# Patient Record
Sex: MALE | Race: WHITE | Employment: FULL TIME | URBAN - METROPOLITAN AREA
[De-identification: names, ages, dates, MRNs, and addresses within clinical notes are randomized per-mention and may not be internally consistent; named-entity substitution may affect disease eponyms.]

---

## 2017-01-27 RX ORDER — TRAMADOL HYDROCHLORIDE 50 MG/1
50 TABLET ORAL EVERY 6 HOURS PRN
Qty: 90 TABLET | Refills: 0 | Status: SHIPPED | OUTPATIENT
Start: 2017-01-27 | End: 2017-02-16 | Stop reason: SDUPTHER

## 2017-02-16 RX ORDER — TRAMADOL HYDROCHLORIDE 50 MG/1
50 TABLET ORAL EVERY 6 HOURS PRN
Qty: 90 TABLET | Refills: 0 | Status: SHIPPED | OUTPATIENT
Start: 2017-02-16 | End: 2017-03-16 | Stop reason: SDUPTHER

## 2017-03-17 RX ORDER — TRAMADOL HYDROCHLORIDE 50 MG/1
50 TABLET ORAL EVERY 6 HOURS PRN
Qty: 90 TABLET | Refills: 0 | Status: SHIPPED | OUTPATIENT
Start: 2017-03-17 | End: 2017-04-11 | Stop reason: SDUPTHER

## 2017-04-11 ENCOUNTER — OFFICE VISIT (OUTPATIENT)
Dept: PRIMARY CARE CLINIC | Age: 52
End: 2017-04-11
Payer: COMMERCIAL

## 2017-04-11 VITALS
WEIGHT: 185.4 LBS | RESPIRATION RATE: 18 BRPM | DIASTOLIC BLOOD PRESSURE: 72 MMHG | SYSTOLIC BLOOD PRESSURE: 118 MMHG | BODY MASS INDEX: 24.57 KG/M2 | HEIGHT: 73 IN | HEART RATE: 60 BPM

## 2017-04-11 DIAGNOSIS — G89.29 CHRONIC MIDLINE LOW BACK PAIN WITHOUT SCIATICA: Primary | ICD-10-CM

## 2017-04-11 DIAGNOSIS — G47.33 OBSTRUCTIVE SLEEP APNEA SYNDROME: ICD-10-CM

## 2017-04-11 DIAGNOSIS — M51.37 DISC DISEASE, DEGENERATIVE, LUMBAR OR LUMBOSACRAL: ICD-10-CM

## 2017-04-11 DIAGNOSIS — M54.50 CHRONIC MIDLINE LOW BACK PAIN WITHOUT SCIATICA: Primary | ICD-10-CM

## 2017-04-11 DIAGNOSIS — J30.89 PERENNIAL ALLERGIC RHINITIS, UNSPECIFIED ALLERGIC RHINITIS TRIGGER: ICD-10-CM

## 2017-04-11 PROCEDURE — 99214 OFFICE O/P EST MOD 30 MIN: CPT | Performed by: NURSE PRACTITIONER

## 2017-04-11 RX ORDER — TRAMADOL HYDROCHLORIDE 50 MG/1
50 TABLET ORAL EVERY 6 HOURS PRN
Qty: 90 TABLET | Refills: 0 | Status: SHIPPED | OUTPATIENT
Start: 2017-04-11 | End: 2017-05-09 | Stop reason: SDUPTHER

## 2017-04-11 RX ORDER — MELOXICAM 15 MG/1
TABLET ORAL
Qty: 30 TABLET | Refills: 2 | Status: SHIPPED | OUTPATIENT
Start: 2017-04-11 | End: 2017-08-13 | Stop reason: SDUPTHER

## 2017-04-11 RX ORDER — ECHINACEA PURPUREA EXTRACT 125 MG
1 TABLET ORAL PRN
Qty: 1 BOTTLE | Refills: 3 | Status: SHIPPED | OUTPATIENT
Start: 2017-04-11 | End: 2018-01-11 | Stop reason: ALTCHOICE

## 2017-04-11 RX ORDER — FLUTICASONE PROPIONATE 50 MCG
1 SPRAY, SUSPENSION (ML) NASAL DAILY
Qty: 1 BOTTLE | Refills: 3 | Status: SHIPPED | OUTPATIENT
Start: 2017-04-11 | End: 2021-09-07 | Stop reason: ALTCHOICE

## 2017-04-11 ASSESSMENT — ENCOUNTER SYMPTOMS
WHEEZING: 0
SHORTNESS OF BREATH: 0
ABDOMINAL PAIN: 0
SINUS PRESSURE: 0
TROUBLE SWALLOWING: 0
SORE THROAT: 0
COUGH: 0
BLOOD IN STOOL: 0
BACK PAIN: 1
DIARRHEA: 0
CONSTIPATION: 0
VOMITING: 0
RHINORRHEA: 1
NAUSEA: 0

## 2017-05-09 DIAGNOSIS — M51.37 DISC DISEASE, DEGENERATIVE, LUMBAR OR LUMBOSACRAL: ICD-10-CM

## 2017-05-09 DIAGNOSIS — G89.29 CHRONIC MIDLINE LOW BACK PAIN WITHOUT SCIATICA: ICD-10-CM

## 2017-05-09 DIAGNOSIS — M54.50 CHRONIC MIDLINE LOW BACK PAIN WITHOUT SCIATICA: ICD-10-CM

## 2017-05-09 RX ORDER — TRAMADOL HYDROCHLORIDE 50 MG/1
50 TABLET ORAL EVERY 6 HOURS PRN
Qty: 90 TABLET | Refills: 0 | Status: SHIPPED | OUTPATIENT
Start: 2017-05-09 | End: 2017-06-08 | Stop reason: SDUPTHER

## 2017-05-15 RX ORDER — LISINOPRIL 10 MG/1
TABLET ORAL
Qty: 30 TABLET | Refills: 5 | Status: SHIPPED | OUTPATIENT
Start: 2017-05-15 | End: 2017-08-23 | Stop reason: SDUPTHER

## 2017-06-07 DIAGNOSIS — M54.50 CHRONIC MIDLINE LOW BACK PAIN WITHOUT SCIATICA: ICD-10-CM

## 2017-06-07 DIAGNOSIS — M51.37 DISC DISEASE, DEGENERATIVE, LUMBAR OR LUMBOSACRAL: ICD-10-CM

## 2017-06-07 DIAGNOSIS — G89.29 CHRONIC MIDLINE LOW BACK PAIN WITHOUT SCIATICA: ICD-10-CM

## 2017-06-08 RX ORDER — TRAMADOL HYDROCHLORIDE 50 MG/1
TABLET ORAL
Qty: 90 TABLET | Refills: 0 | Status: SHIPPED | OUTPATIENT
Start: 2017-06-08 | End: 2017-06-29 | Stop reason: SDUPTHER

## 2017-06-08 RX ORDER — TRAMADOL HYDROCHLORIDE 50 MG/1
50 TABLET ORAL EVERY 6 HOURS PRN
Qty: 90 TABLET | Refills: 0 | Status: SHIPPED | OUTPATIENT
Start: 2017-06-08 | End: 2017-06-08 | Stop reason: SDUPTHER

## 2017-06-29 DIAGNOSIS — M54.50 CHRONIC MIDLINE LOW BACK PAIN WITHOUT SCIATICA: ICD-10-CM

## 2017-06-29 DIAGNOSIS — M51.37 DISC DISEASE, DEGENERATIVE, LUMBAR OR LUMBOSACRAL: ICD-10-CM

## 2017-06-29 DIAGNOSIS — G89.29 CHRONIC MIDLINE LOW BACK PAIN WITHOUT SCIATICA: ICD-10-CM

## 2017-06-29 RX ORDER — TRAMADOL HYDROCHLORIDE 50 MG/1
TABLET ORAL
Qty: 90 TABLET | Refills: 0 | Status: SHIPPED | OUTPATIENT
Start: 2017-06-29 | End: 2017-07-26 | Stop reason: SDUPTHER

## 2017-07-26 DIAGNOSIS — G89.29 CHRONIC MIDLINE LOW BACK PAIN WITHOUT SCIATICA: ICD-10-CM

## 2017-07-26 DIAGNOSIS — M54.50 CHRONIC MIDLINE LOW BACK PAIN WITHOUT SCIATICA: ICD-10-CM

## 2017-07-26 DIAGNOSIS — M51.37 DISC DISEASE, DEGENERATIVE, LUMBAR OR LUMBOSACRAL: ICD-10-CM

## 2017-07-27 RX ORDER — TRAMADOL HYDROCHLORIDE 50 MG/1
TABLET ORAL
Qty: 90 TABLET | Refills: 0 | Status: SHIPPED | OUTPATIENT
Start: 2017-07-27 | End: 2017-10-12 | Stop reason: SDUPTHER

## 2017-08-13 DIAGNOSIS — G89.29 CHRONIC MIDLINE LOW BACK PAIN WITHOUT SCIATICA: ICD-10-CM

## 2017-08-13 DIAGNOSIS — M51.37 DISC DISEASE, DEGENERATIVE, LUMBAR OR LUMBOSACRAL: ICD-10-CM

## 2017-08-13 DIAGNOSIS — M54.50 CHRONIC MIDLINE LOW BACK PAIN WITHOUT SCIATICA: ICD-10-CM

## 2017-08-14 RX ORDER — MELOXICAM 15 MG/1
TABLET ORAL
Qty: 30 TABLET | Refills: 1 | Status: SHIPPED | OUTPATIENT
Start: 2017-08-14 | End: 2017-10-20 | Stop reason: SDUPTHER

## 2017-08-21 DIAGNOSIS — M51.37 DISC DISEASE, DEGENERATIVE, LUMBAR OR LUMBOSACRAL: ICD-10-CM

## 2017-08-21 DIAGNOSIS — G89.29 CHRONIC MIDLINE LOW BACK PAIN WITHOUT SCIATICA: ICD-10-CM

## 2017-08-21 DIAGNOSIS — M54.50 CHRONIC MIDLINE LOW BACK PAIN WITHOUT SCIATICA: ICD-10-CM

## 2017-08-21 RX ORDER — TRAMADOL HYDROCHLORIDE 50 MG/1
TABLET ORAL
Qty: 90 TABLET | Refills: 0 | OUTPATIENT
Start: 2017-08-21

## 2017-08-23 ENCOUNTER — OFFICE VISIT (OUTPATIENT)
Dept: PRIMARY CARE CLINIC | Age: 52
End: 2017-08-23
Payer: COMMERCIAL

## 2017-08-23 VITALS
SYSTOLIC BLOOD PRESSURE: 122 MMHG | RESPIRATION RATE: 14 BRPM | HEART RATE: 70 BPM | BODY MASS INDEX: 24.39 KG/M2 | HEIGHT: 73 IN | WEIGHT: 184 LBS | DIASTOLIC BLOOD PRESSURE: 68 MMHG

## 2017-08-23 DIAGNOSIS — G89.29 CHRONIC MIDLINE LOW BACK PAIN WITHOUT SCIATICA: ICD-10-CM

## 2017-08-23 DIAGNOSIS — M54.50 CHRONIC MIDLINE LOW BACK PAIN WITHOUT SCIATICA: ICD-10-CM

## 2017-08-23 DIAGNOSIS — I10 ESSENTIAL HYPERTENSION: Primary | Chronic | ICD-10-CM

## 2017-08-23 DIAGNOSIS — M51.37 DISC DISEASE, DEGENERATIVE, LUMBAR OR LUMBOSACRAL: ICD-10-CM

## 2017-08-23 DIAGNOSIS — M54.42 ACUTE LEFT-SIDED LOW BACK PAIN WITH LEFT-SIDED SCIATICA: ICD-10-CM

## 2017-08-23 PROCEDURE — 99214 OFFICE O/P EST MOD 30 MIN: CPT | Performed by: NURSE PRACTITIONER

## 2017-08-23 RX ORDER — TRAMADOL HYDROCHLORIDE 200 MG/1
200 TABLET, FILM COATED, EXTENDED RELEASE ORAL DAILY
Qty: 30 TABLET | Refills: 0 | Status: SHIPPED | OUTPATIENT
Start: 2017-08-23 | End: 2017-11-10 | Stop reason: SDUPTHER

## 2017-08-23 RX ORDER — TIZANIDINE 4 MG/1
4 TABLET ORAL 3 TIMES DAILY
Qty: 90 TABLET | Refills: 2 | Status: SHIPPED | OUTPATIENT
Start: 2017-08-23 | End: 2018-09-21 | Stop reason: SDUPTHER

## 2017-08-23 RX ORDER — LIDOCAINE 50 MG/G
1 PATCH TOPICAL DAILY
Qty: 30 PATCH | Refills: 0 | Status: SHIPPED | OUTPATIENT
Start: 2017-08-23 | End: 2018-04-23

## 2017-08-23 RX ORDER — TRAMADOL HYDROCHLORIDE 50 MG/1
TABLET ORAL
Qty: 90 TABLET | Refills: 0 | Status: CANCELLED | OUTPATIENT
Start: 2017-08-23

## 2017-08-23 RX ORDER — LISINOPRIL 10 MG/1
TABLET ORAL
Qty: 30 TABLET | Refills: 5 | Status: SHIPPED | OUTPATIENT
Start: 2017-08-23 | End: 2018-04-23 | Stop reason: SDUPTHER

## 2017-08-23 ASSESSMENT — ENCOUNTER SYMPTOMS
BOWEL INCONTINENCE: 0
BACK PAIN: 1
ABDOMINAL PAIN: 0
SHORTNESS OF BREATH: 0
COUGH: 0

## 2017-08-23 ASSESSMENT — PATIENT HEALTH QUESTIONNAIRE - PHQ9
2. FEELING DOWN, DEPRESSED OR HOPELESS: 0
SUM OF ALL RESPONSES TO PHQ QUESTIONS 1-9: 0
1. LITTLE INTEREST OR PLEASURE IN DOING THINGS: 0
SUM OF ALL RESPONSES TO PHQ9 QUESTIONS 1 & 2: 0

## 2017-08-24 ENCOUNTER — TELEPHONE (OUTPATIENT)
Dept: PRIMARY CARE CLINIC | Age: 52
End: 2017-08-24

## 2017-09-12 ENCOUNTER — TELEPHONE (OUTPATIENT)
Dept: PRIMARY CARE CLINIC | Age: 52
End: 2017-09-12

## 2017-09-13 RX ORDER — TRAMADOL HYDROCHLORIDE 50 MG/1
50 TABLET ORAL EVERY 8 HOURS PRN
Qty: 90 TABLET | Refills: 0 | Status: SHIPPED | OUTPATIENT
Start: 2017-09-13 | End: 2017-10-11 | Stop reason: SDUPTHER

## 2017-10-11 DIAGNOSIS — M54.50 CHRONIC MIDLINE LOW BACK PAIN WITHOUT SCIATICA: ICD-10-CM

## 2017-10-11 DIAGNOSIS — M51.37 DISC DISEASE, DEGENERATIVE, LUMBAR OR LUMBOSACRAL: ICD-10-CM

## 2017-10-11 DIAGNOSIS — G89.29 CHRONIC MIDLINE LOW BACK PAIN WITHOUT SCIATICA: ICD-10-CM

## 2017-10-12 RX ORDER — TRAMADOL HYDROCHLORIDE 50 MG/1
50 TABLET ORAL EVERY 8 HOURS PRN
Qty: 90 TABLET | Refills: 0 | Status: SHIPPED | OUTPATIENT
Start: 2017-10-12 | End: 2017-11-10 | Stop reason: SDUPTHER

## 2017-10-20 DIAGNOSIS — M51.37 DISC DISEASE, DEGENERATIVE, LUMBAR OR LUMBOSACRAL: ICD-10-CM

## 2017-10-20 DIAGNOSIS — M54.50 CHRONIC MIDLINE LOW BACK PAIN WITHOUT SCIATICA: ICD-10-CM

## 2017-10-20 DIAGNOSIS — G89.29 CHRONIC MIDLINE LOW BACK PAIN WITHOUT SCIATICA: ICD-10-CM

## 2017-10-20 RX ORDER — MELOXICAM 15 MG/1
TABLET ORAL
Qty: 30 TABLET | Refills: 0 | Status: SHIPPED | OUTPATIENT
Start: 2017-10-20 | End: 2017-11-21 | Stop reason: SDUPTHER

## 2017-11-10 RX ORDER — TRAMADOL HYDROCHLORIDE 50 MG/1
TABLET ORAL
Qty: 90 TABLET | Refills: 0 | Status: SHIPPED | OUTPATIENT
Start: 2017-11-10 | End: 2017-12-08 | Stop reason: SDUPTHER

## 2017-11-10 NOTE — TELEPHONE ENCOUNTER
Last OV 8/23/17    Health Maintenance   Topic Date Due    Lipid screen  06/12/2003    Flu vaccine (1) 11/10/2017 (Originally 9/1/2017)    Hepatitis C screen  11/10/2017 (Originally 1965)    HIV screen  11/10/2017 (Originally 6/12/1978)    Colon cancer screen colonoscopy  11/10/2017 (Originally 6/12/2013)    DTaP/Tdap/Td vaccine (2 - Td) 01/01/2024             (applicable per patient's age: Cancer Screenings, Depression Screening, Fall Risk Screening, Immunizations)    BUN (mg/dL)   Date Value   09/10/2012 14      (goal A1C is < 7)   (goal LDL is <100) need 30-50% reduction from baseline     BP Readings from Last 3 Encounters:   08/23/17 122/68   04/11/17 118/72   12/12/16 128/78    (goal /80)      All Future Testing planned in [facility]:  Lab Frequency Next Occurrence   Lipid Panel Once 11/10/2017   CBC Auto Differential Once 05/97/2780   Comp Metabolic w Bili Profile Once 11/10/2017       Next Visit Date:  Future Appointments  Date Time Provider Yoli Griggs   11/28/2017 3:20 PM Hortencia Thompson CNP [facility]            Patient Active Problem List:     Restless leg syndrome     Hyperlipidemia     Disc disease, degenerative, lumbar or lumbosacral     Somatic dysfunction of cervical region     Chronic midline low back pain without sciatica     Essential hypertension     Obstructive sleep apnea syndrome

## 2017-11-21 DIAGNOSIS — M54.50 CHRONIC MIDLINE LOW BACK PAIN WITHOUT SCIATICA: ICD-10-CM

## 2017-11-21 DIAGNOSIS — M51.37 DISC DISEASE, DEGENERATIVE, LUMBAR OR LUMBOSACRAL: ICD-10-CM

## 2017-11-21 DIAGNOSIS — G89.29 CHRONIC MIDLINE LOW BACK PAIN WITHOUT SCIATICA: ICD-10-CM

## 2017-11-21 RX ORDER — MELOXICAM 15 MG/1
TABLET ORAL
Qty: 30 TABLET | Refills: 5 | Status: SHIPPED | OUTPATIENT
Start: 2017-11-21 | End: 2018-12-14

## 2017-12-08 RX ORDER — TRAMADOL HYDROCHLORIDE 50 MG/1
TABLET ORAL
Qty: 90 TABLET | Refills: 0 | Status: SHIPPED | OUTPATIENT
Start: 2017-12-08 | End: 2018-01-08 | Stop reason: SDUPTHER

## 2018-01-08 DIAGNOSIS — M51.37 DISC DISEASE, DEGENERATIVE, LUMBAR OR LUMBOSACRAL: Primary | ICD-10-CM

## 2018-01-08 DIAGNOSIS — M99.01 SOMATIC DYSFUNCTION OF CERVICAL REGION: ICD-10-CM

## 2018-01-08 DIAGNOSIS — M54.50 CHRONIC MIDLINE LOW BACK PAIN WITHOUT SCIATICA: ICD-10-CM

## 2018-01-08 DIAGNOSIS — G89.29 CHRONIC MIDLINE LOW BACK PAIN WITHOUT SCIATICA: ICD-10-CM

## 2018-01-09 RX ORDER — TRAMADOL HYDROCHLORIDE 50 MG/1
TABLET ORAL
Qty: 90 TABLET | Refills: 0 | Status: SHIPPED | OUTPATIENT
Start: 2018-01-09 | End: 2018-01-29 | Stop reason: SDUPTHER

## 2018-01-11 ENCOUNTER — OFFICE VISIT (OUTPATIENT)
Dept: PRIMARY CARE CLINIC | Age: 53
End: 2018-01-11
Payer: COMMERCIAL

## 2018-01-11 VITALS
OXYGEN SATURATION: 97 % | HEART RATE: 68 BPM | RESPIRATION RATE: 18 BRPM | SYSTOLIC BLOOD PRESSURE: 102 MMHG | BODY MASS INDEX: 23.67 KG/M2 | DIASTOLIC BLOOD PRESSURE: 62 MMHG | WEIGHT: 178.6 LBS | HEIGHT: 73 IN

## 2018-01-11 DIAGNOSIS — Z12.11 SCREENING FOR COLON CANCER: ICD-10-CM

## 2018-01-11 DIAGNOSIS — I10 ESSENTIAL HYPERTENSION: Primary | Chronic | ICD-10-CM

## 2018-01-11 DIAGNOSIS — M51.37 DISC DISEASE, DEGENERATIVE, LUMBAR OR LUMBOSACRAL: ICD-10-CM

## 2018-01-11 DIAGNOSIS — Z13.0 SCREENING FOR DEFICIENCY ANEMIA: ICD-10-CM

## 2018-01-11 DIAGNOSIS — G89.29 CHRONIC MIDLINE LOW BACK PAIN WITHOUT SCIATICA: ICD-10-CM

## 2018-01-11 DIAGNOSIS — M54.50 CHRONIC MIDLINE LOW BACK PAIN WITHOUT SCIATICA: ICD-10-CM

## 2018-01-11 DIAGNOSIS — R06.02 SHORT OF BREATH ON EXERTION: ICD-10-CM

## 2018-01-11 DIAGNOSIS — Z13.29 SCREENING FOR THYROID DISORDER: ICD-10-CM

## 2018-01-11 DIAGNOSIS — E78.2 MIXED HYPERLIPIDEMIA: ICD-10-CM

## 2018-01-11 DIAGNOSIS — G47.33 OBSTRUCTIVE SLEEP APNEA SYNDROME: ICD-10-CM

## 2018-01-11 PROCEDURE — 99214 OFFICE O/P EST MOD 30 MIN: CPT | Performed by: NURSE PRACTITIONER

## 2018-01-11 PROCEDURE — G8420 CALC BMI NORM PARAMETERS: HCPCS | Performed by: NURSE PRACTITIONER

## 2018-01-11 PROCEDURE — G8427 DOCREV CUR MEDS BY ELIG CLIN: HCPCS | Performed by: NURSE PRACTITIONER

## 2018-01-11 PROCEDURE — 1036F TOBACCO NON-USER: CPT | Performed by: NURSE PRACTITIONER

## 2018-01-11 PROCEDURE — 3017F COLORECTAL CA SCREEN DOC REV: CPT | Performed by: NURSE PRACTITIONER

## 2018-01-11 PROCEDURE — G8484 FLU IMMUNIZE NO ADMIN: HCPCS | Performed by: NURSE PRACTITIONER

## 2018-01-11 ASSESSMENT — ENCOUNTER SYMPTOMS
SINUS PRESSURE: 0
CONSTIPATION: 0
BLOOD IN STOOL: 0
ABDOMINAL PAIN: 0
VOMITING: 0
TROUBLE SWALLOWING: 0
NAUSEA: 0
SHORTNESS OF BREATH: 0
WHEEZING: 0
BACK PAIN: 1
SORE THROAT: 0
COUGH: 0
DIARRHEA: 0

## 2018-01-11 NOTE — PROGRESS NOTES
Chronic Disease Visit Information    BP Readings from Last 3 Encounters:   08/23/17 122/68   04/11/17 118/72   12/12/16 128/78          BUN (mg/dL)   Date Value   09/10/2012 14     CREATININE (mg/dL)   Date Value   09/10/2012 0.78     Glucose (mg/dL)   Date Value   09/10/2012 119 (H)            Have you changed or started any medications since your last visit including any over-the-counter medicines, vitamins, or herbal medicines? no   Are you having any side effects from any of your medications? -  no  Have you stopped taking any of your medications? Is so, why? -  no    Have you seen any other physician or provider since your last visit? No  Have you had any other diagnostic tests since your last visit? No  Have you been seen in the emergency room and/or had an admission to a hospital since we last saw you? No  Have you had your annual diabetic retinal (eye) exam? No, na  Have you had your routine dental cleaning in the past 6 months? no    Have you activated your [employer] account? If not, what are your barriers?  No: not interested     Patient Care Team:  Melvi Jenkins CNP as PCP - General (Family Nurse Practitioner)         Medical History Review  Past Medical, Family, and Social History reviewed and does contribute to the patient presenting condition    Health Maintenance   Topic Date Due    Potassium monitoring  1965    Hepatitis C screen  1965    HIV screen  06/12/1978    Lipid screen  06/12/2003    Colon cancer screen colonoscopy  06/12/2013    Creatinine monitoring  09/10/2013    Flu vaccine (1) 09/01/2017    DTaP/Tdap/Td vaccine (2 - Td) 01/01/2024

## 2018-01-11 NOTE — PROGRESS NOTES
[facility]  [facility]  [address]  [address] [place] [address]  Dept: [phone]  Dept Fax: [phone]    Shashank Sanchez is a 45 y.o. male who presents today for his medical conditions/complaints as noted below. Shashank Sanchez is c/o of   Chief Complaint   Patient presents with    Back Pain     chronic, 3 month visit    Hypertension     3 month visit    Shortness of Breath     has been getting worse, was using nasal spray for sinus         HPI:     Here today for follow up  Has been noticing some SOB with exertion/stair climbing the past few months    Has a lot of nasal congestion, using flonase nightly, reports did take antihistamine for a few days and it helped a lot    Reports his lower back pain is a bit worse, has noticed \"clicking\" with movement  Has felt a bulge in right lower back  Has been using 2 tramadol in AM and 1 in PM, wondering if he can take 2 both AM and PM      Has not been using his CPAP, would like to try again  Plans to call equipment provider to assist with adjusting        Shortness of Breath   This is a new problem. The current episode started more than 1 month ago. The problem occurs daily. The problem has been unchanged. Pertinent negatives include no abdominal pain, chest pain, ear pain, fever, headaches, leg swelling, rash, sore throat, vomiting or wheezing. The symptoms are aggravated by occupational exposure and exercise. Treatments tried: Flonase. The treatment provided no relief. There is no history of allergies, asthma or chronic lung disease. Back Pain   This is a chronic problem. The current episode started more than 1 year ago. The problem occurs 2 to 4 times per day. The problem has been gradually worsening since onset. The pain is present in the lumbar spine. The quality of the pain is described as aching. The pain does not radiate. The pain is at a severity of 7/10. The pain is moderate.  The symptoms are aggravated by distension. Musculoskeletal:        Lumbar back: He exhibits tenderness. He exhibits normal range of motion. No palpable \"bulge\" in the area that patient reportedly has felt it in, mild swelling on the right side   Neurological: He is alert and oriented to person, place, and time. Skin: Skin is warm and dry. Psychiatric: He has a normal mood and affect. His behavior is normal. Judgment and thought content normal.     /62 (Site: Left Arm, Position: Sitting, Cuff Size: Large Adult)   Pulse 68   Resp 18   Ht 6' 1\" (1.854 m)   Wt 178 lb 9.6 oz (81 kg)   SpO2 97%   BMI 23.56 kg/m²     Assessment:      1. Essential hypertension  Comprehensive Metabolic Panel    Lipid Panel   2. Disc disease, degenerative, lumbar or lumbosacral     3. Chronic midline low back pain without sciatica     4. Obstructive sleep apnea syndrome     5. Short of breath on exertion     6. Mixed hyperlipidemia  Comprehensive Metabolic Panel    Lipid Panel   7. Screening for colon cancer  Sada Pena MD, [facility]*   8. Screening for deficiency anemia  CBC Auto Differential   9. Screening for thyroid disorder  TSH with Reflex             Plan:      Return in about 3 months (around 4/11/2018) for back pain.     Orders Placed This Encounter   Procedures    Comprehensive Metabolic Panel     Standing Status:   Future     Standing Expiration Date:   1/11/2019    CBC Auto Differential     Standing Status:   Future     Standing Expiration Date:   1/11/2019    Lipid Panel     Standing Status:   Future     Standing Expiration Date:   1/11/2019     Order Specific Question:   Is Patient Fasting?/# of Hours     Answer:   8    TSH with Reflex     Standing Status:   Future     Standing Expiration Date:   1/11/2019   Sada Pena MD, [facility]*     Referral Priority:   Routine     Referral Type:   Consult for Advice and Opinion     Referral Reason:   Specialty Services Required

## 2018-01-17 ENCOUNTER — TELEPHONE (OUTPATIENT)
Dept: PRIMARY CARE CLINIC | Age: 53
End: 2018-01-17

## 2018-01-25 DIAGNOSIS — Z12.11 COLON CANCER SCREENING: Primary | ICD-10-CM

## 2018-01-25 RX ORDER — POLYETHYLENE GLYCOL 3350 17 G/17G
POWDER, FOR SOLUTION ORAL
Qty: 255 G | Refills: 0 | Status: SHIPPED | OUTPATIENT
Start: 2018-01-25 | End: 2018-02-24

## 2018-01-29 DIAGNOSIS — M99.01 SOMATIC DYSFUNCTION OF CERVICAL REGION: ICD-10-CM

## 2018-01-29 DIAGNOSIS — M54.50 CHRONIC MIDLINE LOW BACK PAIN WITHOUT SCIATICA: ICD-10-CM

## 2018-01-29 DIAGNOSIS — M51.37 DISC DISEASE, DEGENERATIVE, LUMBAR OR LUMBOSACRAL: ICD-10-CM

## 2018-01-29 DIAGNOSIS — G89.29 CHRONIC MIDLINE LOW BACK PAIN WITHOUT SCIATICA: ICD-10-CM

## 2018-01-29 NOTE — TELEPHONE ENCOUNTER
1/11/18 LV pt states at last appointment he was told he was able to take 4 tramadol per day. Please advise. Thank you.

## 2018-01-30 RX ORDER — TRAMADOL HYDROCHLORIDE 50 MG/1
TABLET ORAL
Qty: 120 TABLET | Refills: 0 | Status: SHIPPED | OUTPATIENT
Start: 2018-01-30 | End: 2018-02-01 | Stop reason: SDUPTHER

## 2018-02-01 ENCOUNTER — TELEPHONE (OUTPATIENT)
Dept: PRIMARY CARE CLINIC | Age: 53
End: 2018-02-01

## 2018-02-01 DIAGNOSIS — M51.37 DISC DISEASE, DEGENERATIVE, LUMBAR OR LUMBOSACRAL: ICD-10-CM

## 2018-02-01 DIAGNOSIS — M54.50 CHRONIC MIDLINE LOW BACK PAIN WITHOUT SCIATICA: ICD-10-CM

## 2018-02-01 DIAGNOSIS — G89.29 CHRONIC MIDLINE LOW BACK PAIN WITHOUT SCIATICA: ICD-10-CM

## 2018-02-01 DIAGNOSIS — M99.01 SOMATIC DYSFUNCTION OF CERVICAL REGION: ICD-10-CM

## 2018-02-01 RX ORDER — TRAMADOL HYDROCHLORIDE 50 MG/1
TABLET ORAL
Qty: 120 TABLET | Refills: 0 | Status: SHIPPED | OUTPATIENT
Start: 2018-02-01 | End: 2018-02-27 | Stop reason: SDUPTHER

## 2018-02-16 ENCOUNTER — HOSPITAL ENCOUNTER (EMERGENCY)
Age: 53
Discharge: HOME | End: 2018-02-16
Attending: EMERGENCY MEDICINE
Payer: COMMERCIAL

## 2018-02-16 VITALS
RESPIRATION RATE: 15 BRPM | TEMPERATURE: 98 F | OXYGEN SATURATION: 96 % | SYSTOLIC BLOOD PRESSURE: 101 MMHG | BODY MASS INDEX: 22.35 KG/M2 | WEIGHT: 165 LBS | HEIGHT: 72 IN | DIASTOLIC BLOOD PRESSURE: 58 MMHG | HEART RATE: 67 BPM

## 2018-02-16 DIAGNOSIS — R11.2 NAUSEA AND VOMITING, INTRACTABILITY OF VOMITING NOT SPECIFIED, UNSPECIFIED VOMITING TYPE: ICD-10-CM

## 2018-02-16 DIAGNOSIS — R10.13 ABDOMINAL PAIN, EPIGASTRIC: Primary | ICD-10-CM

## 2018-02-16 LAB
ABSOLUTE EOS #: 0.1 K/UL (ref 0–0.4)
ABSOLUTE IMMATURE GRANULOCYTE: ABNORMAL K/UL (ref 0–0.3)
ABSOLUTE LYMPH #: 1.3 K/UL (ref 1–4.8)
ABSOLUTE MONO #: 0.5 K/UL (ref 0.1–1.3)
ALBUMIN SERPL-MCNC: 4.6 G/DL (ref 3.5–5.2)
ALBUMIN/GLOBULIN RATIO: ABNORMAL (ref 1–2.5)
ALP BLD-CCNC: 73 U/L (ref 40–129)
ALT SERPL-CCNC: 18 U/L (ref 5–41)
ANION GAP SERPL CALCULATED.3IONS-SCNC: 13 MMOL/L (ref 9–17)
AST SERPL-CCNC: 19 U/L
BASOPHILS # BLD: 1 % (ref 0–2)
BASOPHILS ABSOLUTE: 0 K/UL (ref 0–0.2)
BILIRUB SERPL-MCNC: 0.56 MG/DL (ref 0.3–1.2)
BUN BLDV-MCNC: 18 MG/DL (ref 6–20)
BUN/CREAT BLD: ABNORMAL (ref 9–20)
CALCIUM SERPL-MCNC: 9.2 MG/DL (ref 8.6–10.4)
CHLORIDE BLD-SCNC: 100 MMOL/L (ref 98–107)
CO2: 25 MMOL/L (ref 20–31)
CREAT SERPL-MCNC: 0.78 MG/DL (ref 0.7–1.2)
DIFFERENTIAL TYPE: ABNORMAL
EKG ATRIAL RATE: 63 BPM
EKG P AXIS: 52 DEGREES
EKG P-R INTERVAL: 140 MS
EKG Q-T INTERVAL: 388 MS
EKG QRS DURATION: 94 MS
EKG QTC CALCULATION (BAZETT): 397 MS
EKG R AXIS: 83 DEGREES
EKG T AXIS: 74 DEGREES
EKG VENTRICULAR RATE: 63 BPM
EOSINOPHILS RELATIVE PERCENT: 1 % (ref 0–4)
GFR AFRICAN AMERICAN: >60 ML/MIN
GFR NON-AFRICAN AMERICAN: >60 ML/MIN
GFR SERPL CREATININE-BSD FRML MDRD: ABNORMAL ML/MIN/{1.73_M2}
GFR SERPL CREATININE-BSD FRML MDRD: ABNORMAL ML/MIN/{1.73_M2}
GLUCOSE BLD-MCNC: 117 MG/DL (ref 70–99)
HCT VFR BLD CALC: 49.7 % (ref 41–53)
HEMOGLOBIN: 17.2 G/DL (ref 13.5–17.5)
IMMATURE GRANULOCYTES: ABNORMAL %
LIPASE: 27 U/L (ref 13–60)
LYMPHOCYTES # BLD: 15 % (ref 24–44)
MCH RBC QN AUTO: 31.1 PG (ref 26–34)
MCHC RBC AUTO-ENTMCNC: 34.6 G/DL (ref 31–37)
MCV RBC AUTO: 89.9 FL (ref 80–100)
MONOCYTES # BLD: 6 % (ref 1–7)
NRBC AUTOMATED: ABNORMAL PER 100 WBC
PDW BLD-RTO: 12.2 % (ref 11.5–14.9)
PLATELET # BLD: 295 K/UL (ref 150–450)
PLATELET ESTIMATE: ABNORMAL
PMV BLD AUTO: 8.1 FL (ref 6–12)
POTASSIUM SERPL-SCNC: 4.6 MMOL/L (ref 3.7–5.3)
RBC # BLD: 5.53 M/UL (ref 4.5–5.9)
RBC # BLD: ABNORMAL 10*6/UL
SEG NEUTROPHILS: 77 % (ref 36–66)
SEGMENTED NEUTROPHILS ABSOLUTE COUNT: 6.5 K/UL (ref 1.3–9.1)
SODIUM BLD-SCNC: 138 MMOL/L (ref 135–144)
TOTAL PROTEIN: 7.5 G/DL (ref 6.4–8.3)
WBC # BLD: 8.4 K/UL (ref 3.5–11)
WBC # BLD: ABNORMAL 10*3/UL

## 2018-02-16 PROCEDURE — 6370000000 HC RX 637 (ALT 250 FOR IP): Performed by: EMERGENCY MEDICINE

## 2018-02-16 PROCEDURE — 99284 EMERGENCY DEPT VISIT MOD MDM: CPT

## 2018-02-16 PROCEDURE — 85025 COMPLETE CBC W/AUTO DIFF WBC: CPT

## 2018-02-16 PROCEDURE — 2580000003 HC RX 258: Performed by: EMERGENCY MEDICINE

## 2018-02-16 PROCEDURE — 83690 ASSAY OF LIPASE: CPT

## 2018-02-16 PROCEDURE — 93005 ELECTROCARDIOGRAM TRACING: CPT

## 2018-02-16 PROCEDURE — 6360000002 HC RX W HCPCS: Performed by: EMERGENCY MEDICINE

## 2018-02-16 PROCEDURE — 96374 THER/PROPH/DIAG INJ IV PUSH: CPT

## 2018-02-16 PROCEDURE — 36415 COLL VENOUS BLD VENIPUNCTURE: CPT

## 2018-02-16 PROCEDURE — 80053 COMPREHEN METABOLIC PANEL: CPT

## 2018-02-16 RX ORDER — ONDANSETRON 4 MG/1
4 TABLET, ORALLY DISINTEGRATING ORAL EVERY 8 HOURS PRN
Qty: 10 TABLET | Refills: 0 | Status: SHIPPED | OUTPATIENT
Start: 2018-02-16 | End: 2018-04-23 | Stop reason: ALTCHOICE

## 2018-02-16 RX ORDER — 0.9 % SODIUM CHLORIDE 0.9 %
1000 INTRAVENOUS SOLUTION INTRAVENOUS ONCE
Status: COMPLETED | OUTPATIENT
Start: 2018-02-16 | End: 2018-02-16

## 2018-02-16 RX ORDER — DICYCLOMINE HYDROCHLORIDE 10 MG/1
10 CAPSULE ORAL ONCE
Status: COMPLETED | OUTPATIENT
Start: 2018-02-16 | End: 2018-02-16

## 2018-02-16 RX ORDER — FAMOTIDINE 20 MG/1
20 TABLET, FILM COATED ORAL 2 TIMES DAILY
Qty: 30 TABLET | Refills: 0 | Status: SHIPPED | OUTPATIENT
Start: 2018-02-16 | End: 2018-04-23

## 2018-02-16 RX ORDER — ONDANSETRON 2 MG/ML
4 INJECTION INTRAMUSCULAR; INTRAVENOUS ONCE
Status: COMPLETED | OUTPATIENT
Start: 2018-02-16 | End: 2018-02-16

## 2018-02-16 RX ADMIN — DICYCLOMINE HYDROCHLORIDE 10 MG: 10 CAPSULE ORAL at 12:41

## 2018-02-16 RX ADMIN — SODIUM CHLORIDE 1000 ML: 9 INJECTION, SOLUTION INTRAVENOUS at 11:32

## 2018-02-16 RX ADMIN — ONDANSETRON 4 MG: 2 INJECTION INTRAMUSCULAR; INTRAVENOUS at 11:33

## 2018-02-16 ASSESSMENT — PAIN DESCRIPTION - PAIN TYPE: TYPE: ACUTE PAIN

## 2018-02-16 ASSESSMENT — ENCOUNTER SYMPTOMS
BACK PAIN: 0
EYE PAIN: 0
COUGH: 0
ABDOMINAL PAIN: 1
RHINORRHEA: 0
NAUSEA: 1
EYE REDNESS: 0
VOMITING: 1
SHORTNESS OF BREATH: 0
EYE DISCHARGE: 0
DIARRHEA: 0

## 2018-02-16 ASSESSMENT — PAIN DESCRIPTION - LOCATION: LOCATION: CHEST;ABDOMEN

## 2018-02-16 ASSESSMENT — PAIN - FUNCTIONAL ASSESSMENT: PAIN_FUNCTIONAL_ASSESSMENT: 0-10

## 2018-02-16 ASSESSMENT — PAIN SCALES - GENERAL
PAINLEVEL_OUTOF10: 8
PAINLEVEL_OUTOF10: 9

## 2018-02-16 ASSESSMENT — PAIN DESCRIPTION - DESCRIPTORS: DESCRIPTORS: ACHING

## 2018-02-16 NOTE — ED PROVIDER NOTES
[address]  eMERGENCY dEPARTMENT eNCOUnter      Pt Name: Tisha Borges  MRN: [id number]  [place] 1965  Date of evaluation: 2/16/2018  PCP:    Jovon Spears CNP      CHIEF COMPLAINT       Nausea vomiting and abdominal discomfort    HISTORY OF PRESENT ILLNESS   HPI   Tisha Bogres is a 45 y.o. male who presents For evaluation for not feeling well. Patient states the last couple days he felt some nausea. States he's had a few episodes of vomiting. He says lightheaded at times. His his blood pressure is elevated however at this time is back normal.  No bowel movement changes. He has abdominal Comes and goes. No abdominal pain right now. He says he doesn't feel well. Otherwise no other complaints       REVIEW OF SYSTEMS         Review of Systems   Constitutional: Positive for fatigue. Negative for chills and fever. HENT: Negative for congestion and rhinorrhea. Eyes: Negative for pain, discharge and redness. Respiratory: Negative for cough and shortness of breath. Cardiovascular: Negative for chest pain. Gastrointestinal: Positive for abdominal pain, nausea and vomiting. Negative for diarrhea. Genitourinary: Negative for dysuria and hematuria. Musculoskeletal: Negative for arthralgias, back pain, myalgias, neck pain and neck stiffness. Skin: Negative for rash. Neurological: Negative for light-headedness and headaches. Hematological: Does not bruise/bleed easily.           PAST MEDICAL HISTORY     Past Medical History:   Diagnosis Date    Back pain     Disc disease, degenerative, lumbar or lumbosacral     Hyperlipidemia     Restless leg syndrome        SURGICAL HISTORY       Past Surgical History:   Procedure Laterality Date    HERNIA REPAIR      TONSILLECTOMY AND ADENOIDECTOMY      TYMPANOSTOMY TUBE PLACEMENT Bilateral        CURRENT MEDICATIONS       Previous Medications    FLUTICASONE (FLONASE) 50 MCG/ACT NASAL SPRAY    1 spray by Nasal route moist.   Eyes: Conjunctivae and EOM are normal. Pupils are equal, round, and reactive to light. Right eye exhibits no discharge. Left eye exhibits no discharge. Neck: Normal range of motion. Neck supple. Cardiovascular: Normal rate, regular rhythm, normal heart sounds and intact distal pulses. Pulmonary/Chest: Effort normal and breath sounds normal. No respiratory distress. He has no wheezes. Abdominal: Soft. Bowel sounds are normal. He exhibits no distension. There is no tenderness. Musculoskeletal: Normal range of motion. Neurological: He is alert and oriented to person, place, and time. Skin: Skin is warm and dry. Nursing note and vitals reviewed. DIFFERENTIAL DIAGNOSIS/ MDM:     Patient here with more abdominal complaints including nausea and vomiting. Vitals are unremarkable. EKG stable. Workup including symptom medications are given at this time    1245: She continues in stable condition. Workup thus far is unremarkable.   At this time patient with likely more of a gastritis type symptoms and at this time patient placed on Pepcid and Zofran for symptom control    DIAGNOSTIC RESULTS     EKG: All EKG's are interpreted by the Emergency Department Physician who either signs or Co-signs this chart in the absence of a cardiologist.  EKG Interpretation    Interpreted by emergency department physician    Rhythm: normal sinus   Rate: normal  Axis: normal  Ectopy: none  Conduction: normal  ST Segments: no acute change  T Waves: no acute change  Q Waves: none    EKG  Impression: no acute changes        RADIOLOGY:   I directly visualized the following  images and reviewed the radiologist interpretations:  No orders to display          LABS:  Labs Reviewed   CBC WITH AUTO DIFFERENTIAL - Abnormal; Notable for the following:        Result Value    Seg Neutrophils 77 (*)     Lymphocytes 15 (*)     All other components within normal limits   COMPREHENSIVE METABOLIC PANEL - Abnormal; Notable for the

## 2018-02-16 NOTE — ED NOTES
Pt reported that nausea has decreased, but ABD cramping persists; Dr. Shabnam Arreola notified. Verbal orders for PO Bentyl 10mg.      Neli Patterson RN  02/16/18 1169

## 2018-02-27 DIAGNOSIS — G89.29 CHRONIC MIDLINE LOW BACK PAIN WITHOUT SCIATICA: ICD-10-CM

## 2018-02-27 DIAGNOSIS — M99.01 SOMATIC DYSFUNCTION OF CERVICAL REGION: ICD-10-CM

## 2018-02-27 DIAGNOSIS — M54.50 CHRONIC MIDLINE LOW BACK PAIN WITHOUT SCIATICA: ICD-10-CM

## 2018-02-27 DIAGNOSIS — M51.37 DISC DISEASE, DEGENERATIVE, LUMBAR OR LUMBOSACRAL: ICD-10-CM

## 2018-02-27 RX ORDER — TRAMADOL HYDROCHLORIDE 50 MG/1
TABLET ORAL
Qty: 120 TABLET | Refills: 0 | Status: SHIPPED | OUTPATIENT
Start: 2018-02-27 | End: 2018-04-03 | Stop reason: SDUPTHER

## 2018-04-03 DIAGNOSIS — G89.29 CHRONIC MIDLINE LOW BACK PAIN WITHOUT SCIATICA: ICD-10-CM

## 2018-04-03 DIAGNOSIS — M51.37 DISC DISEASE, DEGENERATIVE, LUMBAR OR LUMBOSACRAL: ICD-10-CM

## 2018-04-03 DIAGNOSIS — M54.50 CHRONIC MIDLINE LOW BACK PAIN WITHOUT SCIATICA: ICD-10-CM

## 2018-04-03 DIAGNOSIS — M99.01 SOMATIC DYSFUNCTION OF CERVICAL REGION: ICD-10-CM

## 2018-04-03 RX ORDER — TRAMADOL HYDROCHLORIDE 50 MG/1
TABLET ORAL
Qty: 120 TABLET | Refills: 0 | Status: SHIPPED | OUTPATIENT
Start: 2018-04-03 | End: 2018-05-03 | Stop reason: SDUPTHER

## 2018-04-23 ENCOUNTER — OFFICE VISIT (OUTPATIENT)
Dept: PRIMARY CARE CLINIC | Age: 53
End: 2018-04-23
Payer: COMMERCIAL

## 2018-04-23 VITALS
HEART RATE: 64 BPM | HEIGHT: 72 IN | BODY MASS INDEX: 24.57 KG/M2 | SYSTOLIC BLOOD PRESSURE: 112 MMHG | DIASTOLIC BLOOD PRESSURE: 64 MMHG | WEIGHT: 181.4 LBS | RESPIRATION RATE: 18 BRPM

## 2018-04-23 DIAGNOSIS — Z98.890 H/O LEFT INGUINAL HERNIA REPAIR: ICD-10-CM

## 2018-04-23 DIAGNOSIS — E78.2 MIXED HYPERLIPIDEMIA: ICD-10-CM

## 2018-04-23 DIAGNOSIS — M54.50 CHRONIC MIDLINE LOW BACK PAIN WITHOUT SCIATICA: Primary | ICD-10-CM

## 2018-04-23 DIAGNOSIS — Z87.19 H/O LEFT INGUINAL HERNIA REPAIR: ICD-10-CM

## 2018-04-23 DIAGNOSIS — I10 ESSENTIAL HYPERTENSION: Chronic | ICD-10-CM

## 2018-04-23 DIAGNOSIS — G89.29 CHRONIC MIDLINE LOW BACK PAIN WITHOUT SCIATICA: Primary | ICD-10-CM

## 2018-04-23 DIAGNOSIS — M51.37 DISC DISEASE, DEGENERATIVE, LUMBAR OR LUMBOSACRAL: ICD-10-CM

## 2018-04-23 DIAGNOSIS — R10.32 LLQ PAIN: ICD-10-CM

## 2018-04-23 PROCEDURE — 3017F COLORECTAL CA SCREEN DOC REV: CPT | Performed by: NURSE PRACTITIONER

## 2018-04-23 PROCEDURE — 1036F TOBACCO NON-USER: CPT | Performed by: NURSE PRACTITIONER

## 2018-04-23 PROCEDURE — 99214 OFFICE O/P EST MOD 30 MIN: CPT | Performed by: NURSE PRACTITIONER

## 2018-04-23 PROCEDURE — G8427 DOCREV CUR MEDS BY ELIG CLIN: HCPCS | Performed by: NURSE PRACTITIONER

## 2018-04-23 PROCEDURE — G8420 CALC BMI NORM PARAMETERS: HCPCS | Performed by: NURSE PRACTITIONER

## 2018-04-23 RX ORDER — LIDOCAINE 50 MG/G
1 PATCH TOPICAL DAILY
Qty: 30 PATCH | Refills: 1 | Status: SHIPPED | OUTPATIENT
Start: 2018-04-23 | End: 2018-08-24

## 2018-04-23 RX ORDER — LIDOCAINE 50 MG/G
1 PATCH TOPICAL DAILY
COMMUNITY
End: 2018-04-23 | Stop reason: SDUPTHER

## 2018-04-23 RX ORDER — LISINOPRIL 10 MG/1
TABLET ORAL
Qty: 30 TABLET | Refills: 5 | Status: SHIPPED | OUTPATIENT
Start: 2018-04-23 | End: 2018-05-31 | Stop reason: SDUPTHER

## 2018-04-23 ASSESSMENT — ENCOUNTER SYMPTOMS
COUGH: 0
ABDOMINAL PAIN: 1
SORE THROAT: 0
CONSTIPATION: 0
WHEEZING: 0
TROUBLE SWALLOWING: 0
VOMITING: 0
BACK PAIN: 1
DIARRHEA: 0
SHORTNESS OF BREATH: 0
BLOOD IN STOOL: 0
NAUSEA: 0
SINUS PRESSURE: 0

## 2018-05-03 DIAGNOSIS — G89.29 CHRONIC MIDLINE LOW BACK PAIN WITHOUT SCIATICA: ICD-10-CM

## 2018-05-03 DIAGNOSIS — M99.01 SOMATIC DYSFUNCTION OF CERVICAL REGION: ICD-10-CM

## 2018-05-03 DIAGNOSIS — M51.37 DISC DISEASE, DEGENERATIVE, LUMBAR OR LUMBOSACRAL: ICD-10-CM

## 2018-05-03 DIAGNOSIS — M54.50 CHRONIC MIDLINE LOW BACK PAIN WITHOUT SCIATICA: ICD-10-CM

## 2018-05-03 RX ORDER — TRAMADOL HYDROCHLORIDE 50 MG/1
TABLET ORAL
Qty: 120 TABLET | Refills: 0 | Status: SHIPPED | OUTPATIENT
Start: 2018-05-03 | End: 2018-05-31 | Stop reason: SDUPTHER

## 2018-05-31 DIAGNOSIS — M54.50 CHRONIC MIDLINE LOW BACK PAIN WITHOUT SCIATICA: ICD-10-CM

## 2018-05-31 DIAGNOSIS — M99.01 SOMATIC DYSFUNCTION OF CERVICAL REGION: ICD-10-CM

## 2018-05-31 DIAGNOSIS — I10 ESSENTIAL HYPERTENSION: Chronic | ICD-10-CM

## 2018-05-31 DIAGNOSIS — M51.37 DISC DISEASE, DEGENERATIVE, LUMBAR OR LUMBOSACRAL: ICD-10-CM

## 2018-05-31 DIAGNOSIS — G89.29 CHRONIC MIDLINE LOW BACK PAIN WITHOUT SCIATICA: ICD-10-CM

## 2018-05-31 RX ORDER — TRAMADOL HYDROCHLORIDE 50 MG/1
TABLET ORAL
Qty: 120 TABLET | Refills: 0 | Status: SHIPPED | OUTPATIENT
Start: 2018-05-31 | End: 2018-06-26 | Stop reason: SDUPTHER

## 2018-05-31 RX ORDER — LISINOPRIL 10 MG/1
TABLET ORAL
Qty: 30 TABLET | Refills: 5 | Status: SHIPPED | OUTPATIENT
Start: 2018-05-31 | End: 2018-06-26 | Stop reason: SDUPTHER

## 2018-06-20 ENCOUNTER — TELEPHONE (OUTPATIENT)
Dept: PRIMARY CARE CLINIC | Age: 53
End: 2018-06-20

## 2018-06-20 DIAGNOSIS — F41.9 ANXIETY: Primary | ICD-10-CM

## 2018-06-20 RX ORDER — ALPRAZOLAM 0.25 MG/1
0.25 TABLET ORAL 3 TIMES DAILY PRN
Qty: 45 TABLET | Refills: 0 | Status: SHIPPED | OUTPATIENT
Start: 2018-06-20 | End: 2018-07-05

## 2018-06-26 DIAGNOSIS — G89.29 CHRONIC MIDLINE LOW BACK PAIN WITHOUT SCIATICA: ICD-10-CM

## 2018-06-26 DIAGNOSIS — M54.50 CHRONIC MIDLINE LOW BACK PAIN WITHOUT SCIATICA: ICD-10-CM

## 2018-06-26 DIAGNOSIS — M51.37 DISC DISEASE, DEGENERATIVE, LUMBAR OR LUMBOSACRAL: ICD-10-CM

## 2018-06-26 DIAGNOSIS — I10 ESSENTIAL HYPERTENSION: Chronic | ICD-10-CM

## 2018-06-26 DIAGNOSIS — M99.01 SOMATIC DYSFUNCTION OF CERVICAL REGION: ICD-10-CM

## 2018-06-26 RX ORDER — LISINOPRIL 10 MG/1
TABLET ORAL
Qty: 30 TABLET | Refills: 5 | Status: SHIPPED | OUTPATIENT
Start: 2018-06-26 | End: 2018-08-24 | Stop reason: SDUPTHER

## 2018-06-26 RX ORDER — TRAMADOL HYDROCHLORIDE 50 MG/1
TABLET ORAL
Qty: 120 TABLET | Refills: 0 | Status: SHIPPED | OUTPATIENT
Start: 2018-06-26 | End: 2018-07-23 | Stop reason: SDUPTHER

## 2018-06-27 ENCOUNTER — TELEPHONE (OUTPATIENT)
Dept: PRIMARY CARE CLINIC | Age: 53
End: 2018-06-27

## 2018-07-23 DIAGNOSIS — M99.01 SOMATIC DYSFUNCTION OF CERVICAL REGION: ICD-10-CM

## 2018-07-23 DIAGNOSIS — G89.29 CHRONIC MIDLINE LOW BACK PAIN WITHOUT SCIATICA: ICD-10-CM

## 2018-07-23 DIAGNOSIS — M51.37 DISC DISEASE, DEGENERATIVE, LUMBAR OR LUMBOSACRAL: ICD-10-CM

## 2018-07-23 DIAGNOSIS — M54.50 CHRONIC MIDLINE LOW BACK PAIN WITHOUT SCIATICA: ICD-10-CM

## 2018-07-23 RX ORDER — TRAMADOL HYDROCHLORIDE 50 MG/1
TABLET ORAL
Qty: 120 TABLET | Refills: 0 | Status: SHIPPED | OUTPATIENT
Start: 2018-07-23 | End: 2018-08-22

## 2018-08-24 ENCOUNTER — OFFICE VISIT (OUTPATIENT)
Dept: PRIMARY CARE CLINIC | Age: 53
End: 2018-08-24
Payer: COMMERCIAL

## 2018-08-24 VITALS
SYSTOLIC BLOOD PRESSURE: 120 MMHG | BODY MASS INDEX: 23.73 KG/M2 | RESPIRATION RATE: 18 BRPM | HEART RATE: 60 BPM | HEIGHT: 72 IN | DIASTOLIC BLOOD PRESSURE: 72 MMHG | WEIGHT: 175.2 LBS

## 2018-08-24 DIAGNOSIS — G89.29 CHRONIC MIDLINE LOW BACK PAIN WITHOUT SCIATICA: ICD-10-CM

## 2018-08-24 DIAGNOSIS — M51.37 DISC DISEASE, DEGENERATIVE, LUMBAR OR LUMBOSACRAL: ICD-10-CM

## 2018-08-24 DIAGNOSIS — I10 ESSENTIAL HYPERTENSION: Primary | Chronic | ICD-10-CM

## 2018-08-24 DIAGNOSIS — M54.50 CHRONIC MIDLINE LOW BACK PAIN WITHOUT SCIATICA: ICD-10-CM

## 2018-08-24 PROCEDURE — G8427 DOCREV CUR MEDS BY ELIG CLIN: HCPCS | Performed by: NURSE PRACTITIONER

## 2018-08-24 PROCEDURE — 3017F COLORECTAL CA SCREEN DOC REV: CPT | Performed by: NURSE PRACTITIONER

## 2018-08-24 PROCEDURE — G8420 CALC BMI NORM PARAMETERS: HCPCS | Performed by: NURSE PRACTITIONER

## 2018-08-24 PROCEDURE — 99214 OFFICE O/P EST MOD 30 MIN: CPT | Performed by: NURSE PRACTITIONER

## 2018-08-24 PROCEDURE — 1036F TOBACCO NON-USER: CPT | Performed by: NURSE PRACTITIONER

## 2018-08-24 RX ORDER — TRAMADOL HYDROCHLORIDE 50 MG/1
TABLET ORAL
Qty: 120 TABLET | Refills: 0 | Status: SHIPPED | OUTPATIENT
Start: 2018-08-24 | End: 2018-09-21 | Stop reason: SDUPTHER

## 2018-08-24 RX ORDER — LISINOPRIL 10 MG/1
TABLET ORAL
Qty: 30 TABLET | Refills: 5 | Status: SHIPPED | OUTPATIENT
Start: 2018-08-24 | End: 2018-09-21 | Stop reason: SDUPTHER

## 2018-08-24 RX ORDER — TRAMADOL HYDROCHLORIDE 50 MG/1
50 TABLET ORAL
COMMUNITY
Start: 2016-12-02 | End: 2018-08-24 | Stop reason: SDUPTHER

## 2018-08-24 ASSESSMENT — ENCOUNTER SYMPTOMS
SINUS PRESSURE: 0
ABDOMINAL PAIN: 0
SHORTNESS OF BREATH: 0
BACK PAIN: 1
BLOOD IN STOOL: 0
COUGH: 0
VOMITING: 0
CONSTIPATION: 0
DIARRHEA: 0
NAUSEA: 0
TROUBLE SWALLOWING: 0
SORE THROAT: 0
WHEEZING: 0

## 2018-08-24 ASSESSMENT — PATIENT HEALTH QUESTIONNAIRE - PHQ9
2. FEELING DOWN, DEPRESSED OR HOPELESS: 1
SUM OF ALL RESPONSES TO PHQ QUESTIONS 1-9: 1
1. LITTLE INTEREST OR PLEASURE IN DOING THINGS: 0
SUM OF ALL RESPONSES TO PHQ9 QUESTIONS 1 & 2: 1
SUM OF ALL RESPONSES TO PHQ QUESTIONS 1-9: 1

## 2018-08-24 NOTE — PROGRESS NOTES
Chronic Disease Visit Information    BP Readings from Last 3 Encounters:   04/23/18 112/64   02/16/18 (!) 101/58   01/11/18 102/62          BUN (mg/dL)   Date Value   02/16/2018 18     CREATININE (mg/dL)   Date Value   02/16/2018 0.78     Glucose (mg/dL)   Date Value   02/16/2018 117 (H)            Have you changed or started any medications since your last visit including any over-the-counter medicines, vitamins, or herbal medicines? no   Are you having any side effects from any of your medications? -  no  Have you stopped taking any of your medications? Is so, why? -  no    Have you seen any other physician or provider since your last visit? No  Have you had any other diagnostic tests since your last visit? No  Have you been seen in the emergency room and/or had an admission to a hospital since we last saw you? No  Have you had your annual diabetic retinal (eye) exam? No, na  Have you had your routine dental cleaning in the past 6 months? no    Have you activated your [employer] account? If not, what are your barriers?  No: declined     Patient Care Team:  GITA Montelongo CNP as PCP - General (Family Nurse Practitioner)         Medical History Review  Past Medical, Family, and Social History reviewed and does contribute to the patient presenting condition    Health Maintenance   Topic Date Due    Hepatitis C screen  1965    HIV screen  06/12/1978    Lipid screen  06/12/2003    Shingles Vaccine (1 of 2 - 2 Dose Series) 06/12/2013    Colon cancer screen colonoscopy  06/12/2013    Flu vaccine (1) 01/11/2019 (Originally 9/1/2018)    Potassium monitoring  02/16/2019    Creatinine monitoring  02/16/2019    DTaP/Tdap/Td vaccine (2 - Td) 01/01/2024
Dispense:  30 tablet     Refill:  5      Hypertension-well-controlled, continue current medication, refill provided. Reminded to complete labs prior to next visit  Chronic back pain-stable, refill on tramadol  Controlled Substances Monitoring:   RX Monitoring 8/24/2018   Attestation The Prescription Monitoring Report for this patient was reviewed today. Documentation Obtaining appropriate analgesic effect of treatment. ;No signs of potential drug abuse or diversion identified. Medication Contracts Existing medication contract. Refer for colnoscopy next visit as he does not want to complete until next year     Patient given educational materials - see patient instructions. Discussed use, benefit, and side effects of prescribed medications. All patient questions answered. Pt voiced understanding. Reviewed health maintenance. Instructed to continue current medications, diet and exercise. Patient agreed with treatment plan. Follow up as directed.      Electronically signed by GITA Butler CNP on 8/24/2018 at 5:05 PM

## 2018-09-21 DIAGNOSIS — M51.37 DISC DISEASE, DEGENERATIVE, LUMBAR OR LUMBOSACRAL: ICD-10-CM

## 2018-09-21 DIAGNOSIS — G89.29 CHRONIC MIDLINE LOW BACK PAIN WITHOUT SCIATICA: ICD-10-CM

## 2018-09-21 DIAGNOSIS — I10 ESSENTIAL HYPERTENSION: Chronic | ICD-10-CM

## 2018-09-21 DIAGNOSIS — M54.50 CHRONIC MIDLINE LOW BACK PAIN WITHOUT SCIATICA: ICD-10-CM

## 2018-09-21 RX ORDER — TRAMADOL HYDROCHLORIDE 50 MG/1
100 TABLET ORAL EVERY 12 HOURS
Qty: 120 TABLET | Refills: 0 | Status: SHIPPED | OUTPATIENT
Start: 2018-09-21 | End: 2018-10-19 | Stop reason: SDUPTHER

## 2018-09-21 RX ORDER — LISINOPRIL 10 MG/1
TABLET ORAL
Qty: 30 TABLET | Refills: 5 | Status: SHIPPED | OUTPATIENT
Start: 2018-09-21 | End: 2018-12-14 | Stop reason: SDUPTHER

## 2018-09-21 RX ORDER — TIZANIDINE 4 MG/1
4 TABLET ORAL 3 TIMES DAILY
Qty: 90 TABLET | Refills: 5 | Status: SHIPPED | OUTPATIENT
Start: 2018-09-21 | End: 2019-08-16 | Stop reason: SDUPTHER

## 2018-09-21 NOTE — TELEPHONE ENCOUNTER
Last OV 8/24/2018    Health Maintenance   Topic Date Due    Hepatitis C screen  1965    HIV screen  06/12/1978    Lipid screen  06/12/2003    Shingles Vaccine (1 of 2 - 2 Dose Series) 06/12/2013    Colon cancer screen colonoscopy  06/12/2013    Flu vaccine (1) 01/11/2019 (Originally 9/1/2018)    Potassium monitoring  02/16/2019    Creatinine monitoring  02/16/2019    DTaP/Tdap/Td vaccine (2 - Td) 01/01/2024             (applicable per patient's age: Cancer Screenings, Depression Screening, Fall Risk Screening, Immunizations)    AST (U/L)   Date Value   02/16/2018 19     ALT (U/L)   Date Value   02/16/2018 18     BUN (mg/dL)   Date Value   02/16/2018 18      (goal A1C is < 7)   (goal LDL is <100) need 30-50% reduction from baseline     BP Readings from Last 3 Encounters:   08/24/18 120/72   04/23/18 112/64   02/16/18 (!) 101/58    (goal /80)      All Future Testing planned in [facility]:  Lab Frequency Next Occurrence   Comprehensive Metabolic Panel Once 53/47/2223   CBC Auto Differential Once 02/11/2018   Lipid Panel Once 01/11/2019   TSH with Reflex Once 01/11/2019   XR Lumbar Spine Ap Lateral Flexion and Extension Once 05/08/2018       Next Visit Date:  Future Appointments  Date Time Provider Yoli Griggs   12/14/2018 3:00 PM Gaby Child, APRN - CNP [facility]            Patient Active Problem List:     Restless leg syndrome     Hyperlipidemia     Disc disease, degenerative, lumbar or lumbosacral     Somatic dysfunction of cervical region     Chronic midline low back pain without sciatica     Essential hypertension     Obstructive sleep apnea syndrome

## 2018-10-19 DIAGNOSIS — G89.29 CHRONIC MIDLINE LOW BACK PAIN WITHOUT SCIATICA: ICD-10-CM

## 2018-10-19 DIAGNOSIS — M51.37 DISC DISEASE, DEGENERATIVE, LUMBAR OR LUMBOSACRAL: ICD-10-CM

## 2018-10-19 DIAGNOSIS — M54.50 CHRONIC MIDLINE LOW BACK PAIN WITHOUT SCIATICA: ICD-10-CM

## 2018-10-20 NOTE — TELEPHONE ENCOUNTER
Last OV 08/24/18    Health Maintenance   Topic Date Due    Hepatitis C screen  1965    HIV screen  06/12/1978    Lipid screen  06/12/2003    Shingles Vaccine (1 of 2 - 2 Dose Series) 06/12/2013    Colon cancer screen colonoscopy  06/12/2013    Flu vaccine (1) 01/11/2019 (Originally 9/1/2018)    Potassium monitoring  02/16/2019    Creatinine monitoring  02/16/2019    DTaP/Tdap/Td vaccine (2 - Td) 01/01/2024             (applicable per patient's age: Cancer Screenings, Depression Screening, Fall Risk Screening, Immunizations)    AST (U/L)   Date Value   02/16/2018 19     ALT (U/L)   Date Value   02/16/2018 18     BUN (mg/dL)   Date Value   02/16/2018 18      (goal A1C is < 7)   (goal LDL is <100) need 30-50% reduction from baseline     BP Readings from Last 3 Encounters:   08/24/18 120/72   04/23/18 112/64   02/16/18 (!) 101/58    (goal /80)      All Future Testing planned in [facility]:  Lab Frequency Next Occurrence   Comprehensive Metabolic Panel Once 88/06/7875   CBC Auto Differential Once 02/11/2018   Lipid Panel Once 01/11/2019   TSH with Reflex Once 01/11/2019   XR Lumbar Spine Ap Lateral Flexion and Extension Once 05/08/2018       Next Visit Date:  Future Appointments  Date Time Provider Yoli Griggs   12/14/2018 3:00 PM Gaby York, APRN - CNP [facility]            Patient Active Problem List:     Restless leg syndrome     Hyperlipidemia     Disc disease, degenerative, lumbar or lumbosacral     Somatic dysfunction of cervical region     Chronic midline low back pain without sciatica     Essential hypertension     Obstructive sleep apnea syndrome

## 2018-10-21 RX ORDER — TRAMADOL HYDROCHLORIDE 50 MG/1
TABLET ORAL
Qty: 120 TABLET | Refills: 0 | Status: SHIPPED | OUTPATIENT
Start: 2018-10-21 | End: 2018-11-19 | Stop reason: SDUPTHER

## 2018-11-10 ENCOUNTER — HOSPITAL ENCOUNTER (EMERGENCY)
Age: 53
Discharge: HOME | End: 2018-11-10
Attending: EMERGENCY MEDICINE
Payer: COMMERCIAL

## 2018-11-10 VITALS
WEIGHT: 185 LBS | TEMPERATURE: 98.5 F | HEART RATE: 90 BPM | DIASTOLIC BLOOD PRESSURE: 91 MMHG | HEIGHT: 72 IN | OXYGEN SATURATION: 98 % | BODY MASS INDEX: 25.06 KG/M2 | RESPIRATION RATE: 16 BRPM | SYSTOLIC BLOOD PRESSURE: 154 MMHG

## 2018-11-10 DIAGNOSIS — H10.32 ACUTE CONJUNCTIVITIS OF LEFT EYE, UNSPECIFIED ACUTE CONJUNCTIVITIS TYPE: Primary | ICD-10-CM

## 2018-11-10 PROCEDURE — 99283 EMERGENCY DEPT VISIT LOW MDM: CPT

## 2018-11-10 RX ORDER — TRAMADOL HYDROCHLORIDE 50 MG/1
50 TABLET ORAL EVERY 6 HOURS PRN
Qty: 10 TABLET | Refills: 0 | Status: SHIPPED | OUTPATIENT
Start: 2018-11-10 | End: 2018-11-19

## 2018-11-10 RX ORDER — TETRACAINE HYDROCHLORIDE 5 MG/ML
2 SOLUTION OPHTHALMIC ONCE
Status: DISCONTINUED | OUTPATIENT
Start: 2018-11-10 | End: 2018-11-10 | Stop reason: HOSPADM

## 2018-11-10 RX ORDER — SULFACETAMIDE SODIUM 100 MG/ML
2 SOLUTION/ DROPS OPHTHALMIC EVERY 4 HOURS
Qty: 1 BOTTLE | Refills: 0 | Status: SHIPPED | OUTPATIENT
Start: 2018-11-10 | End: 2018-11-17

## 2018-11-10 ASSESSMENT — PAIN SCALES - GENERAL
PAINLEVEL_OUTOF10: 6
PAINLEVEL_OUTOF10: 4

## 2018-11-10 ASSESSMENT — ENCOUNTER SYMPTOMS
RHINORRHEA: 0
NAUSEA: 0
EYE ITCHING: 1
SHORTNESS OF BREATH: 0
ABDOMINAL PAIN: 0
EYE PAIN: 1
COUGH: 0
EYE DISCHARGE: 0
SORE THROAT: 0
VOMITING: 0
PHOTOPHOBIA: 0
CONSTIPATION: 0
FACIAL SWELLING: 0
DIARRHEA: 0
EYE REDNESS: 1

## 2018-11-10 ASSESSMENT — VISUAL ACUITY
OU: 20/25
OS: 20/40
OD: 20/40

## 2018-11-10 ASSESSMENT — PAIN DESCRIPTION - ORIENTATION: ORIENTATION: LEFT

## 2018-11-10 ASSESSMENT — PAIN DESCRIPTION - LOCATION: LOCATION: EYE

## 2018-11-10 NOTE — ED PROVIDER NOTES
with a voice recognition program.  Efforts were made to edit thedictations but occasionally words are mis-transcribed.)    Anusha Whyte MD  Attending Emergency Physician                        Anusha Whyte MD  11/10/18 1223

## 2018-11-16 ENCOUNTER — OFFICE VISIT (OUTPATIENT)
Dept: PRIMARY CARE CLINIC | Age: 53
End: 2018-11-16
Payer: COMMERCIAL

## 2018-11-16 VITALS
HEART RATE: 66 BPM | DIASTOLIC BLOOD PRESSURE: 74 MMHG | BODY MASS INDEX: 23.84 KG/M2 | HEIGHT: 72 IN | SYSTOLIC BLOOD PRESSURE: 126 MMHG | WEIGHT: 176 LBS | OXYGEN SATURATION: 96 %

## 2018-11-16 DIAGNOSIS — J06.9 UPPER RESPIRATORY TRACT INFECTION, UNSPECIFIED TYPE: Primary | ICD-10-CM

## 2018-11-16 PROCEDURE — G8427 DOCREV CUR MEDS BY ELIG CLIN: HCPCS | Performed by: FAMILY MEDICINE

## 2018-11-16 PROCEDURE — G8484 FLU IMMUNIZE NO ADMIN: HCPCS | Performed by: FAMILY MEDICINE

## 2018-11-16 PROCEDURE — 99213 OFFICE O/P EST LOW 20 MIN: CPT | Performed by: FAMILY MEDICINE

## 2018-11-16 PROCEDURE — 1036F TOBACCO NON-USER: CPT | Performed by: FAMILY MEDICINE

## 2018-11-16 PROCEDURE — 3017F COLORECTAL CA SCREEN DOC REV: CPT | Performed by: FAMILY MEDICINE

## 2018-11-16 PROCEDURE — G8420 CALC BMI NORM PARAMETERS: HCPCS | Performed by: FAMILY MEDICINE

## 2018-11-16 RX ORDER — AZITHROMYCIN 250 MG/1
TABLET, FILM COATED ORAL
Qty: 6 TABLET | Refills: 0 | Status: SHIPPED | OUTPATIENT
Start: 2018-11-16 | End: 2018-12-14 | Stop reason: ALTCHOICE

## 2018-11-16 ASSESSMENT — ENCOUNTER SYMPTOMS
ABDOMINAL PAIN: 0
COUGH: 1
SHORTNESS OF BREATH: 0
SINUS PRESSURE: 0
WHEEZING: 0
SORE THROAT: 1
VOMITING: 0
NAUSEA: 0

## 2018-11-16 ASSESSMENT — PATIENT HEALTH QUESTIONNAIRE - PHQ9
SUM OF ALL RESPONSES TO PHQ QUESTIONS 1-9: 2
SUM OF ALL RESPONSES TO PHQ QUESTIONS 1-9: 2
1. LITTLE INTEREST OR PLEASURE IN DOING THINGS: 0
SUM OF ALL RESPONSES TO PHQ9 QUESTIONS 1 & 2: 2
2. FEELING DOWN, DEPRESSED OR HOPELESS: 2

## 2018-11-16 NOTE — PROGRESS NOTES
Ear: External ear normal.   Left Ear: External ear normal.   Mouth/Throat: Oropharynx is clear and moist. No oropharyngeal exudate. Left tm normal, R tm some scarring from previous surgery, no fluid noted. Eyes: Pupils are equal, round, and reactive to light. Neck: Neck supple. Cardiovascular: Normal rate, regular rhythm and normal heart sounds. Pulmonary/Chest: Effort normal and breath sounds normal. No respiratory distress. He has no wheezes. Neurological: He is alert and oriented to person, place, and time. Skin: Skin is warm and dry. He is not diaphoretic. Psychiatric: He has a normal mood and affect. His behavior is normal.     /74   Pulse 66   Ht 6' (1.829 m)   Wt 176 lb (79.8 kg)   SpO2 96%   BMI 23.87 kg/m²     Assessment:      1. Upper respiratory tract infection, unspecified type  - recommend mucinex for cough. Continue allergy medications. If pt not improving in next few days he can take abx.   - follow up if worsens. Plan:      Return if symptoms worsen or fail to improve. No orders of the defined types were placed in this encounter.     Orders Placed This Encounter   Medications    azithromycin (ZITHROMAX) 250 MG tablet     Sig: TAKE TWO TABLETS  on day 1 followed by ONE TAB on days 2 - 5     Dispense:  6 tablet     Refill:  0           Electronically signed by Rk Peres DO on 11/16/2018 at 9:00 AM

## 2018-11-19 DIAGNOSIS — G89.29 CHRONIC MIDLINE LOW BACK PAIN WITHOUT SCIATICA: ICD-10-CM

## 2018-11-19 DIAGNOSIS — M51.37 DISC DISEASE, DEGENERATIVE, LUMBAR OR LUMBOSACRAL: ICD-10-CM

## 2018-11-19 DIAGNOSIS — M54.50 CHRONIC MIDLINE LOW BACK PAIN WITHOUT SCIATICA: ICD-10-CM

## 2018-11-19 RX ORDER — TRAMADOL HYDROCHLORIDE 50 MG/1
100 TABLET ORAL EVERY 12 HOURS PRN
Qty: 120 TABLET | Refills: 0 | Status: SHIPPED | OUTPATIENT
Start: 2018-11-19 | End: 2018-12-14 | Stop reason: SDUPTHER

## 2018-12-14 ENCOUNTER — OFFICE VISIT (OUTPATIENT)
Dept: PRIMARY CARE CLINIC | Age: 53
End: 2018-12-14
Payer: COMMERCIAL

## 2018-12-14 VITALS
DIASTOLIC BLOOD PRESSURE: 54 MMHG | SYSTOLIC BLOOD PRESSURE: 86 MMHG | HEART RATE: 68 BPM | HEIGHT: 72 IN | RESPIRATION RATE: 18 BRPM | BODY MASS INDEX: 25.03 KG/M2 | WEIGHT: 184.8 LBS

## 2018-12-14 DIAGNOSIS — G89.29 CHRONIC MIDLINE LOW BACK PAIN WITHOUT SCIATICA: ICD-10-CM

## 2018-12-14 DIAGNOSIS — M54.50 CHRONIC MIDLINE LOW BACK PAIN WITHOUT SCIATICA: ICD-10-CM

## 2018-12-14 DIAGNOSIS — I10 ESSENTIAL HYPERTENSION: Primary | Chronic | ICD-10-CM

## 2018-12-14 DIAGNOSIS — M51.37 DISC DISEASE, DEGENERATIVE, LUMBAR OR LUMBOSACRAL: ICD-10-CM

## 2018-12-14 PROCEDURE — G8484 FLU IMMUNIZE NO ADMIN: HCPCS | Performed by: NURSE PRACTITIONER

## 2018-12-14 PROCEDURE — 1036F TOBACCO NON-USER: CPT | Performed by: NURSE PRACTITIONER

## 2018-12-14 PROCEDURE — G8427 DOCREV CUR MEDS BY ELIG CLIN: HCPCS | Performed by: NURSE PRACTITIONER

## 2018-12-14 PROCEDURE — 99214 OFFICE O/P EST MOD 30 MIN: CPT | Performed by: NURSE PRACTITIONER

## 2018-12-14 PROCEDURE — G8419 CALC BMI OUT NRM PARAM NOF/U: HCPCS | Performed by: NURSE PRACTITIONER

## 2018-12-14 PROCEDURE — 3017F COLORECTAL CA SCREEN DOC REV: CPT | Performed by: NURSE PRACTITIONER

## 2018-12-14 RX ORDER — TIZANIDINE 4 MG/1
4 TABLET ORAL 3 TIMES DAILY
Qty: 90 TABLET | Refills: 5 | Status: SHIPPED | OUTPATIENT
Start: 2018-12-14 | End: 2019-06-14

## 2018-12-14 RX ORDER — MELOXICAM 15 MG/1
TABLET ORAL
Qty: 30 TABLET | Refills: 5 | Status: CANCELLED | OUTPATIENT
Start: 2018-12-14

## 2018-12-14 RX ORDER — LISINOPRIL 10 MG/1
TABLET ORAL
Qty: 30 TABLET | Refills: 5 | Status: SHIPPED | OUTPATIENT
Start: 2018-12-14 | End: 2019-06-28 | Stop reason: SDUPTHER

## 2018-12-14 RX ORDER — TRAMADOL HYDROCHLORIDE 50 MG/1
100 TABLET ORAL EVERY 12 HOURS PRN
Qty: 120 TABLET | Refills: 0 | Status: SHIPPED | OUTPATIENT
Start: 2018-12-14 | End: 2019-05-13 | Stop reason: SDUPTHER

## 2018-12-14 ASSESSMENT — ENCOUNTER SYMPTOMS
DIARRHEA: 0
VOMITING: 0
CONSTIPATION: 0
WHEEZING: 0
ABDOMINAL PAIN: 0
TROUBLE SWALLOWING: 0
COUGH: 0
SINUS PRESSURE: 0
BACK PAIN: 1
SHORTNESS OF BREATH: 0
NAUSEA: 0
BLOOD IN STOOL: 0
SORE THROAT: 0

## 2018-12-14 NOTE — PROGRESS NOTES
date: 7/23/2010    Smokeless tobacco: Never Used    Alcohol use No      Comment: rare      Current Outpatient Prescriptions   Medication Sig Dispense Refill    traMADol (ULTRAM) 50 MG tablet Take 2 tablets by mouth every 12 hours as needed for Pain for up to 30 days. . 120 tablet 0    lisinopril (PRINIVIL;ZESTRIL) 10 MG tablet TAKE ONE TABLET BY MOUTH DAILY 30 tablet 5    tiZANidine (ZANAFLEX) 4 MG tablet Take 1 tablet by mouth 3 times daily 90 tablet 5    Multiple Vitamins-Minerals (MULTI FOR HIM 50+ PO) Take by mouth      fluticasone (FLONASE) 50 MCG/ACT nasal spray 1 spray by Nasal route daily 1 Bottle 3     No current facility-administered medications for this visit. No Known Allergies    Health Maintenance   Topic Date Due    Hepatitis C screen  1965    HIV screen  06/12/1978    Lipid screen  06/12/2003    Diabetes screen  06/12/2003    Shingles Vaccine (1 of 2 - 2 Dose Series) 06/12/2013    Colon cancer screen colonoscopy  06/12/2013    Flu vaccine (1) 01/11/2019 (Originally 9/1/2018)    Potassium monitoring  02/16/2019    Creatinine monitoring  02/16/2019    DTaP/Tdap/Td vaccine (2 - Td) 01/01/2024       Subjective:      Review of Systems   Constitutional: Negative for activity change, appetite change, chills, fatigue, fever and unexpected weight change. HENT: Negative for congestion, ear pain, hearing loss, sinus pressure, sore throat and trouble swallowing. Eyes: Negative for visual disturbance. Respiratory: Negative for cough, shortness of breath and wheezing. Cardiovascular: Negative for chest pain, palpitations and leg swelling. Gastrointestinal: Negative for abdominal pain, blood in stool, constipation, diarrhea, nausea and vomiting. Endocrine: Negative for cold intolerance, heat intolerance, polydipsia, polyphagia and polyuria. Genitourinary: Negative for difficulty urinating, frequency, hematuria and urgency.    Musculoskeletal: Positive for arthralgias and

## 2019-01-16 DIAGNOSIS — G89.29 CHRONIC MIDLINE LOW BACK PAIN WITHOUT SCIATICA: ICD-10-CM

## 2019-01-16 DIAGNOSIS — M54.50 CHRONIC MIDLINE LOW BACK PAIN WITHOUT SCIATICA: ICD-10-CM

## 2019-01-16 DIAGNOSIS — M51.37 DISC DISEASE, DEGENERATIVE, LUMBAR OR LUMBOSACRAL: ICD-10-CM

## 2019-01-16 RX ORDER — TRAMADOL HYDROCHLORIDE 50 MG/1
100 TABLET ORAL EVERY 12 HOURS PRN
Qty: 120 TABLET | Refills: 0 | Status: SHIPPED | OUTPATIENT
Start: 2019-01-16 | End: 2019-02-15 | Stop reason: SDUPTHER

## 2019-02-15 DIAGNOSIS — M54.50 CHRONIC MIDLINE LOW BACK PAIN WITHOUT SCIATICA: ICD-10-CM

## 2019-02-15 DIAGNOSIS — M51.37 DISC DISEASE, DEGENERATIVE, LUMBAR OR LUMBOSACRAL: ICD-10-CM

## 2019-02-15 DIAGNOSIS — G89.29 CHRONIC MIDLINE LOW BACK PAIN WITHOUT SCIATICA: ICD-10-CM

## 2019-02-15 RX ORDER — TRAMADOL HYDROCHLORIDE 50 MG/1
100 TABLET ORAL EVERY 12 HOURS PRN
Qty: 120 TABLET | Refills: 0 | Status: SHIPPED | OUTPATIENT
Start: 2019-02-15 | End: 2019-03-13 | Stop reason: SDUPTHER

## 2019-03-13 DIAGNOSIS — M54.50 CHRONIC MIDLINE LOW BACK PAIN WITHOUT SCIATICA: ICD-10-CM

## 2019-03-13 DIAGNOSIS — M51.37 DISC DISEASE, DEGENERATIVE, LUMBAR OR LUMBOSACRAL: ICD-10-CM

## 2019-03-13 DIAGNOSIS — G89.29 CHRONIC MIDLINE LOW BACK PAIN WITHOUT SCIATICA: ICD-10-CM

## 2019-03-13 RX ORDER — TRAMADOL HYDROCHLORIDE 50 MG/1
100 TABLET ORAL EVERY 12 HOURS PRN
Qty: 120 TABLET | Refills: 0 | Status: SHIPPED | OUTPATIENT
Start: 2019-03-13 | End: 2019-04-12 | Stop reason: SDUPTHER

## 2019-03-27 ENCOUNTER — TELEPHONE (OUTPATIENT)
Dept: PRIMARY CARE CLINIC | Age: 54
End: 2019-03-27

## 2019-04-12 DIAGNOSIS — M51.37 DISC DISEASE, DEGENERATIVE, LUMBAR OR LUMBOSACRAL: ICD-10-CM

## 2019-04-12 DIAGNOSIS — G89.29 CHRONIC MIDLINE LOW BACK PAIN WITHOUT SCIATICA: ICD-10-CM

## 2019-04-12 DIAGNOSIS — M54.50 CHRONIC MIDLINE LOW BACK PAIN WITHOUT SCIATICA: ICD-10-CM

## 2019-04-12 RX ORDER — TRAMADOL HYDROCHLORIDE 50 MG/1
100 TABLET ORAL EVERY 12 HOURS PRN
Qty: 120 TABLET | Refills: 0 | Status: SHIPPED | OUTPATIENT
Start: 2019-04-12 | End: 2019-05-12

## 2019-04-16 ENCOUNTER — OFFICE VISIT (OUTPATIENT)
Dept: PRIMARY CARE CLINIC | Age: 54
End: 2019-04-16
Payer: COMMERCIAL

## 2019-04-16 VITALS
HEIGHT: 72 IN | SYSTOLIC BLOOD PRESSURE: 106 MMHG | BODY MASS INDEX: 24.06 KG/M2 | DIASTOLIC BLOOD PRESSURE: 68 MMHG | RESPIRATION RATE: 18 BRPM | WEIGHT: 177.6 LBS | HEART RATE: 60 BPM

## 2019-04-16 DIAGNOSIS — G89.29 CHRONIC MIDLINE LOW BACK PAIN WITHOUT SCIATICA: Primary | ICD-10-CM

## 2019-04-16 DIAGNOSIS — M54.50 CHRONIC MIDLINE LOW BACK PAIN WITHOUT SCIATICA: Primary | ICD-10-CM

## 2019-04-16 DIAGNOSIS — Z87.891 FORMER SMOKER: ICD-10-CM

## 2019-04-16 DIAGNOSIS — R06.02 SHORTNESS OF BREATH: ICD-10-CM

## 2019-04-16 DIAGNOSIS — M51.37 DISC DISEASE, DEGENERATIVE, LUMBAR OR LUMBOSACRAL: ICD-10-CM

## 2019-04-16 PROCEDURE — G8420 CALC BMI NORM PARAMETERS: HCPCS | Performed by: NURSE PRACTITIONER

## 2019-04-16 PROCEDURE — G8427 DOCREV CUR MEDS BY ELIG CLIN: HCPCS | Performed by: NURSE PRACTITIONER

## 2019-04-16 PROCEDURE — 99214 OFFICE O/P EST MOD 30 MIN: CPT | Performed by: NURSE PRACTITIONER

## 2019-04-16 PROCEDURE — 1036F TOBACCO NON-USER: CPT | Performed by: NURSE PRACTITIONER

## 2019-04-16 PROCEDURE — 3017F COLORECTAL CA SCREEN DOC REV: CPT | Performed by: NURSE PRACTITIONER

## 2019-04-16 RX ORDER — IBUPROFEN 800 MG/1
800 TABLET ORAL EVERY 8 HOURS PRN
Qty: 90 TABLET | Refills: 5 | Status: SHIPPED | OUTPATIENT
Start: 2019-04-16 | End: 2019-06-04 | Stop reason: ALTCHOICE

## 2019-04-16 RX ORDER — ALBUTEROL SULFATE 90 UG/1
2 AEROSOL, METERED RESPIRATORY (INHALATION) EVERY 6 HOURS PRN
Qty: 1 INHALER | Refills: 3 | Status: SHIPPED | OUTPATIENT
Start: 2019-04-16 | End: 2019-06-28 | Stop reason: SDUPTHER

## 2019-04-16 ASSESSMENT — ENCOUNTER SYMPTOMS
BLOOD IN STOOL: 0
TROUBLE SWALLOWING: 0
CONSTIPATION: 0
WHEEZING: 0
COUGH: 1
SORE THROAT: 0
BACK PAIN: 1
VOMITING: 0
SHORTNESS OF BREATH: 1
ABDOMINAL PAIN: 0
NAUSEA: 0
DIARRHEA: 0
SINUS PRESSURE: 0

## 2019-04-16 ASSESSMENT — PATIENT HEALTH QUESTIONNAIRE - PHQ9
SUM OF ALL RESPONSES TO PHQ QUESTIONS 1-9: 0
2. FEELING DOWN, DEPRESSED OR HOPELESS: 0
1. LITTLE INTEREST OR PLEASURE IN DOING THINGS: 0
SUM OF ALL RESPONSES TO PHQ9 QUESTIONS 1 & 2: 0
SUM OF ALL RESPONSES TO PHQ QUESTIONS 1-9: 0

## 2019-04-16 NOTE — PROGRESS NOTES
[facility] PRIMARY CARE  [address] [place] [address]   [address] [address],[address] [facility] [address]  Dept: [phone]  Dept Fax: [phone]    Annamaria Forrest is a 52 y.o. male who presentstoday for his medical conditions/complaints as noted below. Annamaria Forrest is c/o of  Chief Complaint   Patient presents with    Hypertension     4 month visit    Lower Back Pain     chronic, routine visit       HPI:     Here today for follow up  Voicing concern about some increased SOB, feels its hard to take a deep breath at times  Bothers him daily, has noticed with stair climbing  Has morning cough with some production of whitish mucous, does not cough during day much unless he exerts himself  He is a fomer smoker of 35 years, quit 8 years ago    His back pain is worse on some days, better on others  Never resumed his meloxicam after stopping for colonoscopy    Back Pain   This is a chronic problem. The current episode started more than 1 year ago. The problem occurs daily. The problem has been waxing and waning since onset. The pain is present in the lumbar spine. The quality of the pain is described as aching. The pain is at a severity of 5/10. The pain is moderate. Pertinent negatives include no abdominal pain, chest pain, fever, headaches or weakness. He has tried NSAIDs, analgesics and heat for the symptoms. The treatment provided significant relief.        No results found for: LABA1C          ( goal A1C is < 7)   No results found for: LABMICR  No results found for: LDLCHOLESTEROL, LDLCALC    (goal LDL is <100)   AST (U/L)   Date Value   02/16/2018 19     ALT (U/L)   Date Value   02/16/2018 18     BUN (mg/dL)   Date Value   02/16/2018 18     BP Readings from Last 3 Encounters:   04/16/19 106/68   12/14/18 (!) 86/54   11/16/18 126/74          ([id number])    Past Medical History:   Diagnosis Date    Back pain     Disc disease, degenerative, lumbar or lumbosacral     Hyperlipidemia     Restless leg syndrome       Past Surgical History:   Procedure Laterality Date    HERNIA REPAIR      TONSILLECTOMY AND ADENOIDECTOMY      TYMPANOSTOMY TUBE PLACEMENT Bilateral        Family History   Problem Relation Age of Onset    Arthritis Mother           Social History     Tobacco Use    Smoking status: Former Smoker     Packs/day: 0.05     Years: 35.00     Pack years: 1.75     Start date:      Last attempt to quit: 2010     Years since quittin.7    Smokeless tobacco: Never Used   Substance Use Topics    Alcohol use: No     Comment: rare      Current Outpatient Medications   Medication Sig Dispense Refill    albuterol sulfate HFA (PROAIR HFA) 108 (90 Base) MCG/ACT inhaler Inhale 2 puffs into the lungs every 6 hours as needed for Wheezing 1 Inhaler 3    ibuprofen (ADVIL;MOTRIN) 800 MG tablet Take 1 tablet by mouth every 8 hours as needed for Pain 90 tablet 5    traMADol (ULTRAM) 50 MG tablet Take 2 tablets by mouth every 12 hours as needed for Pain for up to 30 days. 120 tablet 0    lisinopril (PRINIVIL;ZESTRIL) 10 MG tablet TAKE ONE TABLET BY MOUTH DAILY 30 tablet 5    tiZANidine (ZANAFLEX) 4 MG tablet Take 1 tablet by mouth 3 times daily 90 tablet 5    Multiple Vitamins-Minerals (MULTI FOR HIM 50+ PO) Take by mouth      fluticasone (FLONASE) 50 MCG/ACT nasal spray 1 spray by Nasal route daily 1 Bottle 3     No current facility-administered medications for this visit.       No Known Allergies    Health Maintenance   Topic Date Due    Hepatitis C screen  1965    HIV screen  1978    Lipid screen  2003    Shingles Vaccine (1 of 2) 2013    Colon cancer screen colonoscopy  2013    Potassium monitoring  2019    Creatinine monitoring  2019    Flu vaccine (Season Ended) 2019    DTaP/Tdap/Td vaccine (2 - Td) 2024    Pneumococcal 0-64 years Vaccine  Aged Out       Subjective:      Review of Systems   Constitutional: Negative for activity change, appetite change, chills, fatigue, fever and unexpected weight change. HENT: Negative for congestion, ear pain, hearing loss, sinus pressure, sore throat and trouble swallowing. Eyes: Negative for visual disturbance. Respiratory: Positive for cough and shortness of breath. Negative for wheezing. Cardiovascular: Negative for chest pain, palpitations and leg swelling. Gastrointestinal: Negative for abdominal pain, blood in stool, constipation, diarrhea, nausea and vomiting. Endocrine: Negative for cold intolerance, heat intolerance, polydipsia, polyphagia and polyuria. Genitourinary: Negative for difficulty urinating, frequency, hematuria and urgency. Musculoskeletal: Positive for back pain. Negative for arthralgias and myalgias. Skin: Negative for rash. Allergic/Immunologic: Negative for environmental allergies. Neurological: Negative for dizziness, weakness, light-headedness and headaches. Psychiatric/Behavioral: Negative for confusion. The patient is not nervous/anxious. Objective:     Physical Exam   Constitutional: He is oriented to person, place, and time. He appears well-developed and well-nourished. HENT:   Head: Normocephalic. Eyes: Pupils are equal, round, and reactive to light. Conjunctivae and EOM are normal.   Neck: Normal range of motion. Cardiovascular: Normal rate, regular rhythm, normal heart sounds and intact distal pulses. No murmur heard. Pulmonary/Chest: Effort normal and breath sounds normal. He has no wheezes. Abdominal: Soft. Bowel sounds are normal. He exhibits no distension. Musculoskeletal: Normal range of motion. Neurological: He is alert and oriented to person, place, and time. Skin: Skin is warm and dry. Psychiatric: He has a normal mood and affect.  His behavior is normal. Judgment and thought content normal.     /68 (Site: Left Upper Arm, Position: Sitting, Cuff Size: Medium Adult)   Pulse 60   Resp 18   Ht 6' (1.829 m)   Wt 177 lb 9.6 oz (80.6 kg)   BMI 24.09 kg/m²     Assessment:       Diagnosis Orders   1. Chronic midline low back pain without sciatica  ibuprofen (ADVIL;MOTRIN) 800 MG tablet    XR LUMBAR SPINE (2-3 VIEWS)   2. Shortness of breath  XR CHEST STANDARD (2 VW)    albuterol sulfate HFA (PROAIR HFA) 108 (90 Base) MCG/ACT inhaler   3. Former smoker  XR CHEST STANDARD (2 VW)   4. Disc disease, degenerative, lumbar or lumbosacral  ibuprofen (ADVIL;MOTRIN) 800 MG tablet    XR LUMBAR SPINE (2-3 VIEWS)             Plan:      Return in about 4 months (around 8/16/2019) for back pain. Orders Placed This Encounter   Procedures    XR CHEST STANDARD (2 VW)     Standing Status:   Future     Standing Expiration Date:   4/16/2020     Order Specific Question:   Reason for exam:     Answer:   shortness of breath    XR LUMBAR SPINE (2-3 VIEWS)     Standing Status:   Future     Standing Expiration Date:   4/16/2020     Order Specific Question:   Reason for exam:     Answer:   worsening chronic pain        Orders Placed This Encounter   Medications    albuterol sulfate HFA (PROAIR HFA) 108 (90 Base) MCG/ACT inhaler     Sig: Inhale 2 puffs into the lungs every 6 hours as needed for Wheezing     Dispense:  1 Inhaler     Refill:  3    ibuprofen (ADVIL;MOTRIN) 800 MG tablet     Sig: Take 1 tablet by mouth every 8 hours as needed for Pain     Dispense:  90 tablet     Refill:  5      Back pain, DDD lumbar-xray as no recent imaging and symptoms are changing, motrin TID, tramadol as needed  SOB, former smoker-lengthy discussion, concern for asthma/COPD, check CXR, start albuterol. Pending results may consider further testing or referral to pulmonary   Patient given educational materials - see patient instructions. Discussed use, benefit, and side effects of prescribed medications. All patientquestions answered. Pt voiced understanding. Reviewed health maintenance. Instructedto continue current medications, diet and exercise.

## 2019-04-22 ENCOUNTER — HOSPITAL ENCOUNTER (OUTPATIENT)
Age: 54
Discharge: HOME | End: 2019-04-24
Payer: COMMERCIAL

## 2019-04-22 ENCOUNTER — HOSPITAL ENCOUNTER (OUTPATIENT)
Dept: GENERAL RADIOLOGY | Age: 54
Discharge: HOME | End: 2019-04-24
Payer: COMMERCIAL

## 2019-04-22 DIAGNOSIS — G89.29 CHRONIC MIDLINE LOW BACK PAIN WITHOUT SCIATICA: ICD-10-CM

## 2019-04-22 DIAGNOSIS — M51.37 DISC DISEASE, DEGENERATIVE, LUMBAR OR LUMBOSACRAL: ICD-10-CM

## 2019-04-22 DIAGNOSIS — Z87.891 FORMER SMOKER: ICD-10-CM

## 2019-04-22 DIAGNOSIS — M54.50 CHRONIC MIDLINE LOW BACK PAIN WITHOUT SCIATICA: ICD-10-CM

## 2019-04-22 DIAGNOSIS — R06.02 SHORTNESS OF BREATH: ICD-10-CM

## 2019-04-22 PROCEDURE — 72100 X-RAY EXAM L-S SPINE 2/3 VWS: CPT

## 2019-04-22 PROCEDURE — 71046 X-RAY EXAM CHEST 2 VIEWS: CPT

## 2019-05-13 DIAGNOSIS — M54.50 CHRONIC MIDLINE LOW BACK PAIN WITHOUT SCIATICA: ICD-10-CM

## 2019-05-13 DIAGNOSIS — G89.29 CHRONIC MIDLINE LOW BACK PAIN WITHOUT SCIATICA: ICD-10-CM

## 2019-05-13 DIAGNOSIS — M51.37 DISC DISEASE, DEGENERATIVE, LUMBAR OR LUMBOSACRAL: ICD-10-CM

## 2019-05-13 RX ORDER — TRAMADOL HYDROCHLORIDE 50 MG/1
100 TABLET ORAL EVERY 12 HOURS PRN
Qty: 120 TABLET | Refills: 0 | Status: SHIPPED | OUTPATIENT
Start: 2019-05-13 | End: 2019-06-12 | Stop reason: SDUPTHER

## 2019-05-13 NOTE — TELEPHONE ENCOUNTER
Last ov 4/16/19    Health Maintenance   Topic Date Due    Hepatitis C screen  1965    HIV screen  06/12/1978    Lipid screen  06/12/2003    Shingles Vaccine (1 of 2) 06/12/2013    Colon cancer screen colonoscopy  06/12/2013    Potassium monitoring  02/16/2019    Creatinine monitoring  02/16/2019    Flu vaccine (Season Ended) 09/01/2019    DTaP/Tdap/Td vaccine (2 - Td) 01/01/2024    Pneumococcal 0-64 years Vaccine  Aged Out             (applicable per patient's age: Cancer Screenings, Depression Screening, Fall Risk Screening, Immunizations)    AST (U/L)   Date Value   02/16/2018 19     ALT (U/L)   Date Value   02/16/2018 18     BUN (mg/dL)   Date Value   02/16/2018 18      (goal A1C is < 7)   (goal LDL is <100) need 30-50% reduction from baseline     BP Readings from Last 3 Encounters:   04/16/19 106/68   12/14/18 (!) 86/54   11/16/18 126/74    (goal /80)      All Future Testing planned in [facility]:  Lab Frequency Next Occurrence       Next Visit Date:  Future Appointments   Date Time Provider Yoli Griggs   8/16/2019  3:20 PM GITA Lentz - CNP [id number]            Patient Active Problem List:     Restless leg syndrome     Hyperlipidemia     Disc disease, degenerative, lumbar or lumbosacral     Somatic dysfunction of cervical region     Chronic midline low back pain without sciatica     Essential hypertension     Obstructive sleep apnea syndrome

## 2019-06-04 ENCOUNTER — APPOINTMENT (OUTPATIENT)
Dept: CT IMAGING | Age: 54
End: 2019-06-04
Payer: COMMERCIAL

## 2019-06-04 ENCOUNTER — HOSPITAL ENCOUNTER (OUTPATIENT)
Age: 54
Setting detail: OBSERVATION
Discharge: HOME | End: 2019-06-06
Attending: EMERGENCY MEDICINE | Admitting: EMERGENCY MEDICINE
Payer: COMMERCIAL

## 2019-06-04 ENCOUNTER — APPOINTMENT (OUTPATIENT)
Dept: GENERAL RADIOLOGY | Age: 54
End: 2019-06-04
Payer: COMMERCIAL

## 2019-06-04 DIAGNOSIS — R07.9 CHEST PAIN, UNSPECIFIED TYPE: Primary | ICD-10-CM

## 2019-06-04 DIAGNOSIS — G45.0 VERTEBROBASILAR INSUFFICIENCY: ICD-10-CM

## 2019-06-04 LAB
ABSOLUTE EOS #: 0.11 K/UL (ref 0–0.44)
ABSOLUTE IMMATURE GRANULOCYTE: 0.03 K/UL (ref 0–0.3)
ABSOLUTE LYMPH #: 1.18 K/UL (ref 1.1–3.7)
ABSOLUTE MONO #: 0.64 K/UL (ref 0.1–1.2)
ALBUMIN SERPL-MCNC: 4.2 G/DL (ref 3.5–5.2)
ALBUMIN/GLOBULIN RATIO: 1.8 (ref 1–2.5)
ALP BLD-CCNC: 63 U/L (ref 40–129)
ALT SERPL-CCNC: 25 U/L (ref 5–41)
ANION GAP SERPL CALCULATED.3IONS-SCNC: 12 MMOL/L (ref 9–17)
AST SERPL-CCNC: 32 U/L
BASOPHILS # BLD: 1 % (ref 0–2)
BASOPHILS ABSOLUTE: 0.04 K/UL (ref 0–0.2)
BILIRUB SERPL-MCNC: 0.6 MG/DL (ref 0.3–1.2)
BNP INTERPRETATION: NORMAL
BUN BLDV-MCNC: 17 MG/DL (ref 6–20)
BUN/CREAT BLD: ABNORMAL (ref 9–20)
CALCIUM SERPL-MCNC: 8.7 MG/DL (ref 8.6–10.4)
CHLORIDE BLD-SCNC: 98 MMOL/L (ref 98–107)
CO2: 24 MMOL/L (ref 20–31)
CREAT SERPL-MCNC: 0.85 MG/DL (ref 0.7–1.2)
DIFFERENTIAL TYPE: ABNORMAL
EOSINOPHILS RELATIVE PERCENT: 1 % (ref 1–4)
GFR AFRICAN AMERICAN: >60 ML/MIN
GFR NON-AFRICAN AMERICAN: >60 ML/MIN
GFR SERPL CREATININE-BSD FRML MDRD: ABNORMAL ML/MIN/{1.73_M2}
GFR SERPL CREATININE-BSD FRML MDRD: ABNORMAL ML/MIN/{1.73_M2}
GLUCOSE BLD-MCNC: 120 MG/DL (ref 70–99)
HCT VFR BLD CALC: 42.6 % (ref 40.7–50.3)
HEMOGLOBIN: 14.8 G/DL (ref 13–17)
IMMATURE GRANULOCYTES: 0 %
LYMPHOCYTES # BLD: 14 % (ref 24–43)
MCH RBC QN AUTO: 30.9 PG (ref 25.2–33.5)
MCHC RBC AUTO-ENTMCNC: 34.7 G/DL (ref 28.4–34.8)
MCV RBC AUTO: 88.9 FL (ref 82.6–102.9)
MONOCYTES # BLD: 8 % (ref 3–12)
NRBC AUTOMATED: 0 PER 100 WBC
PDW BLD-RTO: 11.9 % (ref 11.8–14.4)
PLATELET # BLD: 260 K/UL (ref 138–453)
PLATELET ESTIMATE: ABNORMAL
PMV BLD AUTO: 10.5 FL (ref 8.1–13.5)
POTASSIUM SERPL-SCNC: 4 MMOL/L (ref 3.7–5.3)
PRO-BNP: 44 PG/ML
RBC # BLD: 4.79 M/UL (ref 4.21–5.77)
RBC # BLD: ABNORMAL 10*6/UL
SEG NEUTROPHILS: 76 % (ref 36–65)
SEGMENTED NEUTROPHILS ABSOLUTE COUNT: 6.25 K/UL (ref 1.5–8.1)
SODIUM BLD-SCNC: 134 MMOL/L (ref 135–144)
TOTAL PROTEIN: 6.6 G/DL (ref 6.4–8.3)
TROPONIN INTERP: NORMAL
TROPONIN INTERP: NORMAL
TROPONIN T: NORMAL NG/ML
TROPONIN T: NORMAL NG/ML
TROPONIN, HIGH SENSITIVITY: 7 NG/L (ref 0–22)
TROPONIN, HIGH SENSITIVITY: <6 NG/L (ref 0–22)
WBC # BLD: 8.3 K/UL (ref 3.5–11.3)
WBC # BLD: ABNORMAL 10*3/UL

## 2019-06-04 PROCEDURE — 85025 COMPLETE CBC W/AUTO DIFF WBC: CPT

## 2019-06-04 PROCEDURE — 99285 EMERGENCY DEPT VISIT HI MDM: CPT

## 2019-06-04 PROCEDURE — 96375 TX/PRO/DX INJ NEW DRUG ADDON: CPT

## 2019-06-04 PROCEDURE — 96374 THER/PROPH/DIAG INJ IV PUSH: CPT

## 2019-06-04 PROCEDURE — 2580000003 HC RX 258: Performed by: EMERGENCY MEDICINE

## 2019-06-04 PROCEDURE — 83880 ASSAY OF NATRIURETIC PEPTIDE: CPT

## 2019-06-04 PROCEDURE — 84484 ASSAY OF TROPONIN QUANT: CPT

## 2019-06-04 PROCEDURE — 70498 CT ANGIOGRAPHY NECK: CPT

## 2019-06-04 PROCEDURE — 93005 ELECTROCARDIOGRAM TRACING: CPT

## 2019-06-04 PROCEDURE — G0378 HOSPITAL OBSERVATION PER HR: HCPCS

## 2019-06-04 PROCEDURE — 6360000002 HC RX W HCPCS: Performed by: EMERGENCY MEDICINE

## 2019-06-04 PROCEDURE — 6360000004 HC RX CONTRAST MEDICATION: Performed by: EMERGENCY MEDICINE

## 2019-06-04 PROCEDURE — 70496 CT ANGIOGRAPHY HEAD: CPT

## 2019-06-04 PROCEDURE — 71046 X-RAY EXAM CHEST 2 VIEWS: CPT

## 2019-06-04 PROCEDURE — 6370000000 HC RX 637 (ALT 250 FOR IP): Performed by: EMERGENCY MEDICINE

## 2019-06-04 PROCEDURE — 80053 COMPREHEN METABOLIC PANEL: CPT

## 2019-06-04 PROCEDURE — 93005 ELECTROCARDIOGRAM TRACING: CPT | Performed by: EMERGENCY MEDICINE

## 2019-06-04 PROCEDURE — 70450 CT HEAD/BRAIN W/O DYE: CPT

## 2019-06-04 RX ORDER — SODIUM CHLORIDE 0.9 % (FLUSH) 0.9 %
10 SYRINGE (ML) INJECTION PRN
Status: DISCONTINUED | OUTPATIENT
Start: 2019-06-04 | End: 2019-06-06 | Stop reason: HOSPADM

## 2019-06-04 RX ORDER — SODIUM CHLORIDE 9 MG/ML
INJECTION, SOLUTION INTRAVENOUS CONTINUOUS
Status: DISCONTINUED | OUTPATIENT
Start: 2019-06-04 | End: 2019-06-06 | Stop reason: HOSPADM

## 2019-06-04 RX ORDER — ACETAMINOPHEN 325 MG/1
650 TABLET ORAL EVERY 4 HOURS PRN
Status: DISCONTINUED | OUTPATIENT
Start: 2019-06-04 | End: 2019-06-06 | Stop reason: HOSPADM

## 2019-06-04 RX ORDER — SODIUM CHLORIDE 0.9 % (FLUSH) 0.9 %
10 SYRINGE (ML) INJECTION EVERY 12 HOURS SCHEDULED
Status: DISCONTINUED | OUTPATIENT
Start: 2019-06-04 | End: 2019-06-06 | Stop reason: HOSPADM

## 2019-06-04 RX ORDER — ACETAMINOPHEN 325 MG/1
650 TABLET ORAL ONCE
Status: COMPLETED | OUTPATIENT
Start: 2019-06-04 | End: 2019-06-04

## 2019-06-04 RX ORDER — DIPHENHYDRAMINE HYDROCHLORIDE 50 MG/ML
25 INJECTION INTRAMUSCULAR; INTRAVENOUS ONCE
Status: COMPLETED | OUTPATIENT
Start: 2019-06-04 | End: 2019-06-04

## 2019-06-04 RX ORDER — PROCHLORPERAZINE EDISYLATE 5 MG/ML
10 INJECTION INTRAMUSCULAR; INTRAVENOUS ONCE
Status: COMPLETED | OUTPATIENT
Start: 2019-06-04 | End: 2019-06-04

## 2019-06-04 RX ORDER — 0.9 % SODIUM CHLORIDE 0.9 %
1000 INTRAVENOUS SOLUTION INTRAVENOUS ONCE
Status: COMPLETED | OUTPATIENT
Start: 2019-06-04 | End: 2019-06-04

## 2019-06-04 RX ORDER — TIZANIDINE 4 MG/1
4 TABLET ORAL 3 TIMES DAILY
Status: DISCONTINUED | OUTPATIENT
Start: 2019-06-04 | End: 2019-06-06 | Stop reason: HOSPADM

## 2019-06-04 RX ORDER — ASPIRIN 81 MG/1
162 TABLET, CHEWABLE ORAL ONCE
Status: COMPLETED | OUTPATIENT
Start: 2019-06-04 | End: 2019-06-04

## 2019-06-04 RX ORDER — LISINOPRIL 10 MG/1
10 TABLET ORAL DAILY
Status: DISCONTINUED | OUTPATIENT
Start: 2019-06-05 | End: 2019-06-06 | Stop reason: HOSPADM

## 2019-06-04 RX ADMIN — SODIUM CHLORIDE 1000 ML: 9 INJECTION, SOLUTION INTRAVENOUS at 19:14

## 2019-06-04 RX ADMIN — Medication 10 ML: at 23:25

## 2019-06-04 RX ADMIN — IOHEXOL 90 ML: 350 INJECTION, SOLUTION INTRAVENOUS at 20:50

## 2019-06-04 RX ADMIN — DIPHENHYDRAMINE HYDROCHLORIDE 25 MG: 50 INJECTION INTRAMUSCULAR; INTRAVENOUS at 22:12

## 2019-06-04 RX ADMIN — ASPIRIN 81 MG 162 MG: 81 TABLET ORAL at 19:14

## 2019-06-04 RX ADMIN — ACETAMINOPHEN 650 MG: 325 TABLET ORAL at 21:18

## 2019-06-04 RX ADMIN — SODIUM CHLORIDE: 9 INJECTION, SOLUTION INTRAVENOUS at 23:25

## 2019-06-04 RX ADMIN — PROCHLORPERAZINE EDISYLATE 10 MG: 5 INJECTION INTRAMUSCULAR; INTRAVENOUS at 22:11

## 2019-06-04 ASSESSMENT — ENCOUNTER SYMPTOMS
COUGH: 0
SORE THROAT: 0
VOMITING: 0
EYE REDNESS: 0
SHORTNESS OF BREATH: 1
ABDOMINAL PAIN: 0
BLOOD IN STOOL: 0
NAUSEA: 0
FACIAL SWELLING: 0
DIARRHEA: 0
PHOTOPHOBIA: 0
CHEST TIGHTNESS: 1
WHEEZING: 0

## 2019-06-04 ASSESSMENT — PAIN DESCRIPTION - ONSET
ONSET: PROGRESSIVE
ONSET: PROGRESSIVE

## 2019-06-04 ASSESSMENT — PAIN DESCRIPTION - PROGRESSION
CLINICAL_PROGRESSION: GRADUALLY WORSENING
CLINICAL_PROGRESSION: GRADUALLY WORSENING

## 2019-06-04 ASSESSMENT — PAIN DESCRIPTION - PAIN TYPE
TYPE: ACUTE PAIN
TYPE: ACUTE PAIN

## 2019-06-04 ASSESSMENT — PAIN DESCRIPTION - LOCATION
LOCATION: HEAD
LOCATION: CHEST

## 2019-06-04 ASSESSMENT — PAIN DESCRIPTION - FREQUENCY
FREQUENCY: CONTINUOUS
FREQUENCY: INTERMITTENT

## 2019-06-04 ASSESSMENT — PAIN SCALES - GENERAL
PAINLEVEL_OUTOF10: 2
PAINLEVEL_OUTOF10: 8
PAINLEVEL_OUTOF10: 0
PAINLEVEL_OUTOF10: 5

## 2019-06-04 ASSESSMENT — PAIN - FUNCTIONAL ASSESSMENT
PAIN_FUNCTIONAL_ASSESSMENT: ACTIVITIES ARE NOT PREVENTED
PAIN_FUNCTIONAL_ASSESSMENT: ACTIVITIES ARE NOT PREVENTED

## 2019-06-04 ASSESSMENT — PAIN DESCRIPTION - DESCRIPTORS
DESCRIPTORS: HEAVINESS
DESCRIPTORS: ACHING

## 2019-06-04 ASSESSMENT — HEART SCORE: ECG: 1

## 2019-06-04 ASSESSMENT — PAIN DESCRIPTION - ORIENTATION: ORIENTATION: MID

## 2019-06-04 NOTE — ED NOTES
Dr Polo Lopes and Dr Lana Herrera at bedside     Rita Jaden, [place]  06/04/19 9949

## 2019-06-04 NOTE — ED NOTES
Bed: 11  Expected date:   Expected time:   Means of arrival:   Comments:  95[address] Vikash Holguin RN  06/04/19 9310

## 2019-06-04 NOTE — ED PROVIDER NOTES
[facility]  Emergency Department  Faculty Attestation     I performed a history and physical examination of the patient and discussed management with the resident. I reviewed the residents note and agree with the documented findings and plan of care. Any areas of disagreement are noted on the chart. I was personally present for the key portions of any procedures. I have documented in the chart those procedures where I was not present during the key portions. I have reviewed the emergency nurses triage note. I agree with the chief complaint, past medical history, past surgical history, allergies, medications, social and family history as documented unless otherwise noted below. For Physician Assistant/ Nurse Practitioner cases/documentation I have personally evaluated this patient and have completed at least one if not all key elements of the E/M (history, physical exam, and MDM). Additional findings are as noted. Primary Care Physician:  GITA Mares CNP    Screenings:  [unfilled]    CHIEF COMPLAINT       Chief Complaint   Patient presents with    Chest Pain     Started having dry mouth, ams, numbness, chest pain and feeling like he was going to pass out.  Went to urgent care and was sent to ed for eval/treat       RECENT VITALS:   Temp: 97.5 °F (36.4 °C),  Pulse: 71, Resp: 18, BP: (!) 145/81    LABS:  Labs Reviewed   CBC WITH AUTO DIFFERENTIAL - Abnormal; Notable for the following components:       Result Value    Seg Neutrophils 76 (*)     Lymphocytes 14 (*)     All other components within normal limits   COMPREHENSIVE METABOLIC PANEL   TROPONIN   BRAIN NATRIURETIC PEPTIDE       Radiology  XR CHEST STANDARD (2 VW)    (Results Pending)   CT Head WO Contrast    (Results Pending)   CTA HEAD W CONTRAST    (Results Pending)   CTA NECK W CONTRAST    (Results Pending)       CRITICAL CARE: There was a high probability of clinically significant/life threatening deterioration in this patient's condition which required my urgent intervention. Total critical care time was 10 minutes. This excludes any time for separately reportable procedures. EKG:  EKG Interpretation    Interpreted by me    Rhythm: normal sinus   Rate: normal  Axis: normal  Ectopy: none  Conduction: Incomplete right bundle-branch block  ST Segments: no acute change  T Waves: Inversion aVL, V2  Q Waves: none    Clinical Impression: Nonspecific T wave changes    Attending Physician Additional  Notes    Patient been under significant amount of stress recently. He developed substernal heaviness pressure sensation without radiation. He has shortness of breath sweats and nausea. He has dry mouth. He felt anxious and had tingling in his fingers. He had difficulty with his speech and some ataxia which has since resolved. There's mild headache. He's been having irritability and change in personality for the past week. He has risk factors for coronary disease but no prior workup. He had an EKG at urgent care center which was nonspecific. He received one baby aspirin. On exam he is minimally hypertensive uncomfortable vital signs normal.  Normal speech mentation memory orientation. GCS 15. Normal pupils. Normal extraocular movements. Normal finger nose movements. Negative drift. Tongue is midline. Pulses are symmetrical.  No edema cords Homans Tenderness. Chest nontender. Lungs are clear. Abdomen is benign. Impression is anginal type pain, new onset, rule out ACS, TIA/vertebral artery insufficiency. Plan is cardiac workup, troponin, EKG, fluids, CT brain, CT angiogram, neurology consultation, admission. Melanie Briggs.  Yael Mehta MD, [facility]  Attending Emergency  Physician                Lemuel Montiel MD  06/04/19 6621

## 2019-06-04 NOTE — ED PROVIDER NOTES
[facility]  ED  Emergency Department Encounter  EmergencyMedicine Resident     Pt Barbara Aquino  MRN: [id number]  [place] 1965  Date of evaluation: 6/4/19  PCP:  GITA Orellana CNP    CHIEF COMPLAINT       Chief Complaint   Patient presents with    Chest Pain     Started having dry mouth, ams, numbness, chest pain and feeling like he was going to pass out. Went to urgent care and was sent to ed for eval/treat       HISTORY OF PRESENT ILLNESS  (Location/Symptom, Timing/Onset, Context/Setting, Quality, Duration, Modifying Factors, Severity.)      Mick Pena is a 52 y.o. male who presents with Chest pain, dizziness, near syncope and speech difficulty. Patient states symptoms started earlier today and went to urgent care. EKG was done and patient was given aspirin and referred to the emergency department. He states that he had an acute onset of what sounds like vertigo associated with chest pain shortness of breath and speech difficulty. Patient states he is having word finding problems. He has a history of high blood pressure but has been controlled. He states his chest pain is midsternal pressure-like sensation and nonradiating and was associated with some shortness of breath. He denies any nausea or vomiting abdominal pain. Denies any prior history of aortic pathology. PAST MEDICAL / SURGICAL / SOCIAL / FAMILY HISTORY      has a past medical history of Back pain, Disc disease, degenerative, lumbar or lumbosacral, Hyperlipidemia, and Restless leg syndrome. has a past surgical history that includes Tonsillectomy and adenoidectomy; hernia repair; and Tympanostomy tube placement (Bilateral).     Social History     Socioeconomic History    Marital status:      Spouse name: Not on file    Number of children: Not on file    Years of education: Not on file    Highest education level: Not on file   Occupational History    Not on file   Social Needs  Financial resource strain: Not on file   Shashank-Sonu insecurity:     Worry: Not on file     Inability: Not on file    Transportation needs:     Medical: Not on file     Non-medical: Not on file   Tobacco Use    Smoking status: Former Smoker     Packs/day: 0.05     Years: 35.00     Pack years: 1.75     Start date:      Last attempt to quit: 2010     Years since quittin.8    Smokeless tobacco: Never Used   Substance and Sexual Activity    Alcohol use: No     Comment: rare    Drug use: Yes     Comment: occ    Sexual activity: Yes     Partners: Female   Lifestyle    Physical activity:     Days per week: Not on file     Minutes per session: Not on file    Stress: Not on file   Relationships    Social connections:     Talks on phone: Not on file     Gets together: Not on file     Attends Religion service: Not on file     Active member of club or organization: Not on file     Attends meetings of clubs or organizations: Not on file     Relationship status: Not on file    Intimate partner violence:     Fear of current or ex partner: Not on file     Emotionally abused: Not on file     Physically abused: Not on file     Forced sexual activity: Not on file   Other Topics Concern    Not on file   Social History Narrative    Not on file       Family History   Problem Relation Age of Onset    Arthritis Mother        Allergies:  Patient has no known allergies. Home Medications:  Prior to Admission medications    Medication Sig Start Date End Date Taking? Authorizing Provider   traMADol (ULTRAM) 50 MG tablet Take 2 tablets by mouth every 12 hours as needed for Pain for up to 30 days.  19 Yes GITA Razo CNP   lisinopril (PRINIVIL;ZESTRIL) 10 MG tablet TAKE ONE TABLET BY MOUTH DAILY 18  Yes GITA Razo CNP   tiZANidine (ZANAFLEX) 4 MG tablet Take 1 tablet by mouth 3 times daily 18  Yes GITA Cruz CNP   Multiple Vitamins-Minerals (MULTI FOR are equal, round, and reactive to light. Conjunctivae are normal. No scleral icterus. Neck: Normal range of motion. Neck supple. No JVD present. No tracheal deviation present. Cardiovascular: Normal rate, regular rhythm, normal heart sounds and intact distal pulses. Exam reveals no gallop and no friction rub. No murmur heard. Pulmonary/Chest: Effort normal and breath sounds normal. No stridor. No respiratory distress. He has no wheezes. He has no rales. He exhibits no tenderness. Abdominal: Soft. Bowel sounds are normal. He exhibits no mass. There is no tenderness. There is no rebound and no guarding. Musculoskeletal: Normal range of motion. He exhibits no edema or tenderness. Lymphadenopathy:     He has no cervical adenopathy. Neurological: He is alert and oriented to person, place, and time. No cranial nerve deficit. He exhibits normal muscle tone. Coordination normal.   Skin: Skin is warm and dry. No rash noted. He is not diaphoretic. No erythema. Psychiatric: He has a normal mood and affect. His behavior is normal. Judgment normal.   Nursing note and vitals reviewed.       DIFFERENTIAL  DIAGNOSIS     PLAN (LABS / IMAGING / EKG):  Orders Placed This Encounter   Procedures    XR CHEST STANDARD (2 VW)    CT Head WO Contrast    CTA HEAD W CONTRAST    CTA NECK W CONTRAST    CBC WITH AUTO DIFFERENTIAL    Comprehensive Metabolic Panel    Troponin    Brain Natriuretic Peptide    Troponin    EKG 12 Lead    PATIENT STATUS (FROM ED OR OR/PROCEDURAL) Observation       MEDICATIONS ORDERED:  Orders Placed This Encounter   Medications    0.9 % sodium chloride bolus    aspirin chewable tablet 162 mg    iohexol (OMNIPAQUE 350) solution 90 mL    acetaminophen (TYLENOL) tablet 650 mg    diphenhydrAMINE (BENADRYL) injection 25 mg    prochlorperazine (COMPAZINE) injection 10 mg       DDX: ACS, TIA, Vasovagal syncope, dehydration    DIAGNOSTIC RESULTS / EMERGENCY DEPARTMENT COURSE / MDM LABS:  Results for orders placed or performed during the hospital encounter of 06/04/19   CBC WITH AUTO DIFFERENTIAL   Result Value Ref Range    WBC 8.3 3.5 - 11.3 k/uL    RBC 4.79 4.21 - 5.77 m/uL    Hemoglobin 14.8 13.0 - 17.0 g/dL    Hematocrit 42.6 40.7 - 50.3 %    MCV 88.9 82.6 - 102.9 fL    MCH 30.9 25.2 - 33.5 pg    MCHC 34.7 28.4 - 34.8 g/dL    RDW 11.9 11.8 - 14.4 %    Platelets 101 387 - 899 k/uL    MPV 10.5 8.1 - 13.5 fL    NRBC Automated 0.0 0.0 per 100 WBC    Differential Type NOT REPORTED     Seg Neutrophils 76 (H) 36 - 65 %    Lymphocytes 14 (L) 24 - 43 %    Monocytes 8 3 - 12 %    Eosinophils % 1 1 - 4 %    Basophils 1 0 - 2 %    Immature Granulocytes 0 0 %    Segs Absolute 6.25 1.50 - 8.10 k/uL    Absolute Lymph # 1.18 1.10 - 3.70 k/uL    Absolute Mono # 0.64 0.10 - 1.20 k/uL    Absolute Eos # 0.11 0.00 - 0.44 k/uL    Basophils # 0.04 0.00 - 0.20 k/uL    Absolute Immature Granulocyte 0.03 0.00 - 0.30 k/uL    WBC Morphology NOT REPORTED     RBC Morphology NOT REPORTED     Platelet Estimate NOT REPORTED    Comprehensive Metabolic Panel   Result Value Ref Range    Glucose 120 (H) 70 - 99 mg/dL    BUN 17 6 - 20 mg/dL    CREATININE 0.85 0.70 - 1.20 mg/dL    Bun/Cre Ratio NOT REPORTED 9 - 20    Calcium 8.7 8.6 - 10.4 mg/dL    Sodium 134 (L) 135 - 144 mmol/L    Potassium 4.0 3.7 - 5.3 mmol/L    Chloride 98 98 - 107 mmol/L    CO2 24 20 - 31 mmol/L    Anion Gap 12 9 - 17 mmol/L    Alkaline Phosphatase 63 40 - 129 U/L    ALT 25 5 - 41 U/L    AST 32 <40 U/L    Total Bilirubin 0.60 0.3 - 1.2 mg/dL    Total Protein 6.6 6.4 - 8.3 g/dL    Alb 4.2 3.5 - 5.2 g/dL    Albumin/Globulin Ratio 1.8 1.0 - 2.5    GFR Non-African American >60 >60 mL/min    GFR African American >60 >60 mL/min    GFR Comment          GFR Staging NOT REPORTED    Troponin   Result Value Ref Range    Troponin, High Sensitivity <6 0 - 22 ng/L    Troponin T NOT REPORTED <0.03 ng/mL    Troponin Interp NOT REPORTED    Brain Natriuretic Peptide Result Value Ref Range    Pro-BNP 44 <300 pg/mL    BNP Interpretation Pro-BNP Reference Range:    Troponin   Result Value Ref Range    Troponin, High Sensitivity 7 0 - 22 ng/L    Troponin T NOT REPORTED <0.03 ng/mL    Troponin Interp NOT REPORTED        IMPRESSION: 52-year-old male presents with chest pain, lightheadedness versus vertigo. Was hypertensive and slightly diaphoretic as well. There had symptoms like this in the past.  Does have history of hypertension which has been well-controlled. No prior cardiac workup. Vital signs within normal limits. Neurologic exam is normal as well. Plan workup for TIA, possible vertebrobasilar insufficiency as well as ACS. Anticipate admission. RADIOLOGY:  Xr Chest Standard (2 Vw)    Result Date: 6/4/2019  EXAMINATION: TWO XRAY VIEWS OF THE CHEST 6/4/2019 7:30 pm COMPARISON: Chest radiograph dated 04/22/2019 HISTORY: ORDERING SYSTEM PROVIDED HISTORY: chest pain TECHNOLOGIST PROVIDED HISTORY: chest pain Ordering Physician Provided Reason for Exam: chest pain Acuity: Unknown Type of Exam: Unknown FINDINGS: Cardiac and mediastinal shadows are normal.  No infiltrates. No effusions. No pneumothorax. No free air below the diaphragm. No acute findings in the chest.     Ct Head Wo Contrast    Result Date: 6/4/2019  EXAMINATION: CT OF THE HEAD WITHOUT CONTRAST  6/4/2019 8:36 pm TECHNIQUE: CT of the head was performed without the administration of intravenous contrast. Dose modulation, iterative reconstruction, and/or weight based adjustment of the mA/kV was utilized to reduce the radiation dose to as low as reasonably achievable. COMPARISON: September 2012 HISTORY: ORDERING SYSTEM PROVIDED HISTORY: speech difficulty, vertigo TECHNOLOGIST PROVIDED HISTORY: Ordering Physician Provided Reason for Exam: Patient presents with Acuity: Unknown Type of Exam: Unknown FINDINGS: BRAIN/VENTRICLES: Ventricles and sulci are stable.   Posterior fossa detail is limited due to artifact. 4th ventricle is midline. There is no definite focal area of abnormal density in the parenchyma. There is no hemorrhage or extra-axial collection ORBITS: No acute orbital abnormality is noted SINUSES: No air-fluid levels are noted SOFT TISSUES/SKULL:  No acute abnormality of the visualized skull or soft tissues. No acute intracranial abnormality. Cta Neck W Contrast    Result Date: 6/4/2019  EXAMINATION: CTA OF THE HEAD WITH CONTRAST; CTA OF THE NECK 6/4/2019 8:15 pm; 6/4/2019 8:36 pm: TECHNIQUE: CTA of the head/brain was performed with the administration of intravenous contrast. Multiplanar reformatted images are provided for review. MIP images are provided for review. Dose modulation, iterative reconstruction, and/or weight based adjustment of the mA/kV was utilized to reduce the radiation dose to as low as reasonably achievable.; CTA of the neck was performed with the administration of intravenous contrast. Multiplanar reformatted images are provided for review. MIP images are provided for review. Stenosis of the internal carotid arteries measured using NASCET criteria. Dose modulation, iterative reconstruction, and/or weight based adjustment of the mA/kV was utilized to reduce the radiation dose to as low as reasonably achievable. COMPARISON: None. HISTORY: ORDERING SYSTEM PROVIDED HISTORY: speech difficulty, vertigo TECHNOLOGIST PROVIDED HISTORY: Ordering Physician Provided Reason for Exam: Patient presents with Acuity: Unknown Type of Exam: Unknown; ORDERING SYSTEM PROVIDED HISTORY: speech difficulty, vertigo TECHNOLOGIST PROVIDED HISTORY: Ordering Physician Provided Reason for Exam: Patient presents with Type of Exam: Unknown FINDINGS: CTA NECK: AORTIC ARCH/ARCH VESSELS: Vascular calcifications are noted. Otherwise, the aorta is grossly normal.  Great vessel origins are patent. CAROTID ARTERIES: Left: Common carotid artery is patent.   Low-density plaque and calcifications are noted at the bifurcation and proximal left internal carotid artery. There is minimal narrowing at the bifurcation. There is no significant left internal carotid artery narrowing. Right: Common carotid artery and bifurcation are patent. Calcifications are noted along the lateral aspect of the bifurcation. There is no focal significant narrowing of the common carotid artery, bifurcation or internal carotid artery on the right VERTEBRAL ARTERIES: Both vertebral arteries are patent. There is no focal significant narrowing SOFT TISSUES: Several small cervical nodes are present. High density right parotid nodule is noted measuring approximately 14 mm. Small thoracic inlet level lymph nodes are noted. Multiple mediastinal nodes are present. AP window level lymph node measures approximately 2.3 x 2 cm. Peripheral interstitial prominence is noted in the upper lung zones bilaterally raising the question of fibrotic change. Emphysematous changes are noted as well. BONES: Degenerative changes throughout the spine are noted. No destructive lesions are noted. Spinal canal detail is limited. Multilevel disc bulging is noted CTA HEAD: ANTERIOR CIRCULATION: Distal internal carotid arteries are widely patent without focal narrowing. Multifocal irregular contour of the distal anterior cerebral branches is noted. Artifact is favored. There is no focal significant narrowing noted otherwise. No focal significant narrowing of the middle cerebral arteries is noted. Remona Mellow POSTERIOR CIRCULATION: Distal vertebral arteries and the basilar artery are widely patent. Posterior cerebral arteries are widely patent bilaterally. BRAIN: Limited evaluation of the dural sinuses demonstrates no focal abnormality. Limited evaluation of the brain parenchyma demonstrates no midline shift. No focal significant arterial narrowing in the head or the neck. Lymph nodes in the AP window region are noted as described. Dedicated CT chest recommended. Cta Head W Contrast    Result Date: 6/4/2019  EXAMINATION: CTA OF THE HEAD WITH CONTRAST; CTA OF THE NECK 6/4/2019 8:15 pm; 6/4/2019 8:36 pm: TECHNIQUE: CTA of the head/brain was performed with the administration of intravenous contrast. Multiplanar reformatted images are provided for review. MIP images are provided for review. Dose modulation, iterative reconstruction, and/or weight based adjustment of the mA/kV was utilized to reduce the radiation dose to as low as reasonably achievable.; CTA of the neck was performed with the administration of intravenous contrast. Multiplanar reformatted images are provided for review. MIP images are provided for review. Stenosis of the internal carotid arteries measured using NASCET criteria. Dose modulation, iterative reconstruction, and/or weight based adjustment of the mA/kV was utilized to reduce the radiation dose to as low as reasonably achievable. COMPARISON: None. HISTORY: ORDERING SYSTEM PROVIDED HISTORY: speech difficulty, vertigo TECHNOLOGIST PROVIDED HISTORY: Ordering Physician Provided Reason for Exam: Patient presents with Acuity: Unknown Type of Exam: Unknown; ORDERING SYSTEM PROVIDED HISTORY: speech difficulty, vertigo TECHNOLOGIST PROVIDED HISTORY: Ordering Physician Provided Reason for Exam: Patient presents with Type of Exam: Unknown FINDINGS: CTA NECK: AORTIC ARCH/ARCH VESSELS: Vascular calcifications are noted. Otherwise, the aorta is grossly normal.  Great vessel origins are patent. CAROTID ARTERIES: Left: Common carotid artery is patent. Low-density plaque and calcifications are noted at the bifurcation and proximal left internal carotid artery. There is minimal narrowing at the bifurcation. There is no significant left internal carotid artery narrowing. Right: Common carotid artery and bifurcation are patent. Calcifications are noted along the lateral aspect of the bifurcation.   There is no focal significant narrowing of the common carotid artery, bifurcation or internal carotid artery on the right VERTEBRAL ARTERIES: Both vertebral arteries are patent. There is no focal significant narrowing SOFT TISSUES: Several small cervical nodes are present. High density right parotid nodule is noted measuring approximately 14 mm. Small thoracic inlet level lymph nodes are noted. Multiple mediastinal nodes are present. AP window level lymph node measures approximately 2.3 x 2 cm. Peripheral interstitial prominence is noted in the upper lung zones bilaterally raising the question of fibrotic change. Emphysematous changes are noted as well. BONES: Degenerative changes throughout the spine are noted. No destructive lesions are noted. Spinal canal detail is limited. Multilevel disc bulging is noted CTA HEAD: ANTERIOR CIRCULATION: Distal internal carotid arteries are widely patent without focal narrowing. Multifocal irregular contour of the distal anterior cerebral branches is noted. Artifact is favored. There is no focal significant narrowing noted otherwise. No focal significant narrowing of the middle cerebral arteries is noted. Lord Donte POSTERIOR CIRCULATION: Distal vertebral arteries and the basilar artery are widely patent. Posterior cerebral arteries are widely patent bilaterally. BRAIN: Limited evaluation of the dural sinuses demonstrates no focal abnormality. Limited evaluation of the brain parenchyma demonstrates no midline shift. No focal significant arterial narrowing in the head or the neck. Lymph nodes in the AP window region are noted as described. Dedicated CT chest recommended. EKG  EKG Interpretation    Interpreted by me    Rhythm: normal sinus   Rate: normal  Axis: normal  Ectopy: none  Conduction: incomplete right bundle-branch block  ST Segments:T-wave inversion 1, aVL  T Waves: no acute change  Q Waves: none    Clinical Impression: normal sinus rhythm, normal intervals, normal axis. Incomplete right bundle branch block.   T-wave inversion noted in leads 1, aVL. All EKG's are interpreted by the Emergency Department Physician who either signs or Co-signs this chart in the absence of a cardiologist.    EMERGENCY DEPARTMENT COURSE:  Heart Score:   Heart Score for chest pain patients  History: Moderately Suspicious  ECG: Non-Specifc repolarization disturbance/LBTB/PM  Patient Age: > 39 and < 65 years  *Risk factors for Atherosclerotic disease: Hypertension  Risk Factors: 1 or 2 risk factors  Troponin: < 1X normal limit  Heart Score Total: 4    Score 0-3: 2.5% MACE over next 6 weeks = Discharge Home  Score 4-6: 20.3% MACE over next 6 weeks = Admit for Clinical Observation  Score 7-10: 72.7% MACE over next 6 weeks = Early Invasive Strategies   Chest Pain in the Emergency Room: A Multicenter Validation of the [address] [place] BE, Miguel AJ, Cathy SHELLEY, Marisa TP, [employer], Mast EG, Lucius SH, [facility]. Crit Pathw Cardiol. 2010 Sep; 9(3): 164-169. \"A prospective validation of the HEART score for chest pain patients at the emergency department. \" Int J Cardiol. 2013 Oct 3;168(3):2153-8. doi: 10.1016/j.ijcard. 2013.01.255. Epub 2013 Mar 7. Patient is elevated heart score N believe requires admission for cardiology consultation given risk factors and abnormal EKG. Has not had evaluation in the past.  Additionally E ABCD 2 score is a 2 however given possible posterior stroke, will have neurology evaluate as well. CT reviewed and discussed with patient. CT shows mediastinal lymphadenopathy which could be concerning for malignancy and recommended follow-up CT scan. We'll defer to primary team to order CT while in hospital and this was discussed with patient who is in agreement. PROCEDURES:  None    CONSULTS:  IP CONSULT TO NEUROLOGY  IP CONSULT TO CARDIOLOGY    CRITICAL CARE:  None    FINAL IMPRESSION      1. Chest pain, unspecified type    2.  Vertebrobasilar insufficiency          DISPOSITION / PLAN DISPOSITION  Admission      PATIENT REFERRED TO:  Dave Charles, APRN - CNP  [address] [facility]  [address]  [facility] [address] [address] [facility]  [phone]            DISCHARGE MEDICATIONS:  New Prescriptions    No medications on file       Tati Chavarria DO  Emergency Medicine Resident    (Please note that portions of thisnote were completed with a voice recognition program.  Efforts were made to edit the dictations but occasionally words are mis-transcribed. )        Tati Chavarria DO  Resident  06/04/19 [id number]

## 2019-06-05 ENCOUNTER — APPOINTMENT (OUTPATIENT)
Dept: NUCLEAR MEDICINE | Age: 54
End: 2019-06-05
Payer: COMMERCIAL

## 2019-06-05 LAB
AMPHETAMINE SCREEN URINE: NEGATIVE
BARBITURATE SCREEN URINE: NEGATIVE
BENZODIAZEPINE SCREEN, URINE: NEGATIVE
BILIRUBIN URINE: NEGATIVE
BUPRENORPHINE URINE: ABNORMAL
CANNABINOID SCREEN URINE: POSITIVE
CHOLESTEROL/HDL RATIO: 3.1
CHOLESTEROL: 135 MG/DL
COCAINE METABOLITE, URINE: NEGATIVE
COLOR: YELLOW
COMMENT UA: NORMAL
EKG ATRIAL RATE: 51 BPM
EKG ATRIAL RATE: 62 BPM
EKG P AXIS: 63 DEGREES
EKG P AXIS: 74 DEGREES
EKG P-R INTERVAL: 136 MS
EKG P-R INTERVAL: 136 MS
EKG Q-T INTERVAL: 406 MS
EKG Q-T INTERVAL: 452 MS
EKG QRS DURATION: 94 MS
EKG QRS DURATION: 96 MS
EKG QTC CALCULATION (BAZETT): 412 MS
EKG QTC CALCULATION (BAZETT): 416 MS
EKG R AXIS: 84 DEGREES
EKG R AXIS: 84 DEGREES
EKG T AXIS: 70 DEGREES
EKG T AXIS: 74 DEGREES
EKG VENTRICULAR RATE: 51 BPM
EKG VENTRICULAR RATE: 62 BPM
FOLATE: >20 NG/ML
GLUCOSE URINE: NEGATIVE
HDLC SERPL-MCNC: 43 MG/DL
KETONES, URINE: NEGATIVE
LDL CHOLESTEROL: 66 MG/DL (ref 0–130)
LEUKOCYTE ESTERASE, URINE: NEGATIVE
LV EF: 62 %
LVEF MODALITY: NORMAL
MDMA URINE: ABNORMAL
METHADONE SCREEN, URINE: NEGATIVE
METHAMPHETAMINE, URINE: ABNORMAL
NITRITE, URINE: NEGATIVE
OPIATES, URINE: NEGATIVE
OXYCODONE SCREEN URINE: NEGATIVE
PH UA: 7 (ref 5–8)
PHENCYCLIDINE, URINE: NEGATIVE
PROPOXYPHENE, URINE: ABNORMAL
PROTEIN UA: NEGATIVE
SEDIMENTATION RATE, ERYTHROCYTE: 2 MM (ref 0–10)
SPECIFIC GRAVITY UA: 1.02 (ref 1–1.03)
TEST INFORMATION: ABNORMAL
TRICYCLIC ANTIDEPRESSANTS, UR: ABNORMAL
TRIGL SERPL-MCNC: 131 MG/DL
TURBIDITY: CLEAR
URINE HGB: NEGATIVE
UROBILINOGEN, URINE: NORMAL
VITAMIN B-12: 584 PG/ML (ref 232–1245)
VLDLC SERPL CALC-MCNC: NORMAL MG/DL (ref 1–30)

## 2019-06-05 PROCEDURE — 93005 ELECTROCARDIOGRAM TRACING: CPT | Performed by: STUDENT IN AN ORGANIZED HEALTH CARE EDUCATION/TRAINING PROGRAM

## 2019-06-05 PROCEDURE — 93010 ELECTROCARDIOGRAM REPORT: CPT | Performed by: INTERNAL MEDICINE

## 2019-06-05 PROCEDURE — A9500 TC99M SESTAMIBI: HCPCS | Performed by: EMERGENCY MEDICINE

## 2019-06-05 PROCEDURE — 85651 RBC SED RATE NONAUTOMATED: CPT

## 2019-06-05 PROCEDURE — 2580000003 HC RX 258: Performed by: EMERGENCY MEDICINE

## 2019-06-05 PROCEDURE — 95819 EEG AWAKE AND ASLEEP: CPT | Performed by: PSYCHIATRY & NEUROLOGY

## 2019-06-05 PROCEDURE — 81003 URINALYSIS AUTO W/O SCOPE: CPT

## 2019-06-05 PROCEDURE — 80061 LIPID PANEL: CPT

## 2019-06-05 PROCEDURE — 6370000000 HC RX 637 (ALT 250 FOR IP): Performed by: EMERGENCY MEDICINE

## 2019-06-05 PROCEDURE — 6360000002 HC RX W HCPCS: Performed by: EMERGENCY MEDICINE

## 2019-06-05 PROCEDURE — 82746 ASSAY OF FOLIC ACID SERUM: CPT

## 2019-06-05 PROCEDURE — 93017 CV STRESS TEST TRACING ONLY: CPT

## 2019-06-05 PROCEDURE — G0378 HOSPITAL OBSERVATION PER HR: HCPCS

## 2019-06-05 PROCEDURE — 99220 PR INITIAL OBSERVATION CARE/DAY 70 MINUTES: CPT | Performed by: PSYCHIATRY & NEUROLOGY

## 2019-06-05 PROCEDURE — 3430000000 HC RX DIAGNOSTIC RADIOPHARMACEUTICAL: Performed by: EMERGENCY MEDICINE

## 2019-06-05 PROCEDURE — 80307 DRUG TEST PRSMV CHEM ANLYZR: CPT

## 2019-06-05 PROCEDURE — 95816 EEG AWAKE AND DROWSY: CPT

## 2019-06-05 PROCEDURE — 78452 HT MUSCLE IMAGE SPECT MULT: CPT

## 2019-06-05 PROCEDURE — 96372 THER/PROPH/DIAG INJ SC/IM: CPT

## 2019-06-05 PROCEDURE — 36415 COLL VENOUS BLD VENIPUNCTURE: CPT

## 2019-06-05 PROCEDURE — 82607 VITAMIN B-12: CPT

## 2019-06-05 RX ORDER — ALBUTEROL SULFATE 90 UG/1
2 AEROSOL, METERED RESPIRATORY (INHALATION) PRN
Status: DISCONTINUED | OUTPATIENT
Start: 2019-06-05 | End: 2019-06-05 | Stop reason: ALTCHOICE

## 2019-06-05 RX ORDER — SODIUM CHLORIDE 0.9 % (FLUSH) 0.9 %
10 SYRINGE (ML) INJECTION PRN
Status: DISCONTINUED | OUTPATIENT
Start: 2019-06-05 | End: 2019-06-06 | Stop reason: HOSPADM

## 2019-06-05 RX ORDER — SODIUM CHLORIDE 0.9 % (FLUSH) 0.9 %
10 SYRINGE (ML) INJECTION PRN
Status: DISCONTINUED | OUTPATIENT
Start: 2019-06-05 | End: 2019-06-05 | Stop reason: ALTCHOICE

## 2019-06-05 RX ORDER — NITROGLYCERIN 0.4 MG/1
0.4 TABLET SUBLINGUAL EVERY 5 MIN PRN
Status: DISCONTINUED | OUTPATIENT
Start: 2019-06-05 | End: 2019-06-05 | Stop reason: ALTCHOICE

## 2019-06-05 RX ORDER — AMINOPHYLLINE DIHYDRATE 25 MG/ML
50 INJECTION, SOLUTION INTRAVENOUS PRN
Status: DISCONTINUED | OUTPATIENT
Start: 2019-06-05 | End: 2019-06-05 | Stop reason: ALTCHOICE

## 2019-06-05 RX ORDER — SODIUM CHLORIDE 9 MG/ML
500 INJECTION, SOLUTION INTRAVENOUS CONTINUOUS PRN
Status: DISCONTINUED | OUTPATIENT
Start: 2019-06-05 | End: 2019-06-05 | Stop reason: ALTCHOICE

## 2019-06-05 RX ORDER — METOPROLOL TARTRATE 5 MG/5ML
5 INJECTION INTRAVENOUS EVERY 5 MIN PRN
Status: DISCONTINUED | OUTPATIENT
Start: 2019-06-05 | End: 2019-06-05 | Stop reason: ALTCHOICE

## 2019-06-05 RX ORDER — ATROPINE SULFATE 0.1 MG/ML
0.5 INJECTION INTRAVENOUS EVERY 5 MIN PRN
Status: DISCONTINUED | OUTPATIENT
Start: 2019-06-05 | End: 2019-06-05 | Stop reason: ALTCHOICE

## 2019-06-05 RX ORDER — TRAMADOL HYDROCHLORIDE 50 MG/1
50 TABLET ORAL 3 TIMES DAILY PRN
Status: DISCONTINUED | OUTPATIENT
Start: 2019-06-05 | End: 2019-06-06 | Stop reason: HOSPADM

## 2019-06-05 RX ADMIN — TIZANIDINE 4 MG: 4 TABLET ORAL at 08:18

## 2019-06-05 RX ADMIN — TRAMADOL HYDROCHLORIDE 50 MG: 50 TABLET, FILM COATED ORAL at 15:25

## 2019-06-05 RX ADMIN — TETRAKIS(2-METHOXYISOBUTYLISOCYANIDE)COPPER(I) TETRAFLUOROBORATE 14.1 MILLICURIE: 1 INJECTION, POWDER, LYOPHILIZED, FOR SOLUTION INTRAVENOUS at 09:00

## 2019-06-05 RX ADMIN — TIZANIDINE 4 MG: 4 TABLET ORAL at 13:56

## 2019-06-05 RX ADMIN — LISINOPRIL 10 MG: 10 TABLET ORAL at 08:18

## 2019-06-05 RX ADMIN — TETRAKIS(2-METHOXYISOBUTYLISOCYANIDE)COPPER(I) TETRAFLUOROBORATE 38 MILLICURIE: 1 INJECTION, POWDER, LYOPHILIZED, FOR SOLUTION INTRAVENOUS at 10:25

## 2019-06-05 RX ADMIN — SODIUM CHLORIDE, PRESERVATIVE FREE 10 ML: 5 INJECTION INTRAVENOUS at 10:25

## 2019-06-05 RX ADMIN — ACETAMINOPHEN 650 MG: 325 TABLET ORAL at 20:04

## 2019-06-05 RX ADMIN — Medication 10 ML: at 10:14

## 2019-06-05 RX ADMIN — Medication 10 ML: at 19:53

## 2019-06-05 RX ADMIN — TRAMADOL HYDROCHLORIDE 50 MG: 50 TABLET, FILM COATED ORAL at 08:49

## 2019-06-05 RX ADMIN — Medication 10 ML: at 10:33

## 2019-06-05 RX ADMIN — REGADENOSON 0.4 MG: 0.08 INJECTION, SOLUTION INTRAVENOUS at 10:24

## 2019-06-05 RX ADMIN — ENOXAPARIN SODIUM 40 MG: 40 INJECTION SUBCUTANEOUS at 08:18

## 2019-06-05 RX ADMIN — Medication 10 ML: at 08:18

## 2019-06-05 RX ADMIN — SODIUM CHLORIDE, PRESERVATIVE FREE 10 ML: 5 INJECTION INTRAVENOUS at 09:00

## 2019-06-05 ASSESSMENT — PAIN SCALES - GENERAL
PAINLEVEL_OUTOF10: 0
PAINLEVEL_OUTOF10: 2
PAINLEVEL_OUTOF10: 0
PAINLEVEL_OUTOF10: 3
PAINLEVEL_OUTOF10: 6
PAINLEVEL_OUTOF10: 2

## 2019-06-05 ASSESSMENT — PAIN DESCRIPTION - PAIN TYPE
TYPE: ACUTE PAIN
TYPE: ACUTE PAIN

## 2019-06-05 ASSESSMENT — PAIN DESCRIPTION - LOCATION
LOCATION: HEAD
LOCATION: HEAD

## 2019-06-05 NOTE — CONSULTS
Assessment/Plan:  Pap every 3 year if normal-due, STI testing as indicated, exercise most days of week, appropriate diet and hydration, Calcium 1000mg + 600 vit D daily, birth control as directed (ACHES reviewed)  Extended cycling  Benefits, risks and alternatives discussed/reviewed  Annual mammogram starting at age 34, monthly BSE  Kegels 20 times twice daily  RTO for pap and cultures  Pelvic US  Right breast US  Complete TSH, FT4       Diagnoses and all orders for this visit:    Encntr for gyn exam (general) (routine) w abnormal findings    Dysmenorrhea  -     US pelvis complete non OB; Future    Irregular menses  -     TSH, 3rd generation; Future  -     T4, free; Future  -     US pelvis complete non OB; Future    Breast density  -     US breast right limited (diagnostic); Future    HSV infection  -     valACYclovir (VALTREX) 500 mg tablet; Take 1 tablet (500 mg total) by mouth daily    Other orders  -     varenicline (CHANTIX) 1 mg tablet; Take 1 mg by mouth 2 (two) times a day  -     Multiple Vitamins-Minerals (MULTIVITAMIN ADULT PO); Take by mouth          Subjective:      Patient ID: Kim Zheng is a 32 y o  female  Patient here for annual exam  Monthly spotting x 5 days with mod to light flow while extended cycling  This past month she did not bleed when expected during placebo pill and started bleeding 2 weeks later x 2 weeks  Missed one pill because she did not have a refill on her [facility]  No other missed pills  Compliant with taking daily on time  C/o menstrual pain that rates 10/10 on day one of menses  No  medications taken because "they don't work " Last sexually active 11 months ago  HSV 2 outbreaks-4 this year  Takes valacyclovir daily and increased to 2  Tablets per day x 3 days during outbreaks, which resolves symptoms within 5 days  Intermittent abnormal clear to white colored,normal odor vaginal discharge  Currently mod flow vaginal bleeding           The following portions of the patient's NEUROLOGY INPATIENT CONSULT NOTE    6/5/2019         Kacie Pearl is a  52 y.o. male admitted on 6/4/2019 with  Chest pain [R07.9]      History is obtained mostly from the patient and the medical record and from the caregivers. Chart is reviewed and patient is examined. Briefly, this is a  52 y.o. male admitted on 6/4/2019 with past medical history of restless leg syndrome, hypertension, hyperlipidemia, degenerative disc disease and back pain   Came to the ED due to acute onset of lightheadedness, tingling in bilateral hands and feet, chest pain with shortness of breath. Patient went to urgent care where  they did an EKG and given aspirin and told him to go to the hospital.  In the hospital patient had a CT head done which was unremarkable CTA head and neck showed no large vessel occlusion and neurology was consulted for the symptom. As per the patient he was driving back from home at 3:30 PM when the all of the sudden he developed numbness and tingling in his upper and lower extremity and feeling anxious he rushed home her symptoms did not improve and he decided to go to the urgent care where he noticed slurred speech and gait imbalance which lasted for one hour and he got an EKG done and received a baby aspirin was told to go to the hospital.  At . patient was evaluated with blood pressure was 145/81 and he complained of chest heaviness in the Center office chest with shortness of breath. Patient denied nausea, palpitations, diaphoresis, headache, seizures, blurry vision or any other neurological complaint. No current facility-administered medications on file prior to encounter. Current Outpatient Medications on File Prior to Encounter   Medication Sig Dispense Refill    traMADol (ULTRAM) 50 MG tablet Take 2 tablets by mouth every 12 hours as needed for Pain for up to 30 days.  (Patient taking differently: Take 50 mg by mouth three times daily. ) 120 tablet 0    lisinopril (PRINIVIL;ZESTRIL) 10 MG tablet TAKE ONE TABLET BY MOUTH DAILY 30 tablet 5    Multiple Vitamins-Minerals (MULTI FOR HIM 50+ PO) Take by mouth      albuterol sulfate HFA (PROAIR HFA) 108 (90 Base) MCG/ACT inhaler Inhale 2 puffs into the lungs every 6 hours as needed for Wheezing 1 Inhaler 3    tiZANidine (ZANAFLEX) 4 MG tablet Take 1 tablet by mouth 3 times daily 90 tablet 5    fluticasone (FLONASE) 50 MCG/ACT nasal spray 1 spray by Nasal route daily 1 Bottle 3     Allergies: Jing Deleon has No Known Allergies. Past Medical History:   Diagnosis Date    Back pain     Disc disease, degenerative, lumbar or lumbosacral     Hyperlipidemia     Hypertension     Restless leg syndrome        Past Surgical History:   Procedure Laterality Date    HERNIA REPAIR      TONSILLECTOMY AND ADENOIDECTOMY      TYMPANOSTOMY TUBE PLACEMENT Bilateral      Social History: Jing Deleon  reports that he quit smoking about 8 years ago. He started smoking about 9 years ago. He has a 1.75 pack-year smoking history. He has never used smokeless tobacco. He reports that he has current or past drug history. He reports that he does not drink alcohol.     Family History   Problem Relation Age of Onset    Arthritis Mother        Current Medications:     lisinopril  10 mg Oral Daily    tiZANidine  4 mg Oral TID    sodium chloride flush  10 mL Intravenous 2 times per day    enoxaparin  40 mg Subcutaneous Daily     PRN Meds include: traMADol, sodium chloride flush, sodium chloride flush, sodium chloride flush, acetaminophen    ROS:   Constitutional Negative for fever and chills   HEENT Negative for ear discharge, ear pain, nosebleed   Eyes Negative for photophobia, pain and discharge   Respiratory Negative for hemoptysis and sputum   Cardiovascular Negative for orthopnea, claudication and PND   Gastrointestinal Negative for abdominal pain, diarrhea, blood in stool   Musculoskeletal Negative for joint pain, negative for history were reviewed and updated as appropriate: allergies, current medications, past family history, past medical history, past social history, past surgical history and problem list     Review of Systems   Constitutional: Negative  Negative for activity change, appetite change, chills, diaphoresis, fatigue, fever and unexpected weight change  HENT: Negative for congestion, dental problem, sneezing, sore throat and trouble swallowing  Eyes: Negative for visual disturbance  Respiratory: Negative for chest tightness and shortness of breath  Cardiovascular: Negative for chest pain and leg swelling  Gastrointestinal: Negative for abdominal pain, constipation, diarrhea, nausea and vomiting  Genitourinary: Positive for menstrual problem, pelvic pain (with menses), vaginal bleeding and vaginal discharge  Negative for difficulty urinating, dyspareunia, dysuria, frequency, hematuria, urgency and vaginal pain  Musculoskeletal: Negative for back pain and neck pain  Skin: Negative  Allergic/Immunologic: Negative  Neurological: Negative for weakness and headaches  Hematological: Negative for adenopathy  Psychiatric/Behavioral: Negative  Objective:      /60 (BP Location: Right arm, Patient Position: Sitting)   Pulse (!) 48   Ht 5' 3" (1 6 m)   Wt 77 7 kg (171 lb 6 4 oz)   LMP 05/10/2018   BMI 30 36 kg/m²          Physical Exam   Constitutional: She is oriented to person, place, and time  She appears well-developed and well-nourished  HENT:   Head: Normocephalic and atraumatic  Eyes: Right eye exhibits no discharge  Left eye exhibits no discharge  Neck: Normal range of motion  Neck supple  Cardiovascular: Normal rate, regular rhythm, normal heart sounds and intact distal pulses  Pulmonary/Chest: Effort normal and breath sounds normal    Abdominal: Soft  Genitourinary: Uterus normal  Rectal exam shows no external hemorrhoid   There is breast tenderness (right breat myalgia   Skin Negative for rash or itching   Endo/heme/allergies Negative for polydipsia, environmental allergy   Psychiatric Negative for suicidal ideation. Patient is not anxious           Objective:   /65   Pulse 53   Temp 97.2 °F (36.2 °C) (Axillary)   Resp 14   Ht 6' (1.829 m)   Wt 177 lb 1.6 oz (80.3 kg)   SpO2 95%   BMI 24.02 kg/m²     Blood pressure range: Systolic (40NXE), DYY:762 , Min:110 , UBB:077   ; Diastolic (46ZMW), CRI:02, Min:65, Max:96      Continuous infusions:    sodium chloride 75 mL/hr at 06/04/19 9385                    EXAMINATION:  GENERAL    SKIN  Appears comfortable and in no distress    No gross lesions, rashes, bruising or bleeding on exposed skin area   HEENT NC/ AT  Eyes:no icterus, redness, pupils equal and reactive, extraocular eye movements intact, conjunctiva clear  Ear: normal external ear, no discharge, hearing intact  Nose:  no drainage noted  Mouth: mucous membranes moist   NECK  LUNGS  Supple and no bruits heard  Clear to auscultation,b/l   Cardiovascular  Abdomen  S1, S2 heard; radial pulse intact,RRR  Soft,non-tender,no organomegaly,BS+   MENTAL STATUS:  Alert, oriented, intact memory, no confusion, normal speech, normal language, no hallucination or delusion   CRANIAL NERVES: II     -       Pupils reactive b/l visual acuity: 20/30 OU; Fundus exam: intact venous pulsations;  Visual fields intact to confrontation  III,IV,VI -  EOMs full, no afferent defect, no                      RADHA, no ptosis  V     -     Normal facial sensation  VII    -     Normal facial symmetry  VIII   -     Intact hearing  IX,X -     Symmetrical palate  XI    -     Symmetrical shoulder shrug  XII   -     Midline tongue, no atrophy    MOTOR FUNCTION:  Bulk R side:Normal            L side:Normal  Tone  R side:Normal            L side:Normal   Power 5/5 in upper and lower extremties;      no involuntary movements, no tremor     SENSORY FUNCTION:                      Extremities: Touch tenderness and area of density at 10:00  5 cm FN)  No breast swelling, discharge or bleeding  No labial fusion  There is no rash, tenderness, lesion or injury on the right labia  There is no rash, tenderness, lesion or injury on the left labia  Cervix exhibits no motion tenderness, no discharge and no friability  Right adnexum displays no mass, no tenderness and no fullness  Left adnexum displays no mass, no tenderness and no fullness  There is bleeding in the vagina  No erythema or tenderness in the vagina  No foreign body in the vagina  No signs of injury around the vagina  No vaginal discharge found  Genitourinary Comments: Mod to heavy menses at time of exam   Musculoskeletal: Normal range of motion  Lymphadenopathy:     She has no cervical adenopathy  Right: No inguinal adenopathy present  Left: No inguinal adenopathy present  Neurological: She is alert and oriented to person, place, and time  Skin: Skin is warm and dry  Psychiatric: She has a normal mood and affect  Nursing note and vitals reviewed  R side:Normal                 L side:Normal     Pain        R side:Normal                  L side:Normal  Vibration  R side:Normal                  L side:Normal    Proprioception    R side:Normal                             L side:Normal    Peripheral pulses palpable, no pedal edema or calf pain with palpation   CEREBELLAR FUNCTION:  Heel to Shin:     Right side:  normal                             Left side:  normal    Finger to Nose:   Right:  normal                              Left:  normal            REFLEX FUNCTION:  Symmetric, no perverted reflex,      Right Bicep:  2+  Left Bicep:  2+  Right Knee:  2+  Left Knee:  2+    Babinski sign   Right side:Downgoing                          Left side   :Downgoing   STATION and GAIT  Not tested           Data:    Lab Results:     Lab Results   Component Value Date    ALT 25 06/04/2019    AST 32 06/04/2019     Hematology:  Recent Labs     06/04/19  1905   WBC 8.3   HGB 14.8   HCT 42.6        Chemistry:  Recent Labs     06/04/19  1905   *   K 4.0   CL 98   CO2 24   GLUCOSE 120*   BUN 17   CREATININE 0.85   CALCIUM 8.7     Recent Labs     06/04/19  1905   PROT 6.6   LABALBU 4.2   AST 32   ALT 25       No results found for: PHENYTOIN, PHENYTOIN, VALPROATE, CBMZ              Impression and Plan:     Mr. Gabby Arellano is a 52 y.o. male with ast medical history of restless leg syndrome, hypertension, hyperlipidemia, degenerative disc disease and back pain   Came to the ED due to acute onset of lightheadedness, tingling in bilateral hands and feet, chest pain with shortness of breath. Symptoms can be related to stroke/TIA, arrhythmias versus anxiety attack  CTAs head and neck unremarkable, would order MRI brain with and without contrast  Obtain  orthostatic and consider tilt test/ cardiology workup  Adequate hydration  Will follow along    Discussed with   Will follow with you. Thank you for consultation.          Mireille Forte MD Pager: [phone]  Electronically signed 6/5/2019 at 11:51 AM

## 2019-06-05 NOTE — CARE COORDINATION
Case Management Initial Discharge Plan  John Winston,             Met with:patient to discuss discharge plans. Information verified: address, contacts, phone number, , insurance Yes  PCP: GITA Viera CNP  Date of last visit: month     Insurance Provider: [facility]     Discharge Planning    Living Arrangements:  Family Members   Support Systems:  Family Members    Home has 2 stories  none stairs to climb to get into front door, flight stairs to climb to reach second floor  Location of bedroom/bathroom in home up     Patient able to perform ADL's:Independent    Current Services (outpatient & in home) none  DME equipment: none  DME provider: none    [facility]: [facility]    Potential Assistance Purchasing Medications:  Yes  Does patient want to participate in local refill/ meds to beds program?  No    Potential Assistance Needed:  N/A    Patient agreeable to home care: No  [place] of choice provided:  n/a    Prior SNF/Rehab Placement and Facility: none   Agreeable to SNF/Rehab: No  [place] of choice provided: no   Evaluation: n/a    Expected Discharge date:     Patient expects to be discharged to:  home  Follow Up Appointment: Best Day/ Time:      Transportation provider: family   Transportation arrangements needed for discharge: No    Readmission Risk              Risk of Unplanned Readmission:        8             Does patient have a readmission risk score greater than 14?: No  If yes, follow-up appointment must be made within 7 days of discharge.      Discharge Plan: Plan to discharge to home , has established pcp,           Electronically signed by Freddy Wall RN on 19 at 12:41 PM

## 2019-06-05 NOTE — H&P
[facility]  CDU / OBSERVATION eNCOUnter  Resident Note     Pt Name: John Winston  MRN: [id number]  [place] 1965  Date of evaluation: 6/5/19  Patient's PCP is :  GITA Viera - CNP    CHIEF COMPLAINT       Chief Complaint   Patient presents with    Chest Pain     Started having dry mouth, ams, numbness, chest pain and feeling like he was going to pass out. Went to urgent care and was sent to ed for eval/treat         [address] [place] [address] Maninder Eid is a 52 y.o. male who presents acute anxiety, acute lightheadedness, acute tingling in bilateral hands and feet, acute chest pain with shortness of breath. Patient states that he's been feeling \"due\" lately. He states it has been a lot going on in his social life. He was driving home from work yesterday as he began to feel word finding difficulty in addition to the symptoms as described above in addition to nausea and diaphoresis. He states he has never felt this before. He went to an urgent care department him to seek further evaluation at the emergency department. Upon evaluation this morning, he states he still feels slight chest heaviness in the center of his chest.  He describes as a hand is currently sitting on his chest.  This is associated with some shortness of breath. Patient is a former smoker, history of hypertension and hyperlipidemia. No previous cardiac evaluation.     Location/Symptom: substernal chest, BL hands and feet  Timing/Onset: yesterday driving home from work  Provocation: environmental factors  Quality: lightheaded, extremity paresthesias, chest heaviness  Radiation: none  Severity: moderate  Timing/Duration: lasted approximately 1 hour  Modifying Factors: unclear    REVIEW OF SYSTEMS       General ROS - anxious, No fevers, No malaise   Ophthalmic ROS - No discharge, No changes in vision  ENT ROS -  No sore throat, No rhinorrhea,   Respiratory ROS -  shortness of breath, no cough, no  wheezing  Cardiovascular ROS -  chest pain, no dyspnea on exertion  Gastrointestinal ROS - brief nausea, No abdominal pain, no current nausea or vomiting, no change in bowel habits, no black or bloody stools  Genito-Urinary ROS - No dysuria, trouble voiding, or hematuria  Musculoskeletal ROS - No myalgias, No arthalgias  Neurological ROS - lightheadedness, BL hand and feet tingling, No headache, No focal weakness  Dermatological ROS - No lesions, No rash     (PQRS) Advance directives on face sheet per hospital policy. No change unless specifically mentioned in chart    PAST MEDICAL HISTORY    has a past medical history of Back pain, Disc disease, degenerative, lumbar or lumbosacral, Hyperlipidemia, Hypertension, and Restless leg syndrome. I have reviewed the past medical history with the patient and it is pertinent to this complaint. SURGICAL HISTORY      has a past surgical history that includes Tonsillectomy and adenoidectomy; hernia repair; and Tympanostomy tube placement (Bilateral). I have reviewed and agree with Surgical History entered and it is pertinent to this complaint. CURRENT MEDICATIONS       lisinopril (PRINIVIL;ZESTRIL) tablet 10 mg Daily   tiZANidine (ZANAFLEX) tablet 4 mg TID   sodium chloride flush 0.9 % injection 10 mL 2 times per day   sodium chloride flush 0.9 % injection 10 mL PRN   acetaminophen (TYLENOL) tablet 650 mg Q4H PRN   enoxaparin (LOVENOX) injection 40 mg Daily   0.9 % sodium chloride infusion Continuous       All medication charted and reviewed. ALLERGIES     has No Known Allergies. FAMILY HISTORY     indicated that his mother is alive. He indicated that his father is . He indicated that both of his sisters are alive. He indicated that all of his three brothers are alive. family history includes Arthritis in his mother. The patient denies any pertinent family history. I have reviewed and agree with the family history entered.   I have reviewed the Family History and it is not significant to the case    SOCIAL HISTORY      reports that he quit smoking about 8 years ago. He started smoking about 9 years ago. He has a 1.75 pack-year smoking history. He has never used smokeless tobacco. He reports that he has current or past drug history. He reports that he does not drink alcohol. I have reviewed and agree with all Social.  There are no concerns for substance abuse/use. PHYSICAL EXAM     INITIAL VITALS:  height is 6' (1.829 m) and weight is 177 lb 1.6 oz (80.3 kg). His oral temperature is 98 °F (36.7 °C). His blood pressure is 122/73 and his pulse is 53. His respiration is 14 and oxygen saturation is 93%.       CONSTITUTIONAL: AOx4, no apparent distress, appears stated age    HEAD: normocephalic, atraumatic   EYES: PERRLA, EOMI    ENT: moist mucous membranes, uvula midline   NECK: supple, symmetric   BACK: symmetric   LUNGS: clear to auscultation bilaterally   CARDIOVASCULAR: regular rate and rhythm, no murmurs, rubs or gallops   ABDOMEN: soft, non-tender, non-distended with normal active bowel sounds   NEUROLOGIC:  MAEx4, no focal sensory or motor deficits, no pronator drift, finger nose testing without difficulty, cranial nerves intact    MUSCULOSKELETAL: no clubbing, cyanosis or edema   SKIN: no rash or wounds       DIFFERENTIAL DIAGNOSIS/MDM:     Stroke/ TIA/ Facial Droop:  DDX: Stroke/ TIA, Vascular, Infectious/inflammatory, Neoplastic/neurological, Drug induced, Iatrogenic, Cardiopulmonary, Autoimmune, Endocrine, Degenerative, Psychogenic/psychiatric, MSK  Immediate: BHCG, INR, fingerstick    Chest Pain:  DDX: Emergent: ACS/NSTEMI/STEMI/angina, arrhythmia, trauma, aortic dissection,  PE, PNA, pneumothroax, esophageal rupture, tamponade, Cocaine use  Nonemergent: pneumonia, pericarditis, GERD, MSK, Endocarditis, anxiety  Evaluated for: diaphoresis, present chest pain, tachypnea, BP both arms, heart sounds, JVD, tender chest wall, wheezing, anxiety    Lightheaded/ Dizzy:  DDX:   Vertigo: Peripheral (BPPV, labyrinthitis, Meniere's) Central: (MS, Acoustic neuroma, carotid artery dissection). Lightheaded: Serious (cardiac valve/ arrhytmia, anemia, low glucose, infection), Common (orthostatic, nonspecific)      DIAGNOSTIC RESULTS     EKG: All EKG's are interpreted by the Observation Physician who either signs or Co-signs this chart in the absence of a cardiologist.    EKG Interpretation    Interpreted by observation physician    EKG Interpretation    Interpreted by me    Rhythm: normal sinus   Rate: normal  Axis: normal  Ectopy: none  Conduction: normal  ST Segments: no acute change  T Waves: no acute change  Q Waves: none    Clinical Impression: no acute changes and normal EKG    Ant Ray DO      RADIOLOGY:   I directly visualized the following  images and reviewed the radiologist interpretations:    Xr Chest Standard (2 Vw)    Result Date: 6/4/2019  EXAMINATION: TWO XRAY VIEWS OF THE CHEST 6/4/2019 7:30 pm COMPARISON: Chest radiograph dated 04/22/2019 HISTORY: ORDERING SYSTEM PROVIDED HISTORY: chest pain TECHNOLOGIST PROVIDED HISTORY: chest pain Ordering Physician Provided Reason for Exam: chest pain Acuity: Unknown Type of Exam: Unknown FINDINGS: Cardiac and mediastinal shadows are normal.  No infiltrates. No effusions. No pneumothorax. No free air below the diaphragm. No acute findings in the chest.     Ct Head Wo Contrast    Result Date: 6/4/2019  EXAMINATION: CT OF THE HEAD WITHOUT CONTRAST  6/4/2019 8:36 pm TECHNIQUE: CT of the head was performed without the administration of intravenous contrast. Dose modulation, iterative reconstruction, and/or weight based adjustment of the mA/kV was utilized to reduce the radiation dose to as low as reasonably achievable.  COMPARISON: September 2012 HISTORY: ORDERING SYSTEM PROVIDED HISTORY: speech difficulty, vertigo TECHNOLOGIST PROVIDED HISTORY: Ordering Physician Provided Reason for Exam: is grossly normal.  Great vessel origins are patent. CAROTID ARTERIES: Left: Common carotid artery is patent. Low-density plaque and calcifications are noted at the bifurcation and proximal left internal carotid artery. There is minimal narrowing at the bifurcation. There is no significant left internal carotid artery narrowing. Right: Common carotid artery and bifurcation are patent. Calcifications are noted along the lateral aspect of the bifurcation. There is no focal significant narrowing of the common carotid artery, bifurcation or internal carotid artery on the right VERTEBRAL ARTERIES: Both vertebral arteries are patent. There is no focal significant narrowing SOFT TISSUES: Several small cervical nodes are present. High density right parotid nodule is noted measuring approximately 14 mm. Small thoracic inlet level lymph nodes are noted. Multiple mediastinal nodes are present. AP window level lymph node measures approximately 2.3 x 2 cm. Peripheral interstitial prominence is noted in the upper lung zones bilaterally raising the question of fibrotic change. Emphysematous changes are noted as well. BONES: Degenerative changes throughout the spine are noted. No destructive lesions are noted. Spinal canal detail is limited. Multilevel disc bulging is noted CTA HEAD: ANTERIOR CIRCULATION: Distal internal carotid arteries are widely patent without focal narrowing. Multifocal irregular contour of the distal anterior cerebral branches is noted. Artifact is favored. There is no focal significant narrowing noted otherwise. No focal significant narrowing of the middle cerebral arteries is noted. [place] POSTERIOR CIRCULATION: Distal vertebral arteries and the basilar artery are widely patent. Posterior cerebral arteries are widely patent bilaterally. BRAIN: Limited evaluation of the dural sinuses demonstrates no focal abnormality. Limited evaluation of the brain parenchyma demonstrates no midline shift. bifurcation are patent. Calcifications are noted along the lateral aspect of the bifurcation. There is no focal significant narrowing of the common carotid artery, bifurcation or internal carotid artery on the right VERTEBRAL ARTERIES: Both vertebral arteries are patent. There is no focal significant narrowing SOFT TISSUES: Several small cervical nodes are present. High density right parotid nodule is noted measuring approximately 14 mm. Small thoracic inlet level lymph nodes are noted. Multiple mediastinal nodes are present. AP window level lymph node measures approximately 2.3 x 2 cm. Peripheral interstitial prominence is noted in the upper lung zones bilaterally raising the question of fibrotic change. Emphysematous changes are noted as well. BONES: Degenerative changes throughout the spine are noted. No destructive lesions are noted. Spinal canal detail is limited. Multilevel disc bulging is noted CTA HEAD: ANTERIOR CIRCULATION: Distal internal carotid arteries are widely patent without focal narrowing. Multifocal irregular contour of the distal anterior cerebral branches is noted. Artifact is favored. There is no focal significant narrowing noted otherwise. No focal significant narrowing of the middle cerebral arteries is noted. Ariana Higinio POSTERIOR CIRCULATION: Distal vertebral arteries and the basilar artery are widely patent. Posterior cerebral arteries are widely patent bilaterally. BRAIN: Limited evaluation of the dural sinuses demonstrates no focal abnormality. Limited evaluation of the brain parenchyma demonstrates no midline shift. No focal significant arterial narrowing in the head or the neck. Lymph nodes in the AP window region are noted as described. Dedicated CT chest recommended. LABS:  I have reviewed and interpreted all available lab results.   Labs Reviewed   CBC WITH AUTO DIFFERENTIAL - Abnormal; Notable for the following components:       Result Value    Seg Neutrophils 76 (*) test  · Patient had negative CT head, CTA head and neck. However, given constellation of symptoms, will have evaluation by neurology. Consider etiology related to TIA vs seizure  · Will eventually need CT chest to further evaluate for mediastinal lymphadenopathy seen on CTA head/neck  · He denies suicidal or homicidal thoughts  · Continue home medications and pain control  · Monitor vitals, labs, and imaging  · DISPO: pending consults and clinical improvement    CONSULTS:    IP CONSULT TO NEUROLOGY  IP CONSULT TO CARDIOLOGY    PROCEDURES:  Not indicated       PATIENT REFERRED TO:    Aurelia Finnegan, APRN - CNP  [address] [facility]  [address] [facility] [address]  [facility] [address] [facility]  [phone]            --  Hakeem Cerda DO   Emergency Medicine Resident     This dictation was generated by voice recognition computer software. Although all attempts are made to edit the dictation for accuracy, there may be errors in the transcription that are not intended.

## 2019-06-05 NOTE — ED PROVIDER NOTES
Dr Yael Mehta sign out, neuro symptoms, cardiology neuro consulted,   Pt admitted,       Malina Whitley,   06/04/19 5981

## 2019-06-05 NOTE — PLAN OF CARE
Problem: Safety:  Goal: Free from accidental physical injury  Description  Free from accidental physical injury  6/5/2019 1228 by Shira Tillman RN  Outcome: Ongoing  6/5/2019 0012 by Halle Rasmussen RN  Outcome: Ongoing     Problem: Pain:  Goal: Patient's pain/discomfort is manageable  Description  Patient's pain/discomfort is manageable  6/5/2019 1228 by Shira Tillman RN  Outcome: Ongoing  6/5/2019 0012 by Halle Rasmussen RN  Outcome: Ongoing     Problem: Discharge Planning:  Goal: Patients continuum of care needs are met  Description  Patients continuum of care needs are met  6/5/2019 1228 by Shira Tillman RN  Outcome: Ongoing  6/5/2019 0012 by Halle Rasmussen RN  Outcome: Ongoing

## 2019-06-05 NOTE — PROGRESS NOTES
[facility]  CDU / OBSERVATION eNCOUnter  Attending NOte       I performed a history and physical examination of the patient and discussed management with the resident. I reviewed the residents note and agree with the documented findings and plan of care. Any areas of disagreement are noted on the chart. I was personally present for the key portions of any procedures. I have documented in the chart those procedures where I was not present during the key portions. I have reviewed the nurses notes. I agree with the chief complaint, past medical history, past surgical history, allergies, medications, social and family history as documented unless otherwise noted below. The Family history, social history, and ROS are effectively unchanged since admission unless noted elsewhere in the chart. She presented with anginal-like chest pain. Patient also stated he had \"word finding issues\". On closer history it seems the patient may have had a hyperventilation type episode. He states he felt anxiety and started feeling numbness in his feet and hands. Patient also felt that his mouth was not working properly. This resolved with resolution of some of the anxiety. The anxiety is not something the patient experiences frequently. Patient had associated chest pain and discomfort with that described as an anginal type symptom. Patient's workup in the ED has been negative. Neurology has been consulted. No neurologic anatomic issues have been found. I suspect patient may have had enough perioral paresthesias that he was having difficulty forming words patient has no residual neurologic deficit at this time. Stress testing has been performed.     Dale Rosa MD  Attending Emergency  Physician

## 2019-06-05 NOTE — CONSULTS
I reviewed and discussed with the resident of his/her note, the documented findings and plan of care. I have personally seen and evaluated the patient and test results. I agree with the documentation except following modifications/additions: (see separate resident note)    52year old RH  was admitted after one episode. Yesterday after around 3PM, he completed work, drove to home, started to feel tingling/numbness in both hands and feet, also a feeling of panic attack, he managed to get home, had difficulty speech at home, wife noticed some confusion, pt also recalls slight tightness in chest, sob, slightly difficulty in walking but no falls/dizziness/visual changes, he had mild headache, no seizure like activities, no local or lateralized weakness, he went to ED, BP was slightly high 145/81. CTA head, neck no significant stenosis, reported possible artifacts for multifocal irregularity in distal anterior cerebral branches. Posterior cerebral arteries were widely patent bilaterally. CTH unremarkable. CBC, CMP grossly WNL, neuro exam benign except mild length dependent sensory reduction to temp/pp below knees. Pt denies similar experience in the past, does have tremendous stress at home, mother passed away last year, father lives with them, wife will have knee surgery. 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 No smoking, drinking, he does use marijuana few times a week.      A/P  Episode of confusion  - panic attack vs stress vs seizure vs TIA vs others?  - CTA head, neck unremarkable  - EEG routine  - MRI brain w/wo  - sufficient hydration  - cardiac workup for chest pain  - check B12, lipid panel, UDS, ESR, continue good BP control    // enlarged lymph node  - on CTA neck  - manage per primary team    Thanks for this consult, will follow     Josey Castañeda MD, [address]  Neurology Attending  [address] Neurology

## 2019-06-05 NOTE — PROCEDURES
[address]                              CARDIAC STRESS TEST    PATIENT NAME: Laurie Nash                  :        1965  MED REC NO:   [id number]                             ROOM:       5380  ACCOUNT NO:   [de-identified]                           ADMIT DATE: 2019  PROVIDER:     Ann Paiz      LEXISCAN STRESS STUDY    DATE OF STUDY:  2019  ORDERING PROVIDER:  Jose Wright  PRIMARY CARE PROVIDER:  JOHANN Hilliard  INDICATION: Angina  CONSENT:  The test was explained and consent was signed. PROTOCOL: Lexiscan, 0.4 mg infused. PREINFUSION EKG: Normal.  PREINFUSION HR: 54 bpm, infusion HR, 96 bpm.  HR response to Lexiscan  was Normal.  PREINFUSION BP: 118/82 mmHg, infusion BP, 147/94 mmHg. BP response to  Lexiscan was appropriate. CHEST PAIN:  No chest discomfort with Lexiscan nor in recovery. ISCHEMIC EKG CHANGES: None. IMPRESSION:  Electrocardiographically negative Lexiscan stress study.   **Cardiolite report issued from the department of Nuclear Medicine**    Vale Flax    D: 2019 14:45:49       T: 2019 14:49:58     MT/DAVIDA  Job#: [id number]     Doc#: Unknown
activity during maximal recorded wakefulness consisted of bioccipitally dominant 9-10 Hz, 25-35 uV symmetric, regular activity that was reactive to eye opening. During drowsiness, the background rhythm waxed and waned and there were periods of slowing. During stage II sleep symmetric V waves, K complexes, and sleep spindles were seen. Photic stimulation - stepwise photic stimulation at 2-30 Hz was performed and there was a biposterior, symmetric, driving response. Hyperventilation - was not preformed. No abnormalities were activated by photic stimulation     The EKG channel demonstrated a normal sinus rhythm. Interpretation  This EEG was normal in wakefulness and sleep. Clinical correlation  This EEG was normal. No focal or epileptiform abnormalities were seen.     Ester Herrera MD  Diplomate, American Board of Psychiatry and Neurology  Diplomate, American Board of Clinical Neurophysiology  Diplomate, American Board of Epilepsy

## 2019-06-06 ENCOUNTER — APPOINTMENT (OUTPATIENT)
Dept: MRI IMAGING | Age: 54
End: 2019-06-06
Payer: COMMERCIAL

## 2019-06-06 VITALS
DIASTOLIC BLOOD PRESSURE: 63 MMHG | BODY MASS INDEX: 23.99 KG/M2 | TEMPERATURE: 98.6 F | HEIGHT: 72 IN | SYSTOLIC BLOOD PRESSURE: 114 MMHG | RESPIRATION RATE: 18 BRPM | WEIGHT: 177.1 LBS | OXYGEN SATURATION: 96 % | HEART RATE: 53 BPM

## 2019-06-06 PROCEDURE — 6360000002 HC RX W HCPCS: Performed by: EMERGENCY MEDICINE

## 2019-06-06 PROCEDURE — 6370000000 HC RX 637 (ALT 250 FOR IP): Performed by: EMERGENCY MEDICINE

## 2019-06-06 PROCEDURE — 99226 PR SBSQ OBSERVATION CARE/DAY 35 MINUTES: CPT | Performed by: PSYCHIATRY & NEUROLOGY

## 2019-06-06 PROCEDURE — A9576 INJ PROHANCE MULTIPACK: HCPCS | Performed by: PSYCHIATRY & NEUROLOGY

## 2019-06-06 PROCEDURE — G0378 HOSPITAL OBSERVATION PER HR: HCPCS

## 2019-06-06 PROCEDURE — 96372 THER/PROPH/DIAG INJ SC/IM: CPT

## 2019-06-06 PROCEDURE — 6360000004 HC RX CONTRAST MEDICATION: Performed by: PSYCHIATRY & NEUROLOGY

## 2019-06-06 PROCEDURE — 2580000003 HC RX 258: Performed by: EMERGENCY MEDICINE

## 2019-06-06 PROCEDURE — 70553 MRI BRAIN STEM W/O & W/DYE: CPT

## 2019-06-06 RX ORDER — SODIUM CHLORIDE 0.9 % (FLUSH) 0.9 %
10 SYRINGE (ML) INJECTION PRN
Status: DISCONTINUED | OUTPATIENT
Start: 2019-06-06 | End: 2019-06-06 | Stop reason: HOSPADM

## 2019-06-06 RX ORDER — OXYCODONE HYDROCHLORIDE AND ACETAMINOPHEN 5; 325 MG/1; MG/1
2 TABLET ORAL ONCE
Status: COMPLETED | OUTPATIENT
Start: 2019-06-06 | End: 2019-06-06

## 2019-06-06 RX ADMIN — OXYCODONE HYDROCHLORIDE AND ACETAMINOPHEN 2 TABLET: 5; 325 TABLET ORAL at 10:26

## 2019-06-06 RX ADMIN — LISINOPRIL 10 MG: 10 TABLET ORAL at 10:27

## 2019-06-06 RX ADMIN — ENOXAPARIN SODIUM 40 MG: 40 INJECTION SUBCUTANEOUS at 10:26

## 2019-06-06 RX ADMIN — Medication 10 ML: at 10:27

## 2019-06-06 RX ADMIN — TIZANIDINE 4 MG: 4 TABLET ORAL at 10:26

## 2019-06-06 RX ADMIN — TRAMADOL HYDROCHLORIDE 50 MG: 50 TABLET, FILM COATED ORAL at 00:24

## 2019-06-06 RX ADMIN — GADOTERIDOL 15 ML: 279.3 INJECTION, SOLUTION INTRAVENOUS at 08:39

## 2019-06-06 ASSESSMENT — PAIN DESCRIPTION - LOCATION: LOCATION: BACK

## 2019-06-06 ASSESSMENT — PAIN SCALES - GENERAL
PAINLEVEL_OUTOF10: 4
PAINLEVEL_OUTOF10: 6
PAINLEVEL_OUTOF10: 2

## 2019-06-06 ASSESSMENT — PAIN DESCRIPTION - PAIN TYPE: TYPE: CHRONIC PAIN

## 2019-06-06 NOTE — PROGRESS NOTES
OBS/CDU   RESIDENT NOTE      Patients PCP is:  Harinder Ybarra, GITA - RONNIE        SUBJECTIVE      No acute events overnight. Has been able to tolerate a full diet without nausea or vomiting. The patient is urinating on his own and is passing flatus. Denies fever, chills, nausea, vomiting, chest pain, shortness of breath, abdominal pain, focal weakness, numbness, tingling, vision changes, headache. Pt states that chest pain and b/l upper extremity tingling have resolved. PHYSICAL EXAM      General: NAD, AO X 3  Heent: EMOI, PERRL  Neck: SUPPLE, NO JVD  Cardiovascular: RRR, S1S2  Pulmonary: CTAB, NO SOB  Abdomen: SOFT, NTTP, ND, +BS  Extremities: +2/4 PULSES DISTAL, NO SWELLING  Neuro / Psych: NO NUMBNESS OR TINGLING, MENTATION AT BASELINE    PERTINENT TEST /EXAMS      I have reviewed all available laboratory results. MEDICATIONS CURRENT     traMADol (ULTRAM) tablet 50 mg TID PRN   sodium chloride flush 0.9 % injection 10 mL PRN   sodium chloride flush 0.9 % injection 10 mL PRN   lisinopril (PRINIVIL;ZESTRIL) tablet 10 mg Daily   tiZANidine (ZANAFLEX) tablet 4 mg TID   sodium chloride flush 0.9 % injection 10 mL 2 times per day   sodium chloride flush 0.9 % injection 10 mL PRN   acetaminophen (TYLENOL) tablet 650 mg Q4H PRN   enoxaparin (LOVENOX) injection 40 mg Daily   0.9 % sodium chloride infusion Continuous       All medication charted and reviewed. CONSULTS      IP CONSULT TO NEUROLOGY    ASSESSMENT/PLAN       Jessa Gamez is a 52 y.o. male who presents with acute constellation of symptoms including substernal chest heaviness with associated short of breath, nausea, diaphoresis in addition to acute bilateral hands and feet numbness and tingling, lightheadedness likely secondary to anxiety    · Resolution of symptoms this am  · Stress test 6/5 negative  · Patient had negative CT head, CTA head and neck. However, given constellation of symptoms, will have evaluation by neurology.  Consider etiology related to TIA vs seizure - plan for MRI today per neurology  · MRI unremarkable without evidence of acute infarct  · Will eventually need CT chest to further evaluate for mediastinal lymphadenopathy seen on CTA head/neck  · + Marijuana on UDS  · EEG unremarkable  · Will plan for discharge home today if cleared by neurology. We'll have patient follow-up with PCP for enlarged lymph node, and follow-up CT chest as outpatient      · Continue home medications and pain control  · Monitor vitals, labs, and imaging  · DISPO: pending consults and clinical improvement    --  Hilaria Sahni  Emergency Medicine Resident Physician     This dictation was generated by voice recognition computer software. Although all attempts are made to edit the dictation for accuracy, there may be errors in the transcription that are not intended.

## 2019-06-06 NOTE — FLOWSHEET NOTE
Visit initiated by routine rounding. Patient's wife was present. Tyler Molina is a 52 y.o. Male who stated he is anxious about his testing in the morning. Patient stated he is having an MRI. Patient is hard of hearing. Patient was very receptive to visitation.  provided active compassionate listening, and provided a sustaining presence.  explored spiritual support systems.  provided words of comfort and prayer. Patient expressed gratitude to  for visit and prayer. Chaplains remain available for spiritual and emotional support as needed. 06/05/19 7331   Encounter Summary   Services provided to: Patient and family together   Referral/Consult From: Rounding   Continue Visiting   (6/5/19)   Complexity of Encounter Low   Length of Encounter 15 minutes   Spiritual Assessment Completed Yes   Routine   Type Initial   Assessment Approachable; Anxious   Intervention Active listening;Sustaining presence/ Ministry of presence   Outcome Acceptance;Expressed gratitude

## 2019-06-06 NOTE — PROGRESS NOTES
NEUROLOGY INPATIENT PROGRESS NOTE    6/6/2019         Subjective: Ania Wheeler is a  52 y.o. male admitted on6/4/2019 with Chest pain [R07.9]      Briefly, this is a  52 y.o. male admitted on 6/4/2019 with past medical history of restless leg syndrome, hypertension, hyperlipidemia, degenerative disc disease and back pain   Came to the ED due to acute onset of lightheadedness, tingling in bilateral hands and feet, chest pain with shortness of breath. Patient went to urgent care where  they did an EKG and given aspirin and told him to go to the hospital.  In the hospital patient had a CT head done which was unremarkable CTA head and neck showed no large vessel occlusion and neurology was consulted for the symptom.     As per the patient he was driving back from home at 3:30 PM when the all of the sudden he developed numbness and tingling in his upper and lower extremity and feeling anxious he rushed home her symptoms did not improve and he decided to go to the urgent care where he noticed slurred speech and gait imbalance which lasted for one hour and he got an EKG done and received a baby aspirin was told to go to the hospital.  At [facility] patient was evaluated with blood pressure was 145/81 and he complained of chest heaviness in the Center office chest with shortness of breath. Patient denied nausea, palpitations, diaphoresis, headache, seizures, blurry vision or any other neurological complaint.     This is a follow-up progress note, patient is seen and evaluated at bedside. No acute overnight event, patient states he feels a  better  Urine toxicology positive for cannabinoids  Vitamin , folate> 20.0 and sed rate-2  EEG done was normal.No focal or epileptiform abnormalities were seen. MRI brain done unremarkable for acute infarct  Stress test was negative          No current facility-administered medications on file prior to encounter.       Current Outpatient Medications on File Prior to Encounter Medication Sig Dispense Refill    traMADol (ULTRAM) 50 MG tablet Take 2 tablets by mouth every 12 hours as needed for Pain for up to 30 days. (Patient taking differently: Take 50 mg by mouth three times daily. ) 120 tablet 0    lisinopril (PRINIVIL;ZESTRIL) 10 MG tablet TAKE ONE TABLET BY MOUTH DAILY 30 tablet 5    Multiple Vitamins-Minerals (MULTI FOR HIM 50+ PO) Take by mouth      albuterol sulfate HFA (PROAIR HFA) 108 (90 Base) MCG/ACT inhaler Inhale 2 puffs into the lungs every 6 hours as needed for Wheezing 1 Inhaler 3    tiZANidine (ZANAFLEX) 4 MG tablet Take 1 tablet by mouth 3 times daily 90 tablet 5    fluticasone (FLONASE) 50 MCG/ACT nasal spray 1 spray by Nasal route daily 1 Bottle 3       Allergies: Anjana Vega has No Known Allergies.     Past Medical History:   Diagnosis Date    Back pain     Disc disease, degenerative, lumbar or lumbosacral     Hyperlipidemia     Hypertension     Restless leg syndrome        Past Surgical History:   Procedure Laterality Date    HERNIA REPAIR      TONSILLECTOMY AND ADENOIDECTOMY      TYMPANOSTOMY TUBE PLACEMENT Bilateral            Medications:     lisinopril  10 mg Oral Daily    tiZANidine  4 mg Oral TID    sodium chloride flush  10 mL Intravenous 2 times per day    enoxaparin  40 mg Subcutaneous Daily     PRN Meds include: traMADol, sodium chloride flush, sodium chloride flush, sodium chloride flush, acetaminophen    Objective:   /63   Pulse 53   Temp 98.6 °F (37 °C)   Resp 18   Ht 6' (1.829 m)   Wt 177 lb 1.6 oz (80.3 kg)   SpO2 96%   BMI 24.02 kg/m²     Blood pressure range: Systolic (61VBK), EAM:341 , Min:114 , LBT:527   ; Diastolic (86RRP), DMR:46, Min:63, Max:95      Review of Systems        NEUROLOGICEXAMINATION  GENERAL  Appears comfortable and in no distress   HEENT  NC/ AT   cardiovascular  S1 and S2 heard; palpation of pulses: radial pulse    NECK  Supple and no bruits heard   MENTAL STATUS: Alert, oriented, intact advised  Patient to f/u with neurology and cardiology as Meryle Danas, MD  Neurology Resident PGY-2  6/6/2019 at 8:36 AM

## 2019-06-06 NOTE — DISCHARGE SUMMARY
CDU Discharge Summary        Patient:  Anjana Vega  YOB: 1965    MRN: [id number]   Acct: [de-identified]    Primary Care Physician: GITA Candelario Mc, CNP    Admit date:  6/4/2019  6:47 PM  Discharge date: 6/6/2019 12:30 PM     Discharge Diagnoses:     Acute oscillation of symptoms including substernal chest heaviness with associated shortness of breath, nausea, diaphoresis as well as acute bilateral hand and feet numbness and tingling and lightheadedness likely secondary to anxiety  Improved with PO Tylenol, IV fluids, and reassurance    Follow-up:  Call today/tomorrow for a follow up appointment with GITA Candelario Mc, CNP , or return to the Emergency Room with worsening symptoms    Stressed to patient the importance of following up with primary care doctor for further workup/management of symptoms. Pt verbalizes understanding and agrees with plan. Discharge Medications:  Changes to medications          Lita Sicmadison   Home Medication Instructions UMS:[id number]    Printed on:06/06/19 3364   Medication Information                      albuterol sulfate HFA (PROAIR HFA) 108 (90 Base) MCG/ACT inhaler  Inhale 2 puffs into the lungs every 6 hours as needed for Wheezing             fluticasone (FLONASE) 50 MCG/ACT nasal spray  1 spray by Nasal route daily             lisinopril (PRINIVIL;ZESTRIL) 10 MG tablet  TAKE ONE TABLET BY MOUTH DAILY             Multiple Vitamins-Minerals (MULTI FOR HIM 50+ PO)  Take by mouth             tiZANidine (ZANAFLEX) 4 MG tablet  Take 1 tablet by mouth 3 times daily             traMADol (ULTRAM) 50 MG tablet  Take 2 tablets by mouth every 12 hours as needed for Pain for up to 30 days.                  Diet:  No diet orders on file , Advance as tolerated     Activity:  As tolerated    Consultants: IP CONSULT TO NEUROLOGY    Procedures:  Not indicated     Diagnostic Test:   Results for orders placed or performed during the hospital encounter of Reference Range:    Troponin   Result Value Ref Range    Troponin, High Sensitivity 7 0 - 22 ng/L    Troponin T NOT REPORTED <0.03 ng/mL    Troponin Interp NOT REPORTED    VITAMIN B12 & FOLATE   Result Value Ref Range    Vitamin B-12 584 232 - 1245 pg/mL    Folate >20.0 >4.8 ng/mL   LIPID PANEL   Result Value Ref Range    Cholesterol 135 <200 mg/dL    HDL 43 >40 mg/dL    LDL Cholesterol 66 0 - 130 mg/dL    Chol/HDL Ratio 3.1 <5    Triglycerides 131 <150 mg/dL    VLDL NOT REPORTED 1 - 30 mg/dL   DRUG SCREEN MULTI URINE   Result Value Ref Range    Amphetamine Screen, Ur NEGATIVE NEGATIVE    Barbiturate Screen, Ur NEGATIVE NEGATIVE    Benzodiazepine Screen, Urine NEGATIVE NEGATIVE    Cocaine Metabolite, Urine NEGATIVE NEGATIVE    Methadone Screen, Urine NEGATIVE NEGATIVE    Opiates, Urine NEGATIVE NEGATIVE    Phencyclidine, Urine NEGATIVE NEGATIVE    Propoxyphene, Urine NOT REPORTED NEGATIVE    Cannabinoid Scrn, Ur POSITIVE (A) NEGATIVE    Oxycodone Screen, Ur NEGATIVE NEGATIVE    Methamphetamine, Urine NOT REPORTED NEGATIVE    Tricyclic Antidepressants, Urine NOT REPORTED NEGATIVE    MDMA, Urine NOT REPORTED NEGATIVE    Buprenorphine Urine NOT REPORTED NEGATIVE    Test Information       Assay provides medical screening only. The absence of expected drug(s) and/or metabolite(s) may indicate diluted or adulterated urine, limitations of testing or timing of collection.    URINALYSIS   Result Value Ref Range    Color, UA YELLOW YELLOW    Turbidity UA CLEAR CLEAR    Glucose, Ur NEGATIVE NEGATIVE    Bilirubin Urine NEGATIVE NEGATIVE    Ketones, Urine NEGATIVE NEGATIVE    Specific Gravity, UA 1.019 1.005 - 1.030    Urine Hgb NEGATIVE NEGATIVE    pH, UA 7.0 5.0 - 8.0    Protein, UA NEGATIVE NEGATIVE    Urobilinogen, Urine Normal Normal    Nitrite, Urine NEGATIVE NEGATIVE    Leukocyte Esterase, Urine NEGATIVE NEGATIVE    Urinalysis Comments       Microscopic exam not performed based on chemical results unless requested in original order. Sedimentation Rate   Result Value Ref Range    Sed Rate 2 0 - 10 mm   EKG 12 Lead   Result Value Ref Range    Ventricular Rate 62 BPM    Atrial Rate 62 BPM    P-R Interval 136 ms    QRS Duration 94 ms    Q-T Interval 406 ms    QTc Calculation (Bazett) 412 ms    P Axis 63 degrees    R Axis 84 degrees    T Axis 70 degrees   EKG 12 Lead   Result Value Ref Range    Ventricular Rate 51 BPM    Atrial Rate 51 BPM    P-R Interval 136 ms    QRS Duration 96 ms    Q-T Interval 452 ms    QTc Calculation (Bazett) 416 ms    P Axis 74 degrees    R Axis 84 degrees    T Axis 74 degrees     Xr Chest Standard (2 Vw)    Result Date: 6/4/2019  EXAMINATION: TWO XRAY VIEWS OF THE CHEST 6/4/2019 7:30 pm COMPARISON: Chest radiograph dated 04/22/2019 HISTORY: ORDERING SYSTEM PROVIDED HISTORY: chest pain TECHNOLOGIST PROVIDED HISTORY: chest pain Ordering Physician Provided Reason for Exam: chest pain Acuity: Unknown Type of Exam: Unknown FINDINGS: Cardiac and mediastinal shadows are normal.  No infiltrates. No effusions. No pneumothorax. No free air below the diaphragm. No acute findings in the chest.     Ct Head Wo Contrast    Result Date: 6/4/2019  EXAMINATION: CT OF THE HEAD WITHOUT CONTRAST  6/4/2019 8:36 pm TECHNIQUE: CT of the head was performed without the administration of intravenous contrast. Dose modulation, iterative reconstruction, and/or weight based adjustment of the mA/kV was utilized to reduce the radiation dose to as low as reasonably achievable. COMPARISON: September 2012 HISTORY: ORDERING SYSTEM PROVIDED HISTORY: speech difficulty, vertigo TECHNOLOGIST PROVIDED HISTORY: Ordering Physician Provided Reason for Exam: Patient presents with Acuity: Unknown Type of Exam: Unknown FINDINGS: BRAIN/VENTRICLES: Ventricles and sulci are stable. Posterior fossa detail is limited due to artifact. 4th ventricle is midline. There is no definite focal area of abnormal density in the parenchyma.   There is no hemorrhage or extra-axial collection ORBITS: No acute orbital abnormality is noted SINUSES: No air-fluid levels are noted SOFT TISSUES/SKULL:  No acute abnormality of the visualized skull or soft tissues. No acute intracranial abnormality. Cta Neck W Contrast    Result Date: 6/4/2019  EXAMINATION: CTA OF THE HEAD WITH CONTRAST; CTA OF THE NECK 6/4/2019 8:15 pm; 6/4/2019 8:36 pm: TECHNIQUE: CTA of the head/brain was performed with the administration of intravenous contrast. Multiplanar reformatted images are provided for review. MIP images are provided for review. Dose modulation, iterative reconstruction, and/or weight based adjustment of the mA/kV was utilized to reduce the radiation dose to as low as reasonably achievable.; CTA of the neck was performed with the administration of intravenous contrast. Multiplanar reformatted images are provided for review. MIP images are provided for review. Stenosis of the internal carotid arteries measured using NASCET criteria. Dose modulation, iterative reconstruction, and/or weight based adjustment of the mA/kV was utilized to reduce the radiation dose to as low as reasonably achievable. COMPARISON: None. HISTORY: ORDERING SYSTEM PROVIDED HISTORY: speech difficulty, vertigo TECHNOLOGIST PROVIDED HISTORY: Ordering Physician Provided Reason for Exam: Patient presents with Acuity: Unknown Type of Exam: Unknown; ORDERING SYSTEM PROVIDED HISTORY: speech difficulty, vertigo TECHNOLOGIST PROVIDED HISTORY: Ordering Physician Provided Reason for Exam: Patient presents with Type of Exam: Unknown FINDINGS: CTA NECK: AORTIC ARCH/ARCH VESSELS: Vascular calcifications are noted. Otherwise, the aorta is grossly normal.  Great vessel origins are patent. CAROTID ARTERIES: Left: Common carotid artery is patent. Low-density plaque and calcifications are noted at the bifurcation and proximal left internal carotid artery. There is minimal narrowing at the bifurcation.   There is no significant left internal carotid artery narrowing. Right: Common carotid artery and bifurcation are patent. Calcifications are noted along the lateral aspect of the bifurcation. There is no focal significant narrowing of the common carotid artery, bifurcation or internal carotid artery on the right VERTEBRAL ARTERIES: Both vertebral arteries are patent. There is no focal significant narrowing SOFT TISSUES: Several small cervical nodes are present. High density right parotid nodule is noted measuring approximately 14 mm. Small thoracic inlet level lymph nodes are noted. Multiple mediastinal nodes are present. AP window level lymph node measures approximately 2.3 x 2 cm. Peripheral interstitial prominence is noted in the upper lung zones bilaterally raising the question of fibrotic change. Emphysematous changes are noted as well. BONES: Degenerative changes throughout the spine are noted. No destructive lesions are noted. Spinal canal detail is limited. Multilevel disc bulging is noted CTA HEAD: ANTERIOR CIRCULATION: Distal internal carotid arteries are widely patent without focal narrowing. Multifocal irregular contour of the distal anterior cerebral branches is noted. Artifact is favored. There is no focal significant narrowing noted otherwise. No focal significant narrowing of the middle cerebral arteries is noted. Alphia Pierini POSTERIOR CIRCULATION: Distal vertebral arteries and the basilar artery are widely patent. Posterior cerebral arteries are widely patent bilaterally. BRAIN: Limited evaluation of the dural sinuses demonstrates no focal abnormality. Limited evaluation of the brain parenchyma demonstrates no midline shift. No focal significant arterial narrowing in the head or the neck. Lymph nodes in the AP window region are noted as described. Dedicated CT chest recommended.      Cta Head W Contrast    Result Date: 6/4/2019  EXAMINATION: CTA OF THE HEAD WITH CONTRAST; CTA OF THE NECK 6/4/2019 8:15 pm; 6/4/2019 8:36 pm: TECHNIQUE: CTA of the head/brain was performed with the administration of intravenous contrast. Multiplanar reformatted images are provided for review. MIP images are provided for review. Dose modulation, iterative reconstruction, and/or weight based adjustment of the mA/kV was utilized to reduce the radiation dose to as low as reasonably achievable.; CTA of the neck was performed with the administration of intravenous contrast. Multiplanar reformatted images are provided for review. MIP images are provided for review. Stenosis of the internal carotid arteries measured using NASCET criteria. Dose modulation, iterative reconstruction, and/or weight based adjustment of the mA/kV was utilized to reduce the radiation dose to as low as reasonably achievable. COMPARISON: None. HISTORY: ORDERING SYSTEM PROVIDED HISTORY: speech difficulty, vertigo TECHNOLOGIST PROVIDED HISTORY: Ordering Physician Provided Reason for Exam: Patient presents with Acuity: Unknown Type of Exam: Unknown; ORDERING SYSTEM PROVIDED HISTORY: speech difficulty, vertigo TECHNOLOGIST PROVIDED HISTORY: Ordering Physician Provided Reason for Exam: Patient presents with Type of Exam: Unknown FINDINGS: CTA NECK: AORTIC ARCH/ARCH VESSELS: Vascular calcifications are noted. Otherwise, the aorta is grossly normal.  Great vessel origins are patent. CAROTID ARTERIES: Left: Common carotid artery is patent. Low-density plaque and calcifications are noted at the bifurcation and proximal left internal carotid artery. There is minimal narrowing at the bifurcation. There is no significant left internal carotid artery narrowing. Right: Common carotid artery and bifurcation are patent. Calcifications are noted along the lateral aspect of the bifurcation. There is no focal significant narrowing of the common carotid artery, bifurcation or internal carotid artery on the right VERTEBRAL ARTERIES: Both vertebral arteries are patent.   There is no focal significant narrowing SOFT TISSUES: Several small cervical nodes are present. High density right parotid nodule is noted measuring approximately 14 mm. Small thoracic inlet level lymph nodes are noted. Multiple mediastinal nodes are present. AP window level lymph node measures approximately 2.3 x 2 cm. Peripheral interstitial prominence is noted in the upper lung zones bilaterally raising the question of fibrotic change. Emphysematous changes are noted as well. BONES: Degenerative changes throughout the spine are noted. No destructive lesions are noted. Spinal canal detail is limited. Multilevel disc bulging is noted CTA HEAD: ANTERIOR CIRCULATION: Distal internal carotid arteries are widely patent without focal narrowing. Multifocal irregular contour of the distal anterior cerebral branches is noted. Artifact is favored. There is no focal significant narrowing noted otherwise. No focal significant narrowing of the middle cerebral arteries is noted. Ness Steffany POSTERIOR CIRCULATION: Distal vertebral arteries and the basilar artery are widely patent. Posterior cerebral arteries are widely patent bilaterally. BRAIN: Limited evaluation of the dural sinuses demonstrates no focal abnormality. Limited evaluation of the brain parenchyma demonstrates no midline shift. No focal significant arterial narrowing in the head or the neck. Lymph nodes in the AP window region are noted as described. Dedicated CT chest recommended.      Mri Brain W Wo Contrast    Result Date: 6/6/2019  EXAMINATION: MRI OF THE BRAIN WITHOUT AND WITH CONTRAST  6/6/2019 8:12 am TECHNIQUE: Multiplanar multisequence MRI of the head/brain was performed without and with the administration of intravenous contrast. COMPARISON: CT on 06/04/2019 HISTORY: ORDERING SYSTEM PROVIDED HISTORY: episode of confusion, lymph node enlarge in CTA neck Follow-up FINDINGS: INTRACRANIAL STRUCTURES/VENTRICLES:  There are no areas of restricted diffusion to suggest an acute infarct. The cerebral and cerebellar parenchyma demonstrate normal volume. No abnormal extra-axial fluid collections. The ventricles are normal in size. Normal major intracranial flow voids are noted. There is an incidental developmental venous anomaly in the left frontal lobe. No other areas of abnormal contrast enhancement are noted in the cerebral or cerebellar parenchyma. There are no areas of blooming artifact noted on the gradient echo sequences to suggest sequela of acute or chronic hemorrhage. ORBITS: The visualized portion of the orbits demonstrate no acute abnormality. SINUSES: The visualized paranasal sinuses and mastoid air cells are well aerated. BONES/SOFT TISSUES: The bone marrow signal intensity appears normal. The soft tissues demonstrate no acute abnormality. 1. Unremarkable MRI of the brain. No evidence of an acute infarct. Nm Cardiac Stress Test Nuclear Imaging    Result Date: 6/5/2019  EXAMINATION: MYOCARDIAL PERFUSION IMAGING 6/5/2019 9:37 am TECHNIQUE: For the rest study, 14.1 mCi of Tc 99 labeled sestamibi were injected. SPECT images were acquired. Under cardiology supervision, 0.4mg Enrigue Daniele was infused. After pharmacologic stress, 38 mCi of Tc 99 labeled sestamibi were injected. SPECT images with ECG gating were acquired. COMPARISON: None Available. HISTORY: ORDERING SYSTEM PROVIDED HISTORY: Angina TECHNOLOGIST PROVIDED HISTORY: Ordering Physician Provided Reason for Exam: Angina Procedure Type->Rx Ordering Physician Provided Reason for Exam: Angina, chest pain, sweating Additional signs and symptoms: high blood pressure, smoker FINDINGS: Images interpreted utilizing NORCAT system and [employer]. Fixed medium-sized severe inferior wall perfusion defect 0 which resolves on attenuation correction. This appears to be created by large amount of GI tract activity. There is no significant fixed or reversible perfusion defect.  The gated images show no wall motion abnormalities. Normal myocardial thickening. Perfusion scores are visually adjusted to account for artifact. Summed stress score:  0 Summed rest score:  0 Summed reversibility score:  0 Function: End diastolic volume:  14ZA Left ventricular ejection fraction:  62% TID score:  1.03 (scores greater than 1.39 are considered elevated for Lexiscan stress with Tc99m) Notes concerning risk stratification: Risk stratification incorporates both clinical history and some testing results. Final risk determination is the responsibility of the ordering provider as other patient information and test results may increase or decrease the risk assessment reported for this examination. Risk stratification criteria are adapted from \"Noninvasive Risk Stratification\" criteria from [employer]. Al, ACC/AATS/AHA/ASE/ASNC/SCAI/SCCT/STS 2017 Appropriate Use Criteria For Coronary Revascularization in Patients With Stable Ischemic Heart Disease [facility] Volume 69, Issue 17, May 2017 High risk (>3% annual death or MI) 1. Severe resting LV dysfunction (LVEF <35%) not readily explained by non coronary causes 2. Resting perfusion abnormalities greater than 10% of the myocardium in patients without prior history or evidence of MI 3. Stress-induced perfusion abnormalities encumbering greater than or equal to 10% myocardium or stress segmental scores indicating multiple vascular territories with abnormalities 4. Stress-induced LV dilatation (TID ratio greater than 1.19 for exercise and greater than 1.39 for regadenoson) Intermediate risk (1% to 3% annual death or MI) 1. Mild/moderate resting LV dysfunction (LVEF 35% to 49%) not readily explained by non coronary causes. 2. Resting perfusion abnormalities in 5%-9.9% of the myocardium in patients without a history or prior evidence of MI 3.  Stress-induced perfusion abnormality encumbering 5%-9.9% of the myocardium or stress segmental scores indicating 1 vascular territory with abnormalities but without LV dilation 4. Small wall motion abnormality involving 1-2 segments and only 1 coronary bed. Low Risk (Less than 1% annual death or MI) 1. Normal or small myocardial perfusion defect at rest or with stress encumbering less than 5% of the myocardium. No stress-induced ischemia. No infarct. Normal LVEF. Risk stratification: Low           Physical Exam:       General: NAD, AO X 3  Heent: EMOI, PERRL  Neck: SUPPLE, NO JVD  Cardiovascular: RRR, S1S2  Pulmonary: CTAB, NO SOB  Abdomen: SOFT, NTTP, ND, +BS  Extremities: +2/4 PULSES DISTAL, NO SWELLING  Neuro / Psych: NO NUMBNESS OR TINGLING, MENTATION AT [facility] Course:  Clinical course has improved, labs and imaging reviewed. Narciso Suggs originally presented to the hospital on 6/4/2019  6:47 PM. With a constellation of symptoms including substernal chest heaviness with associated shortness of breath, nausea, diaphoresis, light headedness likely secondary to anxiety . At that time it was determined that He required further observation and neurology consult. He was admitted and labs and imaging were followed daily. Imaging results as above. He is medically stable to be discharged. Disposition: Home    Patient stated that they will not drive themselves home from the hospital if they have gotten pain killers/ narcotics earlier that day and that they will arrange for transportation on their own or work with the  for a ride. Patient counseled NOT to drive while under the influence of narcotics/ pain killers. Condition: Good    Patient stable and ready for discharge home. I have discussed plan of care with patient and they are in understanding. They were instructed to read discharge paperwork. All of their questions and concerns were addressed. Time Spent: 0 day      --  Erica Islas DO  Emergency Medicine Resident Physician    This dictation was generated by voice recognition computer software.   Although all attempts are made to edit the dictation for accuracy, there may be errors in the transcription that are not intended.

## 2019-06-07 ENCOUNTER — TELEPHONE (OUTPATIENT)
Dept: PRIMARY CARE CLINIC | Age: 54
End: 2019-06-07

## 2019-06-07 NOTE — TELEPHONE ENCOUNTER
M[facility] Transitions Initial Follow Up Call    Outreach made within 2 business days of discharge: Yes    Patient: Narciso Suggs Patient : 1965   MRN: [id number]  Reason for Admission: There are no discharge diagnoses documented for the most recent discharge. Discharge Date: 19       Spoke with: Dawit Holley (Patient)    Discharge department/facility: [place] [address] Interactive Patient Contact:  Was patient able to fill all prescriptions: Yes  Was patient instructed to bring all medications to the follow-up visit: No:   Is patient taking all medications as directed in the discharge summary?  Yes  Does patient understand their discharge instructions: Yes  Does patient have questions or concerns that need addressed prior to 7-14 day follow up office visit: no    Scheduled appointment with PCP within 7-14 days    Follow Up  Future Appointments   Date Time Provider Yoli Griggs   2019  2:20 Tia Galvez MD [facility]   2019  8:40 AM Kaushik Barclay MD [facility]   2019  3:20 PM GITA Valentin - CNP [facility]       Carolyne Woody CMA ([facility])

## 2019-06-12 DIAGNOSIS — G89.29 CHRONIC MIDLINE LOW BACK PAIN WITHOUT SCIATICA: ICD-10-CM

## 2019-06-12 DIAGNOSIS — M54.50 CHRONIC MIDLINE LOW BACK PAIN WITHOUT SCIATICA: ICD-10-CM

## 2019-06-12 DIAGNOSIS — M51.37 DISC DISEASE, DEGENERATIVE, LUMBAR OR LUMBOSACRAL: ICD-10-CM

## 2019-06-12 RX ORDER — TRAMADOL HYDROCHLORIDE 50 MG/1
100 TABLET ORAL EVERY 12 HOURS PRN
Qty: 120 TABLET | Refills: 0 | Status: SHIPPED | OUTPATIENT
Start: 2019-06-12 | End: 2019-07-11 | Stop reason: SDUPTHER

## 2019-06-12 NOTE — TELEPHONE ENCOUNTER
Medication Requested: tramadol    Last visit: 04/16/19  Next visit: 6/17/2019  Last refill: 05/13/19    Med contract on file:  [x] yes   [] no    Last urine drug screen: 06/05/19  Consistent with medication(s):    [] yes   [x] no was positive for cannabinoid and negative for opiates    Last OARRS ran: 04/16/19    Quantity of medication remaining: unknown    Who will be picking rx up: carey    Pharmacy if escribed: [facility]

## 2019-06-14 ENCOUNTER — TELEPHONE (OUTPATIENT)
Dept: PRIMARY CARE CLINIC | Age: 54
End: 2019-06-14

## 2019-06-14 ENCOUNTER — OFFICE VISIT (OUTPATIENT)
Dept: PRIMARY CARE CLINIC | Age: 54
End: 2019-06-14
Payer: COMMERCIAL

## 2019-06-14 VITALS
BODY MASS INDEX: 23.44 KG/M2 | OXYGEN SATURATION: 98 % | RESPIRATION RATE: 16 BRPM | SYSTOLIC BLOOD PRESSURE: 132 MMHG | WEIGHT: 172.8 LBS | DIASTOLIC BLOOD PRESSURE: 76 MMHG | HEART RATE: 96 BPM

## 2019-06-14 DIAGNOSIS — F12.10 MARIJUANA ABUSE: ICD-10-CM

## 2019-06-14 DIAGNOSIS — F41.9 ANXIETY: ICD-10-CM

## 2019-06-14 DIAGNOSIS — I10 ESSENTIAL HYPERTENSION: Primary | Chronic | ICD-10-CM

## 2019-06-14 DIAGNOSIS — E78.2 MIXED HYPERLIPIDEMIA: ICD-10-CM

## 2019-06-14 PROCEDURE — G8427 DOCREV CUR MEDS BY ELIG CLIN: HCPCS | Performed by: INTERNAL MEDICINE

## 2019-06-14 PROCEDURE — 1036F TOBACCO NON-USER: CPT | Performed by: INTERNAL MEDICINE

## 2019-06-14 PROCEDURE — G8420 CALC BMI NORM PARAMETERS: HCPCS | Performed by: INTERNAL MEDICINE

## 2019-06-14 PROCEDURE — 99215 OFFICE O/P EST HI 40 MIN: CPT | Performed by: INTERNAL MEDICINE

## 2019-06-14 PROCEDURE — 3017F COLORECTAL CA SCREEN DOC REV: CPT | Performed by: INTERNAL MEDICINE

## 2019-06-14 NOTE — TELEPHONE ENCOUNTER
Prior Authorization not required for patient/medication Case ID: [id number]      Payer:  [employer] - [employer]   Your PA has been resolved, no additional PA is required. For further inquiries please contact the number on the back of the member prescription card.  (Message [phone])   Suggested payer for the patient:   [address] History

## 2019-06-17 ENCOUNTER — TELEPHONE (OUTPATIENT)
Dept: PRIMARY CARE CLINIC | Age: 54
End: 2019-06-17

## 2019-06-17 DIAGNOSIS — F41.9 ANXIETY: Primary | ICD-10-CM

## 2019-06-17 RX ORDER — BUSPIRONE HYDROCHLORIDE 10 MG/1
10 TABLET ORAL 3 TIMES DAILY PRN
Qty: 90 TABLET | Refills: 1 | Status: SHIPPED | OUTPATIENT
Start: 2019-06-17 | End: 2019-08-16

## 2019-06-17 NOTE — TELEPHONE ENCOUNTER
Patient called stating he was in the hospital last week for anxiety and stress and was placed on sertraline by Dr Jean Suarez but patient is still feeling anxious and thinks that it is a temporary issue.  He would like you to call him about this, [phone]

## 2019-06-17 NOTE — TELEPHONE ENCOUNTER
Please let him know I have sent in Buspar for him to use in addition to the zoloft  He can use this 3 times a day if needed  If he continues to have trouble he will need to come in for appt to discuss other treatment opitons

## 2019-06-28 ENCOUNTER — OFFICE VISIT (OUTPATIENT)
Dept: PRIMARY CARE CLINIC | Age: 54
End: 2019-06-28
Payer: COMMERCIAL

## 2019-06-28 VITALS
HEART RATE: 72 BPM | DIASTOLIC BLOOD PRESSURE: 70 MMHG | SYSTOLIC BLOOD PRESSURE: 104 MMHG | RESPIRATION RATE: 18 BRPM | WEIGHT: 174.6 LBS | BODY MASS INDEX: 23.65 KG/M2 | HEIGHT: 72 IN

## 2019-06-28 DIAGNOSIS — R59.9 ENLARGED LYMPH NODE: ICD-10-CM

## 2019-06-28 DIAGNOSIS — R06.02 SHORTNESS OF BREATH: ICD-10-CM

## 2019-06-28 DIAGNOSIS — F12.11 MILD CANNABIS ABUSE IN EARLY REMISSION: ICD-10-CM

## 2019-06-28 DIAGNOSIS — R93.89 ABNORMAL CT SCAN, NECK: ICD-10-CM

## 2019-06-28 DIAGNOSIS — I10 ESSENTIAL HYPERTENSION: Chronic | ICD-10-CM

## 2019-06-28 DIAGNOSIS — F41.9 ANXIETY: Primary | ICD-10-CM

## 2019-06-28 PROCEDURE — 1036F TOBACCO NON-USER: CPT | Performed by: NURSE PRACTITIONER

## 2019-06-28 PROCEDURE — 3017F COLORECTAL CA SCREEN DOC REV: CPT | Performed by: NURSE PRACTITIONER

## 2019-06-28 PROCEDURE — G8420 CALC BMI NORM PARAMETERS: HCPCS | Performed by: NURSE PRACTITIONER

## 2019-06-28 PROCEDURE — G8427 DOCREV CUR MEDS BY ELIG CLIN: HCPCS | Performed by: NURSE PRACTITIONER

## 2019-06-28 PROCEDURE — 99214 OFFICE O/P EST MOD 30 MIN: CPT | Performed by: NURSE PRACTITIONER

## 2019-06-28 RX ORDER — ALBUTEROL SULFATE 90 UG/1
2 AEROSOL, METERED RESPIRATORY (INHALATION) EVERY 6 HOURS PRN
Qty: 1 INHALER | Refills: 3 | Status: SHIPPED | OUTPATIENT
Start: 2019-06-28 | End: 2020-10-28

## 2019-06-28 RX ORDER — LISINOPRIL 10 MG/1
TABLET ORAL
Qty: 30 TABLET | Refills: 5 | Status: SHIPPED | OUTPATIENT
Start: 2019-06-28 | End: 2019-10-09 | Stop reason: SDUPTHER

## 2019-06-28 ASSESSMENT — ENCOUNTER SYMPTOMS
BLOOD IN STOOL: 0
DIARRHEA: 0
WHEEZING: 0
NAUSEA: 0
VOMITING: 0
SINUS PRESSURE: 0
TROUBLE SWALLOWING: 0
SHORTNESS OF BREATH: 0
CONSTIPATION: 0
COUGH: 0
ABDOMINAL PAIN: 0
SORE THROAT: 0

## 2019-06-28 NOTE — PROGRESS NOTES
[facility] PRIMARY CARE  [address] [place] [address] [address] [place] [address],[address] [facility] [address]  Dept: [phone]  Dept Fax: [phone]    Anjana Vega is a 62 y.o. male who presentstoday for his medical conditions/complaints as noted below.   Anjana Vega is c/o of  Chief Complaint   Patient presents with    Anxiety     wants to discuss buspar and zoloft       HPI:     Here today for follow up on anxiety, irritability  Admits was smoking marijuana daily but has quit since his last hospital stay  He is wondering if his current his wife has not been well and is going to have to undergo surgery soon symptoms are withdrawal or still anxiety  Over the past week he does feel somewhat better but remains with easy irritability  He is not taking the zoloft that was prescribed  He states he needs to be alert at work and is worried about feeling drowsy from the meds  Also has not tried the [address]  He states is concerned about his wife who has been well and has surgery planned, is also recalling the one year anniversary of his mother's death    Asking about results from testing that was done in hospital  He states that he was told something about lymph nodes but was not told what to do      No results found for: LABA1C          ( goal A1C is < 7)   No results found for: LABMICR  LDL Cholesterol (mg/dL)   Date Value   06/05/2019 66       (goal LDL is <100)   AST (U/L)   Date Value   06/04/2019 32     ALT (U/L)   Date Value   06/04/2019 25     BUN (mg/dL)   Date Value   06/04/2019 17     BP Readings from Last 3 Encounters:   06/28/19 104/70   06/14/19 132/76   06/06/19 114/63          ([id number])    Past Medical History:   Diagnosis Date    Back pain     Disc disease, degenerative, lumbar or lumbosacral     Hyperlipidemia     Hypertension     Restless leg syndrome       Past Surgical History:   Procedure Laterality Date    HERNIA REPAIR      TONSILLECTOMY AND ADENOIDECTOMY      TYMPANOSTOMY TUBE PLACEMENT Bilateral        Family History   Problem Relation Age of Onset    Arthritis Mother           Social History     Tobacco Use    Smoking status: Former Smoker     Packs/day: 0.05     Years: 35.00     Pack years: 1.75     Start date:      Last attempt to quit: 2010     Years since quittin.9    Smokeless tobacco: Never Used   Substance Use Topics    Alcohol use: No     Comment: rare      Current Outpatient Medications   Medication Sig Dispense Refill    lisinopril (PRINIVIL;ZESTRIL) 10 MG tablet TAKE ONE TABLET BY MOUTH DAILY 30 tablet 5    albuterol sulfate HFA (PROAIR HFA) 108 (90 Base) MCG/ACT inhaler Inhale 2 puffs into the lungs every 6 hours as needed for Wheezing 1 Inhaler 3    traMADol (ULTRAM) 50 MG tablet Take 2 tablets by mouth every 12 hours as needed for Pain for up to 30 days. 120 tablet 0    Multiple Vitamins-Minerals (MULTI FOR HIM 50+ PO) Take by mouth      fluticasone (FLONASE) 50 MCG/ACT nasal spray 1 spray by Nasal route daily 1 Bottle 3    busPIRone (BUSPAR) 10 MG tablet Take 1 tablet by mouth 3 times daily as needed (anxiety) 90 tablet 1    sertraline (ZOLOFT) 50 MG tablet Take 1 tablet by mouth daily 30 tablet 3     No current facility-administered medications for this visit. No Known Allergies    Health Maintenance   Topic Date Due    Hepatitis C screen  1965    HIV screen  1978    Shingles Vaccine (1 of 2) 2013    Colon cancer screen colonoscopy  2013    Flu vaccine (Season Ended) 2019    Potassium monitoring  2020    Creatinine monitoring  2020    DTaP/Tdap/Td vaccine (2 - Td) 2024    Lipid screen  2024    Pneumococcal 0-64 years Vaccine  Aged Out       Subjective:      Review of Systems   Constitutional: Negative for activity change, appetite change, chills, fatigue, fever and unexpected weight change.    HENT: Negative for congestion, ear pain, hearing loss, sinus pressure, sore throat and trouble swallowing. Eyes: Negative for visual disturbance. Respiratory: Negative for cough, shortness of breath and wheezing. Cardiovascular: Negative for chest pain, palpitations and leg swelling. Gastrointestinal: Negative for abdominal pain, blood in stool, constipation, diarrhea, nausea and vomiting. Endocrine: Negative for cold intolerance, heat intolerance, polydipsia, polyphagia and polyuria. Genitourinary: Negative for difficulty urinating, frequency, hematuria and urgency. Musculoskeletal: Negative for arthralgias and myalgias. Skin: Negative for rash. Allergic/Immunologic: Negative for environmental allergies. Neurological: Negative for dizziness, weakness, light-headedness and headaches. Psychiatric/Behavioral: Negative for confusion. The patient is nervous/anxious. Objective:     Physical Exam   Constitutional: He is oriented to person, place, and time. He appears well-developed and well-nourished. HENT:   Head: Normocephalic. Eyes: Pupils are equal, round, and reactive to light. Conjunctivae and EOM are normal.   Neck: Normal range of motion. Cardiovascular: Normal rate, regular rhythm, normal heart sounds and intact distal pulses. No murmur heard. Pulmonary/Chest: Effort normal and breath sounds normal. He has no wheezes. Abdominal: Soft. Bowel sounds are normal. He exhibits no distension. Musculoskeletal: Normal range of motion. Neurological: He is alert and oriented to person, place, and time. Skin: Skin is warm and dry. Psychiatric: He has a normal mood and affect. His behavior is normal. Judgment and thought content normal.   Short of breath  /70 (Site: Left Upper Arm, Position: Sitting, Cuff Size: Large Adult)   Pulse 72   Resp 18   Ht 6' (1.829 m)   Wt 174 lb 9.6 oz (79.2 kg)   BMI 23.68 kg/m²     Assessment:       Diagnosis Orders   1. Anxiety     2. 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  Essential hypertension lisinopril (PRINIVIL;ZESTRIL) 10 MG tablet   4. Shortness of breath  albuterol sulfate HFA (PROAIR HFA) 108 (90 Base) MCG/ACT inhaler   5. Abnormal CT scan, neck  CT Chest High Resolution   6. Enlarged lymph node  CT Chest High Resolution             Plan:      Return in about 7 weeks (around 8/16/2019) for depression/anxiety, already scheduled. Orders Placed This Encounter   Procedures    CT Chest High Resolution     Standing Status:   Future     Standing Expiration Date:   6/27/2020     Order Specific Question:   Reason for exam:     Answer:   shortness of breath        Orders Placed This Encounter   Medications    lisinopril (PRINIVIL;ZESTRIL) 10 MG tablet     Sig: TAKE ONE TABLET BY MOUTH DAILY     Dispense:  30 tablet     Refill:  5    albuterol sulfate HFA (PROAIR HFA) 108 (90 Base) MCG/ACT inhaler     Sig: Inhale 2 puffs into the lungs every 6 hours as needed for Wheezing     Dispense:  1 Inhaler     Refill:  3      Anxiety, cannabis use in remission- lengthy discussion. Strongly advised to start Zoloft nightly over the weekend. As long as he is not having any adverse effects continue nightly. Explained appropriate use of BuSpar and encouraged to use when he is having a lot of worry. Explained may need increased dose of Zoloft in order to reach effectiveness, call if he is tolerating well and would like dose adjustment after 7 days of regular use  Abnormal CT, enlarged lymph nodes- reviewed imaging from hospital stay, concerning for mediastinal lymph node enlargement. Results and need for additional testing explained. Reassurance provided as patient states that he is now more anxious.   Chest CT ordered and instructed on where to schedule  Hypertension- stable, refill provided on current medication  Of the 25 minute duration appointment visit, ALISTAIR Yu spent at least 50% of the face-to-face time in counseling, explanation of diagnosis, planning of further management, and answering all questions. Patient given educational materials - see patient instructions. Discussed use, benefit, and side effects of prescribed medications. All patientquestions answered. Pt voiced understanding. Reviewed health maintenance. Instructedto continue current medications, diet and exercise. Patient agreed with treatmentplan. Follow up as directed.      Electronicallysigned by GITA Guzman CNP on 6/28/2019 at 5:11 PM

## 2019-06-28 NOTE — PROGRESS NOTES
Visit Information    Have you changed or started any medications since your last visit including any over-the-counter medicines, vitamins, or herbal medicines? no   Are you having any side effects from any of your medications? -  no  Have you stopped taking any of your medications? Is so, why? -  Yes, stopped zoloft after 3 days, has not taken buspar    Have you seen any other physician or provider since your last visit? No  Have you had any other diagnostic tests since your last visit? No  Have you been seen in the emergency room and/or had an admission to a hospital since we last saw you? No  Have you had your routine dental cleaning in the past 6 months? no    Have you activated your [employer] account? If not, what are your barriers?  No: declined     Patient Care Team:  GITA Guzman CNP as PCP - General (Family Nurse Practitioner)  GITA Guzman CNP as PCP - [facility] Provider    Medical History Review  Past Medical, Family, and Social History reviewed and does not contribute to the patient presenting condition    Health Maintenance   Topic Date Due    Hepatitis C screen  1965    HIV screen  06/12/1978    Shingles Vaccine (1 of 2) 06/12/2013    Colon cancer screen colonoscopy  06/12/2013    Flu vaccine (Season Ended) 09/01/2019    Potassium monitoring  06/04/2020    Creatinine monitoring  06/04/2020    DTaP/Tdap/Td vaccine (2 - Td) 01/01/2024    Lipid screen  06/05/2024    Pneumococcal 0-64 years Vaccine  Aged Out

## 2019-06-30 ENCOUNTER — HOSPITAL ENCOUNTER (EMERGENCY)
Age: 54
Discharge: HOME | End: 2019-06-30
Attending: EMERGENCY MEDICINE
Payer: COMMERCIAL

## 2019-06-30 ENCOUNTER — APPOINTMENT (OUTPATIENT)
Dept: CT IMAGING | Age: 54
End: 2019-06-30
Payer: COMMERCIAL

## 2019-06-30 ENCOUNTER — APPOINTMENT (OUTPATIENT)
Dept: GENERAL RADIOLOGY | Age: 54
End: 2019-06-30
Payer: COMMERCIAL

## 2019-06-30 VITALS
DIASTOLIC BLOOD PRESSURE: 75 MMHG | WEIGHT: 178 LBS | HEART RATE: 69 BPM | TEMPERATURE: 98.2 F | RESPIRATION RATE: 12 BRPM | SYSTOLIC BLOOD PRESSURE: 115 MMHG | OXYGEN SATURATION: 95 % | BODY MASS INDEX: 24.11 KG/M2 | HEIGHT: 72 IN

## 2019-06-30 DIAGNOSIS — F41.1 ANXIETY STATE: Primary | ICD-10-CM

## 2019-06-30 LAB
% CKMB: 1.3 % (ref 0–3.5)
ABSOLUTE EOS #: 0.12 K/UL (ref 0–0.44)
ABSOLUTE IMMATURE GRANULOCYTE: 0.04 K/UL (ref 0–0.3)
ABSOLUTE LYMPH #: 2.15 K/UL (ref 1.1–3.7)
ABSOLUTE MONO #: 0.74 K/UL (ref 0.1–1.2)
ANION GAP SERPL CALCULATED.3IONS-SCNC: 13 MMOL/L (ref 9–17)
BASOPHILS # BLD: 0 % (ref 0–2)
BASOPHILS ABSOLUTE: 0.04 K/UL (ref 0–0.2)
BUN BLDV-MCNC: 19 MG/DL (ref 6–20)
BUN/CREAT BLD: ABNORMAL (ref 9–20)
CALCIUM SERPL-MCNC: 9.2 MG/DL (ref 8.6–10.4)
CHLORIDE BLD-SCNC: 102 MMOL/L (ref 98–107)
CK MB: 1.9 NG/ML
CKMB INTERPRETATION: ABNORMAL
CO2: 24 MMOL/L (ref 20–31)
CREAT SERPL-MCNC: 0.92 MG/DL (ref 0.7–1.2)
DIFFERENTIAL TYPE: ABNORMAL
EOSINOPHILS RELATIVE PERCENT: 1 % (ref 1–4)
GFR AFRICAN AMERICAN: >60 ML/MIN
GFR NON-AFRICAN AMERICAN: >60 ML/MIN
GFR SERPL CREATININE-BSD FRML MDRD: ABNORMAL ML/MIN/{1.73_M2}
GFR SERPL CREATININE-BSD FRML MDRD: ABNORMAL ML/MIN/{1.73_M2}
GLUCOSE BLD-MCNC: 154 MG/DL (ref 70–99)
HCT VFR BLD CALC: 46.8 % (ref 40.7–50.3)
HEMOGLOBIN: 15.9 G/DL (ref 13–17)
IMMATURE GRANULOCYTES: 0 %
INR BLD: 1
LYMPHOCYTES # BLD: 22 % (ref 24–43)
MCH RBC QN AUTO: 30.5 PG (ref 25.2–33.5)
MCHC RBC AUTO-ENTMCNC: 34 G/DL (ref 28.4–34.8)
MCV RBC AUTO: 89.8 FL (ref 82.6–102.9)
MONOCYTES # BLD: 7 % (ref 3–12)
MYOGLOBIN: 22 NG/ML (ref 28–72)
NRBC AUTOMATED: 0 PER 100 WBC
PARTIAL THROMBOPLASTIN TIME: 24.9 SEC (ref 20.5–30.5)
PDW BLD-RTO: 11.8 % (ref 11.8–14.4)
PLATELET # BLD: 298 K/UL (ref 138–453)
PLATELET ESTIMATE: ABNORMAL
PMV BLD AUTO: 9.9 FL (ref 8.1–13.5)
POTASSIUM SERPL-SCNC: 4.2 MMOL/L (ref 3.7–5.3)
PROTHROMBIN TIME: 10.4 SEC (ref 9–12)
RBC # BLD: 5.21 M/UL (ref 4.21–5.77)
RBC # BLD: ABNORMAL 10*6/UL
SEG NEUTROPHILS: 70 % (ref 36–65)
SEGMENTED NEUTROPHILS ABSOLUTE COUNT: 6.85 K/UL (ref 1.5–8.1)
SODIUM BLD-SCNC: 139 MMOL/L (ref 135–144)
TOTAL CK: 144 U/L (ref 39–308)
TROPONIN INTERP: ABNORMAL
TROPONIN T: ABNORMAL NG/ML
TROPONIN, HIGH SENSITIVITY: 6 NG/L (ref 0–22)
WBC # BLD: 9.9 K/UL (ref 3.5–11.3)
WBC # BLD: ABNORMAL 10*3/UL

## 2019-06-30 PROCEDURE — 70450 CT HEAD/BRAIN W/O DYE: CPT

## 2019-06-30 PROCEDURE — 71045 X-RAY EXAM CHEST 1 VIEW: CPT

## 2019-06-30 PROCEDURE — 82553 CREATINE MB FRACTION: CPT

## 2019-06-30 PROCEDURE — 85730 THROMBOPLASTIN TIME PARTIAL: CPT

## 2019-06-30 PROCEDURE — 6360000004 HC RX CONTRAST MEDICATION: Performed by: STUDENT IN AN ORGANIZED HEALTH CARE EDUCATION/TRAINING PROGRAM

## 2019-06-30 PROCEDURE — 82550 ASSAY OF CK (CPK): CPT

## 2019-06-30 PROCEDURE — 93005 ELECTROCARDIOGRAM TRACING: CPT

## 2019-06-30 PROCEDURE — 80048 BASIC METABOLIC PNL TOTAL CA: CPT

## 2019-06-30 PROCEDURE — 85025 COMPLETE CBC W/AUTO DIFF WBC: CPT

## 2019-06-30 PROCEDURE — 85610 PROTHROMBIN TIME: CPT

## 2019-06-30 PROCEDURE — 99284 EMERGENCY DEPT VISIT MOD MDM: CPT

## 2019-06-30 PROCEDURE — 84484 ASSAY OF TROPONIN QUANT: CPT

## 2019-06-30 PROCEDURE — 6370000000 HC RX 637 (ALT 250 FOR IP): Performed by: EMERGENCY MEDICINE

## 2019-06-30 PROCEDURE — 70498 CT ANGIOGRAPHY NECK: CPT

## 2019-06-30 PROCEDURE — 93005 ELECTROCARDIOGRAM TRACING: CPT | Performed by: STUDENT IN AN ORGANIZED HEALTH CARE EDUCATION/TRAINING PROGRAM

## 2019-06-30 PROCEDURE — 83874 ASSAY OF MYOGLOBIN: CPT

## 2019-06-30 PROCEDURE — 70496 CT ANGIOGRAPHY HEAD: CPT

## 2019-06-30 RX ORDER — LORAZEPAM 2 MG/ML
1 INJECTION INTRAMUSCULAR ONCE
Status: DISCONTINUED | OUTPATIENT
Start: 2019-06-30 | End: 2019-06-30

## 2019-06-30 RX ORDER — HYDROXYZINE HYDROCHLORIDE 25 MG/1
25 TABLET, FILM COATED ORAL ONCE
Status: COMPLETED | OUTPATIENT
Start: 2019-06-30 | End: 2019-06-30

## 2019-06-30 RX ORDER — HYDROXYZINE HYDROCHLORIDE 25 MG/1
25 TABLET, FILM COATED ORAL 3 TIMES DAILY PRN
Qty: 9 TABLET | Refills: 0 | Status: SHIPPED | OUTPATIENT
Start: 2019-06-30 | End: 2019-08-16

## 2019-06-30 RX ADMIN — IOHEXOL 90 ML: 350 INJECTION, SOLUTION INTRAVENOUS at 18:57

## 2019-06-30 RX ADMIN — HYDROXYZINE HYDROCHLORIDE 25 MG: 25 TABLET ORAL at 18:23

## 2019-06-30 ASSESSMENT — ENCOUNTER SYMPTOMS
NAUSEA: 0
BACK PAIN: 0
CONSTIPATION: 0
EYE ITCHING: 0
RHINORRHEA: 0
VOMITING: 0
EYE REDNESS: 0
DIARRHEA: 0
COUGH: 0
ABDOMINAL PAIN: 0
SHORTNESS OF BREATH: 0

## 2019-06-30 ASSESSMENT — PAIN SCALES - GENERAL: PAINLEVEL_OUTOF10: 7

## 2019-06-30 ASSESSMENT — PAIN DESCRIPTION - LOCATION: LOCATION: NECK

## 2019-06-30 NOTE — ED PROVIDER NOTES
[facility]  ED  Emergency Department Encounter  EmergencyMedicine Resident     Pt Briseida Kelley  MRN: [id number]  [place] 1965  Date of evaluation: 6/30/19  PCP:  GITA Lai CNP    CHIEF COMPLAINT       Chief Complaint   Patient presents with    Headache    Extremity Weakness       HISTORY OF PRESENT ILLNESS  (Location/Symptom, Timing/Onset, Context/Setting, Quality, Duration, Modifying Factors, Severity.)      Carmen Rae is a 62 y.o. male who presents with feeling of tremulousness, headache that was acute onset while watching a movie. Patient states that his headache is frontal but also occipital, also associated with some neck discomfort on the right. Patient has a history of anxiety, was recently started on sertraline. Patient has had the symptoms once in the past, admitted for this, with unremarkable work-up. He states that he has no unilateral weakness, has been feeling well prior to the events that transpired in the last hour. He denies abdominal pain nausea vomiting headache diarrhea. He did have a bowel movement just prior to this. He states he is scheduled for a CT scan of his chest to evaluate for lymphadenopathy. PAST MEDICAL / SURGICAL / SOCIAL / FAMILY HISTORY      has a past medical history of Back pain, Disc disease, degenerative, lumbar or lumbosacral, Hyperlipidemia, Hypertension, and Restless leg syndrome. has a past surgical history that includes Tonsillectomy and adenoidectomy; hernia repair; and Tympanostomy tube placement (Bilateral).     Social History     Socioeconomic History    Marital status:      Spouse name: Not on file    Number of children: Not on file    Years of education: Not on file    Highest education level: Not on file   Occupational History    Not on file   Social Needs    Financial resource strain: Not on file    Food insecurity:     Worry: Not on file     Inability: Not on file   [employer] Transportation needs:     Medical: Not on file     Non-medical: Not on file   Tobacco Use    Smoking status: Former Smoker     Packs/day: 0.05     Years: 35.00     Pack years: 1.75     Start date:      Last attempt to quit: 2010     Years since quittin.9    Smokeless tobacco: Never Used   Substance and Sexual Activity    Alcohol use: No     Comment: rare    Drug use: Yes     Comment: occ    Sexual activity: Yes     Partners: Female   Lifestyle    Physical activity:     Days per week: Not on file     Minutes per session: Not on file    Stress: Not on file   Relationships    Social connections:     Talks on phone: Not on file     Gets together: Not on file     Attends Latter-day service: Not on file     Active member of club or organization: Not on file     Attends meetings of clubs or organizations: Not on file     Relationship status: Not on file    Intimate partner violence:     Fear of current or ex partner: Not on file     Emotionally abused: Not on file     Physically abused: Not on file     Forced sexual activity: Not on file   Other Topics Concern    Not on file   Social History Narrative    Not on file       Family History   Problem Relation Age of Onset    Arthritis Mother        Allergies:  Patient has no known allergies. Home Medications:  Prior to Admission medications    Medication Sig Start Date End Date Taking?  Authorizing Provider   hydrOXYzine (ATARAX) 25 MG tablet Take 1 tablet by mouth 3 times daily as needed for Anxiety 19  Yes Pily Kan MD   lisinopril (PRINIVIL;ZESTRIL) 10 MG tablet TAKE ONE TABLET BY MOUTH DAILY 19   GITA Orellana CNP   albuterol sulfate HFA (PROAIR HFA) 108 (90 Base) MCG/ACT inhaler Inhale 2 puffs into the lungs every 6 hours as needed for Wheezing 19   GITA Orellana CNP   busPIRone (BUSPAR) 10 MG tablet Take 1 tablet by mouth 3 times daily as needed (anxiety) 19   GITA Orellana CNP Department Physician who either signs or Co-signs this chart in the absence of a cardiologist.    EMERGENCY DEPARTMENT COURSE:  Symptoms improved with hydroxyzine administration. Patient has normal CT CTAs, vitals continued to be normal and stable. Laboratory work-up unremarkable, patient discharged with prescription hydroxyzine. Instructed to use paper bag if these issues ever arise again, return to the emergency department with continued symptoms. Patient to follow-up with outpatient high-resolution CT scan regarding lymphadenopathy in the chest, PCP for the symptoms. PROCEDURES:  None    CONSULTS:  None    CRITICAL CARE:  None    FINAL IMPRESSION      1.  Anxiety state          DISPOSITION / PLAN     DISPOSITION Decision To Discharge 06/30/2019 08:11:23 PM      PATIENT REFERRED TO:  GITA Stout - CNP  [address] [facility]  [address]  [facility] [address] [place] [address]  [phone]            DISCHARGE MEDICATIONS:  Discharge Medication List as of 6/30/2019  8:13 PM      START taking these medications    Details   hydrOXYzine (ATARAX) 25 MG tablet Take 1 tablet by mouth 3 times daily as needed for Anxiety, Disp-9 tablet, R-0Print             Eamon Sidhu MD  Emergency Medicine Resident    (Please note that portions of thisnote were completed with a voice recognition program.  Efforts were made to edit the dictations but occasionally words are mis-transcribed.)        Eamon Sidhu MD  07/01/19 [id number]

## 2019-07-02 ENCOUNTER — TELEPHONE (OUTPATIENT)
Dept: PRIMARY CARE CLINIC | Age: 54
End: 2019-07-02

## 2019-07-06 ENCOUNTER — HOSPITAL ENCOUNTER (OUTPATIENT)
Dept: CT IMAGING | Age: 54
Discharge: HOME | End: 2019-07-08
Payer: COMMERCIAL

## 2019-07-06 DIAGNOSIS — R59.9 ENLARGED LYMPH NODE: ICD-10-CM

## 2019-07-06 DIAGNOSIS — R93.89 ABNORMAL CT SCAN, NECK: ICD-10-CM

## 2019-07-06 PROCEDURE — 71250 CT THORAX DX C-: CPT

## 2019-07-11 DIAGNOSIS — G89.29 CHRONIC MIDLINE LOW BACK PAIN WITHOUT SCIATICA: ICD-10-CM

## 2019-07-11 DIAGNOSIS — M51.37 DISC DISEASE, DEGENERATIVE, LUMBAR OR LUMBOSACRAL: ICD-10-CM

## 2019-07-11 DIAGNOSIS — M54.50 CHRONIC MIDLINE LOW BACK PAIN WITHOUT SCIATICA: ICD-10-CM

## 2019-07-11 RX ORDER — TRAMADOL HYDROCHLORIDE 50 MG/1
100 TABLET ORAL EVERY 12 HOURS PRN
Qty: 120 TABLET | Refills: 0 | Status: SHIPPED | OUTPATIENT
Start: 2019-07-11 | End: 2019-08-02 | Stop reason: SDUPTHER

## 2019-07-11 NOTE — TELEPHONE ENCOUNTER
Last OV 06/28/2019    Health Maintenance   Topic Date Due    Hepatitis C screen  1965    HIV screen  06/12/1978    Shingles Vaccine (1 of 2) 06/12/2013    Colon cancer screen colonoscopy  06/12/2013    Flu vaccine (1) 09/01/2019    Potassium monitoring  06/04/2020    Creatinine monitoring  06/04/2020    DTaP/Tdap/Td vaccine (2 - Td) 01/01/2024    Lipid screen  06/05/2024    Pneumococcal 0-64 years Vaccine  Aged Out             (applicable per patient's age: Cancer Screenings, Depression Screening, Fall Risk Screening, Immunizations)    LDL Cholesterol (mg/dL)   Date Value   06/05/2019 66     AST (U/L)   Date Value   06/04/2019 32     ALT (U/L)   Date Value   06/04/2019 25     BUN (mg/dL)   Date Value   06/30/2019 19      (goal A1C is < 7)   (goal LDL is <100) need 30-50% reduction from baseline     BP Readings from Last 3 Encounters:   06/30/19 115/75   06/28/19 104/70   06/14/19 132/76    (goal /80)      All Future Testing planned in [facility]:  Lab Frequency Next Occurrence       Next Visit Date:  Future Appointments   Date Time Provider Yoli Griggs   7/22/2019  8:40 AM Pierre Bolton MD [facility]   8/16/2019  3:20 PM GITA Alicea - CNP [facility]            Patient Active Problem List:     Restless leg syndrome     Hyperlipidemia     Disc disease, degenerative, lumbar or lumbosacral     Somatic dysfunction of cervical region     Chronic midline low back pain without sciatica     Essential hypertension     Obstructive sleep apnea syndrome     Chest pain     Episode of altered consciousness     Lymph node enlargement

## 2019-07-31 LAB
EKG ATRIAL RATE: 90 BPM
EKG P AXIS: 79 DEGREES
EKG P-R INTERVAL: 126 MS
EKG Q-T INTERVAL: 356 MS
EKG QRS DURATION: 92 MS
EKG QTC CALCULATION (BAZETT): 435 MS
EKG R AXIS: 86 DEGREES
EKG T AXIS: 67 DEGREES
EKG VENTRICULAR RATE: 90 BPM

## 2019-08-02 DIAGNOSIS — M51.37 DISC DISEASE, DEGENERATIVE, LUMBAR OR LUMBOSACRAL: ICD-10-CM

## 2019-08-02 DIAGNOSIS — M54.50 CHRONIC MIDLINE LOW BACK PAIN WITHOUT SCIATICA: ICD-10-CM

## 2019-08-02 DIAGNOSIS — G89.29 CHRONIC MIDLINE LOW BACK PAIN WITHOUT SCIATICA: ICD-10-CM

## 2019-08-02 RX ORDER — TRAMADOL HYDROCHLORIDE 50 MG/1
100 TABLET ORAL EVERY 12 HOURS PRN
Qty: 120 TABLET | Refills: 0 | Status: SHIPPED | OUTPATIENT
Start: 2019-08-02 | End: 2019-08-16

## 2019-08-16 ENCOUNTER — OFFICE VISIT (OUTPATIENT)
Dept: PRIMARY CARE CLINIC | Age: 54
End: 2019-08-16
Payer: COMMERCIAL

## 2019-08-16 VITALS
BODY MASS INDEX: 25.11 KG/M2 | SYSTOLIC BLOOD PRESSURE: 106 MMHG | DIASTOLIC BLOOD PRESSURE: 62 MMHG | WEIGHT: 185.4 LBS | HEIGHT: 72 IN | HEART RATE: 60 BPM | RESPIRATION RATE: 18 BRPM

## 2019-08-16 DIAGNOSIS — I10 ESSENTIAL HYPERTENSION: Chronic | ICD-10-CM

## 2019-08-16 DIAGNOSIS — M15.9 PRIMARY OSTEOARTHRITIS INVOLVING MULTIPLE JOINTS: ICD-10-CM

## 2019-08-16 DIAGNOSIS — M51.37 DISC DISEASE, DEGENERATIVE, LUMBAR OR LUMBOSACRAL: ICD-10-CM

## 2019-08-16 DIAGNOSIS — Z12.11 SCREENING FOR COLON CANCER: ICD-10-CM

## 2019-08-16 DIAGNOSIS — M54.50 CHRONIC MIDLINE LOW BACK PAIN WITHOUT SCIATICA: Primary | ICD-10-CM

## 2019-08-16 DIAGNOSIS — G89.29 CHRONIC MIDLINE LOW BACK PAIN WITHOUT SCIATICA: Primary | ICD-10-CM

## 2019-08-16 DIAGNOSIS — K08.89 PAIN, DENTAL: ICD-10-CM

## 2019-08-16 PROCEDURE — G8427 DOCREV CUR MEDS BY ELIG CLIN: HCPCS | Performed by: NURSE PRACTITIONER

## 2019-08-16 PROCEDURE — G8419 CALC BMI OUT NRM PARAM NOF/U: HCPCS | Performed by: NURSE PRACTITIONER

## 2019-08-16 PROCEDURE — 3017F COLORECTAL CA SCREEN DOC REV: CPT | Performed by: NURSE PRACTITIONER

## 2019-08-16 PROCEDURE — 99214 OFFICE O/P EST MOD 30 MIN: CPT | Performed by: NURSE PRACTITIONER

## 2019-08-16 PROCEDURE — 1036F TOBACCO NON-USER: CPT | Performed by: NURSE PRACTITIONER

## 2019-08-16 RX ORDER — TIZANIDINE 4 MG/1
4 TABLET ORAL EVERY 8 HOURS PRN
Qty: 90 TABLET | Refills: 5 | Status: SHIPPED | OUTPATIENT
Start: 2019-08-16 | End: 2020-11-23 | Stop reason: SDUPTHER

## 2019-08-16 RX ORDER — TRAMADOL HYDROCHLORIDE 50 MG/1
50-100 TABLET ORAL EVERY 8 HOURS PRN
Qty: 150 TABLET | Refills: 0 | Status: SHIPPED | OUTPATIENT
Start: 2019-08-16 | End: 2019-09-09 | Stop reason: SDUPTHER

## 2019-08-16 RX ORDER — TIZANIDINE 4 MG/1
4 TABLET ORAL EVERY 6 HOURS PRN
COMMUNITY
End: 2019-08-16 | Stop reason: SDUPTHER

## 2019-08-16 RX ORDER — IBUPROFEN 800 MG/1
800 TABLET ORAL EVERY 8 HOURS PRN
Qty: 90 TABLET | Refills: 5 | Status: SHIPPED | OUTPATIENT
Start: 2019-08-16

## 2019-08-16 ASSESSMENT — ENCOUNTER SYMPTOMS
SINUS PRESSURE: 0
BLOOD IN STOOL: 0
DIARRHEA: 0
NAUSEA: 0
BACK PAIN: 1
VOMITING: 0
SHORTNESS OF BREATH: 0
SORE THROAT: 0
WHEEZING: 0
COUGH: 0
CONSTIPATION: 0
TROUBLE SWALLOWING: 0
ABDOMINAL PAIN: 0

## 2019-08-16 NOTE — PATIENT INSTRUCTIONS
each leg. Hip flexor stretch    1. Kneel on the floor with one knee bent and one leg behind you. Place your forward knee over your foot. Keep your other knee touching the floor. 2. Slowly push your hips forward until you feel a stretch in the upper thigh of your rear leg. 3. Hold the stretch for at least 15 to 30 seconds. Repeat with your other leg. 4. Do 2 to 4 times on each side. Wall sit    1. Stand with your back 10 to 12 inches away from a wall. 2. Lean into the wall until your back is flat against it. 3. Slowly slide down until your knees are slightly bent, pressing your lower back into the wall. 4. Hold for about 6 seconds, then slide back up the wall. 5. Repeat 8 to 12 times. Follow-up care is a key part of your treatment and safety. Be sure to make and go to all appointments, and call your doctor if you are having problems. It's also a good idea to know your test results and keep a list of the medicines you take. Where can you learn more? Go to https://[employer].Databan[employer]. org and sign in to your [employer] account. Enter S965 in the Feedlooks box to learn more about \"Low Back Pain: Exercises. \"     If you do not have an account, please click on the \"Sign Up Now\" link. Current as of: September 20, 2018  Content Version: 12.1  © 6350-8422 Healthwise, Incorporated. Care instructions adapted under license by [facility] ([facility]). If you have questions about a medical condition or this instruction, always ask your healthcare professional. [place] [address] any warranty or liability for your use of this information. Patient Education        Acute Low Back Pain: Exercises  Introduction  Here are some examples of typical rehabilitation exercises for your condition. Start each exercise slowly. Ease off the exercise if you start to have pain.   Your doctor or physical therapist will tell you when you can start these exercises and which ones will work best for liability for your use of this information.

## 2019-08-16 NOTE — PROGRESS NOTES
dentition. No dental abscesses or dental caries. Unable to visualize any abnormality that could be contributing to his discomfort in his left lower molar   Eyes: Pupils are equal, round, and reactive to light. Conjunctivae and EOM are normal.   Neck: Normal range of motion. Cardiovascular: Normal rate, regular rhythm, normal heart sounds and intact distal pulses. No murmur heard. Pulmonary/Chest: Effort normal and breath sounds normal. He has no wheezes. Abdominal: Soft. Bowel sounds are normal. He exhibits no distension. Musculoskeletal: Normal range of motion. Neurological: He is alert and oriented to person, place, and time. Skin: Skin is warm and dry. Psychiatric: He has a normal mood and affect. His behavior is normal. Judgment and thought content normal.     /62 (Site: Left Upper Arm, Position: Sitting, Cuff Size: Large Adult)   Pulse 60   Resp 18   Ht 6' (1.829 m)   Wt 185 lb 6.4 oz (84.1 kg)   BMI 25.14 kg/m²     Assessment:       Diagnosis Orders   1. Chronic midline low back pain without sciatica  tiZANidine (ZANAFLEX) 4 MG tablet    ibuprofen (ADVIL;MOTRIN) 800 MG tablet    traMADol (ULTRAM) 50 MG tablet   2. Screening for colon cancer  COLONOSCOPY W/ OR W/O BIOPSY   3. Essential hypertension     4. Disc disease, degenerative, lumbar or lumbosacral  tiZANidine (ZANAFLEX) 4 MG tablet    ibuprofen (ADVIL;MOTRIN) 800 MG tablet    traMADol (ULTRAM) 50 MG tablet   5. Primary osteoarthritis involving multiple joints  ibuprofen (ADVIL;MOTRIN) 800 MG tablet    traMADol (ULTRAM) 50 MG tablet   6. Pain, dental               Plan:      Return in about 3 months (around 11/16/2019) for back pain, hypertension check. Orders Placed This Encounter   Procedures    COLONOSCOPY W/ OR W/O BIOPSY     Standing Status:   Future     Standing Expiration Date:   8/16/2020     Order Specific Question:   Screening or Diagnostic?      Answer:   Screening        Orders Placed This Encounter   Medications

## 2019-09-09 DIAGNOSIS — M54.50 CHRONIC MIDLINE LOW BACK PAIN WITHOUT SCIATICA: ICD-10-CM

## 2019-09-09 DIAGNOSIS — M15.9 PRIMARY OSTEOARTHRITIS INVOLVING MULTIPLE JOINTS: ICD-10-CM

## 2019-09-09 DIAGNOSIS — G89.29 CHRONIC MIDLINE LOW BACK PAIN WITHOUT SCIATICA: ICD-10-CM

## 2019-09-09 DIAGNOSIS — M51.37 DISC DISEASE, DEGENERATIVE, LUMBAR OR LUMBOSACRAL: ICD-10-CM

## 2019-09-09 RX ORDER — TRAMADOL HYDROCHLORIDE 50 MG/1
50-100 TABLET ORAL EVERY 8 HOURS PRN
Qty: 150 TABLET | Refills: 0 | Status: SHIPPED | OUTPATIENT
Start: 2019-09-09 | End: 2019-10-09 | Stop reason: SDUPTHER

## 2019-09-18 ENCOUNTER — TELEPHONE (OUTPATIENT)
Dept: SURGERY | Age: 54
End: 2019-09-18

## 2019-09-24 ENCOUNTER — TELEPHONE (OUTPATIENT)
Dept: PRIMARY CARE CLINIC | Age: 54
End: 2019-09-24

## 2019-09-24 RX ORDER — PREDNISONE 20 MG/1
20 TABLET ORAL 2 TIMES DAILY
Qty: 10 TABLET | Refills: 0 | Status: SHIPPED | OUTPATIENT
Start: 2019-09-24 | End: 2019-09-29

## 2019-09-30 NOTE — TELEPHONE ENCOUNTER
Attempted to reach patient to schedule screening colonoscopy visit with provider. Advised to contact the office to schedule at [phone]. Attempt 3.

## 2019-10-02 NOTE — PROGRESS NOTES
Dr. Yo at bedside assessing Pt. Dr. Yo also stated he tried looking for and calling Pt's  and was not able to contact him. Dr. Yo left a message for .   Post-Discharge Transitional Care Management Services or Hospital Follow Up      Montyjazmin Loren   YOB: 1965    Date of Office Visit:  6/14/2019  Date of Hospital Admission: 6/4/19  Date of Hospital Discharge: 6/6/19  Risk of hospital readmission (high >=14%. Medium >=10%) :Readmission Risk Score: 0      Care management risk score Rising risk (score 2-5) and Complex Care (Scores >=6): 1     Non face to face  following discharge, date last encounter closed (first attempt may have been earlier): 6/7/2019  9:31 AM    Call initiated 2 business days of discharge: Yes    Patient Active Problem List   Diagnosis    Restless leg syndrome    Hyperlipidemia    Disc disease, degenerative, lumbar or lumbosacral    Somatic dysfunction of cervical region    Chronic midline low back pain without sciatica    Essential hypertension    Obstructive sleep apnea syndrome    Chest pain    Episode of altered consciousness    Lymph node enlargement       No Known Allergies    Medications listed as ordered at the time of discharge from hospital   Bearl Seats I   Home Medication Instructions JARED:    Printed on:06/14/19 2326   Medication Information                      albuterol sulfate HFA (PROAIR HFA) 108 (90 Base) MCG/ACT inhaler  Inhale 2 puffs into the lungs every 6 hours as needed for Wheezing             fluticasone (FLONASE) 50 MCG/ACT nasal spray  1 spray by Nasal route daily             lisinopril (PRINIVIL;ZESTRIL) 10 MG tablet  TAKE ONE TABLET BY MOUTH DAILY             Multiple Vitamins-Minerals (MULTI FOR HIM 50+ PO)  Take by mouth             sertraline (ZOLOFT) 50 MG tablet  Take 1 tablet by mouth daily             traMADol (ULTRAM) 50 MG tablet  Take 2 tablets by mouth every 12 hours as needed for Pain for up to 30 days.                    Medications marked \"taking\" at this time  Outpatient Medications Marked as Taking for the 6/14/19 encounter (Office Visit) with Estrada Posada MD Medication Sig Dispense Refill    sertraline (ZOLOFT) 50 MG tablet Take 1 tablet by mouth daily 30 tablet 3    traMADol (ULTRAM) 50 MG tablet Take 2 tablets by mouth every 12 hours as needed for Pain for up to 30 days. 120 tablet 0    albuterol sulfate HFA (PROAIR HFA) 108 (90 Base) MCG/ACT inhaler Inhale 2 puffs into the lungs every 6 hours as needed for Wheezing 1 Inhaler 3    lisinopril (PRINIVIL;ZESTRIL) 10 MG tablet TAKE ONE TABLET BY MOUTH DAILY 30 tablet 5    Multiple Vitamins-Minerals (MULTI FOR HIM 50+ PO) Take by mouth      fluticasone (FLONASE) 50 MCG/ACT nasal spray 1 spray by Nasal route daily 1 Bottle 3        Medications patient taking as of now reconciled against medications ordered at time of hospital discharge: No    Chief Complaint   Patient presents with    [place] [facility] [place] [facility]19 [facility]       History of Present illness - Follow up of Hospital diagnosis(es): atypical chest pain - resolved   Cardiac testing done in hospital - negative for cardiac ischemia     Inpatient course: Discharge summary reviewed- see chart. Interval history/Current status:   He c/o feeling anxious intermittently   He abused marijuana for many yrs  , trying to quit since last 2 months . Have hard time to quit as he gets anxious as he was not smoking marijuana . He do not want to smoker marijuana anymore instead would like to try medication to help with anxiety . he was known to have hypertension - takes meds regularly , compliant with salt restricted diet , denied headache , dizziness , chest pain , palpitations. Well controlled . Denied SE of Lisinopril         A comprehensive review of systems was negative except for what was noted in the HPI. Vitals:    06/14/19 1210   BP: 132/76   Pulse: 96   Resp: 16   SpO2: 98%   Weight: 172 lb 12.8 oz (78.4 kg)     Body mass index is 23.44 kg/m².    Wt Readings from Last 3 Encounters:   06/14/19 172 lb 12.8 oz (78.4 kg)   06/04/19 177 lb 1.6 Hospital Discharge: 6/6/19  Risk of hospital readmission (high >=14%. Medium >=10%) :Readmission Risk Score: 0      Care management risk score Rising risk (score 2-5) and Complex Care (Scores >=6): 1     Non face to face  following discharge, date last encounter closed (first attempt may have been earlier): 6/7/2019  9:31 AM    Call initiated 2 business days of discharge: Yes    Patient Active Problem List   Diagnosis    Restless leg syndrome    Hyperlipidemia    Disc disease, degenerative, lumbar or lumbosacral    Somatic dysfunction of cervical region    Chronic midline low back pain without sciatica    Essential hypertension    Obstructive sleep apnea syndrome    Chest pain    Episode of altered consciousness    Lymph node enlargement       No Known Allergies    Medications listed as ordered at the time of discharge from hospital   Bearl Seats I   Home Medication Instructions JARED:    Printed on:06/14/19 6190   Medication Information                      albuterol sulfate HFA (PROAIR HFA) 108 (90 Base) MCG/ACT inhaler  Inhale 2 puffs into the lungs every 6 hours as needed for Wheezing             fluticasone (FLONASE) 50 MCG/ACT nasal spray  1 spray by Nasal route daily             lisinopril (PRINIVIL;ZESTRIL) 10 MG tablet  TAKE ONE TABLET BY MOUTH DAILY             Multiple Vitamins-Minerals (MULTI FOR HIM 50+ PO)  Take by mouth             sertraline (ZOLOFT) 50 MG tablet  Take 1 tablet by mouth daily             traMADol (ULTRAM) 50 MG tablet  Take 2 tablets by mouth every 12 hours as needed for Pain for up to 30 days.                    Medications marked \"taking\" at this time  Outpatient Medications Marked as Taking for the 6/14/19 encounter (Office Visit) with Estrada Posada MD   Medication Sig Dispense Refill    sertraline (ZOLOFT) 50 MG tablet Take 1 tablet by mouth daily 30 tablet 3    traMADol (ULTRAM) 50 MG tablet Take 2 tablets by mouth every 12 hours as needed for Pain for up to 30 days. 120 tablet 0    albuterol sulfate HFA (PROAIR HFA) 108 (90 Base) MCG/ACT inhaler Inhale 2 puffs into the lungs every 6 hours as needed for Wheezing 1 Inhaler 3    lisinopril (PRINIVIL;ZESTRIL) 10 MG tablet TAKE ONE TABLET BY MOUTH DAILY 30 tablet 5    Multiple Vitamins-Minerals (MULTI FOR HIM 50+ PO) Take by mouth      fluticasone (FLONASE) 50 MCG/ACT nasal spray 1 spray by Nasal route daily 1 Bottle 3        Medications patient taking as of now reconciled against medications ordered at time of hospital discharge: No    Chief Complaint   Patient presents with    Follow-Up from [address] [place] [address]     D/C 6/6/19 from [facility]       History of Present illness - Follow up of Hospital diagnosis(es):     Inpatient course: Discharge summary reviewed- see chart. Interval history/Current status:     A comprehensive review of systems was negative except for what was noted in the HPI. Vitals:    06/14/19 1210   BP: 132/76   Pulse: 96   Resp: 16   SpO2: 98%   Weight: 172 lb 12.8 oz (78.4 kg)     Body mass index is 23.44 kg/m².    Wt Readings from Last 3 Encounters:   06/14/19 172 lb 12.8 oz (78.4 kg)   06/04/19 177 lb 1.6 oz (80.3 kg)   04/16/19 177 lb 9.6 oz (80.6 kg)     BP Readings from Last 3 Encounters:   06/14/19 132/76   06/06/19 114/63   04/16/19 106/68        Physical Exam:  General Appearance: alert and oriented to person, place and time, well developed and well- nourished, in no acute distress  Skin: warm and dry, no rash or erythema  Head: normocephalic and atraumatic  Eyes: pupils equal, round, and reactive to light, extraocular eye movements intact, conjunctivae normal  ENT: tympanic membrane, external ear and ear canal normal bilaterally, nose without deformity, nasal mucosa and turbinates normal without polyps  Neck: supple and non-tender without mass, no thyromegaly or thyroid nodules, no cervical lymphadenopathy  Pulmonary/Chest: clear to auscultation bilaterally- no wheezes, rales or rhonchi, normal air movement, no respiratory distress  Cardiovascular: normal rate, regular rhythm, normal S1 and S2, no murmurs, rubs, clicks, or gallops, distal pulses intact, no carotid bruits  Abdomen: soft, non-tender, non-distended, normal bowel sounds, no masses or organomegaly  Extremities: no cyanosis, clubbing or edema  Musculoskeletal: normal range of motion, no joint swelling, deformity or tenderness  Neurologic: reflexes normal and symmetric, no cranial nerve deficit, gait, coordination and speech normal    Assessment/Plan:  1. Essential hypertension   controlled well - continue current meds , advised daily exercise , low salt diet and DASH diet as well . 2. Mixed hyperlipidemia    3. Anxiety  - sertraline (ZOLOFT) 50 MG tablet; Take 1 tablet by mouth daily  Dispense: 30 tablet; Refill: 3    4.  Marijuana abuse  In sober         Medical Decision Making: moderate complexity

## 2019-10-09 DIAGNOSIS — I10 ESSENTIAL HYPERTENSION: Chronic | ICD-10-CM

## 2019-10-09 DIAGNOSIS — M51.37 DISC DISEASE, DEGENERATIVE, LUMBAR OR LUMBOSACRAL: ICD-10-CM

## 2019-10-09 DIAGNOSIS — G89.29 CHRONIC MIDLINE LOW BACK PAIN WITHOUT SCIATICA: ICD-10-CM

## 2019-10-09 DIAGNOSIS — M54.50 CHRONIC MIDLINE LOW BACK PAIN WITHOUT SCIATICA: ICD-10-CM

## 2019-10-09 DIAGNOSIS — M15.9 PRIMARY OSTEOARTHRITIS INVOLVING MULTIPLE JOINTS: ICD-10-CM

## 2019-10-09 RX ORDER — LISINOPRIL 10 MG/1
TABLET ORAL
Qty: 30 TABLET | Refills: 5 | Status: SHIPPED | OUTPATIENT
Start: 2019-10-09 | End: 2019-11-08 | Stop reason: SDUPTHER

## 2019-10-09 RX ORDER — TRAMADOL HYDROCHLORIDE 50 MG/1
50-100 TABLET ORAL EVERY 8 HOURS PRN
Qty: 150 TABLET | Refills: 0 | Status: SHIPPED | OUTPATIENT
Start: 2019-10-09 | End: 2019-11-08 | Stop reason: SDUPTHER

## 2019-11-08 DIAGNOSIS — G89.29 CHRONIC MIDLINE LOW BACK PAIN WITHOUT SCIATICA: ICD-10-CM

## 2019-11-08 DIAGNOSIS — M15.9 PRIMARY OSTEOARTHRITIS INVOLVING MULTIPLE JOINTS: ICD-10-CM

## 2019-11-08 DIAGNOSIS — I10 ESSENTIAL HYPERTENSION: Chronic | ICD-10-CM

## 2019-11-08 DIAGNOSIS — M51.37 DISC DISEASE, DEGENERATIVE, LUMBAR OR LUMBOSACRAL: ICD-10-CM

## 2019-11-08 DIAGNOSIS — M54.50 CHRONIC MIDLINE LOW BACK PAIN WITHOUT SCIATICA: ICD-10-CM

## 2019-11-08 RX ORDER — TRAMADOL HYDROCHLORIDE 50 MG/1
50-100 TABLET ORAL EVERY 8 HOURS PRN
Qty: 150 TABLET | Refills: 0 | Status: SHIPPED | OUTPATIENT
Start: 2019-11-08 | End: 2019-12-06 | Stop reason: SDUPTHER

## 2019-11-08 RX ORDER — LISINOPRIL 10 MG/1
TABLET ORAL
Qty: 30 TABLET | Refills: 5 | Status: SHIPPED | OUTPATIENT
Start: 2019-11-08 | End: 2020-01-09 | Stop reason: SDUPTHER

## 2019-11-12 ENCOUNTER — HOSPITAL ENCOUNTER (OUTPATIENT)
Dept: SLEEP CENTER | Age: 54
Discharge: HOME | End: 2019-11-14
Payer: COMMERCIAL

## 2019-11-12 DIAGNOSIS — G47.33 OBSTRUCTIVE SLEEP APNEA SYNDROME: Primary | ICD-10-CM

## 2019-11-12 PROCEDURE — 95810 POLYSOM 6/> YRS 4/> PARAM: CPT

## 2019-11-13 VITALS
WEIGHT: 178 LBS | BODY MASS INDEX: 24.11 KG/M2 | DIASTOLIC BLOOD PRESSURE: 76 MMHG | HEART RATE: 67 BPM | RESPIRATION RATE: 16 BRPM | SYSTOLIC BLOOD PRESSURE: 126 MMHG | OXYGEN SATURATION: 94 % | HEIGHT: 72 IN

## 2019-11-13 ASSESSMENT — SLEEP AND FATIGUE QUESTIONNAIRES
HOW LIKELY ARE YOU TO NOD OFF OR FALL ASLEEP WHILE SITTING INACTIVE IN A PUBLIC PLACE: 0
HOW LIKELY ARE YOU TO NOD OFF OR FALL ASLEEP IN A CAR, WHILE STOPPED FOR A FEW MINUTES IN TRAFFIC: 0
HOW LIKELY ARE YOU TO NOD OFF OR FALL ASLEEP WHILE SITTING QUIETLY AFTER LUNCH WITHOUT ALCOHOL: 1
HOW LIKELY ARE YOU TO NOD OFF OR FALL ASLEEP WHILE WATCHING TV: 2
HOW LIKELY ARE YOU TO NOD OFF OR FALL ASLEEP WHEN YOU ARE A PASSENGER IN A CAR FOR AN HOUR WITHOUT A BREAK: 0
HOW LIKELY ARE YOU TO NOD OFF OR FALL ASLEEP WHILE SITTING AND TALKING TO SOMEONE: 0
ESS TOTAL SCORE: 8
HOW LIKELY ARE YOU TO NOD OFF OR FALL ASLEEP WHILE SITTING AND READING: 2
HOW LIKELY ARE YOU TO NOD OFF OR FALL ASLEEP WHILE LYING DOWN TO REST IN THE AFTERNOON WHEN CIRCUMSTANCES PERMIT: 3

## 2019-12-06 DIAGNOSIS — M54.50 CHRONIC MIDLINE LOW BACK PAIN WITHOUT SCIATICA: ICD-10-CM

## 2019-12-06 DIAGNOSIS — M15.9 PRIMARY OSTEOARTHRITIS INVOLVING MULTIPLE JOINTS: ICD-10-CM

## 2019-12-06 DIAGNOSIS — G89.29 CHRONIC MIDLINE LOW BACK PAIN WITHOUT SCIATICA: ICD-10-CM

## 2019-12-06 DIAGNOSIS — M51.37 DISC DISEASE, DEGENERATIVE, LUMBAR OR LUMBOSACRAL: ICD-10-CM

## 2019-12-06 RX ORDER — TRAMADOL HYDROCHLORIDE 50 MG/1
50-100 TABLET ORAL EVERY 8 HOURS PRN
Qty: 150 TABLET | Refills: 0 | Status: SHIPPED | OUTPATIENT
Start: 2019-12-06 | End: 2020-01-05

## 2019-12-15 LAB — STATUS: NORMAL

## 2019-12-19 ENCOUNTER — OFFICE VISIT (OUTPATIENT)
Dept: PRIMARY CARE CLINIC | Age: 54
End: 2019-12-19
Payer: COMMERCIAL

## 2019-12-19 VITALS
DIASTOLIC BLOOD PRESSURE: 68 MMHG | HEIGHT: 73 IN | WEIGHT: 193.6 LBS | RESPIRATION RATE: 15 BRPM | OXYGEN SATURATION: 95 % | HEART RATE: 68 BPM | SYSTOLIC BLOOD PRESSURE: 110 MMHG | BODY MASS INDEX: 25.66 KG/M2

## 2019-12-19 DIAGNOSIS — H92.01 OTALGIA OF RIGHT EAR: ICD-10-CM

## 2019-12-19 DIAGNOSIS — J01.40 ACUTE PANSINUSITIS, RECURRENCE NOT SPECIFIED: Primary | ICD-10-CM

## 2019-12-19 DIAGNOSIS — H65.91 MIDDLE EAR EFFUSION, RIGHT: ICD-10-CM

## 2019-12-19 PROCEDURE — 99213 OFFICE O/P EST LOW 20 MIN: CPT | Performed by: NURSE PRACTITIONER

## 2019-12-19 RX ORDER — DOXYCYCLINE HYCLATE 100 MG
100 TABLET ORAL 2 TIMES DAILY
Qty: 20 TABLET | Refills: 0 | Status: SHIPPED | OUTPATIENT
Start: 2019-12-19 | End: 2019-12-29

## 2019-12-19 ASSESSMENT — ENCOUNTER SYMPTOMS
SWOLLEN GLANDS: 0
SINUS PAIN: 1
VOMITING: 0
COUGH: 0
SINUS PRESSURE: 1
SORE THROAT: 0
ABDOMINAL PAIN: 0
COLOR CHANGE: 0
RHINORRHEA: 0
NAUSEA: 0
SHORTNESS OF BREATH: 1
CHEST TIGHTNESS: 0
DIARRHEA: 0

## 2020-01-09 RX ORDER — TRAMADOL HYDROCHLORIDE 50 MG/1
50-100 TABLET ORAL EVERY 8 HOURS PRN
Qty: 150 TABLET | Refills: 0 | Status: SHIPPED | OUTPATIENT
Start: 2020-01-09 | End: 2020-02-11 | Stop reason: SDUPTHER

## 2020-01-09 RX ORDER — LISINOPRIL 10 MG/1
TABLET ORAL
Qty: 30 TABLET | Refills: 5 | Status: SHIPPED | OUTPATIENT
Start: 2020-01-09 | End: 2020-05-21 | Stop reason: SDUPTHER

## 2020-01-28 ENCOUNTER — OFFICE VISIT (OUTPATIENT)
Dept: PRIMARY CARE CLINIC | Age: 55
End: 2020-01-28
Payer: COMMERCIAL

## 2020-01-28 VITALS
WEIGHT: 194 LBS | HEART RATE: 70 BPM | OXYGEN SATURATION: 95 % | DIASTOLIC BLOOD PRESSURE: 62 MMHG | BODY MASS INDEX: 25.6 KG/M2 | SYSTOLIC BLOOD PRESSURE: 116 MMHG

## 2020-01-28 PROCEDURE — 3017F COLORECTAL CA SCREEN DOC REV: CPT | Performed by: NURSE PRACTITIONER

## 2020-01-28 PROCEDURE — G8419 CALC BMI OUT NRM PARAM NOF/U: HCPCS | Performed by: NURSE PRACTITIONER

## 2020-01-28 PROCEDURE — G8484 FLU IMMUNIZE NO ADMIN: HCPCS | Performed by: NURSE PRACTITIONER

## 2020-01-28 PROCEDURE — G8427 DOCREV CUR MEDS BY ELIG CLIN: HCPCS | Performed by: NURSE PRACTITIONER

## 2020-01-28 PROCEDURE — 1036F TOBACCO NON-USER: CPT | Performed by: NURSE PRACTITIONER

## 2020-01-28 PROCEDURE — 99214 OFFICE O/P EST MOD 30 MIN: CPT | Performed by: NURSE PRACTITIONER

## 2020-01-28 RX ORDER — GENTAMICIN SULFATE 3 MG/ML
SOLUTION/ DROPS OPHTHALMIC
COMMUNITY
Start: 2020-01-16 | End: 2020-01-28

## 2020-01-28 RX ORDER — LORATADINE AND PSEUDOEPHEDRINE 10; 240 MG/1; MG/1
1 TABLET, EXTENDED RELEASE ORAL DAILY
Qty: 30 TABLET | Refills: 1 | Status: SHIPPED | OUTPATIENT
Start: 2020-01-28 | End: 2020-09-10

## 2020-01-28 RX ORDER — ECHINACEA PURPUREA EXTRACT 125 MG
1 TABLET ORAL PRN
Qty: 1 BOTTLE | Refills: 3 | Status: SHIPPED | OUTPATIENT
Start: 2020-01-28 | End: 2021-06-10 | Stop reason: ALTCHOICE

## 2020-01-28 RX ORDER — KETOROLAC TROMETHAMINE 5 MG/ML
SOLUTION OPHTHALMIC
COMMUNITY
Start: 2020-01-15 | End: 2020-01-28

## 2020-01-28 RX ORDER — ESCITALOPRAM OXALATE 10 MG/1
10 TABLET ORAL DAILY
Qty: 30 TABLET | Refills: 5 | Status: SHIPPED | OUTPATIENT
Start: 2020-01-28 | End: 2020-05-26

## 2020-01-28 ASSESSMENT — ENCOUNTER SYMPTOMS
SORE THROAT: 0
TROUBLE SWALLOWING: 0
DIARRHEA: 0
COUGH: 0
BLOOD IN STOOL: 0
WHEEZING: 0
SINUS PRESSURE: 0
VOMITING: 0
VOICE CHANGE: 1
NAUSEA: 0
CONSTIPATION: 0
ABDOMINAL PAIN: 0
SHORTNESS OF BREATH: 0

## 2020-01-28 ASSESSMENT — PATIENT HEALTH QUESTIONNAIRE - PHQ9
SUM OF ALL RESPONSES TO PHQ9 QUESTIONS 1 & 2: 2
SUM OF ALL RESPONSES TO PHQ QUESTIONS 1-9: 2
SUM OF ALL RESPONSES TO PHQ QUESTIONS 1-9: 2
2. FEELING DOWN, DEPRESSED OR HOPELESS: 1
1. LITTLE INTEREST OR PLEASURE IN DOING THINGS: 1

## 2020-01-28 NOTE — PROGRESS NOTES
([id number])    Past Medical History:   Diagnosis Date    Back pain     Disc disease, degenerative, lumbar or lumbosacral     Hyperlipidemia     Hypertension     Restless leg syndrome       Past Surgical History:   Procedure Laterality Date    HERNIA REPAIR      TONSILLECTOMY AND ADENOIDECTOMY      TYMPANOSTOMY TUBE PLACEMENT Bilateral        Family History   Problem Relation Age of Onset    Arthritis Mother           Social History     Tobacco Use    Smoking status: Former Smoker     Packs/day: 0.05     Years: 35.00     Pack years: 1.75     Start date:      Last attempt to quit: 2010     Years since quittin.5    Smokeless tobacco: Never Used   Substance Use Topics    Alcohol use: No     Comment: rare      Current Outpatient Medications   Medication Sig Dispense Refill    sodium chloride (OCEAN) 0.65 % nasal spray 1 spray by Nasal route as needed for Congestion 1 Bottle 3    loratadine-pseudoephedrine (CLARITIN-D 24 HOUR)  MG per extended release tablet Take 1 tablet by mouth daily 30 tablet 1    escitalopram (LEXAPRO) 10 MG tablet Take 1 tablet by mouth daily 30 tablet 5    lisinopril (PRINIVIL;ZESTRIL) 10 MG tablet TAKE ONE TABLET BY MOUTH DAILY 30 tablet 5    traMADol (ULTRAM) 50 MG tablet Take 1-2 tablets by mouth every 8 hours as needed for Pain for up to 30 days.  150 tablet 0    ibuprofen (ADVIL;MOTRIN) 800 MG tablet Take 1 tablet by mouth every 8 hours as needed for Pain 90 tablet 5    albuterol sulfate HFA (PROAIR HFA) 108 (90 Base) MCG/ACT inhaler Inhale 2 puffs into the lungs every 6 hours as needed for Wheezing 1 Inhaler 3    Multiple Vitamins-Minerals (MULTI FOR HIM 50+ PO) Take by mouth      fluticasone (FLONASE) 50 MCG/ACT nasal spray 1 spray by Nasal route daily 1 Bottle 3    tiZANidine (ZANAFLEX) 4 MG tablet Take 1 tablet by mouth every 8 hours as needed (muscle spasms) (Patient not taking: Reported on 2020) 90 tablet 5     No current facility-administered medications for this visit. No Known Allergies    Health Maintenance   Topic Date Due    Hepatitis C screen  1965    HIV screen  06/12/1978    Diabetes screen  06/12/2003    Shingles Vaccine (1 of 2) 06/12/2013    Colon cancer screen colonoscopy  06/12/2013    Flu vaccine (1) 09/01/2019    Potassium monitoring  06/04/2020    Creatinine monitoring  06/04/2020    DTaP/Tdap/Td vaccine (2 - Td) 01/01/2024    Lipid screen  06/05/2024    Pneumococcal 0-64 years Vaccine  Aged Out       Subjective:      Review of Systems   Constitutional: Negative for activity change, appetite change, chills, fatigue, fever and unexpected weight change. HENT: Positive for congestion and voice change. Negative for ear pain, hearing loss, sinus pressure, sore throat and trouble swallowing. Eyes: Negative for visual disturbance. Respiratory: Negative for cough, shortness of breath and wheezing. Cardiovascular: Negative for chest pain, palpitations and leg swelling. Gastrointestinal: Negative for abdominal pain, blood in stool, constipation, diarrhea, nausea and vomiting. Endocrine: Negative for cold intolerance, heat intolerance, polydipsia, polyphagia and polyuria. Genitourinary: Negative for difficulty urinating, frequency, hematuria and urgency. Musculoskeletal: Negative for arthralgias and myalgias. Skin: Negative for rash. Allergic/Immunologic: Negative for environmental allergies. Neurological: Negative for dizziness, weakness, light-headedness and headaches. Psychiatric/Behavioral: Positive for dysphoric mood. Negative for confusion. The patient is not nervous/anxious. Objective:     Physical Exam  Constitutional:       Appearance: He is well-developed. HENT:      Head: Normocephalic. Eyes:      Conjunctiva/sclera: Conjunctivae normal.      Pupils: Pupils are equal, round, and reactive to light. Neck:      Musculoskeletal: Normal range of motion. Cardiovascular:      Rate and Rhythm: Normal rate and regular rhythm. Heart sounds: Normal heart sounds. No murmur. Pulmonary:      Effort: Pulmonary effort is normal.      Breath sounds: Normal breath sounds. No wheezing. Abdominal:      General: Bowel sounds are normal. There is no distension. Palpations: Abdomen is soft. Musculoskeletal: Normal range of motion. Skin:     General: Skin is warm and dry. Neurological:      Mental Status: He is alert and oriented to person, place, and time. Psychiatric:         Behavior: Behavior normal.         Thought Content: Thought content normal.         Judgment: Judgment normal.       /62 (Site: Left Upper Arm, Position: Sitting)   Pulse 70   Wt 194 lb (88 kg)   SpO2 95%   BMI 25.60 kg/m²     Assessment:       Diagnosis Orders   1. Essential hypertension     2. Chronic pansinusitis  sodium chloride (OCEAN) 0.65 % nasal spray    loratadine-pseudoephedrine (CLARITIN-D 24 HOUR)  MG per extended release tablet   3. Chronic midline low back pain without sciatica     4. Anxiety and depression  escitalopram (LEXAPRO) 10 MG tablet             Plan:      Return in about 4 months (around 2020) for hypertension check, back pain. Hypertension- well-controlled with ACE inhibitor  Sinusitis-suspect rebound congestion from spray decongestant use. Stop use of over-the-counter decongestant sprays, start saline nasal spray every 2-3 hours, Flonase at night, Claritin-D daily. Call if symptoms do not improve  Chronic back pain-unchanged, continue tramadol as needed  Anxiety/depression- start Lexapro, call if experiences any adverse effects.   Discussed may need dose increase in order to reach maximum effectiveness  Orders Placed This Encounter   Medications    sodium chloride (OCEAN) 0.65 % nasal spray     Si spray by Nasal route as needed for Congestion     Dispense:  1 Bottle     Refill:  3    loratadine-pseudoephedrine (CLARITIN-D 24 HOUR)  MG per extended release tablet     Sig: Take 1 tablet by mouth daily     Dispense:  30 tablet     Refill:  1    escitalopram (LEXAPRO) 10 MG tablet     Sig: Take 1 tablet by mouth daily     Dispense:  30 tablet     Refill:  5       Patient given educational materials - see patient instructions. Discussed use, benefit, and side effects of prescribed medications. All patientquestions answered. Pt voiced understanding. Reviewed health maintenance. Instructedto continue current medications, diet and exercise. Patient agreed with treatmentplan. Follow up as directed.      Electronicallysigned by GITA Naik CNP on 1/28/2020 at 5:48 PM

## 2020-01-28 NOTE — PROGRESS NOTES
HYPERTENSION visit     BP Readings from Last 3 Encounters:   12/19/19 110/68   11/13/19 126/76   08/16/19 106/62       LDL Cholesterol (mg/dL)   Date Value   06/05/2019 66     HDL (mg/dL)   Date Value   06/05/2019 43     BUN (mg/dL)   Date Value   06/30/2019 19     CREATININE (mg/dL)   Date Value   06/30/2019 0.92     Glucose (mg/dL)   Date Value   06/30/2019 154 (H)              Have you changed or started any medications since your last visit including any over-the-counter medicines, vitamins, or herbal medicines? yes - See med list    Have you stopped taking any of your medications? Is so, why? -  no  Are you having any side effects from any of your medications? - no  How often do you miss doses of your medication? no      Have you seen any other physician or provider since your last visit?  no   Have you had any other diagnostic tests since your last visit?  no   Have you been seen in the emergency room and/or had an admission in a hospital since we last saw you?  yes - 01/15/2020 [facility]   Have you had your routine dental cleaning in the past 6 months?  yes      Do you have an active MyChart account? If no, what is the barrier?   No: Pending     Patient Care Team:  GITA Estes CNP as PCP - General (Family Nurse Practitioner)  GITA Estes CNP as PCP - [facility]    Medical History Review  Past Medical, Family, and Social History reviewed and does contribute to the patient presenting condition    Health Maintenance   Topic Date Due    Hepatitis C screen  1965    HIV screen  06/12/1978    Diabetes screen  06/12/2003    Shingles Vaccine (1 of 2) 06/12/2013    Colon cancer screen colonoscopy  06/12/2013    Flu vaccine (1) 09/01/2019    Potassium monitoring  06/04/2020    Creatinine monitoring  06/04/2020    DTaP/Tdap/Td vaccine (2 - Td) 01/01/2024    Lipid screen  06/05/2024    Pneumococcal 0-64 years Vaccine  Aged Out

## 2020-02-05 ENCOUNTER — TELEPHONE (OUTPATIENT)
Dept: PRIMARY CARE CLINIC | Age: 55
End: 2020-02-05

## 2020-02-05 RX ORDER — BUTALBITAL, ACETAMINOPHEN AND CAFFEINE 50; 325; 40 MG/1; MG/1; MG/1
1 TABLET ORAL EVERY 4 HOURS PRN
Qty: 120 TABLET | Refills: 3 | Status: SHIPPED | OUTPATIENT
Start: 2020-02-05 | End: 2020-12-17 | Stop reason: ALTCHOICE

## 2020-02-05 NOTE — TELEPHONE ENCOUNTER
I have sent in some fioricet for him to use for the headaches.   If this doesn't help he should come in for further evaluation

## 2020-02-10 ENCOUNTER — TELEPHONE (OUTPATIENT)
Dept: PRIMARY CARE CLINIC | Age: 55
End: 2020-02-10

## 2020-02-10 RX ORDER — METHYLPREDNISOLONE 4 MG/1
TABLET ORAL
Qty: 1 KIT | Refills: 0 | Status: SHIPPED | OUTPATIENT
Start: 2020-02-10 | End: 2020-02-16

## 2020-02-10 RX ORDER — TRAMADOL HYDROCHLORIDE 50 MG/1
50-100 TABLET ORAL EVERY 8 HOURS PRN
Qty: 150 TABLET | Refills: 0 | Status: CANCELLED | OUTPATIENT
Start: 2020-02-10 | End: 2020-03-11

## 2020-02-11 RX ORDER — TRAMADOL HYDROCHLORIDE 50 MG/1
50-100 TABLET ORAL EVERY 8 HOURS PRN
Qty: 150 TABLET | Refills: 0 | Status: SHIPPED | OUTPATIENT
Start: 2020-02-11 | End: 2020-03-11 | Stop reason: SDUPTHER

## 2020-03-11 RX ORDER — TRAMADOL HYDROCHLORIDE 50 MG/1
50-100 TABLET ORAL EVERY 8 HOURS PRN
Qty: 150 TABLET | Refills: 0 | Status: SHIPPED | OUTPATIENT
Start: 2020-03-11 | End: 2020-04-09 | Stop reason: SDUPTHER

## 2020-04-09 RX ORDER — TRAMADOL HYDROCHLORIDE 50 MG/1
50-100 TABLET ORAL EVERY 8 HOURS PRN
Qty: 150 TABLET | Refills: 0 | Status: SHIPPED | OUTPATIENT
Start: 2020-04-09 | End: 2020-05-08 | Stop reason: SDUPTHER

## 2020-05-08 RX ORDER — TRAMADOL HYDROCHLORIDE 50 MG/1
50-100 TABLET ORAL EVERY 8 HOURS PRN
Qty: 150 TABLET | Refills: 0 | Status: SHIPPED | OUTPATIENT
Start: 2020-05-08 | End: 2020-06-05 | Stop reason: SDUPTHER

## 2020-05-08 NOTE — TELEPHONE ENCOUNTER
LOV 1/28/2020    Next appt 5/26/2020    Health Maintenance   Topic Date Due    Hepatitis C screen  1965    HIV screen  06/12/1978    Diabetes screen  06/12/2003    Shingles Vaccine (1 of 2) 06/12/2013    Colon cancer screen colonoscopy  06/12/2013    Potassium monitoring  06/04/2020    Creatinine monitoring  06/04/2020    Flu vaccine (Season Ended) 09/01/2020    DTaP/Tdap/Td vaccine (2 - Td) 01/01/2024    Lipid screen  06/05/2024    Hepatitis A vaccine  Aged Out    Hepatitis B vaccine  Aged Out    Hib vaccine  Aged Out    Meningococcal (ACWY) vaccine  Aged Out    Pneumococcal 0-64 years Vaccine  Aged Out             (applicable per patient's age: Cancer Screenings, Depression Screening, Fall Risk Screening, Immunizations)    LDL Cholesterol (mg/dL)   Date Value   06/05/2019 66     AST (U/L)   Date Value   06/04/2019 32     ALT (U/L)   Date Value   06/04/2019 25     BUN (mg/dL)   Date Value   06/30/2019 19      (goal A1C is < 7)   (goal LDL is <100) need 30-50% reduction from baseline     BP Readings from Last 3 Encounters:   01/28/20 116/62   12/19/19 110/68   11/13/19 126/76    (goal /80)      All Future Testing planned in [facility]:  Lab Frequency Next Occurrence   COLONOSCOPY W/ OR W/O BIOPSY Once 02/16/2020       Next Visit Date:  Future Appointments   Date Time Provider Yoli Griggs   5/26/2020  3:00 PM GITA Whitt - CNP [facility]            Patient Active Problem List:     Restless leg syndrome     Hyperlipidemia     Disc disease, degenerative, lumbar or lumbosacral     Somatic dysfunction of cervical region     Chronic midline low back pain without sciatica     Essential hypertension     Obstructive sleep apnea syndrome     Chest pain     Episode of altered consciousness     Lymph node enlargement

## 2020-05-21 RX ORDER — LISINOPRIL 10 MG/1
TABLET ORAL
Qty: 30 TABLET | Refills: 5 | Status: SHIPPED | OUTPATIENT
Start: 2020-05-21 | End: 2020-10-26 | Stop reason: SDUPTHER

## 2020-05-26 ENCOUNTER — OFFICE VISIT (OUTPATIENT)
Dept: PRIMARY CARE CLINIC | Age: 55
End: 2020-05-26
Payer: COMMERCIAL

## 2020-05-26 VITALS
DIASTOLIC BLOOD PRESSURE: 68 MMHG | HEART RATE: 74 BPM | BODY MASS INDEX: 25.94 KG/M2 | WEIGHT: 196.6 LBS | OXYGEN SATURATION: 95 % | SYSTOLIC BLOOD PRESSURE: 118 MMHG

## 2020-05-26 PROBLEM — Z87.891 HISTORY OF TOBACCO USE: Status: ACTIVE | Noted: 2020-05-26

## 2020-05-26 PROBLEM — R07.9 CHEST PAIN: Status: RESOLVED | Noted: 2019-06-04 | Resolved: 2020-05-26

## 2020-05-26 PROBLEM — H93.13 BILATERAL TINNITUS: Status: ACTIVE | Noted: 2020-05-26

## 2020-05-26 PROBLEM — H90.3 SENSORINEURAL HEARING LOSS, BILATERAL: Status: ACTIVE | Noted: 2020-05-26

## 2020-05-26 PROBLEM — J34.2 DEVIATED SEPTUM: Status: ACTIVE | Noted: 2020-05-26

## 2020-05-26 PROBLEM — F41.9 ANXIETY: Status: ACTIVE | Noted: 2020-05-26

## 2020-05-26 PROCEDURE — 1036F TOBACCO NON-USER: CPT | Performed by: NURSE PRACTITIONER

## 2020-05-26 PROCEDURE — G8427 DOCREV CUR MEDS BY ELIG CLIN: HCPCS | Performed by: NURSE PRACTITIONER

## 2020-05-26 PROCEDURE — 3017F COLORECTAL CA SCREEN DOC REV: CPT | Performed by: NURSE PRACTITIONER

## 2020-05-26 PROCEDURE — 99214 OFFICE O/P EST MOD 30 MIN: CPT | Performed by: NURSE PRACTITIONER

## 2020-05-26 PROCEDURE — G8419 CALC BMI OUT NRM PARAM NOF/U: HCPCS | Performed by: NURSE PRACTITIONER

## 2020-05-26 RX ORDER — AMITRIPTYLINE HYDROCHLORIDE 50 MG/1
50 TABLET, FILM COATED ORAL NIGHTLY
Qty: 30 TABLET | Refills: 5 | Status: SHIPPED | OUTPATIENT
Start: 2020-05-26 | End: 2020-09-29 | Stop reason: ALTCHOICE

## 2020-05-26 RX ORDER — LEVOFLOXACIN 500 MG/1
500 TABLET, FILM COATED ORAL DAILY
Qty: 10 TABLET | Refills: 0 | Status: SHIPPED | OUTPATIENT
Start: 2020-05-26 | End: 2020-06-05

## 2020-05-26 ASSESSMENT — ENCOUNTER SYMPTOMS
BLOOD IN STOOL: 0
ABDOMINAL PAIN: 0
VOMITING: 0
SINUS PAIN: 1
CONSTIPATION: 0
BACK PAIN: 1
SHORTNESS OF BREATH: 0
WHEEZING: 0
DIARRHEA: 0
NAUSEA: 0
TROUBLE SWALLOWING: 0
SINUS PRESSURE: 1
SORE THROAT: 0
COUGH: 0

## 2020-05-26 NOTE — PROGRESS NOTES
erythematous. Nose:      Right Sinus: Frontal sinus tenderness present. No maxillary sinus tenderness. Left Sinus: Frontal sinus tenderness present. No maxillary sinus tenderness. Eyes:      Conjunctiva/sclera: Conjunctivae normal.      Pupils: Pupils are equal, round, and reactive to light. Neck:      Musculoskeletal: Normal range of motion. Cardiovascular:      Rate and Rhythm: Normal rate and regular rhythm. Heart sounds: Normal heart sounds. No murmur. Pulmonary:      Effort: Pulmonary effort is normal.      Breath sounds: Normal breath sounds. No wheezing. Abdominal:      General: Bowel sounds are normal. There is no distension. Palpations: Abdomen is soft. Musculoskeletal: Normal range of motion. Skin:     General: Skin is warm and dry. Neurological:      Mental Status: He is alert and oriented to person, place, and time. Psychiatric:         Behavior: Behavior normal.         Thought Content: Thought content normal.         Judgment: Judgment normal.       /68   Pulse 74   Wt 196 lb 9.6 oz (89.2 kg)   SpO2 95%   BMI 25.94 kg/m²     Assessment:       Diagnosis Orders   1. Essential hypertension     2. Disc disease, degenerative, lumbar or lumbosacral     3. Chronic midline low back pain without sciatica     4. Chronic pansinusitis  levoFLOXacin (LEVAQUIN) 500 MG tablet   5. Anxiety     6. Chronic nonintractable headache, unspecified headache type  amitriptyline (ELAVIL) 50 MG tablet   7. Deviated septum               Plan:      Return in about 4 months (around 9/26/2020) for OA, headaches. HTN-well controlled on ACE  Chronic back pain-stable and unchanged, continue Motrin and tramadol as needed  Chronic sinusitis, deviated septum-lengthy discussion, suspect underlying infection, treat with Levaquin.   Also strongly encouraged to return to ENT and schedule surgery for septal deviation as was planned prior to pandemic  Anxiety- continues to exhibit anxiety,

## 2020-06-05 RX ORDER — TRAMADOL HYDROCHLORIDE 50 MG/1
50-100 TABLET ORAL EVERY 8 HOURS PRN
Qty: 150 TABLET | Refills: 0 | Status: SHIPPED | OUTPATIENT
Start: 2020-06-05 | End: 2020-07-02 | Stop reason: SDUPTHER

## 2020-07-02 RX ORDER — TRAMADOL HYDROCHLORIDE 50 MG/1
50-100 TABLET ORAL EVERY 8 HOURS PRN
Qty: 150 TABLET | Refills: 0 | Status: SHIPPED | OUTPATIENT
Start: 2020-07-02 | End: 2020-08-03 | Stop reason: SDUPTHER

## 2020-07-21 RX ORDER — ESCITALOPRAM OXALATE 10 MG/1
TABLET ORAL
Qty: 30 TABLET | Refills: 5 | Status: SHIPPED | OUTPATIENT
Start: 2020-07-21 | End: 2020-09-29 | Stop reason: ALTCHOICE

## 2020-07-21 NOTE — TELEPHONE ENCOUNTER
Last OV 05/26/2020    Next OV 09/29/2020    Health Maintenance   Topic Date Due    Hepatitis C screen  1965    HIV screen  06/12/1978    Diabetes screen  06/12/2003    Shingles Vaccine (1 of 2) 06/12/2013    Colon cancer screen colonoscopy  06/12/2013    Potassium monitoring  06/04/2020    Creatinine monitoring  06/04/2020    Flu vaccine (1) 09/01/2020    DTaP/Tdap/Td vaccine (2 - Td) 01/01/2024    Lipid screen  06/05/2024    Hepatitis A vaccine  Aged Out    Hepatitis B vaccine  Aged Out    Hib vaccine  Aged Out    Meningococcal (ACWY) vaccine  Aged Out    Pneumococcal 0-64 years Vaccine  Aged Out             (applicable per patient's age: Cancer Screenings, Depression Screening, Fall Risk Screening, Immunizations)    LDL Cholesterol (mg/dL)   Date Value   06/05/2019 66     AST (U/L)   Date Value   06/04/2019 32     ALT (U/L)   Date Value   06/04/2019 25     BUN (mg/dL)   Date Value   06/30/2019 19      (goal A1C is < 7)   (goal LDL is <100) need 30-50% reduction from baseline     BP Readings from Last 3 Encounters:   05/26/20 118/68   01/28/20 116/62   12/19/19 110/68    (goal /80)      All Future Testing planned in [facility]:  Lab Frequency Next Occurrence   COLONOSCOPY W/ OR W/O BIOPSY Once 02/16/2020       Next Visit Date:  Future Appointments   Date Time Provider Yoli Griggs   9/29/2020  3:00 PM GITA Devine - CNP [facility]            Patient Active Problem List:     Restless leg syndrome     Hyperlipidemia     Disc disease, degenerative, lumbar or lumbosacral     Somatic dysfunction of cervical region     Chronic midline low back pain without sciatica     Essential hypertension     Obstructive sleep apnea syndrome     Lymph node enlargement     Anxiety     Bilateral tinnitus     History of tobacco use     Sensorineural hearing loss, bilateral     Deviated septum

## 2020-08-03 RX ORDER — TRAMADOL HYDROCHLORIDE 50 MG/1
50-100 TABLET ORAL EVERY 8 HOURS PRN
Qty: 150 TABLET | Refills: 0 | Status: SHIPPED | OUTPATIENT
Start: 2020-08-03 | End: 2020-09-01 | Stop reason: SDUPTHER

## 2020-08-03 NOTE — TELEPHONE ENCOUNTER
Last OV 5/26/20  Next OV 9/29/20    Health Maintenance   Topic Date Due    Hepatitis C screen  1965    HIV screen  06/12/1978    Diabetes screen  06/12/2003    Shingles Vaccine (1 of 2) 06/12/2013    Colon cancer screen colonoscopy  06/12/2013    Potassium monitoring  06/04/2020    Creatinine monitoring  06/04/2020    Flu vaccine (1) 09/01/2020    DTaP/Tdap/Td vaccine (2 - Td) 01/01/2024    Lipid screen  06/05/2024    Hepatitis A vaccine  Aged Out    Hepatitis B vaccine  Aged Out    Hib vaccine  Aged Out    Meningococcal (ACWY) vaccine  Aged Out    Pneumococcal 0-64 years Vaccine  Aged Out             (applicable per patient's age: Cancer Screenings, Depression Screening, Fall Risk Screening, Immunizations)    LDL Cholesterol (mg/dL)   Date Value   06/05/2019 66     AST (U/L)   Date Value   06/04/2019 32     ALT (U/L)   Date Value   06/04/2019 25     BUN (mg/dL)   Date Value   06/30/2019 19      (goal A1C is < 7)   (goal LDL is <100) need 30-50% reduction from baseline     BP Readings from Last 3 Encounters:   05/26/20 118/68   01/28/20 116/62   12/19/19 110/68    (goal /80)      All Future Testing planned in [facility]:  Lab Frequency Next Occurrence   COLONOSCOPY W/ OR W/O BIOPSY Once 02/16/2020       Next Visit Date:  Future Appointments   Date Time Provider Yoli Griggs   9/29/2020  3:00 PM GITA Palomo - CNP [facility]            Patient Active Problem List:     Restless leg syndrome     Hyperlipidemia     Disc disease, degenerative, lumbar or lumbosacral     Somatic dysfunction of cervical region     Chronic midline low back pain without sciatica     Essential hypertension     Obstructive sleep apnea syndrome     Lymph node enlargement     Anxiety     Bilateral tinnitus     History of tobacco use     Sensorineural hearing loss, bilateral     Deviated septum

## 2020-08-06 RX ORDER — OMEPRAZOLE 20 MG/1
20 CAPSULE, DELAYED RELEASE ORAL
Qty: 30 CAPSULE | Refills: 5 | Status: SHIPPED | OUTPATIENT
Start: 2020-08-06 | End: 2020-11-25

## 2020-09-01 RX ORDER — TRAMADOL HYDROCHLORIDE 50 MG/1
50-100 TABLET ORAL EVERY 8 HOURS PRN
Qty: 150 TABLET | Refills: 0 | Status: SHIPPED | OUTPATIENT
Start: 2020-09-01 | End: 2020-09-29 | Stop reason: SDUPTHER

## 2020-09-01 NOTE — TELEPHONE ENCOUNTER
Last OV 05/26/2020    Health Maintenance   Topic Date Due    Hepatitis C screen  1965    HIV screen  06/12/1978    Diabetes screen  06/12/2003    Shingles Vaccine (1 of 2) 06/12/2013    Colon cancer screen colonoscopy  06/12/2013    Potassium monitoring  06/04/2020    Creatinine monitoring  06/04/2020    Flu vaccine (1) 09/01/2020    DTaP/Tdap/Td vaccine (2 - Td) 01/01/2024    Lipid screen  06/05/2024    Hepatitis A vaccine  Aged Out    Hepatitis B vaccine  Aged Out    Hib vaccine  Aged Out    Meningococcal (ACWY) vaccine  Aged Out    Pneumococcal 0-64 years Vaccine  Aged Out             (applicable per patient's age: Cancer Screenings, Depression Screening, Fall Risk Screening, Immunizations)    LDL Cholesterol (mg/dL)   Date Value   06/05/2019 66     AST (U/L)   Date Value   06/04/2019 32     ALT (U/L)   Date Value   06/04/2019 25     BUN (mg/dL)   Date Value   06/30/2019 19      (goal A1C is < 7)   (goal LDL is <100) need 30-50% reduction from baseline     BP Readings from Last 3 Encounters:   05/26/20 118/68   01/28/20 116/62   12/19/19 110/68    (goal /80)      All Future Testing planned in [facility]:  Lab Frequency Next Occurrence       Next Visit Date:  Future Appointments   Date Time Provider Yoli Griggs   9/29/2020  3:00 PM GITA Patterson CNP [facility]            Patient Active Problem List:     Restless leg syndrome     Hyperlipidemia     Disc disease, degenerative, lumbar or lumbosacral     Somatic dysfunction of cervical region     Chronic midline low back pain without sciatica     Essential hypertension     Obstructive sleep apnea syndrome     Lymph node enlargement     Anxiety     Bilateral tinnitus     History of tobacco use     Sensorineural hearing loss, bilateral     Deviated septum

## 2020-09-08 ENCOUNTER — TELEPHONE (OUTPATIENT)
Dept: PRIMARY CARE CLINIC | Age: 55
End: 2020-09-08

## 2020-09-08 RX ORDER — AZITHROMYCIN 250 MG/1
250 TABLET, FILM COATED ORAL SEE ADMIN INSTRUCTIONS
Qty: 6 TABLET | Refills: 0 | Status: SHIPPED | OUTPATIENT
Start: 2020-09-08 | End: 2020-09-13

## 2020-09-10 RX ORDER — LORATADINE AND PSEUDOEPHEDRINE SULFATE 10; 240 MG/1; MG/1
TABLET, EXTENDED RELEASE ORAL
Qty: 30 TABLET | Refills: 1 | Status: SHIPPED | OUTPATIENT
Start: 2020-09-10 | End: 2020-11-25

## 2020-09-29 ENCOUNTER — OFFICE VISIT (OUTPATIENT)
Dept: PRIMARY CARE CLINIC | Age: 55
End: 2020-09-29
Payer: COMMERCIAL

## 2020-09-29 VITALS — DIASTOLIC BLOOD PRESSURE: 88 MMHG | BODY MASS INDEX: 27.05 KG/M2 | SYSTOLIC BLOOD PRESSURE: 126 MMHG | WEIGHT: 205 LBS

## 2020-09-29 PROBLEM — Z96.22 HISTORY OF PLACEMENT OF EAR TUBES: Status: ACTIVE | Noted: 2020-09-29

## 2020-09-29 PROCEDURE — 3017F COLORECTAL CA SCREEN DOC REV: CPT | Performed by: NURSE PRACTITIONER

## 2020-09-29 PROCEDURE — G8419 CALC BMI OUT NRM PARAM NOF/U: HCPCS | Performed by: NURSE PRACTITIONER

## 2020-09-29 PROCEDURE — G8427 DOCREV CUR MEDS BY ELIG CLIN: HCPCS | Performed by: NURSE PRACTITIONER

## 2020-09-29 PROCEDURE — 1036F TOBACCO NON-USER: CPT | Performed by: NURSE PRACTITIONER

## 2020-09-29 PROCEDURE — 99214 OFFICE O/P EST MOD 30 MIN: CPT | Performed by: NURSE PRACTITIONER

## 2020-09-29 RX ORDER — TRAMADOL HYDROCHLORIDE 50 MG/1
50-100 TABLET ORAL EVERY 8 HOURS PRN
Qty: 150 TABLET | Refills: 0 | Status: SHIPPED | OUTPATIENT
Start: 2020-09-29 | End: 2020-10-27 | Stop reason: SDUPTHER

## 2020-09-29 ASSESSMENT — ENCOUNTER SYMPTOMS
BLOOD IN STOOL: 0
SORE THROAT: 0
BACK PAIN: 1
WHEEZING: 0
TROUBLE SWALLOWING: 0
DIARRHEA: 0
NAUSEA: 0
SHORTNESS OF BREATH: 0
CONSTIPATION: 0
COUGH: 0
VOMITING: 0
ABDOMINAL PAIN: 0
SINUS PRESSURE: 0

## 2020-09-29 NOTE — PROGRESS NOTES
05/26/20 118/68   01/28/20 116/62          ([id number])    Past Medical History:   Diagnosis Date    Back pain     Disc disease, degenerative, lumbar or lumbosacral     Hyperlipidemia     Hypertension     Restless leg syndrome       Past Surgical History:   Procedure Laterality Date    HERNIA REPAIR      TONSILLECTOMY AND ADENOIDECTOMY      TYMPANOSTOMY TUBE PLACEMENT Bilateral        Family History   Problem Relation Age of Onset    Arthritis Mother           Social History     Tobacco Use    Smoking status: Former Smoker     Packs/day: 0.05     Years: 35.00     Pack years: 1.75     Start date: 2010     Last attempt to quit: 7/23/2010     Years since quitting: 10.1    Smokeless tobacco: Never Used   Substance Use Topics    Alcohol use: No     Comment: rare      Current Outpatient Medications   Medication Sig Dispense Refill    traMADol (ULTRAM) 50 MG tablet Take 1-2 tablets by mouth every 8 hours as needed for Pain for up to 30 days.  150 tablet 0    CLARITIN-D 24 HOUR  MG per extended release tablet take 1 tablet by mouth once daily 30 tablet 1    omeprazole (PRILOSEC) 20 MG delayed release capsule Take 1 capsule by mouth every morning (before breakfast) 30 capsule 5    lisinopril (PRINIVIL;ZESTRIL) 10 MG tablet TAKE ONE TABLET BY MOUTH DAILY 30 tablet 5    butalbital-acetaminophen-caffeine (FIORICET, ESGIC) -40 MG per tablet Take 1 tablet by mouth every 4 hours as needed for Headaches 120 tablet 3    sodium chloride (OCEAN) 0.65 % nasal spray 1 spray by Nasal route as needed for Congestion 1 Bottle 3    tiZANidine (ZANAFLEX) 4 MG tablet Take 1 tablet by mouth every 8 hours as needed (muscle spasms) 90 tablet 5    ibuprofen (ADVIL;MOTRIN) 800 MG tablet Take 1 tablet by mouth every 8 hours as needed for Pain 90 tablet 5    albuterol sulfate HFA (PROAIR HFA) 108 (90 Base) MCG/ACT inhaler Inhale 2 puffs into the lungs every 6 hours as needed for Wheezing 1 Inhaler 3    Multiple Physical Exam  Constitutional:       Appearance: He is well-developed. HENT:      Head: Normocephalic. Right Ear: A PE tube (appears to be displaced) is present. Left Ear: A PE tube is present. Eyes:      Conjunctiva/sclera: Conjunctivae normal.      Pupils: Pupils are equal, round, and reactive to light. Neck:      Musculoskeletal: Normal range of motion. Cardiovascular:      Rate and Rhythm: Normal rate and regular rhythm. Heart sounds: Normal heart sounds. No murmur. Pulmonary:      Effort: Pulmonary effort is normal.      Breath sounds: Normal breath sounds. No wheezing. Abdominal:      General: Bowel sounds are normal. There is no distension. Palpations: Abdomen is soft. Musculoskeletal: Normal range of motion. Skin:     General: Skin is warm and dry. Neurological:      Mental Status: He is alert and oriented to person, place, and time. Psychiatric:         Behavior: Behavior normal.         Thought Content: Thought content normal.         Judgment: Judgment normal.       /88   Wt 205 lb (93 kg)   BMI 27.05 kg/m²     Assessment:       Diagnosis Orders   1. Essential hypertension     2. History of placement of ear tubes     3. Anxiety     4. Chronic midline low back pain without sciatica  traMADol (ULTRAM) 50 MG tablet   5. Disc disease, degenerative, lumbar or lumbosacral  traMADol (ULTRAM) 50 MG tablet   6. Primary osteoarthritis involving multiple joints  traMADol (ULTRAM) 50 MG tablet             Plan:      Return in about 4 months (around 1/29/2021) for hypertension check, back pain. Hypertension- well-controlled on current medications  History of ear tubes, status post septoplasty-encouraged to call and schedule follow-up with ENT who did his surgery, Dr Ciaran Stein. Questionable placement on right ear of tube.   AlsoEncouraged to pursue audiology exam as was planned that he never follow through with  Anxiety- stable at this time off of all medications  Chronic back pain, arthritis- unchanged, continue as needed use of Motrin and tramadol  Labs from ED visit reviewed, no further labs indicated at this time  Orders Placed This Encounter   Medications    traMADol (ULTRAM) 50 MG tablet     Sig: Take 1-2 tablets by mouth every 8 hours as needed for Pain for up to 30 days. Dispense:  150 tablet     Refill:  0     Reduce doses taken as pain becomes manageable       Patient given educational materials - see patient instructions. Discussed use, benefit, and side effects of prescribed medications. All patientquestions answered. Pt voiced understanding. Reviewed health maintenance. Instructedto continue current medications, diet and exercise. Patient agreed with treatmentplan. Follow up as directed.      Electronicallysigned by GITA James CNP on 9/29/2020 at 4:36 PM

## 2020-10-26 RX ORDER — LISINOPRIL 10 MG/1
TABLET ORAL
Qty: 90 TABLET | Refills: 3 | Status: SHIPPED | OUTPATIENT
Start: 2020-10-26 | End: 2021-03-16 | Stop reason: SDUPTHER

## 2020-10-27 RX ORDER — TRAMADOL HYDROCHLORIDE 50 MG/1
50-100 TABLET ORAL EVERY 8 HOURS PRN
Qty: 150 TABLET | Refills: 0 | Status: SHIPPED | OUTPATIENT
Start: 2020-10-27 | End: 2020-11-23 | Stop reason: SDUPTHER

## 2020-11-23 RX ORDER — TRAMADOL HYDROCHLORIDE 50 MG/1
50-100 TABLET ORAL EVERY 8 HOURS PRN
Qty: 150 TABLET | Refills: 0 | Status: SHIPPED | OUTPATIENT
Start: 2020-11-23 | End: 2020-12-22

## 2020-11-23 RX ORDER — TIZANIDINE 4 MG/1
4 TABLET ORAL EVERY 8 HOURS PRN
Qty: 90 TABLET | Refills: 5 | Status: SHIPPED | OUTPATIENT
Start: 2020-11-23

## 2020-11-23 NOTE — TELEPHONE ENCOUNTER
Last OV 09/29/2020    Next OV 01/29/2021    Health Maintenance   Topic Date Due    Hepatitis C screen  1965    HIV screen  06/12/1978    Diabetes screen  06/12/2003    Shingles Vaccine (1 of 2) 06/12/2013    Colon cancer screen colonoscopy  06/12/2013    Potassium monitoring  06/04/2020    Creatinine monitoring  06/04/2020    Flu vaccine (1) 09/01/2020    DTaP/Tdap/Td vaccine (2 - Td) 01/01/2024    Lipid screen  06/05/2024    Hepatitis A vaccine  Aged Out    Hepatitis B vaccine  Aged Out    Hib vaccine  Aged Out    Meningococcal (ACWY) vaccine  Aged Out    Pneumococcal 0-64 years Vaccine  Aged Out             (applicable per patient's age: Cancer Screenings, Depression Screening, Fall Risk Screening, Immunizations)    LDL Cholesterol (mg/dL)   Date Value   06/05/2019 66     AST (U/L)   Date Value   06/04/2019 32     ALT (U/L)   Date Value   06/04/2019 25     BUN (mg/dL)   Date Value   06/30/2019 19      (goal A1C is < 7)   (goal LDL is <100) need 30-50% reduction from baseline     BP Readings from Last 3 Encounters:   09/29/20 126/88   05/26/20 118/68   01/28/20 116/62    (goal /80)      All Future Testing planned in [facility]:  Lab Frequency Next Occurrence       Next Visit Date:  Future Appointments   Date Time Provider Yoli Griggs   1/29/2021  3:00 PM GITA Saucedo - CNP [facility]            Patient Active Problem List:     Restless leg syndrome     Disc disease, degenerative, lumbar or lumbosacral     Somatic dysfunction of cervical region     Chronic midline low back pain without sciatica     Essential hypertension     Obstructive sleep apnea syndrome     Lymph node enlargement     Anxiety     Bilateral tinnitus     History of tobacco use     Sensorineural hearing loss, bilateral     Deviated septum     History of placement of ear tubes

## 2020-12-17 ENCOUNTER — OFFICE VISIT (OUTPATIENT)
Dept: PRIMARY CARE CLINIC | Age: 55
End: 2020-12-17
Payer: COMMERCIAL

## 2020-12-17 VITALS
OXYGEN SATURATION: 98 % | WEIGHT: 204 LBS | DIASTOLIC BLOOD PRESSURE: 78 MMHG | HEART RATE: 62 BPM | BODY MASS INDEX: 26.91 KG/M2 | SYSTOLIC BLOOD PRESSURE: 124 MMHG

## 2020-12-17 PROCEDURE — 1036F TOBACCO NON-USER: CPT | Performed by: NURSE PRACTITIONER

## 2020-12-17 PROCEDURE — 99214 OFFICE O/P EST MOD 30 MIN: CPT | Performed by: NURSE PRACTITIONER

## 2020-12-17 PROCEDURE — 3017F COLORECTAL CA SCREEN DOC REV: CPT | Performed by: NURSE PRACTITIONER

## 2020-12-17 PROCEDURE — G8419 CALC BMI OUT NRM PARAM NOF/U: HCPCS | Performed by: NURSE PRACTITIONER

## 2020-12-17 PROCEDURE — G8484 FLU IMMUNIZE NO ADMIN: HCPCS | Performed by: NURSE PRACTITIONER

## 2020-12-17 PROCEDURE — G8427 DOCREV CUR MEDS BY ELIG CLIN: HCPCS | Performed by: NURSE PRACTITIONER

## 2020-12-17 RX ORDER — AMOXICILLIN 875 MG/1
875 TABLET, COATED ORAL 2 TIMES DAILY
Qty: 20 TABLET | Refills: 0 | Status: SHIPPED | OUTPATIENT
Start: 2020-12-17 | End: 2020-12-27

## 2020-12-17 RX ORDER — PREDNISONE 20 MG/1
20 TABLET ORAL 2 TIMES DAILY
Qty: 10 TABLET | Refills: 0 | Status: SHIPPED | OUTPATIENT
Start: 2020-12-17 | End: 2020-12-22

## 2020-12-17 ASSESSMENT — ENCOUNTER SYMPTOMS
WHEEZING: 0
ABDOMINAL PAIN: 0
NAUSEA: 0
CONSTIPATION: 0
SINUS PRESSURE: 0
SHORTNESS OF BREATH: 0
TROUBLE SWALLOWING: 0
BLOOD IN STOOL: 0
DIARRHEA: 0
SORE THROAT: 0
COUGH: 0
VOMITING: 0

## 2020-12-17 NOTE — PROGRESS NOTES
[address] [facility] [place] [facility]"" PRIMARY CARE  [facility] [address] [facility] [address]  [address] [address]  Dept: [phone]  Dept Fax: [phone]    Nixon Hammond is a 60 y.o. male who presentstoday for his medical conditions/complaints as noted below. Nixon Hamomnd is c/o of  Chief Complaint   Patient presents with    Headache     has had some neck stiffness - thinking neck could cause the headache     Hearing Problem       HPI:     Here today for follow-up  Reports his arthritis and back pain is essentially unchanged, continues to use Motrin 1-2 times daily along with tramadol as needed  Continues to complain of frequent headaches, he notes that he has neck stiffness particularly by the end of the day and wonders if this is contributing  He also acknowledges increased stress at work  He is using over-the-counter Excedrin Migraine which offers relief from the headaches    Complains of recurrent issues in his right ear, states it feels \"squishy\" and feels frequently blocked  Recent history of tubes placed bilaterally  Denies any drainage the ear is also intermittently painful      Hypertension  The problem is controlled. Associated symptoms include headaches and neck pain. Pertinent negatives include no chest pain, palpitations, peripheral edema or shortness of breath. Past treatments include ACE inhibitors. The current treatment provides significant improvement. There are no compliance problems. There is no history of CAD/MI or CVA.        No results found for: LABA1C          ( goal A1C is < 7)   No results found for: LABMICR  LDL Cholesterol (mg/dL)   Date Value   2019 66       (goal LDL is <100)   AST (U/L)   Date Value   2019 32     ALT (U/L)   Date Value   2019 25     BUN (mg/dL)   Date Value   2019 19     BP Readings from Last 3 Encounters:   20 124/78   20 126/88   20 118/68          ([id number])    Past Medical History:   Diagnosis Date  Back pain     Disc disease, degenerative, lumbar or lumbosacral     Hyperlipidemia     Hypertension     Restless leg syndrome       Past Surgical History:   Procedure Laterality Date    HERNIA REPAIR      TONSILLECTOMY AND ADENOIDECTOMY      TYMPANOSTOMY TUBE PLACEMENT Bilateral        Family History   Problem Relation Age of Onset    Arthritis Mother           Social History     Tobacco Use    Smoking status: Former Smoker     Packs/day: 0.05     Years: 35.00     Pack years: 1.75     Start date: 2010     Quit date: 7/23/2010     Years since quitting: 10.4    Smokeless tobacco: Never Used   Substance Use Topics    Alcohol use: No     Comment: rare      Current Outpatient Medications   Medication Sig Dispense Refill    amoxicillin (AMOXIL) 875 MG tablet Take 1 tablet by mouth 2 times daily for 10 days 20 tablet 0    predniSONE (DELTASONE) 20 MG tablet Take 1 tablet by mouth 2 times daily for 5 days 10 tablet 0    HM ALLERGY RELIEF/NASAL DECONG  MG per extended release tablet TAKE ONE TABLET BY MOUTH ONCE DAILY 90 tablet 3    omeprazole (PRILOSEC) 20 MG delayed release capsule TAKE ONE CAPSULE BY MOUTH EVERY MORNING BEFORE BREAKFAST 90 capsule 3    tiZANidine (ZANAFLEX) 4 MG tablet Take 1 tablet by mouth every 8 hours as needed (muscle spasms) 90 tablet 5    traMADol (ULTRAM) 50 MG tablet Take 1-2 tablets by mouth every 8 hours as needed for Pain for up to 30 days.  150 tablet 0    albuterol sulfate  (90 Base) MCG/ACT inhaler USE AS DIRECTED 18 g 5    lisinopril (PRINIVIL;ZESTRIL) 10 MG tablet TAKE ONE TABLET BY MOUTH DAILY 90 tablet 3    sodium chloride (OCEAN) 0.65 % nasal spray 1 spray by Nasal route as needed for Congestion 1 Bottle 3    ibuprofen (ADVIL;MOTRIN) 800 MG tablet Take 1 tablet by mouth every 8 hours as needed for Pain 90 tablet 5    Multiple Vitamins-Minerals (MULTI FOR HIM 50+ PO) Take by mouth  fluticasone (FLONASE) 50 MCG/ACT nasal spray 1 spray by Nasal route daily 1 Bottle 3     No current facility-administered medications for this visit. No Known Allergies    Health Maintenance   Topic Date Due    Hepatitis C screen  1965    HIV screen  06/12/1978    Diabetes screen  06/12/2003    Shingles Vaccine (1 of 2) 06/12/2013    Colon cancer screen colonoscopy  06/12/2013    Potassium monitoring  06/04/2020    Creatinine monitoring  06/04/2020    Flu vaccine (1) 09/01/2020    DTaP/Tdap/Td vaccine (2 - Td) 01/01/2024    Lipid screen  06/05/2024    Hepatitis A vaccine  Aged Out    Hepatitis B vaccine  Aged Out    Hib vaccine  Aged Out    Meningococcal (ACWY) vaccine  Aged Out    Pneumococcal 0-64 years Vaccine  Aged Out       Subjective:      Review of Systems   Constitutional: Negative for activity change, appetite change, chills, fatigue, fever and unexpected weight change. HENT: Positive for ear pain and hearing loss. Negative for congestion, sinus pressure, sore throat and trouble swallowing. Eyes: Negative for visual disturbance. Respiratory: Negative for cough, shortness of breath and wheezing. Cardiovascular: Negative for chest pain, palpitations and leg swelling. Gastrointestinal: Negative for abdominal pain, blood in stool, constipation, diarrhea, nausea and vomiting. Endocrine: Negative for cold intolerance, heat intolerance, polydipsia, polyphagia and polyuria. Genitourinary: Negative for difficulty urinating, frequency, hematuria and urgency. Musculoskeletal: Positive for arthralgias, neck pain and neck stiffness. Negative for myalgias. Skin: Negative for rash. Allergic/Immunologic: Negative for environmental allergies. Neurological: Positive for headaches. Negative for dizziness, weakness and light-headedness. Psychiatric/Behavioral: Negative for confusion. The patient is not nervous/anxious.         Objective:     Physical Exam  Constitutional: Appearance: He is well-developed. HENT:      Head: Normocephalic. Right Ear: Decreased hearing noted. Tenderness present. A middle ear effusion is present. No PE tube. Tympanic membrane is scarred and erythematous. Left Ear: Decreased hearing noted. No tenderness. No middle ear effusion. A PE tube is present. Tympanic membrane is not erythematous. Eyes:      Conjunctiva/sclera: Conjunctivae normal.      Pupils: Pupils are equal, round, and reactive to light. Neck:      Musculoskeletal: Normal range of motion. Cardiovascular:      Rate and Rhythm: Normal rate and regular rhythm. Heart sounds: Normal heart sounds. No murmur. Pulmonary:      Effort: Pulmonary effort is normal.      Breath sounds: Normal breath sounds. No wheezing. Abdominal:      General: Bowel sounds are normal. There is no distension. Palpations: Abdomen is soft. Musculoskeletal: Normal range of motion. Skin:     General: Skin is warm and dry. Neurological:      Mental Status: He is alert and oriented to person, place, and time. Psychiatric:         Behavior: Behavior normal.         Thought Content: Thought content normal.         Judgment: Judgment normal.       /78   Pulse 62   Wt 204 lb (92.5 kg)   SpO2 98%   BMI 26.91 kg/m²     Assessment:       Diagnosis Orders   1. Primary osteoarthritis involving multiple joints     2. Cervical pain (neck)  XR CERVICAL SPINE FLEXION AND EXTENSION    [facility] - [place]   3. Chronic midline low back pain without sciatica     4. Frequent episodic tension-type headache, not intractable     5. Essential hypertension  Comprehensive Metabolic Panel    Lipid Panel   6. OME (otitis media with effusion), right  amoxicillin (AMOXIL) 875 MG tablet    predniSONE (DELTASONE) 20 MG tablet   7. Screening, anemia, deficiency, iron  CBC Auto Differential   8.  Screening for malignant neoplasm of prostate  Psa screening             Plan: Return in about 4 months (around 4/17/2021) for arthritis, hypertension check. Osteoarthritis, chronic back painstable and unchanged, continue current medications  Neck pain, frequent headacheslengthy discussion, obtain x-ray, start physical therapy if x-ray does not show significant abnormalities. Encouraged regular use of Motrin 2-3 times daily  Hypertensionstable and well-controlled on ACE inhibitor, due for annual labs  Otitis media with effusionamoxicillin, short course prednisone, encouraged follow-up with ENT as your tube not visible in the right, remains present in the left.   Continue Flonase  Orders Placed This Encounter   Procedures    XR CERVICAL SPINE FLEXION AND EXTENSION     Standing Status:   Future     Standing Expiration Date:   12/17/2021     Order Specific Question:   Reason for exam:     Answer:   cervical pain,  headaches    CBC Auto Differential     Standing Status:   Future     Standing Expiration Date:   12/17/2021    Comprehensive Metabolic Panel     Standing Status:   Future     Standing Expiration Date:   12/17/2021    Lipid Panel     Standing Status:   Future     Standing Expiration Date:   12/17/2021     Order Specific Question:   Is Patient Fasting?/# of Hours     Answer:   8    Psa screening     Standing Status:   Future     Standing Expiration Date:   12/17/2021   [address] - [place]     Referral Priority:   Routine     Referral Type:   Eval and Treat     Referral Reason:   Specialty Services Required     Requested Specialty:   Physical Therapy     Number of Visits Requested:   1        Orders Placed This Encounter   Medications    amoxicillin (AMOXIL) 875 MG tablet     Sig: Take 1 tablet by mouth 2 times daily for 10 days     Dispense:  20 tablet     Refill:  0    predniSONE (DELTASONE) 20 MG tablet     Sig: Take 1 tablet by mouth 2 times daily for 5 days     Dispense:  10 tablet     Refill:  0 Patient given educational materials - see patient instructions. Discussed use, benefit, and side effects of prescribed medications. All patientquestions answered. Pt voiced understanding. Reviewed health maintenance. Instructedto continue current medications, diet and exercise. Patient agreed with treatmentplan. Follow up as directed.      Electronicallysigned by GITA Portillo CNP on 12/17/2020 at 5:00 PM

## 2020-12-21 RX ORDER — PREDNISONE 20 MG/1
TABLET ORAL
Qty: 10 TABLET | Refills: 0 | OUTPATIENT
Start: 2020-12-21

## 2020-12-22 RX ORDER — TRAMADOL HYDROCHLORIDE 50 MG/1
TABLET ORAL
Qty: 150 TABLET | Refills: 0 | Status: SHIPPED | OUTPATIENT
Start: 2020-12-22 | End: 2021-01-20 | Stop reason: SDUPTHER

## 2021-01-04 ENCOUNTER — HOSPITAL ENCOUNTER (OUTPATIENT)
Dept: PHYSICAL THERAPY | Facility: CLINIC | Age: 56
Setting detail: THERAPIES SERIES
Discharge: HOME | End: 2021-01-04
Payer: COMMERCIAL

## 2021-01-04 PROCEDURE — 97162 PT EVAL MOD COMPLEX 30 MIN: CPT

## 2021-01-04 PROCEDURE — G0283 ELEC STIM OTHER THAN WOUND: HCPCS

## 2021-01-04 PROCEDURE — 97110 THERAPEUTIC EXERCISES: CPT

## 2021-01-04 NOTE — FLOWSHEET NOTE
[x] [facility]      [facility]    [address]     Phone: [phone]     Fax:  [phone]     Physical Therapy Evaluation    Date:  2021  Patient: Miguel Ángel Mchugh  : 1965  MRN: [id number]  Physician: Abran Mccray 954: Javier Avilez              Eligibility Status:  Eligible     Secondary Insurance (if applicable)               Eligibility Status: n/a  DOS: 21  # of visits allowed/remainin  Source: Phone  Spoke To: Fiordaliza  Reference: [id number]  Auth: NPRe  Medical Diagnosis: Cervical strain    Rehab Codes: M54.2  Onset Date:                                    Subjective:  Pt reports pain, stiffness of B post cervical, upper trap regions, freq frontal/occipiatl HA, denies radiating pain or n/t. Pt states symptoms began 1 yr ago, but does not recall specific incident of injury. Previous treatment for HA w/ meds w/o specific relief.  XR pending    PMHx: [] Unremarkable [] Diabetes [] HTN  [] Pacemaker   [] MI/Heart Problems [] Cancer [] Arthritis [] Asthma                         [x] refer to full medical chart  In [facility]  [] Other:        Tests: [x] X-Ray: [] MRI:  [] Other:    Medications: [x] Refer to full medical record [] None [] Other:  Allergies:      [x] Refer to full medical record [] None [] Other:    Function:  Hand Dominance  [x] Right  [] Left  Working:  [x] Normal Duty  [] Light Duty  [] Off D/T Condition  [] Retired     [] Not Employed  []  Disability  [] Other:            Return to work:   Job/ADL Description:Pt employed as     Pain:  [x] Yes  [] No Pain Rating: (0-10 scale) 3/10  Pain altered Tx:  [] Yes  [x] No  Action:    Symptoms:  [] Improving [] Worsening [x] Same    Objective:     L/R ROM  ° A/P STRENGTH    Cervical Flex 45       Ext 35       SB 15 25      Rot 40 65      rhomboids   4 4    Mid trap   4- 4-    Lower trap   4- 4-    lats   4 4    Shldr flex 170 170 4+ 4+    abd 170 170 4+ 4+    IR WNL WNL 4+ 4+    ER WNL WNL 4+ 4+      OBSERVATION No Deficit Deficit Not Tested Comments   Posture        [x]     Palpation [] [x]     Sensation [x] []       FUNCTION Normal Difficult Unable   Turning head  [] [x] []   bending [] [x] []   reaching [] [x] []   lifting [] [x] []     ASSESSMENT   Pt limited by pain, spasms of B post cervical, upper trap regions, neck ROM loss, thorascapular weakness w/ limitations in postural support of head, neck, shldr motions d/t cervical strain. PROBLEMS  Cervical pain, HA  Cervical ROM loss  Scapular weakness  Neck, Upper back, functional deficits    SHORT TERM GOALS ( 8 visits)  Cervical pain, HA = 0  Cervical  ROM = WNL  Scapularstrength = 4+/5  Neck, Upper back function: turn head, reach, lift w/o pain    LONG TERM GOALS ( 12 visits)  Independent Home Exercise program  Return to normal activity    PATIENT GOAL  stop HA    Rehab Potential:  [x] Good  [] Fair  [] Poor   Suggested Professional Referral:  [x] No  [] Yes:  Barriers to Goal Achievement[de-identified]  [x] No  [] Yes:  Domestic Concerns:  [x] No  [] Yes:    Pt. Education:  [x] Plans/Goals, Risks/Benefits discussed  [] Home exercise program  Method of Education: [x] Verbal  [x] Demo  [x] Written  Comprehension of Education:  [x] Verbalizes understanding. [] Demonstrates understanding. [] Needs Review. [] Demonstrates/verbalizes understanding of HEP/Ed previously given.     Treatment Plan:  [x] Therapeutic Exercise    [x] Modalities:  [] Therapeutic Activity    [x] Ultrasound  [x] Electrical Stimulation  [] Gait Training     [x] Massage       [] Lumbar/Cervical Traction  [] Neuromuscular Re-education [x] Cold/hotpack [] Instruction in HEP  [x] Manual Therapy   [] Aquatic Therapy [] Other:     [] Iontophoresis: 4 mg/mL Dexamethasone Sodium Phosphate 40-80 mAmin  [] Drug allergies reviewed    _______Initials           _______Date     Frequency:  2 x/week for 12 visits    Todays Treatment:  HP, IFC post cervical, upper trap region x 15 min  Upper trap, levator scap, doorway ER stretch 10x10s    Specific Instructions for next treatment:    Treatment Charges: Mins Units   [x] Evaluation ----- 1   [x]  Modalities: HP                         ES 20  20 1  1   [x]  Ther Exercise 20 1   []  Manual Therapy     []  Ther Activities     []  Aquatics     []  Other       Time in: 1500     Time out: 1600  Electronically signed by: Kleber Patrick PT        Physician Signature:________________________________Date:__________________  By signing above, I have reviewed this plan of care and certify a need for medically   necessary rehabilitation services.      *PLEASE SIGN ABOVE AND FAX BACK ALL PAGES*

## 2021-01-07 ENCOUNTER — HOSPITAL ENCOUNTER (OUTPATIENT)
Dept: PHYSICAL THERAPY | Facility: CLINIC | Age: 56
Setting detail: THERAPIES SERIES
Discharge: HOME | End: 2021-01-07
Payer: COMMERCIAL

## 2021-01-07 NOTE — FLOWSHEET NOTE
[] [facility]) - [facility] &  Therapy  [address]    [phone]  F: [phone]   [] [address]  [facility] [address]   [address]  P: [phone]  F: [phone]  [] [address] &  Therapy  [address] [facility]  P: [phone]  F: [phone] [] [address]  P: [phone]  F: [phone]  [] [address]  [facility]   [address]   [place]: [phone]  F: [phone]   [] [address]  [place]: [phone]   F: [phone]     Physical Therapy Cancel/No Show note    Date: 2021  Patient: Carol Ann Feliz  : 1965  MRN: [id number]    Cancels/No Shows to date:     For today's appointment patient:    [x]  Cancelled    [] Rescheduled appointment    [] No-show     Reason given by patient:    []  Patient ill    []  Conflicting appointment    [] No transportation      [x] Conflict with work    [] No reason given    [] Weather related    [] COVID-19    [] Other:      Comments:        [] Next appointment was confirmed    Electronically signed by: Heidy Perez PTA

## 2021-01-20 DIAGNOSIS — M51.37 DISC DISEASE, DEGENERATIVE, LUMBAR OR LUMBOSACRAL: ICD-10-CM

## 2021-01-20 DIAGNOSIS — M15.9 PRIMARY OSTEOARTHRITIS INVOLVING MULTIPLE JOINTS: ICD-10-CM

## 2021-01-20 DIAGNOSIS — G89.29 CHRONIC MIDLINE LOW BACK PAIN WITHOUT SCIATICA: ICD-10-CM

## 2021-01-20 DIAGNOSIS — M54.50 CHRONIC MIDLINE LOW BACK PAIN WITHOUT SCIATICA: ICD-10-CM

## 2021-01-20 RX ORDER — TRAMADOL HYDROCHLORIDE 50 MG/1
50 TABLET ORAL EVERY 8 HOURS PRN
Qty: 150 TABLET | Refills: 0 | Status: SHIPPED | OUTPATIENT
Start: 2021-01-20 | End: 2021-02-22 | Stop reason: SDUPTHER

## 2021-01-20 NOTE — TELEPHONE ENCOUNTER
Last OV 12/17/2020      Health Maintenance   Topic Date Due    Hepatitis C screen  1965    HIV screen  06/12/1978    Shingles Vaccine (1 of 2) 06/12/2013    Colon cancer screen colonoscopy  06/12/2013    Potassium monitoring  06/04/2020    Creatinine monitoring  06/04/2020    Flu vaccine (1) 09/01/2020    DTaP/Tdap/Td vaccine (2 - Td) 01/01/2024    Lipid screen  06/05/2024    Hepatitis A vaccine  Aged Out    Hepatitis B vaccine  Aged Out    Hib vaccine  Aged Out    Meningococcal (ACWY) vaccine  Aged Out    Pneumococcal 0-64 years Vaccine  Aged Out             (applicable per patient's age: Cancer Screenings, Depression Screening, Fall Risk Screening, Immunizations)    LDL Cholesterol (mg/dL)   Date Value   06/05/2019 66     AST (U/L)   Date Value   06/04/2019 32     ALT (U/L)   Date Value   06/04/2019 25     BUN (mg/dL)   Date Value   06/30/2019 19      (goal A1C is < 7)   (goal LDL is <100) need 30-50% reduction from baseline     BP Readings from Last 3 Encounters:   12/17/20 124/78   09/29/20 126/88   05/26/20 118/68    (goal /80)      All Future Testing planned in [facility]:  Lab Frequency Next Occurrence   XR CERVICAL SPINE FLEXION AND EXTENSION Once 01/15/2021   CBC Auto Differential Once 01/17/2021   Comprehensive Metabolic Panel Once 88/61/5780   Lipid Panel Once 01/17/2021   Psa screening Once 01/17/2021       Next Visit Date:  Future Appointments   Date Time Provider Yoli Griggs   1/29/2021  3:00 PM Capo Gutierrez APRN - CNP [facility]   4/19/2021  3:20 PM Capo Gutierrez APRN - CNP [facility]            Patient Active Problem List:     Restless leg syndrome     Disc disease, degenerative, lumbar or lumbosacral     Somatic dysfunction of cervical region     Chronic midline low back pain without sciatica     Essential hypertension     Obstructive sleep apnea syndrome     Lymph node enlargement     Anxiety     Bilateral tinnitus     History of tobacco use Sensorineural hearing loss, bilateral     Deviated septum     History of placement of ear tubes

## 2021-02-01 NOTE — FLOWSHEET NOTE
[address]  [address] [place] [address] 36.  Phone: [phone]  Fax: [phone]    PHYSICAL THERAPY DISCHARGE SUMMARY    Date: 2021  Patient Name: Ashley Dale        MRN: [id number]     Acct#:   : 1965  (60 y.o.)    Physician: Avani Acuña                         Insurance: [employer] [facility]              Eligibility Status:  Eligible     Secondary Insurance (if applicable)               Eligibility Status: n/a  DOS: 21  # of visits allowed/remainin  Source: Phone  Spoke Roro Lopes  Reference: [id number]  Auth: NPRe  Medical Diagnosis: Cervical strain                 Rehab Codes: M54.2  Onset Date:      Date of Initial Eval: 21  Date of Final Treatment: 21  Total number of visits: 1    Discharge Status:  [ ] Patient recovered from condition. Treatment Goals were met. [ ] Patient received maximum benefit. No further therapy indicated at this time. [ ] Patient demonstrated improvement from condition with          of           goals met. [ ] Patient to continue exercises/home instructions independently. [ ] Therapy interrupted due to:  [x] Patient has two or more no-shows/cancellations and has been discontinued per our no show/cancellation policy. [ ] Patient has completed their prescribed number of treatment sessions. [ ] Other:      Pain level at Evaluation was    3    /10 and at Discharge was     3   /10. [ ] Patient returned to work. [ ] Patient demonstrated improved level of function. [ ] Patient has returned to previous functional level. [x] Patients current status unknown due to no-shows  [ ] Other:     Recommendations/Comments: As of last visit pt only seen for evaluation, but pt did not return for further PT.      Treatment Included:  [x] Therapeutic Exercise  [  ] Manual Therapy  [ x ] Hot/Cold Pack  [  [place]  [ x ] Elec-Stim [  ] Iontophoresis [  ]Aquatics [  ]  Therapeutic Activity   [  ] Neuro Re-Education  [  ] Gait    [  ] Massage  [  ] Traction    Thank you for the patient referral to Physical Therapy.  Please feel free to contact me with any questions or concerns regarding this patient's care.    ______________________________________  Viji Guzman PT ATC    Date: 2/1/2021

## 2021-02-22 DIAGNOSIS — M51.37 DISC DISEASE, DEGENERATIVE, LUMBAR OR LUMBOSACRAL: ICD-10-CM

## 2021-02-22 DIAGNOSIS — G89.29 CHRONIC MIDLINE LOW BACK PAIN WITHOUT SCIATICA: ICD-10-CM

## 2021-02-22 DIAGNOSIS — M15.9 PRIMARY OSTEOARTHRITIS INVOLVING MULTIPLE JOINTS: ICD-10-CM

## 2021-02-22 DIAGNOSIS — M54.50 CHRONIC MIDLINE LOW BACK PAIN WITHOUT SCIATICA: ICD-10-CM

## 2021-02-22 RX ORDER — TRAMADOL HYDROCHLORIDE 50 MG/1
50 TABLET ORAL EVERY 8 HOURS PRN
Qty: 150 TABLET | Refills: 0 | Status: SHIPPED | OUTPATIENT
Start: 2021-02-22 | End: 2021-03-16 | Stop reason: SDUPTHER

## 2021-02-22 NOTE — TELEPHONE ENCOUNTER
LOV 12/17/2020    Next appt 4/19/2021    Health Maintenance   Topic Date Due    Hepatitis C screen  1965    HIV screen  06/12/1978    Shingles Vaccine (1 of 2) 06/12/2013    Colon cancer screen colonoscopy  06/12/2013    Potassium monitoring  06/04/2020    Creatinine monitoring  06/04/2020    Flu vaccine (1) 09/01/2020    DTaP/Tdap/Td vaccine (2 - Td) 01/01/2024    Lipid screen  06/05/2024    Hepatitis A vaccine  Aged Out    Hepatitis B vaccine  Aged Out    Hib vaccine  Aged Out    Meningococcal (ACWY) vaccine  Aged Out    Pneumococcal 0-64 years Vaccine  Aged Out             (applicable per patient's age: Cancer Screenings, Depression Screening, Fall Risk Screening, Immunizations)    LDL Cholesterol (mg/dL)   Date Value   06/05/2019 66     AST (U/L)   Date Value   06/04/2019 32     ALT (U/L)   Date Value   06/04/2019 25     BUN (mg/dL)   Date Value   06/30/2019 19      (goal A1C is < 7)   (goal LDL is <100) need 30-50% reduction from baseline     BP Readings from Last 3 Encounters:   12/17/20 124/78   09/29/20 126/88   05/26/20 118/68    (goal /80)      All Future Testing planned in [facility]:  Lab Frequency Next Occurrence   XR CERVICAL SPINE FLEXION AND EXTENSION Once 02/01/2021   CBC Auto Differential Once 01/17/2021   Comprehensive Metabolic Panel Once 68/88/6605   Lipid Panel Once 01/17/2021   Psa screening Once 01/17/2021       Next Visit Date:  Future Appointments   Date Time Provider Yoli Griggs   4/19/2021  3:20 PM GITA Becerra - CNP [facility]            Patient Active Problem List:     Restless leg syndrome     Disc disease, degenerative, lumbar or lumbosacral     Somatic dysfunction of cervical region     Chronic midline low back pain without sciatica     Essential hypertension     Obstructive sleep apnea syndrome     Lymph node enlargement     Anxiety     Bilateral tinnitus     History of tobacco use     Sensorineural hearing loss, bilateral     Deviated septum     History of placement of ear tubes

## 2021-03-16 DIAGNOSIS — M15.9 PRIMARY OSTEOARTHRITIS INVOLVING MULTIPLE JOINTS: ICD-10-CM

## 2021-03-16 DIAGNOSIS — M54.50 CHRONIC MIDLINE LOW BACK PAIN WITHOUT SCIATICA: ICD-10-CM

## 2021-03-16 DIAGNOSIS — G89.29 CHRONIC MIDLINE LOW BACK PAIN WITHOUT SCIATICA: ICD-10-CM

## 2021-03-16 DIAGNOSIS — M51.37 DISC DISEASE, DEGENERATIVE, LUMBAR OR LUMBOSACRAL: ICD-10-CM

## 2021-03-16 DIAGNOSIS — I10 ESSENTIAL HYPERTENSION: Chronic | ICD-10-CM

## 2021-03-16 RX ORDER — TRAMADOL HYDROCHLORIDE 50 MG/1
50 TABLET ORAL EVERY 8 HOURS PRN
Qty: 150 TABLET | Refills: 0 | Status: SHIPPED | OUTPATIENT
Start: 2021-03-16 | End: 2021-04-16 | Stop reason: SDUPTHER

## 2021-03-16 RX ORDER — OMEPRAZOLE 20 MG/1
CAPSULE, DELAYED RELEASE ORAL
Qty: 90 CAPSULE | Refills: 3 | Status: SHIPPED | OUTPATIENT
Start: 2021-03-16 | End: 2021-06-18 | Stop reason: SDUPTHER

## 2021-03-16 RX ORDER — LISINOPRIL 10 MG/1
TABLET ORAL
Qty: 90 TABLET | Refills: 3 | Status: SHIPPED | OUTPATIENT
Start: 2021-03-16 | End: 2021-06-18 | Stop reason: SDUPTHER

## 2021-03-16 NOTE — TELEPHONE ENCOUNTER
Last OV 12/17/2020      Health Maintenance   Topic Date Due    Hepatitis C screen  Never done    HIV screen  Never done    COVID-19 Vaccine (1) Never done    Shingles Vaccine (1 of 2) Never done    Colon cancer screen colonoscopy  Never done    Potassium monitoring  06/04/2020    Creatinine monitoring  06/04/2020    Flu vaccine (1) Never done    DTaP/Tdap/Td vaccine (2 - Td) 01/01/2024    Lipid screen  06/05/2024    Hepatitis A vaccine  Aged Out    Hepatitis B vaccine  Aged Out    Hib vaccine  Aged Out    Meningococcal (ACWY) vaccine  Aged Out    Pneumococcal 0-64 years Vaccine  Aged Out             (applicable per patient's age: Cancer Screenings, Depression Screening, Fall Risk Screening, Immunizations)    LDL Cholesterol (mg/dL)   Date Value   06/05/2019 66     AST (U/L)   Date Value   06/04/2019 32     ALT (U/L)   Date Value   06/04/2019 25     BUN (mg/dL)   Date Value   06/30/2019 19      (goal A1C is < 7)   (goal LDL is <100) need 30-50% reduction from baseline     BP Readings from Last 3 Encounters:   12/17/20 124/78   09/29/20 126/88   05/26/20 118/68    (goal /80)      All Future Testing planned in [facility]:  Lab Frequency Next Occurrence   XR CERVICAL SPINE FLEXION AND EXTENSION Once 02/01/2021   CBC Auto Differential Once 01/17/2021   Comprehensive Metabolic Panel Once 50/44/7198   Lipid Panel Once 01/17/2021   Psa screening Once 01/17/2021       Next Visit Date:  Future Appointments   Date Time Provider Yoli Griggs   4/19/2021  3:20 PM Gaby Sunday Fee, APRN - CNP [facility]            Patient Active Problem List:     Restless leg syndrome     Disc disease, degenerative, lumbar or lumbosacral     Somatic dysfunction of cervical region     Chronic midline low back pain without sciatica     Essential hypertension     Obstructive sleep apnea syndrome     Lymph node enlargement     Anxiety     Bilateral tinnitus     History of tobacco use     Sensorineural hearing loss, bilateral     Deviated septum     History of placement of ear tubes

## 2021-04-16 DIAGNOSIS — G89.29 CHRONIC MIDLINE LOW BACK PAIN WITHOUT SCIATICA: ICD-10-CM

## 2021-04-16 DIAGNOSIS — M15.9 PRIMARY OSTEOARTHRITIS INVOLVING MULTIPLE JOINTS: ICD-10-CM

## 2021-04-16 DIAGNOSIS — M51.37 DISC DISEASE, DEGENERATIVE, LUMBAR OR LUMBOSACRAL: ICD-10-CM

## 2021-04-16 DIAGNOSIS — M54.50 CHRONIC MIDLINE LOW BACK PAIN WITHOUT SCIATICA: ICD-10-CM

## 2021-04-16 RX ORDER — TRAMADOL HYDROCHLORIDE 50 MG/1
50 TABLET ORAL EVERY 8 HOURS PRN
Qty: 150 TABLET | Refills: 0 | Status: SHIPPED | OUTPATIENT
Start: 2021-04-16 | End: 2021-05-20

## 2021-04-29 ENCOUNTER — IMMUNIZATION (OUTPATIENT)
Dept: PRIMARY CARE CLINIC | Age: 56
End: 2021-04-29
Payer: COMMERCIAL

## 2021-04-29 ENCOUNTER — OFFICE VISIT (OUTPATIENT)
Dept: PRIMARY CARE CLINIC | Age: 56
End: 2021-04-29
Payer: COMMERCIAL

## 2021-04-29 VITALS
WEIGHT: 199.8 LBS | DIASTOLIC BLOOD PRESSURE: 76 MMHG | OXYGEN SATURATION: 98 % | HEIGHT: 73 IN | BODY MASS INDEX: 26.48 KG/M2 | RESPIRATION RATE: 18 BRPM | HEART RATE: 68 BPM | SYSTOLIC BLOOD PRESSURE: 122 MMHG

## 2021-04-29 DIAGNOSIS — M54.50 CHRONIC MIDLINE LOW BACK PAIN WITHOUT SCIATICA: Primary | ICD-10-CM

## 2021-04-29 DIAGNOSIS — G89.29 CHRONIC NECK PAIN: ICD-10-CM

## 2021-04-29 DIAGNOSIS — Z13.1 SCREENING FOR DIABETES MELLITUS: ICD-10-CM

## 2021-04-29 DIAGNOSIS — Z11.59 ENCOUNTER FOR HEPATITIS C SCREENING TEST FOR LOW RISK PATIENT: ICD-10-CM

## 2021-04-29 DIAGNOSIS — H65.91 MEE (MIDDLE EAR EFFUSION), RIGHT: ICD-10-CM

## 2021-04-29 DIAGNOSIS — G89.29 CHRONIC MIDLINE LOW BACK PAIN WITHOUT SCIATICA: Primary | ICD-10-CM

## 2021-04-29 DIAGNOSIS — M99.01 SOMATIC DYSFUNCTION OF CERVICAL REGION: ICD-10-CM

## 2021-04-29 DIAGNOSIS — Z12.11 SCREENING FOR COLORECTAL CANCER: ICD-10-CM

## 2021-04-29 DIAGNOSIS — Z12.12 SCREENING FOR COLORECTAL CANCER: ICD-10-CM

## 2021-04-29 DIAGNOSIS — I10 ESSENTIAL HYPERTENSION: Chronic | ICD-10-CM

## 2021-04-29 DIAGNOSIS — M54.2 CHRONIC NECK PAIN: ICD-10-CM

## 2021-04-29 PROCEDURE — G8419 CALC BMI OUT NRM PARAM NOF/U: HCPCS | Performed by: NURSE PRACTITIONER

## 2021-04-29 PROCEDURE — G8427 DOCREV CUR MEDS BY ELIG CLIN: HCPCS | Performed by: NURSE PRACTITIONER

## 2021-04-29 PROCEDURE — 99214 OFFICE O/P EST MOD 30 MIN: CPT | Performed by: NURSE PRACTITIONER

## 2021-04-29 PROCEDURE — 91300 COVID-19, PFIZER VACCINE 30MCG/0.3ML DOSE: CPT | Performed by: INTERNAL MEDICINE

## 2021-04-29 PROCEDURE — 1036F TOBACCO NON-USER: CPT | Performed by: NURSE PRACTITIONER

## 2021-04-29 PROCEDURE — 3017F COLORECTAL CA SCREEN DOC REV: CPT | Performed by: NURSE PRACTITIONER

## 2021-04-29 PROCEDURE — 0001A COVID-19, PFIZER VACCINE 30MCG/0.3ML DOSE: CPT | Performed by: INTERNAL MEDICINE

## 2021-04-29 SDOH — ECONOMIC STABILITY: TRANSPORTATION INSECURITY
IN THE PAST 12 MONTHS, HAS LACK OF TRANSPORTATION KEPT YOU FROM MEETINGS, WORK, OR FROM GETTING THINGS NEEDED FOR DAILY LIVING?: NO

## 2021-04-29 SDOH — ECONOMIC STABILITY: FOOD INSECURITY: WITHIN THE PAST 12 MONTHS, YOU WORRIED THAT YOUR FOOD WOULD RUN OUT BEFORE YOU GOT MONEY TO BUY MORE.: NEVER TRUE

## 2021-04-29 SDOH — ECONOMIC STABILITY: FOOD INSECURITY: WITHIN THE PAST 12 MONTHS, THE FOOD YOU BOUGHT JUST DIDN'T LAST AND YOU DIDN'T HAVE MONEY TO GET MORE.: NEVER TRUE

## 2021-04-29 ASSESSMENT — ENCOUNTER SYMPTOMS
NAUSEA: 0
BLOOD IN STOOL: 0
SHORTNESS OF BREATH: 0
BACK PAIN: 1
VOMITING: 0
COUGH: 0
ABDOMINAL PAIN: 0
WHEEZING: 0
CONSTIPATION: 0
DIARRHEA: 0
SINUS PRESSURE: 0
SORE THROAT: 0
TROUBLE SWALLOWING: 0

## 2021-04-29 ASSESSMENT — PATIENT HEALTH QUESTIONNAIRE - PHQ9
1. LITTLE INTEREST OR PLEASURE IN DOING THINGS: 0
SUM OF ALL RESPONSES TO PHQ QUESTIONS 1-9: 0
2. FEELING DOWN, DEPRESSED OR HOPELESS: 0

## 2021-04-29 NOTE — PROGRESS NOTES
[address] PRIMARY CARE  [facility] [address] [facility] [address]  [address] [address]  Dept: [phone]  Dept Fax: [phone]    Sary Guan is a 60 y.o. male who presentstoday for his medical conditions/complaints as noted below. Sary Guan is c/o of  Chief Complaint   Patient presents with    3 Month Follow-Up         HPI:     Here today for follow up  Reports received covid vaccine  Still  Has not been able to complete xray or labs  His neck pain has persisted, \"I know I need to get the x-rays and see what is going on in there\"  Tramadol does help some, also using Motrin 2-3 times daily  He develops a burning discomfort that radiates into both shoulders by the end of the day  He did see PT for evaluation but never followed up any further  Intermittently uses TENS unit and heating pad at home which is somewhat helpful    Also states right ear feels like there is fluid  Has history of hearing loss on the right with tube placement bilaterally per ENT  However he has not followed up with ENT since that time    Back Pain  This is a chronic problem. The current episode started more than 1 year ago. The problem has been waxing and waning since onset. The pain is present in the lumbar spine. The quality of the pain is described as aching. The pain is moderate. The symptoms are aggravated by bending and twisting. Pertinent negatives include no abdominal pain, chest pain, fever, headaches, numbness, paresthesias, tingling or weakness. He has tried analgesics, NSAIDs and muscle relaxant for the symptoms. The treatment provided significant relief.        No results found for: LABA1C          ( goal A1C is < 7)   No results found for: LABMICR  LDL Cholesterol (mg/dL)   Date Value   2019 66       (goal LDL is <100)   AST (U/L)   Date Value   2019 32     ALT (U/L)   Date Value   2019 25     BUN (mg/dL)   Date Value   2019 19     BP Readings from Last 3 Encounters:   04/29/21 122/76   12/17/20 124/78   09/29/20 126/88          (kegt007/80)    Past Medical History:   Diagnosis Date    Back pain     Disc disease, degenerative, lumbar or lumbosacral     Hyperlipidemia     Hypertension     Restless leg syndrome       Past Surgical History:   Procedure Laterality Date    HERNIA REPAIR      TONSILLECTOMY AND ADENOIDECTOMY      TYMPANOSTOMY TUBE PLACEMENT Bilateral        Family History   Problem Relation Age of Onset    Arthritis Mother           Social History     Tobacco Use    Smoking status: Former Smoker     Packs/day: 0.05     Years: 35.00     Pack years: 1.75     Start date: 2010     Quit date: 7/23/2010     Years since quitting: 10.7    Smokeless tobacco: Never Used   Substance Use Topics    Alcohol use: No     Comment: rare      Current Outpatient Medications   Medication Sig Dispense Refill    traMADol (ULTRAM) 50 MG tablet Take 1 tablet by mouth every 8 hours as needed for Pain (1 to 2 every 8 hrs prn) for up to 30 days.  150 tablet 0    omeprazole (PRILOSEC) 20 MG delayed release capsule TAKE ONE CAPSULE BY MOUTH EVERY MORNING BEFORE BREAKFAST 90 capsule 3    lisinopril (PRINIVIL;ZESTRIL) 10 MG tablet TAKE ONE TABLET BY MOUTH DAILY 90 tablet 3    HM ALLERGY RELIEF/NASAL DECONG  MG per extended release tablet TAKE ONE TABLET BY MOUTH ONCE DAILY 90 tablet 3    tiZANidine (ZANAFLEX) 4 MG tablet Take 1 tablet by mouth every 8 hours as needed (muscle spasms) 90 tablet 5    albuterol sulfate  (90 Base) MCG/ACT inhaler USE AS DIRECTED 18 g 5    sodium chloride (OCEAN) 0.65 % nasal spray 1 spray by Nasal route as needed for Congestion 1 Bottle 3    ibuprofen (ADVIL;MOTRIN) 800 MG tablet Take 1 tablet by mouth every 8 hours as needed for Pain 90 tablet 5    Multiple Vitamins-Minerals (MULTI FOR HIM 50+ PO) Take by mouth      fluticasone (FLONASE) 50 MCG/ACT nasal spray 1 spray by Nasal route daily 1 Bottle 3     No current facility-administered medications for this visit. No Known Allergies    Health Maintenance   Topic Date Due    Hepatitis C screen  Never done    Diabetes screen  Never done    Shingles Vaccine (1 of 2) Never done    Colon cancer screen colonoscopy  Never done    Potassium monitoring  06/04/2020    Creatinine monitoring  06/04/2020    COVID-19 Vaccine (2 - Pfizer 2-dose series) 05/20/2021    Flu vaccine (Season Ended) 09/01/2021    DTaP/Tdap/Td vaccine (2 - Td) 01/01/2024    Lipid screen  06/05/2024    Hepatitis A vaccine  Aged Out    Hepatitis B vaccine  Aged Out    Hib vaccine  Aged Out    Meningococcal (ACWY) vaccine  Aged Out    Pneumococcal 0-64 years Vaccine  Aged Out    HIV screen  Discontinued       Subjective:      Review of Systems   Constitutional: Negative for activity change, appetite change, chills, fatigue, fever and unexpected weight change. HENT: Positive for hearing loss (On right). Negative for congestion, ear pain, sinus pressure, sore throat and trouble swallowing. Eyes: Negative for visual disturbance. Respiratory: Negative for cough, shortness of breath and wheezing. Cardiovascular: Negative for chest pain, palpitations and leg swelling. Gastrointestinal: Negative for abdominal pain, blood in stool, constipation, diarrhea, nausea and vomiting. Endocrine: Negative for cold intolerance, heat intolerance, polydipsia, polyphagia and polyuria. Genitourinary: Negative for difficulty urinating, frequency, hematuria and urgency. Musculoskeletal: Positive for arthralgias, back pain, neck pain and neck stiffness. Negative for myalgias. Skin: Negative for rash. Allergic/Immunologic: Negative for environmental allergies. Neurological: Negative for dizziness, tingling, weakness, light-headedness, numbness, headaches and paresthesias. Psychiatric/Behavioral: Negative for confusion. The patient is not nervous/anxious.         Objective:     Physical Exam result attachment only. It is required that this order requisition be faxed to: [employer] @ [phone]. See [email or web address] for further information. Standing Status:   Future     Standing Expiration Date:   4/29/2022    Hepatitis C Antibody     Standing Status:   Future     Standing Expiration Date:   4/29/2022    Hemoglobin A1C     Standing Status:   Future     Standing Expiration Date:   4/29/2022       Back pain, neck pain, somatic dysfunction of cervical region stable and unchanged, continue current medications neck painstrongly emphasized need to complete imaging. He did not respond to physical therapy as he only attended 1 time. Continue home TENS unit and heat  Hypertensionwell controlled on single agent  Middle ear effusionstrongly advised to schedule follow-up appointment with ENT, continue Flonase   Reminded to complete ordered labs  He has historically been noncompliant with follow through on diagnostics and seeing specialists     Patient given educational materials - see patient instructions. Discussed use, benefit, and side effects of prescribed medications. All patientquestions answered. Pt voiced understanding. Reviewed health maintenance. Instructedto continue current medications, diet and exercise. Patient agreed with treatmentplan. Follow up as directed.      Electronicallysigned by GITA Armstrong CNP on 4/29/2021 at 4:49 PM

## 2021-05-16 ENCOUNTER — HOSPITAL ENCOUNTER (INPATIENT)
Age: 56
LOS: 4 days | Discharge: HOME | DRG: 501 | End: 2021-05-21
Attending: EMERGENCY MEDICINE | Admitting: INTERNAL MEDICINE
Payer: COMMERCIAL

## 2021-05-16 ENCOUNTER — APPOINTMENT (OUTPATIENT)
Dept: GENERAL RADIOLOGY | Age: 56
DRG: 501 | End: 2021-05-16
Payer: COMMERCIAL

## 2021-05-16 DIAGNOSIS — G89.29 CHRONIC MIDLINE LOW BACK PAIN WITHOUT SCIATICA: ICD-10-CM

## 2021-05-16 DIAGNOSIS — M71.162 SEPTIC INFRAPATELLAR BURSITIS, LEFT: ICD-10-CM

## 2021-05-16 DIAGNOSIS — M51.37 DISC DISEASE, DEGENERATIVE, LUMBAR OR LUMBOSACRAL: ICD-10-CM

## 2021-05-16 DIAGNOSIS — M54.50 CHRONIC MIDLINE LOW BACK PAIN WITHOUT SCIATICA: ICD-10-CM

## 2021-05-16 DIAGNOSIS — B96.89 SEPTIC INFRAPATELLAR BURSITIS, LEFT: ICD-10-CM

## 2021-05-16 DIAGNOSIS — L08.9 RIGHT KNEE SKIN INFECTION: Primary | ICD-10-CM

## 2021-05-16 DIAGNOSIS — M15.9 PRIMARY OSTEOARTHRITIS INVOLVING MULTIPLE JOINTS: ICD-10-CM

## 2021-05-16 LAB
ABSOLUTE EOS #: 0.1 K/UL (ref 0–0.4)
ABSOLUTE IMMATURE GRANULOCYTE: ABNORMAL K/UL (ref 0–0.3)
ABSOLUTE LYMPH #: 0.9 K/UL (ref 1–4.8)
ABSOLUTE MONO #: 1.1 K/UL (ref 0.1–1.2)
BASOPHILS # BLD: 1 % (ref 0–2)
BASOPHILS ABSOLUTE: 0.1 K/UL (ref 0–0.2)
C-REACTIVE PROTEIN: 12 MG/L (ref 0–5)
DIFFERENTIAL TYPE: ABNORMAL
EOSINOPHILS RELATIVE PERCENT: 1 % (ref 1–4)
HCT VFR BLD CALC: 44.8 % (ref 41–53)
HEMOGLOBIN: 15.2 G/DL (ref 13.5–17.5)
IMMATURE GRANULOCYTES: ABNORMAL %
LACTIC ACID: 2.3 MMOL/L (ref 0.5–2.2)
LYMPHOCYTES # BLD: 5 % (ref 24–44)
MCH RBC QN AUTO: 30.5 PG (ref 26–34)
MCHC RBC AUTO-ENTMCNC: 33.9 G/DL (ref 31–37)
MCV RBC AUTO: 90 FL (ref 80–100)
MONOCYTES # BLD: 6 % (ref 2–11)
NRBC AUTOMATED: ABNORMAL PER 100 WBC
PDW BLD-RTO: 12.5 % (ref 12.5–15.4)
PLATELET # BLD: 216 K/UL (ref 140–450)
PLATELET ESTIMATE: ABNORMAL
PMV BLD AUTO: 8.2 FL (ref 6–12)
RBC # BLD: 4.98 M/UL (ref 4.5–5.9)
RBC # BLD: ABNORMAL 10*6/UL
SEDIMENTATION RATE, ERYTHROCYTE: 3 MM (ref 0–20)
SEG NEUTROPHILS: 87 % (ref 36–66)
SEGMENTED NEUTROPHILS ABSOLUTE COUNT: 15.2 K/UL (ref 1.8–7.7)
WBC # BLD: 17.4 K/UL (ref 3.5–11)
WBC # BLD: ABNORMAL 10*3/UL

## 2021-05-16 PROCEDURE — 96365 THER/PROPH/DIAG IV INF INIT: CPT

## 2021-05-16 PROCEDURE — 6360000002 HC RX W HCPCS: Performed by: EMERGENCY MEDICINE

## 2021-05-16 PROCEDURE — 99285 EMERGENCY DEPT VISIT HI MDM: CPT

## 2021-05-16 PROCEDURE — 85025 COMPLETE CBC W/AUTO DIFF WBC: CPT

## 2021-05-16 PROCEDURE — 6370000000 HC RX 637 (ALT 250 FOR IP): Performed by: EMERGENCY MEDICINE

## 2021-05-16 PROCEDURE — 86140 C-REACTIVE PROTEIN: CPT

## 2021-05-16 PROCEDURE — 73562 X-RAY EXAM OF KNEE 3: CPT

## 2021-05-16 PROCEDURE — 2580000003 HC RX 258: Performed by: EMERGENCY MEDICINE

## 2021-05-16 PROCEDURE — 87040 BLOOD CULTURE FOR BACTERIA: CPT

## 2021-05-16 PROCEDURE — 96375 TX/PRO/DX INJ NEW DRUG ADDON: CPT

## 2021-05-16 PROCEDURE — 36415 COLL VENOUS BLD VENIPUNCTURE: CPT

## 2021-05-16 PROCEDURE — 83605 ASSAY OF LACTIC ACID: CPT

## 2021-05-16 PROCEDURE — 85652 RBC SED RATE AUTOMATED: CPT

## 2021-05-16 RX ORDER — ACETAMINOPHEN 325 MG/1
650 TABLET ORAL ONCE
Status: COMPLETED | OUTPATIENT
Start: 2021-05-16 | End: 2021-05-16

## 2021-05-16 RX ORDER — MORPHINE SULFATE 2 MG/ML
2 INJECTION, SOLUTION INTRAMUSCULAR; INTRAVENOUS ONCE
Status: COMPLETED | OUTPATIENT
Start: 2021-05-16 | End: 2021-05-16

## 2021-05-16 RX ADMIN — CEFAZOLIN 1000 MG: 1 INJECTION, POWDER, FOR SOLUTION INTRAMUSCULAR; INTRAVENOUS at 21:53

## 2021-05-16 RX ADMIN — ACETAMINOPHEN 650 MG: 325 TABLET ORAL at 23:10

## 2021-05-16 RX ADMIN — MORPHINE SULFATE 2 MG: 2 INJECTION, SOLUTION INTRAMUSCULAR; INTRAVENOUS at 21:53

## 2021-05-16 ASSESSMENT — PAIN DESCRIPTION - ORIENTATION: ORIENTATION: RIGHT

## 2021-05-16 ASSESSMENT — PAIN DESCRIPTION - LOCATION: LOCATION: KNEE

## 2021-05-16 ASSESSMENT — ENCOUNTER SYMPTOMS
ALLERGIC/IMMUNOLOGIC NEGATIVE: 1
RESPIRATORY NEGATIVE: 1
COLOR CHANGE: 1
GASTROINTESTINAL NEGATIVE: 1
EYES NEGATIVE: 1

## 2021-05-16 ASSESSMENT — PAIN SCALES - GENERAL: PAINLEVEL_OUTOF10: 10

## 2021-05-16 ASSESSMENT — PAIN DESCRIPTION - FREQUENCY: FREQUENCY: CONTINUOUS

## 2021-05-16 ASSESSMENT — PAIN DESCRIPTION - DESCRIPTORS: DESCRIPTORS: PRESSURE

## 2021-05-16 NOTE — LETTER
[facility]  [address] [address]  Phone: [phone]             May 18, 2021    Patient: Manuel Henry   YOB: 1965   Date of Visit: 5/16/2021       To Whom It May Concern:    Mandy Smallwood was seen and treated in our facility  beginning 5/16/2021 and presently is a patient.        Sincerely,       Quin Patel RN         Signature:__________________________________

## 2021-05-17 ENCOUNTER — APPOINTMENT (OUTPATIENT)
Dept: CT IMAGING | Age: 56
DRG: 501 | End: 2021-05-17
Payer: COMMERCIAL

## 2021-05-17 PROBLEM — L03.115 CELLULITIS OF RIGHT KNEE: Status: ACTIVE | Noted: 2021-05-17

## 2021-05-17 LAB
ANION GAP SERPL CALCULATED.3IONS-SCNC: 8 MMOL/L (ref 9–17)
BUN BLDV-MCNC: 18 MG/DL (ref 6–20)
BUN/CREAT BLD: ABNORMAL (ref 9–20)
CALCIUM SERPL-MCNC: 8.4 MG/DL (ref 8.6–10.4)
CHLORIDE BLD-SCNC: 103 MMOL/L (ref 98–107)
CO2: 22 MMOL/L (ref 20–31)
CREAT SERPL-MCNC: 0.84 MG/DL (ref 0.7–1.2)
GFR AFRICAN AMERICAN: >60 ML/MIN
GFR NON-AFRICAN AMERICAN: >60 ML/MIN
GFR SERPL CREATININE-BSD FRML MDRD: ABNORMAL ML/MIN/{1.73_M2}
GFR SERPL CREATININE-BSD FRML MDRD: ABNORMAL ML/MIN/{1.73_M2}
GLUCOSE BLD-MCNC: 242 MG/DL (ref 70–99)
HCT VFR BLD CALC: 39.8 % (ref 41–53)
HEMOGLOBIN: 13.8 G/DL (ref 13.5–17.5)
LACTIC ACID, WHOLE BLOOD: ABNORMAL MMOL/L (ref 0.7–2.1)
LACTIC ACID: 2.7 MMOL/L (ref 0.5–2.2)
MCH RBC QN AUTO: 30.9 PG (ref 26–34)
MCHC RBC AUTO-ENTMCNC: 34.6 G/DL (ref 31–37)
MCV RBC AUTO: 89.4 FL (ref 80–100)
NRBC AUTOMATED: ABNORMAL PER 100 WBC
PDW BLD-RTO: 12.2 % (ref 12.5–15.4)
PLATELET # BLD: 205 K/UL (ref 140–450)
PMV BLD AUTO: 7.6 FL (ref 6–12)
POTASSIUM SERPL-SCNC: 4.3 MMOL/L (ref 3.7–5.3)
RBC # BLD: 4.46 M/UL (ref 4.5–5.9)
SARS-COV-2, RAPID: NOT DETECTED
SODIUM BLD-SCNC: 133 MMOL/L (ref 135–144)
SPECIMEN DESCRIPTION: NORMAL
WBC # BLD: 15.1 K/UL (ref 3.5–11)

## 2021-05-17 PROCEDURE — 2580000003 HC RX 258: Performed by: NURSE PRACTITIONER

## 2021-05-17 PROCEDURE — 6360000002 HC RX W HCPCS: Performed by: FAMILY MEDICINE

## 2021-05-17 PROCEDURE — 87635 SARS-COV-2 COVID-19 AMP PRB: CPT

## 2021-05-17 PROCEDURE — 97166 OT EVAL MOD COMPLEX 45 MIN: CPT

## 2021-05-17 PROCEDURE — 36415 COLL VENOUS BLD VENIPUNCTURE: CPT

## 2021-05-17 PROCEDURE — 73700 CT LOWER EXTREMITY W/O DYE: CPT

## 2021-05-17 PROCEDURE — 99222 1ST HOSP IP/OBS MODERATE 55: CPT | Performed by: FAMILY MEDICINE

## 2021-05-17 PROCEDURE — 6370000000 HC RX 637 (ALT 250 FOR IP): Performed by: NURSE PRACTITIONER

## 2021-05-17 PROCEDURE — 87070 CULTURE OTHR SPECIMN AEROBIC: CPT

## 2021-05-17 PROCEDURE — 85027 COMPLETE CBC AUTOMATED: CPT

## 2021-05-17 PROCEDURE — 97116 GAIT TRAINING THERAPY: CPT

## 2021-05-17 PROCEDURE — 6360000002 HC RX W HCPCS: Performed by: NURSE PRACTITIONER

## 2021-05-17 PROCEDURE — 87205 SMEAR GRAM STAIN: CPT

## 2021-05-17 PROCEDURE — 87040 BLOOD CULTURE FOR BACTERIA: CPT

## 2021-05-17 PROCEDURE — 97535 SELF CARE MNGMENT TRAINING: CPT

## 2021-05-17 PROCEDURE — 97162 PT EVAL MOD COMPLEX 30 MIN: CPT

## 2021-05-17 PROCEDURE — 6360000002 HC RX W HCPCS: Performed by: EMERGENCY MEDICINE

## 2021-05-17 PROCEDURE — 1210000000 HC MED SURG R&B

## 2021-05-17 PROCEDURE — 83605 ASSAY OF LACTIC ACID: CPT

## 2021-05-17 PROCEDURE — 80048 BASIC METABOLIC PNL TOTAL CA: CPT

## 2021-05-17 RX ORDER — SODIUM CHLORIDE 0.9 % (FLUSH) 0.9 %
10 SYRINGE (ML) INJECTION PRN
Status: DISCONTINUED | OUTPATIENT
Start: 2021-05-17 | End: 2021-05-21 | Stop reason: HOSPADM

## 2021-05-17 RX ORDER — MORPHINE SULFATE 2 MG/ML
2 INJECTION, SOLUTION INTRAMUSCULAR; INTRAVENOUS
Status: DISCONTINUED | OUTPATIENT
Start: 2021-05-17 | End: 2021-05-18

## 2021-05-17 RX ORDER — TIZANIDINE 4 MG/1
4 TABLET ORAL EVERY 8 HOURS PRN
Status: DISCONTINUED | OUTPATIENT
Start: 2021-05-17 | End: 2021-05-21 | Stop reason: HOSPADM

## 2021-05-17 RX ORDER — BUTALBITAL, ACETAMINOPHEN AND CAFFEINE 50; 325; 40 MG/1; MG/1; MG/1
1 TABLET ORAL EVERY 4 HOURS PRN
Status: DISCONTINUED | OUTPATIENT
Start: 2021-05-17 | End: 2021-05-21 | Stop reason: HOSPADM

## 2021-05-17 RX ORDER — ONDANSETRON 2 MG/ML
4 INJECTION INTRAMUSCULAR; INTRAVENOUS EVERY 6 HOURS PRN
Status: DISCONTINUED | OUTPATIENT
Start: 2021-05-17 | End: 2021-05-21 | Stop reason: HOSPADM

## 2021-05-17 RX ORDER — ACETAMINOPHEN 650 MG/1
650 SUPPOSITORY RECTAL EVERY 6 HOURS PRN
Status: DISCONTINUED | OUTPATIENT
Start: 2021-05-17 | End: 2021-05-18

## 2021-05-17 RX ORDER — MORPHINE SULFATE 2 MG/ML
2 INJECTION, SOLUTION INTRAMUSCULAR; INTRAVENOUS EVERY 4 HOURS PRN
Status: DISCONTINUED | OUTPATIENT
Start: 2021-05-17 | End: 2021-05-17

## 2021-05-17 RX ORDER — SODIUM CHLORIDE 9 MG/ML
INJECTION, SOLUTION INTRAVENOUS CONTINUOUS
Status: DISCONTINUED | OUTPATIENT
Start: 2021-05-17 | End: 2021-05-19

## 2021-05-17 RX ORDER — LISINOPRIL 10 MG/1
10 TABLET ORAL DAILY
Status: DISCONTINUED | OUTPATIENT
Start: 2021-05-17 | End: 2021-05-21 | Stop reason: HOSPADM

## 2021-05-17 RX ORDER — ECHINACEA PURPUREA EXTRACT 125 MG
1 TABLET ORAL PRN
Status: DISCONTINUED | OUTPATIENT
Start: 2021-05-17 | End: 2021-05-21 | Stop reason: HOSPADM

## 2021-05-17 RX ORDER — SODIUM CHLORIDE 0.9 % (FLUSH) 0.9 %
5-40 SYRINGE (ML) INJECTION EVERY 12 HOURS SCHEDULED
Status: DISCONTINUED | OUTPATIENT
Start: 2021-05-17 | End: 2021-05-21 | Stop reason: HOSPADM

## 2021-05-17 RX ORDER — MAGNESIUM SULFATE 1 G/100ML
1000 INJECTION INTRAVENOUS PRN
Status: DISCONTINUED | OUTPATIENT
Start: 2021-05-17 | End: 2021-05-21 | Stop reason: HOSPADM

## 2021-05-17 RX ORDER — PROMETHAZINE HYDROCHLORIDE 25 MG/1
12.5 TABLET ORAL EVERY 6 HOURS PRN
Status: DISCONTINUED | OUTPATIENT
Start: 2021-05-17 | End: 2021-05-21 | Stop reason: HOSPADM

## 2021-05-17 RX ORDER — TRAMADOL HYDROCHLORIDE 50 MG/1
50 TABLET ORAL EVERY 8 HOURS PRN
Status: DISCONTINUED | OUTPATIENT
Start: 2021-05-17 | End: 2021-05-18

## 2021-05-17 RX ORDER — POLYETHYLENE GLYCOL 3350 17 G/17G
17 POWDER, FOR SOLUTION ORAL DAILY PRN
Status: DISCONTINUED | OUTPATIENT
Start: 2021-05-17 | End: 2021-05-21 | Stop reason: HOSPADM

## 2021-05-17 RX ORDER — ACETAMINOPHEN 325 MG/1
650 TABLET ORAL EVERY 6 HOURS PRN
Status: DISCONTINUED | OUTPATIENT
Start: 2021-05-17 | End: 2021-05-18

## 2021-05-17 RX ORDER — NICOTINE 21 MG/24HR
1 PATCH, TRANSDERMAL 24 HOURS TRANSDERMAL DAILY PRN
Status: DISCONTINUED | OUTPATIENT
Start: 2021-05-17 | End: 2021-05-21 | Stop reason: HOSPADM

## 2021-05-17 RX ORDER — FLUTICASONE PROPIONATE 50 MCG
1 SPRAY, SUSPENSION (ML) NASAL DAILY
Status: DISCONTINUED | OUTPATIENT
Start: 2021-05-17 | End: 2021-05-21 | Stop reason: HOSPADM

## 2021-05-17 RX ORDER — PSEUDOEPHEDRINE HCL 120 MG/1
240 TABLET, FILM COATED, EXTENDED RELEASE ORAL DAILY
Status: DISCONTINUED | OUTPATIENT
Start: 2021-05-17 | End: 2021-05-21 | Stop reason: HOSPADM

## 2021-05-17 RX ORDER — ALBUTEROL SULFATE 2.5 MG/3ML
2.5 SOLUTION RESPIRATORY (INHALATION) EVERY 4 HOURS PRN
Status: DISCONTINUED | OUTPATIENT
Start: 2021-05-17 | End: 2021-05-21 | Stop reason: HOSPADM

## 2021-05-17 RX ORDER — MORPHINE SULFATE 4 MG/ML
4 INJECTION, SOLUTION INTRAMUSCULAR; INTRAVENOUS
Status: DISCONTINUED | OUTPATIENT
Start: 2021-05-17 | End: 2021-05-18

## 2021-05-17 RX ORDER — KETOROLAC TROMETHAMINE 15 MG/ML
15 INJECTION, SOLUTION INTRAMUSCULAR; INTRAVENOUS EVERY 6 HOURS PRN
Status: DISPENSED | OUTPATIENT
Start: 2021-05-17 | End: 2021-05-20

## 2021-05-17 RX ORDER — LORATADINE AND PSEUDOEPHEDRINE 10; 240 MG/1; MG/1
1 TABLET, EXTENDED RELEASE ORAL DAILY
Status: DISCONTINUED | OUTPATIENT
Start: 2021-05-17 | End: 2021-05-17

## 2021-05-17 RX ORDER — CETIRIZINE HYDROCHLORIDE 10 MG/1
10 TABLET ORAL DAILY
Status: DISCONTINUED | OUTPATIENT
Start: 2021-05-17 | End: 2021-05-21 | Stop reason: HOSPADM

## 2021-05-17 RX ORDER — POTASSIUM CHLORIDE 20 MEQ/1
40 TABLET, EXTENDED RELEASE ORAL PRN
Status: DISCONTINUED | OUTPATIENT
Start: 2021-05-17 | End: 2021-05-21 | Stop reason: HOSPADM

## 2021-05-17 RX ORDER — POTASSIUM CHLORIDE 7.45 MG/ML
10 INJECTION INTRAVENOUS PRN
Status: DISCONTINUED | OUTPATIENT
Start: 2021-05-17 | End: 2021-05-21 | Stop reason: HOSPADM

## 2021-05-17 RX ORDER — PANTOPRAZOLE SODIUM 40 MG/1
40 TABLET, DELAYED RELEASE ORAL
Status: DISCONTINUED | OUTPATIENT
Start: 2021-05-17 | End: 2021-05-20

## 2021-05-17 RX ORDER — KETOROLAC TROMETHAMINE 30 MG/ML
30 INJECTION, SOLUTION INTRAMUSCULAR; INTRAVENOUS ONCE
Status: COMPLETED | OUTPATIENT
Start: 2021-05-17 | End: 2021-05-17

## 2021-05-17 RX ORDER — SODIUM CHLORIDE 9 MG/ML
25 INJECTION, SOLUTION INTRAVENOUS PRN
Status: DISCONTINUED | OUTPATIENT
Start: 2021-05-17 | End: 2021-05-21 | Stop reason: HOSPADM

## 2021-05-17 RX ADMIN — CETIRIZINE HYDROCHLORIDE 10 MG: 10 TABLET, FILM COATED ORAL at 08:16

## 2021-05-17 RX ADMIN — MORPHINE SULFATE 2 MG: 2 INJECTION, SOLUTION INTRAMUSCULAR; INTRAVENOUS at 16:42

## 2021-05-17 RX ADMIN — ACETAMINOPHEN 650 MG: 325 TABLET ORAL at 20:15

## 2021-05-17 RX ADMIN — Medication 1500 MG: at 17:12

## 2021-05-17 RX ADMIN — BUTALBITAL, ACETAMINOPHEN AND CAFFEINE 1 TABLET: 50; 325; 40 TABLET ORAL at 12:14

## 2021-05-17 RX ADMIN — SODIUM CHLORIDE: 9 INJECTION, SOLUTION INTRAVENOUS at 20:17

## 2021-05-17 RX ADMIN — KETOROLAC TROMETHAMINE 30 MG: 30 INJECTION, SOLUTION INTRAMUSCULAR; INTRAVENOUS at 01:06

## 2021-05-17 RX ADMIN — ENOXAPARIN SODIUM 40 MG: 40 INJECTION SUBCUTANEOUS at 08:18

## 2021-05-17 RX ADMIN — PANTOPRAZOLE SODIUM 40 MG: 40 TABLET, DELAYED RELEASE ORAL at 09:11

## 2021-05-17 RX ADMIN — TRAMADOL HYDROCHLORIDE 50 MG: 50 TABLET, FILM COATED ORAL at 04:20

## 2021-05-17 RX ADMIN — KETOROLAC TROMETHAMINE 15 MG: 15 INJECTION, SOLUTION INTRAMUSCULAR; INTRAVENOUS at 15:05

## 2021-05-17 RX ADMIN — TRAMADOL HYDROCHLORIDE 50 MG: 50 TABLET, FILM COATED ORAL at 13:19

## 2021-05-17 RX ADMIN — MORPHINE SULFATE 2 MG: 2 INJECTION, SOLUTION INTRAMUSCULAR; INTRAVENOUS at 22:31

## 2021-05-17 RX ADMIN — BUTALBITAL, ACETAMINOPHEN AND CAFFEINE 1 TABLET: 50; 325; 40 TABLET ORAL at 08:16

## 2021-05-17 RX ADMIN — SODIUM CHLORIDE: 9 INJECTION, SOLUTION INTRAVENOUS at 04:08

## 2021-05-17 RX ADMIN — FLUTICASONE PROPIONATE 1 SPRAY: 50 SPRAY, METERED NASAL at 08:18

## 2021-05-17 RX ADMIN — KETOROLAC TROMETHAMINE 15 MG: 15 INJECTION, SOLUTION INTRAMUSCULAR; INTRAVENOUS at 09:11

## 2021-05-17 RX ADMIN — LISINOPRIL 10 MG: 10 TABLET ORAL at 08:16

## 2021-05-17 RX ADMIN — MORPHINE SULFATE 2 MG: 2 INJECTION, SOLUTION INTRAMUSCULAR; INTRAVENOUS at 20:26

## 2021-05-17 ASSESSMENT — PAIN SCALES - GENERAL
PAINLEVEL_OUTOF10: 7
PAINLEVEL_OUTOF10: 6
PAINLEVEL_OUTOF10: 5
PAINLEVEL_OUTOF10: 10
PAINLEVEL_OUTOF10: 9
PAINLEVEL_OUTOF10: 7
PAINLEVEL_OUTOF10: 9
PAINLEVEL_OUTOF10: 10

## 2021-05-17 ASSESSMENT — PAIN DESCRIPTION - DESCRIPTORS
DESCRIPTORS: THROBBING
DESCRIPTORS: ACHING
DESCRIPTORS: ACHING;THROBBING
DESCRIPTORS: ACHING;DISCOMFORT
DESCRIPTORS: ACHING

## 2021-05-17 ASSESSMENT — ENCOUNTER SYMPTOMS
RHINORRHEA: 0
CHOKING: 0
WHEEZING: 0
BACK PAIN: 0
NAUSEA: 0
VOICE CHANGE: 0
DIARRHEA: 0
COUGH: 0
SINUS PRESSURE: 0
SHORTNESS OF BREATH: 0
ABDOMINAL PAIN: 0
VOMITING: 0
CONSTIPATION: 0
CHEST TIGHTNESS: 0

## 2021-05-17 ASSESSMENT — PAIN DESCRIPTION - FREQUENCY
FREQUENCY: CONTINUOUS

## 2021-05-17 ASSESSMENT — PAIN DESCRIPTION - PAIN TYPE
TYPE: ACUTE PAIN

## 2021-05-17 ASSESSMENT — PAIN DESCRIPTION - LOCATION
LOCATION: HEAD
LOCATION: KNEE
LOCATION: HEAD;KNEE
LOCATION: BACK;KNEE

## 2021-05-17 ASSESSMENT — PAIN DESCRIPTION - ONSET
ONSET: ON-GOING

## 2021-05-17 ASSESSMENT — PAIN DESCRIPTION - ORIENTATION
ORIENTATION: RIGHT

## 2021-05-17 ASSESSMENT — PAIN - FUNCTIONAL ASSESSMENT
PAIN_FUNCTIONAL_ASSESSMENT: PREVENTS OR INTERFERES SOME ACTIVE ACTIVITIES AND ADLS

## 2021-05-17 NOTE — PLAN OF CARE
Problem: Falls - Risk of:  Goal: Will remain free from falls  Description: Will remain free from falls  Outcome: Ongoing   No falls/injuries this shift, bed in lowest position, brakes on, bed alarm on, call light in reach, side rails up x2   Problem: Pain:  Goal: Control of acute pain  Description: Control of acute pain  Outcome: Ongoing   No signs/symptoms of pain noted, pain rating <3 on scale 0-10, pain controlled with medication/repositioning   Problem: Skin Integrity:  Goal: Skin integrity will stabilize  Description: Skin integrity will stabilize  Outcome: Ongoing   No new skin breakdown noted, no signs/symptoms of infection, continue to monitor labwork including WBC, medications ordered   Problem: Discharge Planning:  Goal: Patients continuum of care needs are met  Description: Patients continuum of care needs are met  Outcome: Ongoing   Pt.  Participates in ADL's and plan of care decisions

## 2021-05-17 NOTE — PROGRESS NOTES
Patient arrived to ED room 6 with complaints of right knee pain and swelling. Patient stated that he doesn't remember injuring it. Patients right knee is red and swollen. Patient stated that he went to  earlier today and they lanced it. Patient denied any drainage. Patient denies any fevers. Patient is instructed on plan of care.

## 2021-05-17 NOTE — CARE COORDINATION
Case Management Initial Discharge Plan  Donnie Shi             Met with:patient to discuss discharge plans. Information verified: address, contacts, phone number, , insurance Yes    Emergency Contact/Next of Kin name & number: Mitchell Garcia [phone]    PCP: GITA Pena CNP  Date of last visit: past month    Insurance Provider: [facility]    Discharge Planning    Living Arrangements:  Spouse/Significant Other   Support Systems:  Spouse/Significant Other    Patient able to perform ADL's:Independent    Current Services (outpatient & in home) none  DME equipment: has cane, walker - doesn't use  DME provider:     Receiving oral anticoagulation therapy? No    Potential Assistance Needed:  N/A    Patient agreeable to home care: No  [place] of choice provided:  no    Prior SNF/Rehab Placement and Facility: no     Evaluation: no    Expected Discharge date:  21    Patient expects to be discharged to:  Home  Follow Up Appointment: Best Day/ Time: Monday AM    Transportation provider: self  Transportation arrangements needed for discharge: No    Readmission Risk              Risk of Unplanned Readmission:  7         Does patient have a readmission risk score greater than 14?: No  If yes, follow-up appointment must be made within 7 days of discharge. Goals of Care:  Infection management      Discharge Plan: home with family, independent - following for skilled needs/abx plan.            Electronically signed by Cristal Blount RN on 21 at 1:02 PM EDT

## 2021-05-17 NOTE — ED PROVIDER NOTES
eMERGENCY dEPARTMENT eNCOUnter      Pt Name: Kamilah Henriquez  MRN: [id number]  [place] 1965  Date of evaluation: 5/16/2021      CHIEF COMPLAINT       Chief Complaint   Patient presents with    Knee Pain          HISTORY OF PRESENT ILLNESS    Kamilah Henriquez is a 60 y.o. male who presents to the emergency department for evaluation of right knee pain and swelling patient states that yesterday he was at a store he knelt down on his knee says he did not know whether he actually knelt on something got up laceration but it was swollen and he went to see urgent care today. At urgent care it sounds like they attempted to do an I&D however it looks like it was just a small either needle stick or segundo on the top of the knee which is now red sore and swollen. Patient is afebrile here today but the knee is exquisitely tender. He is having difficulty ambulating. REVIEW OF SYSTEMS       Review of Systems   Constitutional: Negative. HENT: Negative. Eyes: Negative. Respiratory: Negative. Cardiovascular: Negative. Gastrointestinal: Negative. Endocrine: Negative. Genitourinary: Negative. Musculoskeletal: Positive for arthralgias and joint swelling. Skin: Positive for color change. Allergic/Immunologic: Negative. Neurological: Negative. Hematological: Negative. Psychiatric/Behavioral: Negative. PAST MEDICAL HISTORY    has a past medical history of Back pain, Disc disease, degenerative, lumbar or lumbosacral, Hyperlipidemia, Hypertension, Restless leg syndrome, and Septic infrapatellar bursitis, left. SURGICAL HISTORY      has a past surgical history that includes Tonsillectomy and adenoidectomy; hernia repair; Tympanostomy tube placement (Bilateral); knee surgery (Right, 05/18/2021); and knee surgery (Right, 5/18/2021).     CURRENT MEDICATIONS       Discharge Medication List as of 5/21/2021  4:28 PM      CONTINUE these medications which have NOT CHANGED    Details his pulse is 65. His respiration is 18 and oxygen saturation is 97%. Constitutional: Alert, oriented x3, nontoxic, afebrile, answering questions appropriately, acting properly for age, in no acute distress  HEENT: Extraocular muscles intact, mucus membranes moist, TMs clear bilaterally, no posterior pharyngeal erythema or exudates, Pupils equal, round, reactive to light,   Neck: Trachea midline, Supple without lymphadenopathy, no posterior midline neck tenderness to palpation  Cardiovascular: Regular rhythm and rate no S3, S4, or murmurs  Respiratory: Clear to auscultation bilaterally no wheezes, rhonchi, rales, no respiratory distress  Gastrointestinal: Soft, nontender, nondistended, positive bowel sounds. No rebound, rigidity, or guarding. Musculoskeletal: No extremity pain or swelling  Neurologic: Moving all 4 extremities without difficulty there are no gross focal neurologic deficits  Skin: Warm and dry      DIFFERENTIAL DIAGNOSIS/ MDM:     Possible knee infection, patient will get some laboratories done drawn he will get an IV will get a give him a dose of IV antibiotics here and draw some blood cultures get an x-ray and then reevaluate. DIAGNOSTIC RESULTS     EKG: All EKG's are interpreted by the Emergency Department Physician who either signs or Co-signs this chart in the absence of a cardiologist.        Not indicated unless otherwise documented above    LABS:  Results for orders placed or performed during the hospital encounter of 05/16/21   Culture, Blood 1    Specimen: Blood   Result Value Ref Range    Specimen Description . BLOOD     Special Requests 20 ML RIGHT WRIST     Culture NO GROWTH 5 DAYS    Culture, Blood 1    Specimen: Blood   Result Value Ref Range    Specimen Description . BLOOD     Special Requests 10ml left arm     Culture NO GROWTH 5 DAYS    COVID-19, Rapid    Specimen: Nasopharyngeal Swab   Result Value Ref Range    Specimen Description . NASOPHARYNGEAL SWAB     SARS-CoV-2, Rapid Not Detected Not Detected   Culture, Blood 1    Specimen: Blood   Result Value Ref Range    Specimen Description . BLOOD     Special Requests left arm 20cc     Culture NO GROWTH 5 DAYS    Culture, Aerobic    Specimen: Joint, Knee; Swab   Result Value Ref Range    Specimen Description . KNEE, JOINT     Special Requests NOT REPORTED     Direct Exam NOT REPORTED     Culture       DUE TO THE SPECIMEN TYPE, THE ORDER WAS CANCELED AND REORDERED. PLEASE REFER TO: AEROBIC ANAEROBIC CULTURE PER ORDER REQUISITION FORM   Culture, Anaerobic and Aerobic    Specimen: Joint, Knee   Result Value Ref Range    Specimen Description . KNEE, JOINT PATELLAR BURSA     Special Requests NOT REPORTED     Direct Exam FEW NEUTROPHILS (A)     Direct Exam NO ORGANISMS SEEN     Culture STREPTOCOCCI, BETA HEMOLYTIC GROUP C LIGHT GROWTH (A)     Culture NO ANAEROBIC ORGANISMS ISOLATED AT 4 DAYS (A)    CBC Auto Differential   Result Value Ref Range    WBC 17.4 (H) 3.5 - 11.0 k/uL    RBC 4.98 4.5 - 5.9 m/uL    Hemoglobin 15.2 13.5 - 17.5 g/dL    Hematocrit 44.8 41 - 53 %    MCV 90.0 80 - 100 fL    MCH 30.5 26 - 34 pg    MCHC 33.9 31 - 37 g/dL    RDW 12.5 12.5 - 15.4 %    Platelets 855 765 - 515 k/uL    MPV 8.2 6.0 - 12.0 fL    NRBC Automated NOT REPORTED per 100 WBC    Differential Type NOT REPORTED     Seg Neutrophils 87 (H) 36 - 66 %    Lymphocytes 5 (L) 24 - 44 %    Monocytes 6 2 - 11 %    Eosinophils % 1 1 - 4 %    Basophils 1 0 - 2 %    Immature Granulocytes NOT REPORTED 0 %    Segs Absolute 15.20 (H) 1.8 - 7.7 k/uL    Absolute Lymph # 0.90 (L) 1.0 - 4.8 k/uL    Absolute Mono # 1.10 0.1 - 1.2 k/uL    Absolute Eos # 0.10 0.0 - 0.4 k/uL    Basophils Absolute 0.10 0.0 - 0.2 k/uL    Absolute Immature Granulocyte NOT REPORTED 0.00 - 0.30 k/uL    WBC Morphology NOT REPORTED     RBC Morphology NOT REPORTED     Platelet Estimate NOT REPORTED    C-Reactive Protein   Result Value Ref Range    CRP 12.0 (H) 0.0 - 5.0 mg/L   Sedimentation Rate   Result Value Ref Range    Sed Rate 3 0 - 20 mm   Lactic Acid   Result Value Ref Range    Lactic Acid 2.3 (H) 0.5 - 2.2 mmol/L   CBC   Result Value Ref Range    WBC 15.1 (H) 3.5 - 11.0 k/uL    RBC 4.46 (L) 4.5 - 5.9 m/uL    Hemoglobin 13.8 13.5 - 17.5 g/dL    Hematocrit 39.8 (L) 41 - 53 %    MCV 89.4 80 - 100 fL    MCH 30.9 26 - 34 pg    MCHC 34.6 31 - 37 g/dL    RDW 12.2 (L) 12.5 - 15.4 %    Platelets 076 026 - 057 k/uL    MPV 7.6 6.0 - 12.0 fL    NRBC Automated NOT REPORTED per 100 WBC   Basic Metabolic Panel w/ Reflex to MG   Result Value Ref Range    Glucose 242 (H) 70 - 99 mg/dL    BUN 18 6 - 20 mg/dL    CREATININE 0.84 0.70 - 1.20 mg/dL    Bun/Cre Ratio NOT REPORTED 9 - 20    Calcium 8.4 (L) 8.6 - 10.4 mg/dL    Sodium 133 (L) 135 - 144 mmol/L    Potassium 4.3 3.7 - 5.3 mmol/L    Chloride 103 98 - 107 mmol/L    CO2 22 20 - 31 mmol/L    Anion Gap 8 (L) 9 - 17 mmol/L    GFR Non-African American >60 >60 mL/min    GFR African American >60 >60 mL/min    GFR Comment          GFR Staging NOT REPORTED    Lactic Acid, Plasma   Result Value Ref Range    Lactic Acid 2.7 (H) 0.5 - 2.2 mmol/L    Lactic Acid, Whole Blood NOT REPORTED 0.7 - 2.1 mmol/L   CBC   Result Value Ref Range    WBC 14.3 (H) 3.5 - 11.0 k/uL    RBC 4.29 (L) 4.5 - 5.9 m/uL    Hemoglobin 13.2 (L) 13.5 - 17.5 g/dL    Hematocrit 38.8 (L) 41 - 53 %    MCV 90.6 80 - 100 fL    MCH 30.9 26 - 34 pg    MCHC 34.1 31 - 37 g/dL    RDW 12.4 (L) 12.5 - 15.4 %    Platelets 823 044 - 983 k/uL    MPV 8.3 6.0 - 12.0 fL    NRBC Automated NOT REPORTED per 100 WBC   Basic Metabolic Panel w/ Reflex to MG   Result Value Ref Range    Glucose 186 (H) 70 - 99 mg/dL    BUN 12 6 - 20 mg/dL    CREATININE 0.78 0.70 - 1.20 mg/dL    Bun/Cre Ratio NOT REPORTED 9 - 20    Calcium 8.0 (L) 8.6 - 10.4 mg/dL    Sodium 132 (L) 135 - 144 mmol/L    Potassium 3.8 3.7 - 5.3 mmol/L    Chloride 101 98 - 107 mmol/L    CO2 24 20 - 31 mmol/L    Anion Gap 7 (L) 9 - 17 mmol/L    GFR Non-African American >60 >60 GFR Non-African American >60 >60 mL/min    GFR African American >60 >60 mL/min    GFR Comment          GFR Staging NOT REPORTED    C-Reactive Protein   Result Value Ref Range    CRP 42.5 (H) 0.0 - 5.0 mg/L   Calcium, Ionized   Result Value Ref Range    Calcium, Ion 1.18 1.13 - 1.33 mmol/L   CBC   Result Value Ref Range    WBC 7.2 3.5 - 11.0 k/uL    RBC 4.13 (L) 4.5 - 5.9 m/uL    Hemoglobin 12.9 (L) 13.5 - 17.5 g/dL    Hematocrit 37.3 (L) 41 - 53 %    MCV 90.3 80 - 100 fL    MCH 31.1 26 - 34 pg    MCHC 34.4 31 - 37 g/dL    RDW 12.3 (L) 12.5 - 15.4 %    Platelets 140 978 - 106 k/uL    MPV 7.6 6.0 - 12.0 fL    NRBC Automated NOT REPORTED per 100 WBC       Not indicated unless otherwise documented above    RADIOLOGY:   I reviewed the radiologist interpretations:  CT KNEE RIGHT WO CONTRAST   Final Result   1. Nonspecific subcutaneous edema most pronounced anterior to the patella and   patellar tendon compatible with reactive edema or cellulitis. No abscess or   soft tissue gas. 2. Small nonspecific joint effusion. If there is clinical concern for septic   arthritis please consider arthrocentesis. 3. No evidence of osteomyelitis or other acute osseous abnormality. 4. Mild tricompartmental degenerative changes. XR KNEE RIGHT (3 VIEWS)   Final Result   No fracture evident. Soft tissue edema, presumably reactive edema, contusion and/or sprain. Follow-up imaging recommended if pain persists or worsens following   conservative management.              Not indicated unless otherwise documented above    EMERGENCY DEPARTMENT COURSE:     The patient was given the following medications:  Orders Placed This Encounter   Medications    ceFAZolin (ANCEF) 1,000 mg in dextrose 5 % 50 mL IVPB (mini-bag)     Order Specific Question:   Antimicrobial Indications     Answer:   Skin and Soft Tissue Infection    morphine (PF) injection 2 mg    acetaminophen (TYLENOL) tablet 650 mg    ketorolac (TORADOL) injection 30 mg    DISCONTD: fluticasone (FLONASE) 50 MCG/ACT nasal spray 1 spray    DISCONTD: sodium chloride (OCEAN) 0.65 % nasal spray 1 spray    DISCONTD: tiZANidine (ZANAFLEX) tablet 4 mg    DISCONTD: lisinopril (PRINIVIL;ZESTRIL) tablet 10 mg    DISCONTD: traMADol (ULTRAM) tablet 50 mg    DISCONTD: loratadine-pseudoephedrine (CLARITIN-D 24HR)  MG per extended release tablet 1 tablet    DISCONTD: 0.9 % sodium chloride infusion    DISCONTD: sodium chloride flush 0.9 % injection 5-40 mL    DISCONTD: sodium chloride flush 0.9 % injection 10 mL    DISCONTD: 0.9 % sodium chloride infusion    DISCONTD: potassium chloride (KLOR-CON M) extended release tablet 40 mEq    DISCONTD: potassium bicarb-citric acid (EFFER-K) effervescent tablet 40 mEq    DISCONTD: potassium chloride 10 mEq/100 mL IVPB (Peripheral Line)    DISCONTD: magnesium sulfate 1000 mg in dextrose 5% 100 mL IVPB    DISCONTD: enoxaparin (LOVENOX) injection 40 mg    DISCONTD: promethazine (PHENERGAN) tablet 12.5 mg    DISCONTD: ondansetron (ZOFRAN) injection 4 mg    DISCONTD: polyethylene glycol (GLYCOLAX) packet 17 g    DISCONTD: nicotine (NICODERM CQ) 21 MG/24HR 1 patch     If indicated/pateint smokes    DISCONTD: acetaminophen (TYLENOL) tablet 650 mg    DISCONTD: acetaminophen (TYLENOL) suppository 650 mg    DISCONTD: ceFAZolin (ANCEF) 2000 mg in dextrose 5 % 50 mL IVPB     Order Specific Question:   Antimicrobial Indications     Answer:   Bone and Joint Infection    DISCONTD: morphine injection 2 mg    DISCONTD: pseudoephedrine (SUDAFED 12 HR) extended release tablet 240 mg    DISCONTD: cetirizine (ZYRTEC) tablet 10 mg    ketorolac (TORADOL) injection 15 mg    DISCONTD: butalbital-acetaminophen-caffeine (FIORICET, ESGIC) per tablet 1 tablet    DISCONTD: pantoprazole (PROTONIX) tablet 40 mg    DISCONTD: albuterol (PROVENTIL) nebulizer solution 2.5 mg    DISCONTD: vancomycin (VANCOCIN) 1500 mg in dextrose 5 % 250 mL IVPB Order Specific Question:   Antimicrobial Indications     Answer:   Skin and Soft Tissue Infection    DISCONTD: morphine (PF) injection 2 mg    DISCONTD: morphine injection 4 mg    oxyCODONE-acetaminophen (PERCOCET) 5-325 MG per tablet 1 tablet    DISCONTD: vancomycin (VANCOCIN) 1,500 mg in dextrose 5 % 500 mL IVPB     Order Specific Question:   Antimicrobial Indications     Answer:   Skin and Soft Tissue Infection    oxyCODONE-acetaminophen (PERCOCET) 5-325 MG per tablet 1 tablet    DISCONTD: oxyCODONE-acetaminophen (PERCOCET) 5-325 MG per tablet 1 tablet    DISCONTD: oxyCODONE-acetaminophen (PERCOCET) 5-325 MG per tablet 2 tablet    DISCONTD: meperidine (DEMEROL) injection 12.5 mg    DISCONTD: morphine (PF) injection 2 mg    DISCONTD: HYDROmorphone (DILAUDID) injection 0.5 mg    DISCONTD: HYDROmorphone (DILAUDID) injection 0.25 mg    DISCONTD: HYDROmorphone (DILAUDID) injection 0.5 mg    DISCONTD: oxyCODONE-acetaminophen (PERCOCET) 5-325 MG per tablet 1 tablet    DISCONTD: oxyCODONE-acetaminophen (PERCOCET) 5-325 MG per tablet 2 tablet    DISCONTD: ondansetron (ZOFRAN) injection 4 mg    DISCONTD: promethazine (PHENERGAN) injection 12.5 mg    DISCONTD: 0.9 % sodium chloride bolus    DISCONTD: diphenhydrAMINE (BENADRYL) injection 12.5 mg    DISCONTD: labetalol (NORMODYNE;TRANDATE) injection syringe 5 mg    ceFAZolin (ANCEF) IVPB     Arline Omalley: cabinet override    HYDROmorphone (DILAUDID) 1 MG/ML injection     Maye Claudio: cabinet override    DISCONTD: oxyCODONE (ROXICODONE) immediate release tablet 5 mg    DISCONTD: acetaminophen (TYLENOL) tablet 1,000 mg    ketorolac (TORADOL) injection 30 mg    HYDROmorphone (DILAUDID) 1 MG/ML injection     Fairchild, April RIGO: cabinet override    HYDROmorphone (DILAUDID) 1 MG/ML injection     Maye Claudio: cabinet override    ketorolac (TORADOL) 30 MG/ML injection     Maye Claudio: cabinet override    ketorolac (TORADOL) injection 30 mg    BP: (!) 165/94 (!) 167/83  (!) 146/87   Pulse: 61 55  65   Resp: 18 18  18   Temp: 98.5 °F (36.9 °C) 98.2 °F (36.8 °C)  98.1 °F (36.7 °C)   TempSrc: Oral Oral  Oral   SpO2: 98% 96%  97%   Weight:   94.7 kg (208 lb 12.4 oz)    Height:         -------------------------  BP (!) 146/87   Pulse 65   Temp 98.1 °F (36.7 °C) (Oral)   Resp 18   Ht 6' 1\" (1.854 m)   Wt 94.7 kg (208 lb 12.4 oz)   SpO2 97%   BMI 27.54 kg/m²         I have reviewed the disposition diagnosis with the patient and or their family/guardian. I have answered their questions and given discharge instructions. They voiced understanding of these instructions and did not have any further questions or complaints. CRITICAL CARE:    None    CONSULTS:    None    PROCEDURES:    None      OARRS Report if indicated             FINAL IMPRESSION      1. Right knee skin infection    2. Disc disease, degenerative, lumbar or lumbosacral    3. Chronic midline low back pain without sciatica    4. Primary osteoarthritis involving multiple joints    5. Septic infrapatellar bursitis, left          DISPOSITION/PLAN   DISPOSITION Admitted    I have reviewed the disposition diagnosis with the patient and or their family/guardian. I have answered their questions and given discharge instructions. They voiced understanding of these instructions and did not have any further questions or complaints. Reevaluation: Patient does show a 17,000 white count x-ray was unrevealing of any gas in the tissue or any osteomyelitis. However patient is going to be admitted we started him on Ancef here he is gotten pain medicines I will have orthopedics take a look at him in the hospital tomorrow.   PATIENT REFERRED TO:  Millie Faria MD  [address]  [phone]    Schedule an appointment as soon as possible for a visit in 2 weeks  For follow-up    Marissa Hernandez, GITA - RONNIE  [address] [address]  [phone]    Schedule an appointment as soon as possible for a visit in 1 week  Follow up appointment    Laure Wills MD  [address] [facility]. [phone]    In 2 weeks  For suture removal, For wound re-check      DISCHARGE MEDICATIONS:  Discharge Medication List as of 5/21/2021  4:28 PM      START taking these medications    Details   oxyCODONE (ROXICODONE) 5 MG immediate release tablet Take 1 tablet by mouth every 6 hours as needed for Pain for up to 3 days. , Disp-12 tablet, R-0Normal      sucralfate (CARAFATE) 1 GM tablet Take 1 tablet by mouth 4 times daily, Disp-120 tablet, R-3Normal      cefadroxil (DURICEF) 500 MG capsule Take 1 capsule by mouth 2 times daily for 14 days, Disp-28 capsule, R-0Normal      doxycycline hyclate (VIBRA-TABS) 100 MG tablet Take 1 tablet by mouth 2 times daily for 14 days, Disp-28 tablet, R-0Normal             (Please note that portions of this note were completed with a voice recognition program.  Efforts were made to edit the dictations but occasionally words are mis-transcribed.)    Katiana Salcedo MD,, MD  Attending Emergency Physician           Katiana Salcedo MD  05/22/21 3507

## 2021-05-17 NOTE — PROGRESS NOTES
pain, Disc disease, degenerative, lumbar or lumbosacral, Hyperlipidemia, Hypertension, and Restless leg syndrome. has a past surgical history that includes Tonsillectomy and adenoidectomy; hernia repair; and Tympanostomy tube placement (Bilateral). Restrictions  Restrictions/Precautions  Restrictions/Precautions: Up as Tolerated  Required Braces or Orthoses?: No  Position Activity Restriction  Other position/activity restrictions: up as tolerated - elevate affected limb    Subjective   General  Patient assessed for rehabilitation services?: Yes  Family / Caregiver Present: No  General Comment  Comments: RN ok'd for eval. Pt agreeable; encouragement provided secondary to significant RLE pain  Patient Currently in Pain: Yes  Pain Assessment  Pain Assessment: 0-10  Pain Level: 6 (in WB = 10/10, rest = 6/10)  Pain Type: Acute pain  Pain Location: Head;Back;Buttocks;Knee (Pt reports pain in neck, buttocks, and head - pain rating is for R knee)  Pain Orientation: Right  Pain Descriptors: Throbbing (Throbbing at rest)  Pain Frequency: Continuous  Pain Onset: On-going  Clinical Progression: Not changed  Functional Pain Assessment: Prevents or interferes some active activities and ADLs  Non-Pharmaceutical Pain Intervention(s): Ambulation/Increased Activity; Emotional support;Distraction;Elevation;Repositioned;Cold applied    Social/Functional History  Social/Functional History  Lives With: Spouse, Family  Type of Home: House  Home Layout: Two level (pt reports bedroom on 2nd floor)  Home Access: Stairs to enter with rails  Entrance Stairs - Number of Steps: 3  Entrance Stairs - Rails: Left  Bathroom Shower/Tub: Walk-in shower  Bathroom Toilet: Standard  Bathroom Equipment: Grab bars around toilet, Grab bars in shower  Bathroom Accessibility: Accessible  Home Equipment: Crutches, Rolling walker, Cane, 4 wheeled walker (Pt has listed AD available as needed)  Receives Help From: Family  ADL Assistance: Independent  Homemaking Assistance: Independent  Homemaking Responsibilities: Yes  Ambulation Assistance: Independent  Transfer Assistance: Independent  Active : Yes  Mode of Transportation: Car, Truck  Occupation: Full time employment  Type of occupation: Tile shop -   Additional Comments: Pt reports support as needed from spouse and father as needed; father at home 24/7. Pt reports full flight of steps to reach main bedroom and indicates 1st floor bedroom occupied by father. Objective   Vision: Within Functional Limits  Hearing: Exceptions to [facility] (Pt states need for hearing aids; L ear ~50% and R ear ~80% hearing loss per pt)  Hearing Exceptions: Hard of hearing/hearing concerns      Orientation  Overall Orientation Status: Within Normal Limits       Balance  Sitting Balance: Contact guard assistance (CGA for RLE support d/t increased pain upon EOB/toe-touch weight bearing)  Standing Balance: Minimal assistance (during functional mobility; don pants)    Functional Mobility  Functional - Mobility Device: Rolling Walker  Activity: Other (from bed > chair)  Assist Level: Minimal assistance  Functional Mobility Comments: Min A w/RW d/t pt demo RLE NWB secodnary to significant RLE pain/throbbing upon participation in functional mobility    ADL  Feeding: Independent  Grooming: Supervision;Modified independent   UE Bathing: Supervision; Modified independent   LE Bathing: Increased time to complete; Moderate assistance  UE Dressing: Supervision; Modified independent   LE Dressing: Increased time to complete; Moderate assistance;Verbal cueing (don B socks and thread BLE through pants)  Toileting: Contact guard assistance     Tone RUE  RUE Tone: Normotonic  Tone LUE  LUE Tone: Normotonic  Coordination  Movements Are Fluid And Coordinated: Yes     Bed mobility  Supine to Sit: Stand by assistance  Sit to Supine:  (pt up to chair following session.)  Scooting: Minimal assistance (min A for RLE support d/t significant pain)  Comment: Increase time to perform     Transfers  Sit to stand: Contact guard assistance  Stand to sit: Contact guard assistance  Transfer Comments: Increased time to perform and Min VCs for technique/safety awareness secondary to significant RLE pain upon standing     Cognition  Overall Cognitive Status: Exceptions  Arousal/Alertness: Appropriate responses to stimuli  Following Commands: Follows multistep commands with increased time  Attention Span: Appears intact  Memory: Appears intact  Safety Judgement: Decreased awareness of need for assistance;Decreased awareness of need for safety  Problem Solving: Assistance required to identify errors made  Insights: Fully aware of deficits  Initiation: Does not require cues  Sequencing: Does not require cues        Sensation  Overall Sensation Status: WFL (pt denies any numbness or tingling). LUE AROM (degrees)  LUE AROM : WFL  RUE AROM (degrees)  RUE AROM : WFL     LUE Strength  Gross LUE Strength: WFL  LUE Strength Comment: Grossly 5/5  RUE Strength  Gross RUE Strength: WFL  RUE Strength Comment: Grossly 5/5         Plan   Plan  Times per week: 5-6x/week  Times per day: Daily  Current Treatment Recommendations: Safety Education & Training, Balance Training, Patient/Caregiver Education & Training, Self-Care / ADL, Home Management Training, Functional Mobility Training, Endurance Training      AM-PAC Score   [facility] Inpatient Daily Activity Raw Score: 19 (05/17/21 1020)  AM-PAC Inpatient ADL T-Scale Score : 40.22 (05/17/21 1020)  ADL Inpatient CMS 0-100% Score: 42.8 (05/17/21 1020)  ADL Inpatient CMS G-Code Modifier : CK (05/17/21 1020)    Goals  Short term goals  Time Frame for Short term goals: 14 visits  Short term goal 1: Pt will demo Mod IND, with DME as needed, to perform toileting and LB ADLs. Short term goal 2: Pt will demo Mod IND, with LRD, to perform functional mobility/tranfers during ADLs.   Short term goal 3: Pt will tolerate 10+ minutes of dynamic standing balance during functional tasks for increased participation in ADLs. Short term goal 4: Pt independently demo good safety awareness throughout engagement in functional tasks/activities.        Therapy Time   Individual Concurrent Group Co-treatment   Time In 9694         Time Out 0927         Minutes 32         Timed Code Treatment Minutes: 10 Minutes       Talat Cason OTR/L

## 2021-05-17 NOTE — H&P
[facility]  Office: Troy Rodrigues, DO, Cody Cox, DO, Brayan An, DO, Wes Ontiveros, DO, Nuvia Ellis MD, Rodolfo Hutchinson MD, Makeda Coon MD, Danial Garza MD, Ness White MD, Kori Contreras MD, Imtiaz Joseph MD, Roger Galvan MD, Sangeeta Osorio DO, Shannon Bryan MD, Dereck Bray DO, Olvin West MD,  Mimi Rollins DO, Deepa Webb MD, Job Maguire MD, Amie Alvarez MD, Laurie Escobedo MD, Ninfa Mccullough, [facility], [facility], CNP, Ubaldo Euceda, CNP, Tahira Phelan, CNS, Rosalino Castrejon, CNP, Thompson Lombard, CNP, Naya Linealbert, CNP, Robby Evans, CNP, Kim Holland, CNP, Fredy Groves PA-C, Ashtyn Ayala [facility], Juanjo Mesa, CNP, Rogerio Wood, CNP, Jim José Miguel, CNP, Shukri Eis, CNP, Joeliman Mcmahon, CNP, Marnie Robison 22      HISTORY AND PHYSICAL EXAMINATION            Date:   5/17/2021  Patient name:  Donnie Shi  Date of admission:  5/16/2021  8:26 PM  MRN:   [id number]  Account:  [de-identified]  YOB: 1965  PCP:    GITA Pena CNP  Room:   329/329-01  Code Status:    Full Code    Chief Complaint:     Chief Complaint   Patient presents with    Knee Pain       History Obtained From:     patient, electronic medical record    History of Present Illness: The patient is a 60 y.o. Non-/non  male who presents with Knee Pain   and he is admitted to the hospital for the management of  Cellulitis of right knee. Patient came to emergency room with difficulty ambulating with pain in right leg and swelling for about 2 to 3 days. Patient does not recall any trauma. Patient initially went to urgent care where needle aspiration and I&D was attempted. Patient was directed to go to emergency room for further evaluation.   He is underlying history of sensorineural hearing loss, hypertension, hyperlipidemia, chronic back pain on Ultram.  Patient was afebrile with heart rate 92.  Lab work showed leukocytosis 17.4, with left shift CRP 12. 0. X-ray knee was negative for foreign body. Past Medical History:     Past Medical History:   Diagnosis Date    Back pain     Disc disease, degenerative, lumbar or lumbosacral     Hyperlipidemia     Hypertension     Restless leg syndrome         Past Surgical History:     Past Surgical History:   Procedure Laterality Date    HERNIA REPAIR      TONSILLECTOMY AND ADENOIDECTOMY      TYMPANOSTOMY TUBE PLACEMENT Bilateral         Medications Prior to Admission:     Prior to Admission medications    Medication Sig Start Date End Date Taking? Authorizing Provider   traMADol (ULTRAM) 50 MG tablet Take 1 tablet by mouth every 8 hours as needed for Pain (1 to 2 every 8 hrs prn) for up to 30 days. Patient taking differently: Take 50 mg by mouth every 8 hours as needed for Pain (1 to 2 every 8 hrs prn).  Indications: back & neck pain  4/16/21 5/17/21 Yes GITA León Che, CNP   omeprazole (PRILOSEC) 20 MG delayed release capsule TAKE ONE CAPSULE BY MOUTH EVERY MORNING BEFORE BREAKFAST 3/16/21  Yes GITA León Che, CNP   lisinopril (PRINIVIL;ZESTRIL) 10 MG tablet TAKE ONE TABLET BY MOUTH DAILY 3/16/21  Yes GITA León Che, CNP   HM ALLERGY RELIEF/NASAL DECONG  MG per extended release tablet TAKE ONE TABLET BY MOUTH ONCE DAILY 11/25/20  Yes GITA León Che, CNP   tiZANidine (ZANAFLEX) 4 MG tablet Take 1 tablet by mouth every 8 hours as needed (muscle spasms) 11/23/20  Yes GITA León Che, CNP   albuterol sulfate  (90 Base) MCG/ACT inhaler USE AS DIRECTED 10/28/20  Yes GITA León Che, CNP   ibuprofen (ADVIL;MOTRIN) 800 MG tablet Take 1 tablet by mouth every 8 hours as needed for Pain 8/16/19  Yes GITA León Che, CNP   Multiple Vitamins-Minerals (MULTI FOR HIM 50+ PO) Take by mouth   Yes Historical Provider, MD   fluticasone (FLONASE) 50 MCG/ACT nasal spray 1 spray by Nasal route daily 17  Yes GITA Plata CNP   sodium chloride (OCEAN) 0.65 % nasal spray 1 spray by Nasal route as needed for Congestion 20   Gaby Milas Sherita APRN - CNP        Allergies:     Patient has no known allergies. Social History:     Tobacco:    reports that he quit smoking about 10 years ago. He started smoking about 11 years ago. He has a 1.75 pack-year smoking history. He has never used smokeless tobacco.  Alcohol:      reports no history of alcohol use. Drug Use:  reports current drug use. Family History:     Family History   Problem Relation Age of Onset    Arthritis Mother        Review of Systems:     Positive and Negative as described in HPI. Review of Systems   Constitutional: Negative for activity change, appetite change, fatigue, fever and unexpected weight change. HENT: Negative for congestion, nosebleeds, rhinorrhea, sinus pressure, sneezing and voice change. Respiratory: Negative for cough, choking, chest tightness, shortness of breath and wheezing. Cardiovascular: Negative for chest pain, palpitations and leg swelling. Gastrointestinal: Negative for abdominal pain, constipation, diarrhea, nausea and vomiting. Genitourinary: Negative for difficulty urinating, discharge, dysuria, frequency and testicular pain. Musculoskeletal: Negative for back pain. R knee swelling and pain and decreased function    Skin: Negative for rash. Neurological: Negative for dizziness, weakness, light-headedness and headaches. Hematological: Does not bruise/bleed easily. Psychiatric/Behavioral: Negative for agitation, behavioral problems, confusion, self-injury, sleep disturbance and suicidal ideas.        Physical Exam:   /82   Pulse 82   Temp 98.6 °F (37 °C) (Temporal)   Resp 18   Ht 6' 1\" (1.854 m)   Wt 198 lb 3.1 oz (89.9 kg)   SpO2 95%   BMI 26.15 kg/m²   Temp (24hrs), Av.9 °F (37.2 °C), Min:98.1 °F (36.7 °C), Max:99.8 °F (37.7 °C)    No results for input(s): POCGLU in the last 72 hours. Intake/Output Summary (Last 24 hours) at 5/17/2021 1858  Last data filed at 5/17/2021 1714  Gross per 24 hour   Intake 455.7 ml   Output 675 ml   Net -219.3 ml     Physical Exam  Vitals and nursing note reviewed. Constitutional:       General: He is not in acute distress. Appearance: He is not diaphoretic. HENT:      Head: Normocephalic and atraumatic. Nose:      Right Sinus: No maxillary sinus tenderness or frontal sinus tenderness. Left Sinus: No maxillary sinus tenderness or frontal sinus tenderness. Mouth/Throat:      Pharynx: No oropharyngeal exudate. Eyes:      General: No scleral icterus. Conjunctiva/sclera: Conjunctivae normal.      Pupils: Pupils are equal, round, and reactive to light. Neck:      Thyroid: No thyromegaly. Vascular: No JVD. Cardiovascular:      Rate and Rhythm: Normal rate and regular rhythm. Pulses:           Dorsalis pedis pulses are 2+ on the right side and 2+ on the left side. Heart sounds: Normal heart sounds. No murmur heard. Pulmonary:      Effort: Pulmonary effort is normal.      Breath sounds: Normal breath sounds. No wheezing or rales. Abdominal:      Palpations: Abdomen is soft. There is no mass. Tenderness: There is no abdominal tenderness. Musculoskeletal:      Cervical back: Full passive range of motion without pain and neck supple. Comments: R knee swelling and pain . Tender to touch. [place] extended to thigh . Lymphadenopathy:      Head:      Right side of head: No submandibular adenopathy. Left side of head: No submandibular adenopathy. Cervical: No cervical adenopathy. Skin:     General: Skin is warm. Neurological:      Mental Status: He is alert and oriented to person, place, and time. Motor: No tremor. Psychiatric:         Behavior: Behavior is cooperative.          Investigations:      Laboratory Testing:  Recent Results (from the past 24 Detected   CBC    Collection Time: 05/17/21  9:08 AM   Result Value Ref Range    WBC 15.1 (H) 3.5 - 11.0 k/uL    RBC 4.46 (L) 4.5 - 5.9 m/uL    Hemoglobin 13.8 13.5 - 17.5 g/dL    Hematocrit 39.8 (L) 41 - 53 %    MCV 89.4 80 - 100 fL    MCH 30.9 26 - 34 pg    MCHC 34.6 31 - 37 g/dL    RDW 12.2 (L) 12.5 - 15.4 %    Platelets 510 900 - 645 k/uL    MPV 7.6 6.0 - 12.0 fL    NRBC Automated NOT REPORTED per 100 WBC   Basic Metabolic Panel w/ Reflex to MG    Collection Time: 05/17/21  9:08 AM   Result Value Ref Range    Glucose 242 (H) 70 - 99 mg/dL    BUN 18 6 - 20 mg/dL    CREATININE 0.84 0.70 - 1.20 mg/dL    Bun/Cre Ratio NOT REPORTED 9 - 20    Calcium 8.4 (L) 8.6 - 10.4 mg/dL    Sodium 133 (L) 135 - 144 mmol/L    Potassium 4.3 3.7 - 5.3 mmol/L    Chloride 103 98 - 107 mmol/L    CO2 22 20 - 31 mmol/L    Anion Gap 8 (L) 9 - 17 mmol/L    GFR Non-African American >60 >60 mL/min    GFR African American >60 >60 mL/min    GFR Comment          GFR Staging NOT REPORTED    Lactic Acid, Plasma    Collection Time: 05/17/21  9:08 AM   Result Value Ref Range    Lactic Acid 2.7 (H) 0.5 - 2.2 mmol/L    Lactic Acid, Whole Blood NOT REPORTED 0.7 - 2.1 mmol/L       Imaging/Diagonstics:    XR KNEE RIGHT (3 VIEWS)    Result Date: 5/16/2021  No fracture evident. Soft tissue edema, presumably reactive edema, contusion and/or sprain. Follow-up imaging recommended if pain persists or worsens following conservative management.         Assessment :      Primary Problem  Cellulitis of right knee    Active Hospital Problems    Diagnosis Date Noted    Cellulitis of right knee [Q57.061] 05/17/2021    History of tobacco use [Z87.891] 05/26/2020    Sensorineural hearing loss, bilateral [H90.3] 05/26/2020    Chronic midline low back pain without sciatica [M54.5, G89.29] 11/10/2016    Essential hypertension [I10] 11/10/2016    Disc disease, degenerative, lumbar or lumbosacral [M51.37]        Plan:     Patient status Admit as inpatient in the Progressive Unit/Step down    1. Prepatellar cellulitis -concern for septic arthritis -change antibiotics to Vanco Zosyn. CT right knee without contrast concerning for joint effusion. -Consult orthopedic surgery. 2. Essential hypertension -stable with lisinopril. 3. Chronic back pain-on tramadol and Ultram.    Consultations:   None    Patient is admitted as inpatient status because of co-morbidities listed above, severity of signs and symptoms as outlined, requirement for current medical therapies and most importantly because of direct risk to patient if care not provided in a hospital setting.     Kishan Young MD  5/17/2021    Copy sent to Dr. Ginger Rashid, APRN - CNP    (Please note that portions of this note were completed with a voice recognition program. Efforts were made to edit the dictations but occasionally words are mis-transcribed.)

## 2021-05-17 NOTE — PROGRESS NOTES
(Bilateral). Restrictions  Restrictions/Precautions  Restrictions/Precautions: Up as Tolerated  Required Braces or Orthoses?: No  Position Activity Restriction  Other position/activity restrictions: up as tolerated - elevate affected limb  Vision/Hearing  Vision: Within Functional Limits  Hearing: Exceptions to [facility]  Hearing Exceptions: Hard of hearing/hearing concerns     Subjective  General  Patient assessed for rehabilitation services?: Yes  Response To Previous Treatment: Not applicable  Family / Caregiver Present: No  Follows Commands: Within Functional Limits  Subjective  Subjective: Pt agreeable to therapy. RN agreeable to therapy. Pt reports he has not moved recently due to excrutiating pain when WB on RLE. Pain Screening  Patient Currently in Pain: Yes  Pain Assessment  Pain Assessment: 0-10  Pain Level: 6 (in WB = 10/10, rest = 6/10)  Pain Type: Acute pain  Pain Location: Head;Back;Buttocks;Knee (Pt reports pain in neck, buttocks, and head - pain rating is for R knee)  Pain Descriptors:  Throbbing (Throbbing at rest)  Pain Frequency: Continuous  Functional Pain Assessment: Prevents or interferes some active activities and ADLs  Vital Signs  Patient Currently in Pain: Yes     Orientation  Orientation  Overall Orientation Status: Within Normal Limits  Social/Functional History  Social/Functional History  Lives With: Spouse, Family  Type of Home: House  Home Layout: Two level (pt reports bedroom on 2nd floor)  Home Access: Stairs to enter with rails  Entrance Stairs - Number of Steps: 3  Entrance Stairs - Rails: Left  Bathroom Shower/Tub: Walk-in shower  Bathroom Toilet: Standard  Bathroom Equipment: Grab bars around toilet, Grab bars in shower  Bathroom Accessibility: Accessible  Home Equipment: Crutches, Rolling walker, Cane, 4 wheeled walker (Pt has listed AD available as needed)  Receives Help From: Family  ADL Assistance: [address] [place] [address] Avenue: Independent  Homemaking Responsibilities: Yes  Ambulation 1  Surface: level tile  Device: Rolling Walker  Assistance: Contact guard assistance  Quality of Gait: Antalgic gait, limited WB on RLE due to pain use of RW, decreased step length, slow oly  Gait Deviations: Slow Oly;Decreased step length  Distance: 15 ft x 1  Comments: Pt able to ambulate with CGA with hop to and toe touch gait as tolerated with RW. Pt visibly fatigued and reporting pain upon group home to chair. Balance  Posture: Fair  Sitting - Static: Good  Sitting - Dynamic: Fair;+  Standing - Static: Fair;+  Standing - Dynamic: [facility]  Times per week: 5x  Times per day: Daily  Current Treatment Recommendations: Strengthening, ROM, Balance Training, Endurance Training, Functional Mobility Training, Transfer Training, Gait Training  Safety Devices  Type of devices: Gait belt, Left in chair, Nurse notified  Restraints  Initially in place: No    AM-PAC Score  AM-PAC Inpatient Mobility Raw Score : 17 (05/17/21 1027)  AM-PAC Inpatient T-Scale Score : 42.13 (05/17/21 1027)  Mobility Inpatient CMS 0-100% Score: 50.57 (05/17/21 1027)  Mobility Inpatient CMS G-Code Modifier : CK (05/17/21 1027)     Goals  Short term goals  Time Frame for Short term goals: 14  Short term goal 1: Pt demonstrates 150 ft modIND with appropriate device if needed without LOB to facilitate return home. Short term goal 2: Pt demonstrates transfers modIND with appopriate device as needed to facilitate return home. Short term goal 3: Pt performs seated and standing LE PREs x 12 reps to improve knee ROM for mobility. Short term goal 4: Pt will negotiate 3 stairs with rail on L modIND with appropriate device as needed to facilitate return home.   Patient Goals   Patient goals : Pt would like to be pain free and return to prior living situation     Therapy Time   Individual Concurrent Group Co-treatment   Time In 0847         Time Out 0927         Minutes 40         Timed Code Treatment Minutes: 10 Brigitte Latham, SPT  Evaluation/treatment performed by Student PT under the supervision of co-signing PT who agrees with all evaluation/treatment and documentation.

## 2021-05-17 NOTE — CARE COORDINATION
[facility] ICU Quality Flow/Interdisciplinary Rounds Progress Note    Quality Flow Rounds held on May 17, 2021 at [address] Attending:  Bedside Nurse, , Nursing Unit Leadership, Dietary, Respiratory Therapy, Physical Therapy and Occupational Therapy    Anticipated Discharge Date:  Expected Discharge Date: 05/19/21    Anticipated Discharge Disposition: home    Readmission Risk              Risk of Unplanned Readmission:  7           Discussed patient goal for the day, patient clinical progression, and barriers to discharge.   The following Goal(s) of the Day/Commitment(s) have been identified:  Activity Progression    and antibiotic plan      Aida Eckert RN  May 17, 2021

## 2021-05-17 NOTE — PLAN OF CARE
Problem: Falls - Risk of:  Goal: Will remain free from falls  Description: Will remain free from falls  5/17/2021 1731 by Quin Patel RN  Outcome: Ongoing  5/17/2021 0437 by Franchesca Mcmahon RN  Outcome: Ongoing  Goal: Absence of physical injury  Description: Absence of physical injury  5/17/2021 1731 by Quin Patel RN  Outcome: Ongoing  5/17/2021 0437 by Franchesca Mcmahon RN  Outcome: Ongoing    Pt remains free from falls at this time. Bed/chair alarms in place to promote safety. Pt has been educated on how to utilize the call light effectively. Problem: Pain:  Goal: Pain level will decrease  Description: Pain level will decrease  5/17/2021 1731 by Quin Patel RN  Outcome: Ongoing  5/17/2021 0437 by Franchesca Mcmahon RN  Outcome: Ongoing  Goal: Control of acute pain  Description: Control of acute pain  5/17/2021 1731 by Quin Patel RN  Outcome: Ongoing  5/17/2021 0437 by Franchesca Mcmahon RN  Outcome: Ongoing  Goal: Control of chronic pain  Description: Control of chronic pain  5/17/2021 1731 by Quin Patel RN  Outcome: Ongoing  5/17/2021 0437 by Franchesca Mcmahon RN  Outcome: Ongoing  Goal: Patient's pain/discomfort is manageable  Description: Patient's pain/discomfort is manageable  5/17/2021 1731 by Quin Patel RN  Outcome: Ongoing  5/17/2021 0437 by Franchesca Mcmahon RN  Outcome: Ongoing    Pt pain is controlled at this time and evaluated each hour. PRN medications are available and will be administered as needed. Problem: Infection:  Goal: Will remain free from infection  Description: Will remain free from infection  5/17/2021 1731 by Quin Patel RN  Outcome: Ongoing  5/17/2021 0437 by Franchesca Mcmahon RN  Outcome: Ongoing    Pt is afebrile at this time. Trending labs. Administering antibiotics as ordered. Will continue to monitor.        Problem: Safety:  Goal: Free from accidental physical injury  Description: Free from accidental physical injury  5/17/2021 1731 by Quin Patel RN  Outcome: Ongoing  5/17/2021 0437 by Cayla Conte RN  Outcome: Ongoing    Pt remains free from falls at this time. Bed/chair alarms in place to promote safety. Pt has been educated on how to utilize the call light effectively. Problem: Skin Integrity:  Goal: Skin integrity will stabilize  Description: Skin integrity will stabilize  5/17/2021 1731 by Jerod Cuellar RN  Outcome: Ongoing  5/17/2021 0437 by Cayla Conte RN  Outcome: Ongoing    Pt skin is not intact (see charting). RN will continue to monitor cellulitis for worsening and notify MD with significant change. Problem: Discharge Planning:  Goal: Patients continuum of care needs are met  Description: Patients continuum of care needs are met  5/17/2021 1731 by Jerod Cuellar RN  Outcome: Ongoing  5/17/2021 0437 by Cayla Conte RN  Outcome: Ongoing    Discharge planning has been initiated. Will continue to work with care team to ensure timely discharge.

## 2021-05-18 ENCOUNTER — ANESTHESIA EVENT (OUTPATIENT)
Dept: OPERATING ROOM | Age: 56
DRG: 501 | End: 2021-05-18
Payer: COMMERCIAL

## 2021-05-18 ENCOUNTER — ANESTHESIA (OUTPATIENT)
Dept: OPERATING ROOM | Age: 56
DRG: 501 | End: 2021-05-18
Payer: COMMERCIAL

## 2021-05-18 VITALS — DIASTOLIC BLOOD PRESSURE: 58 MMHG | SYSTOLIC BLOOD PRESSURE: 110 MMHG | TEMPERATURE: 99.3 F | OXYGEN SATURATION: 99 %

## 2021-05-18 PROBLEM — E87.1 HYPONATREMIA: Status: ACTIVE | Noted: 2021-05-18

## 2021-05-18 PROBLEM — B96.89 SEPTIC INFRAPATELLAR BURSITIS, LEFT: Status: ACTIVE | Noted: 2021-05-18

## 2021-05-18 PROBLEM — M71.162 SEPTIC INFRAPATELLAR BURSITIS, LEFT: Status: ACTIVE | Noted: 2021-05-18

## 2021-05-18 PROBLEM — E83.51 HYPOCALCEMIA: Status: ACTIVE | Noted: 2021-05-18

## 2021-05-18 PROBLEM — M70.41 PREPATELLAR BURSITIS OF RIGHT KNEE: Status: ACTIVE | Noted: 2021-05-18

## 2021-05-18 LAB
ANION GAP SERPL CALCULATED.3IONS-SCNC: 7 MMOL/L (ref 9–17)
BUN BLDV-MCNC: 12 MG/DL (ref 6–20)
BUN/CREAT BLD: ABNORMAL (ref 9–20)
CALCIUM SERPL-MCNC: 8 MG/DL (ref 8.6–10.4)
CHLORIDE BLD-SCNC: 101 MMOL/L (ref 98–107)
CO2: 24 MMOL/L (ref 20–31)
CREAT SERPL-MCNC: 0.78 MG/DL (ref 0.7–1.2)
CULTURE: NORMAL
DIRECT EXAM: NORMAL
GFR AFRICAN AMERICAN: >60 ML/MIN
GFR NON-AFRICAN AMERICAN: >60 ML/MIN
GFR SERPL CREATININE-BSD FRML MDRD: ABNORMAL ML/MIN/{1.73_M2}
GFR SERPL CREATININE-BSD FRML MDRD: ABNORMAL ML/MIN/{1.73_M2}
GLUCOSE BLD-MCNC: 186 MG/DL (ref 70–99)
HCT VFR BLD CALC: 38.8 % (ref 41–53)
HEMOGLOBIN: 13.2 G/DL (ref 13.5–17.5)
Lab: NORMAL
MCH RBC QN AUTO: 30.9 PG (ref 26–34)
MCHC RBC AUTO-ENTMCNC: 34.1 G/DL (ref 31–37)
MCV RBC AUTO: 90.6 FL (ref 80–100)
NRBC AUTOMATED: ABNORMAL PER 100 WBC
PDW BLD-RTO: 12.4 % (ref 12.5–15.4)
PLATELET # BLD: 190 K/UL (ref 140–450)
PMV BLD AUTO: 8.3 FL (ref 6–12)
POTASSIUM SERPL-SCNC: 3.8 MMOL/L (ref 3.7–5.3)
RBC # BLD: 4.29 M/UL (ref 4.5–5.9)
SODIUM BLD-SCNC: 132 MMOL/L (ref 135–144)
SPECIMEN DESCRIPTION: NORMAL
WBC # BLD: 14.3 K/UL (ref 3.5–11)

## 2021-05-18 PROCEDURE — 85027 COMPLETE CBC AUTOMATED: CPT

## 2021-05-18 PROCEDURE — 3700000000 HC ANESTHESIA ATTENDED CARE: Performed by: ORTHOPAEDIC SURGERY

## 2021-05-18 PROCEDURE — 2580000003 HC RX 258: Performed by: FAMILY MEDICINE

## 2021-05-18 PROCEDURE — 6360000002 HC RX W HCPCS: Performed by: ORTHOPAEDIC SURGERY

## 2021-05-18 PROCEDURE — 87070 CULTURE OTHR SPECIMN AEROBIC: CPT

## 2021-05-18 PROCEDURE — 6360000002 HC RX W HCPCS: Performed by: FAMILY MEDICINE

## 2021-05-18 PROCEDURE — 2500000003 HC RX 250 WO HCPCS: Performed by: NURSE ANESTHETIST, CERTIFIED REGISTERED

## 2021-05-18 PROCEDURE — 3600000002 HC SURGERY LEVEL 2 BASE: Performed by: ORTHOPAEDIC SURGERY

## 2021-05-18 PROCEDURE — 2709999900 HC NON-CHARGEABLE SUPPLY: Performed by: ORTHOPAEDIC SURGERY

## 2021-05-18 PROCEDURE — 1210000000 HC MED SURG R&B

## 2021-05-18 PROCEDURE — 87075 CULTR BACTERIA EXCEPT BLOOD: CPT

## 2021-05-18 PROCEDURE — 7100000001 HC PACU RECOVERY - ADDTL 15 MIN: Performed by: ORTHOPAEDIC SURGERY

## 2021-05-18 PROCEDURE — 6370000000 HC RX 637 (ALT 250 FOR IP): Performed by: ORTHOPAEDIC SURGERY

## 2021-05-18 PROCEDURE — 80048 BASIC METABOLIC PNL TOTAL CA: CPT

## 2021-05-18 PROCEDURE — 86403 PARTICLE AGGLUT ANTBDY SCRN: CPT

## 2021-05-18 PROCEDURE — 7100000000 HC PACU RECOVERY - FIRST 15 MIN: Performed by: ORTHOPAEDIC SURGERY

## 2021-05-18 PROCEDURE — 2580000003 HC RX 258: Performed by: ORTHOPAEDIC SURGERY

## 2021-05-18 PROCEDURE — 6370000000 HC RX 637 (ALT 250 FOR IP): Performed by: NURSE PRACTITIONER

## 2021-05-18 PROCEDURE — 36415 COLL VENOUS BLD VENIPUNCTURE: CPT

## 2021-05-18 PROCEDURE — 2580000003 HC RX 258: Performed by: NURSE PRACTITIONER

## 2021-05-18 PROCEDURE — 99221 1ST HOSP IP/OBS SF/LOW 40: CPT | Performed by: ORTHOPAEDIC SURGERY

## 2021-05-18 PROCEDURE — 99232 SBSQ HOSP IP/OBS MODERATE 35: CPT | Performed by: INTERNAL MEDICINE

## 2021-05-18 PROCEDURE — 0MDN0ZZ EXTRACTION OF RIGHT KNEE BURSA AND LIGAMENT, OPEN APPROACH: ICD-10-PCS | Performed by: ORTHOPAEDIC SURGERY

## 2021-05-18 PROCEDURE — 87205 SMEAR GRAM STAIN: CPT

## 2021-05-18 PROCEDURE — 6360000002 HC RX W HCPCS

## 2021-05-18 PROCEDURE — 6360000002 HC RX W HCPCS: Performed by: INTERNAL MEDICINE

## 2021-05-18 PROCEDURE — 6360000002 HC RX W HCPCS: Performed by: NURSE ANESTHETIST, CERTIFIED REGISTERED

## 2021-05-18 PROCEDURE — 3600000012 HC SURGERY LEVEL 2 ADDTL 15MIN: Performed by: ORTHOPAEDIC SURGERY

## 2021-05-18 PROCEDURE — 3700000001 HC ADD 15 MINUTES (ANESTHESIA): Performed by: ORTHOPAEDIC SURGERY

## 2021-05-18 RX ORDER — KETOROLAC TROMETHAMINE 30 MG/ML
30 INJECTION, SOLUTION INTRAMUSCULAR; INTRAVENOUS ONCE
Status: COMPLETED | OUTPATIENT
Start: 2021-05-18 | End: 2021-05-18

## 2021-05-18 RX ORDER — CALCIUM CARBONATE 200(500)MG
500 TABLET,CHEWABLE ORAL 3 TIMES DAILY PRN
Status: DISCONTINUED | OUTPATIENT
Start: 2021-05-18 | End: 2021-05-21 | Stop reason: HOSPADM

## 2021-05-18 RX ORDER — FENTANYL CITRATE 50 UG/ML
INJECTION, SOLUTION INTRAMUSCULAR; INTRAVENOUS PRN
Status: DISCONTINUED | OUTPATIENT
Start: 2021-05-18 | End: 2021-05-18 | Stop reason: SDUPTHER

## 2021-05-18 RX ORDER — PROPOFOL 10 MG/ML
INJECTION, EMULSION INTRAVENOUS PRN
Status: DISCONTINUED | OUTPATIENT
Start: 2021-05-18 | End: 2021-05-18 | Stop reason: SDUPTHER

## 2021-05-18 RX ORDER — OXYCODONE HYDROCHLORIDE AND ACETAMINOPHEN 5; 325 MG/1; MG/1
2 TABLET ORAL PRN
Status: DISCONTINUED | OUTPATIENT
Start: 2021-05-18 | End: 2021-05-18

## 2021-05-18 RX ORDER — ONDANSETRON 2 MG/ML
INJECTION INTRAMUSCULAR; INTRAVENOUS PRN
Status: DISCONTINUED | OUTPATIENT
Start: 2021-05-18 | End: 2021-05-18 | Stop reason: SDUPTHER

## 2021-05-18 RX ORDER — POLYETHYLENE GLYCOL 3350 17 G/17G
17 POWDER, FOR SOLUTION ORAL DAILY
Status: DISCONTINUED | OUTPATIENT
Start: 2021-05-18 | End: 2021-05-21 | Stop reason: HOSPADM

## 2021-05-18 RX ORDER — DOCUSATE SODIUM 100 MG/1
100 CAPSULE, LIQUID FILLED ORAL 2 TIMES DAILY
Status: DISCONTINUED | OUTPATIENT
Start: 2021-05-18 | End: 2021-05-21 | Stop reason: HOSPADM

## 2021-05-18 RX ORDER — OXYCODONE HYDROCHLORIDE AND ACETAMINOPHEN 5; 325 MG/1; MG/1
1 TABLET ORAL EVERY 4 HOURS PRN
Status: DISCONTINUED | OUTPATIENT
Start: 2021-05-18 | End: 2021-05-18

## 2021-05-18 RX ORDER — OXYCODONE HYDROCHLORIDE AND ACETAMINOPHEN 5; 325 MG/1; MG/1
1 TABLET ORAL PRN
Status: DISCONTINUED | OUTPATIENT
Start: 2021-05-18 | End: 2021-05-18

## 2021-05-18 RX ORDER — LABETALOL 20 MG/4 ML (5 MG/ML) INTRAVENOUS SYRINGE
5 EVERY 10 MIN PRN
Status: DISCONTINUED | OUTPATIENT
Start: 2021-05-18 | End: 2021-05-18

## 2021-05-18 RX ORDER — ACETAMINOPHEN 500 MG
1000 TABLET ORAL EVERY 6 HOURS SCHEDULED
Status: DISCONTINUED | OUTPATIENT
Start: 2021-05-18 | End: 2021-05-21 | Stop reason: HOSPADM

## 2021-05-18 RX ORDER — DIPHENHYDRAMINE HYDROCHLORIDE 50 MG/ML
12.5 INJECTION INTRAMUSCULAR; INTRAVENOUS
Status: DISCONTINUED | OUTPATIENT
Start: 2021-05-18 | End: 2021-05-18

## 2021-05-18 RX ORDER — ONDANSETRON 2 MG/ML
4 INJECTION INTRAMUSCULAR; INTRAVENOUS
Status: DISCONTINUED | OUTPATIENT
Start: 2021-05-18 | End: 2021-05-18

## 2021-05-18 RX ORDER — KETOROLAC TROMETHAMINE 30 MG/ML
30 INJECTION, SOLUTION INTRAMUSCULAR; INTRAVENOUS EVERY 8 HOURS
Status: DISPENSED | OUTPATIENT
Start: 2021-05-18 | End: 2021-05-19

## 2021-05-18 RX ORDER — OXYCODONE HYDROCHLORIDE AND ACETAMINOPHEN 5; 325 MG/1; MG/1
1 TABLET ORAL ONCE
Status: COMPLETED | OUTPATIENT
Start: 2021-05-18 | End: 2021-05-18

## 2021-05-18 RX ORDER — OXYCODONE HYDROCHLORIDE AND ACETAMINOPHEN 5; 325 MG/1; MG/1
2 TABLET ORAL EVERY 4 HOURS PRN
Status: DISCONTINUED | OUTPATIENT
Start: 2021-05-18 | End: 2021-05-18

## 2021-05-18 RX ORDER — 0.9 % SODIUM CHLORIDE 0.9 %
500 INTRAVENOUS SOLUTION INTRAVENOUS
Status: DISCONTINUED | OUTPATIENT
Start: 2021-05-18 | End: 2021-05-18

## 2021-05-18 RX ORDER — DEXAMETHASONE SODIUM PHOSPHATE 10 MG/ML
INJECTION, SOLUTION INTRAMUSCULAR; INTRAVENOUS PRN
Status: DISCONTINUED | OUTPATIENT
Start: 2021-05-18 | End: 2021-05-18 | Stop reason: SDUPTHER

## 2021-05-18 RX ORDER — PROMETHAZINE HYDROCHLORIDE 25 MG/ML
12.5 INJECTION, SOLUTION INTRAMUSCULAR; INTRAVENOUS
Status: DISCONTINUED | OUTPATIENT
Start: 2021-05-18 | End: 2021-05-18

## 2021-05-18 RX ORDER — MEPERIDINE HYDROCHLORIDE 50 MG/ML
12.5 INJECTION INTRAMUSCULAR; INTRAVENOUS; SUBCUTANEOUS EVERY 5 MIN PRN
Status: DISCONTINUED | OUTPATIENT
Start: 2021-05-18 | End: 2021-05-18

## 2021-05-18 RX ORDER — OXYCODONE HYDROCHLORIDE 5 MG/1
5 TABLET ORAL EVERY 4 HOURS PRN
Status: DISCONTINUED | OUTPATIENT
Start: 2021-05-18 | End: 2021-05-21 | Stop reason: HOSPADM

## 2021-05-18 RX ORDER — MORPHINE SULFATE 2 MG/ML
2 INJECTION, SOLUTION INTRAMUSCULAR; INTRAVENOUS EVERY 5 MIN PRN
Status: DISCONTINUED | OUTPATIENT
Start: 2021-05-18 | End: 2021-05-18

## 2021-05-18 RX ORDER — LIDOCAINE HYDROCHLORIDE 10 MG/ML
INJECTION, SOLUTION EPIDURAL; INFILTRATION; INTRACAUDAL; PERINEURAL PRN
Status: DISCONTINUED | OUTPATIENT
Start: 2021-05-18 | End: 2021-05-18 | Stop reason: SDUPTHER

## 2021-05-18 RX ORDER — KETOROLAC TROMETHAMINE 30 MG/ML
INJECTION, SOLUTION INTRAMUSCULAR; INTRAVENOUS
Status: COMPLETED
Start: 2021-05-18 | End: 2021-05-18

## 2021-05-18 RX ADMIN — HYDROMORPHONE HYDROCHLORIDE 0.5 MG: 1 INJECTION, SOLUTION INTRAMUSCULAR; INTRAVENOUS; SUBCUTANEOUS at 10:44

## 2021-05-18 RX ADMIN — DOCUSATE SODIUM 100 MG: 100 CAPSULE, LIQUID FILLED ORAL at 20:47

## 2021-05-18 RX ADMIN — PROPOFOL 170 MG: 10 INJECTION, EMULSION INTRAVENOUS at 09:14

## 2021-05-18 RX ADMIN — KETOROLAC TROMETHAMINE 30 MG: 30 INJECTION, SOLUTION INTRAMUSCULAR; INTRAVENOUS at 11:31

## 2021-05-18 RX ADMIN — FENTANYL CITRATE 100 MCG: 50 INJECTION, SOLUTION INTRAMUSCULAR; INTRAVENOUS at 09:14

## 2021-05-18 RX ADMIN — OXYCODONE HYDROCHLORIDE AND ACETAMINOPHEN 1 TABLET: 5; 325 TABLET ORAL at 01:17

## 2021-05-18 RX ADMIN — FENTANYL CITRATE 25 MCG: 50 INJECTION, SOLUTION INTRAMUSCULAR; INTRAVENOUS at 10:18

## 2021-05-18 RX ADMIN — FENTANYL CITRATE 25 MCG: 50 INJECTION, SOLUTION INTRAMUSCULAR; INTRAVENOUS at 09:55

## 2021-05-18 RX ADMIN — LIDOCAINE HYDROCHLORIDE 50 MG: 10 INJECTION, SOLUTION EPIDURAL; INFILTRATION; INTRACAUDAL; PERINEURAL at 09:14

## 2021-05-18 RX ADMIN — ONDANSETRON 4 MG: 2 INJECTION INTRAMUSCULAR; INTRAVENOUS at 10:08

## 2021-05-18 RX ADMIN — OXYCODONE HYDROCHLORIDE 5 MG: 5 TABLET ORAL at 20:47

## 2021-05-18 RX ADMIN — DEXAMETHASONE SODIUM PHOSPHATE 10 MG: 10 INJECTION, SOLUTION INTRAMUSCULAR; INTRAVENOUS at 09:26

## 2021-05-18 RX ADMIN — ACETAMINOPHEN 1000 MG: 500 TABLET ORAL at 12:11

## 2021-05-18 RX ADMIN — KETOROLAC TROMETHAMINE 30 MG: 30 INJECTION, SOLUTION INTRAMUSCULAR; INTRAVENOUS at 19:45

## 2021-05-18 RX ADMIN — CALCIUM CARBONATE (ANTACID) CHEW TAB 500 MG 500 MG: 500 CHEW TAB at 20:51

## 2021-05-18 RX ADMIN — OXYCODONE HYDROCHLORIDE 5 MG: 5 TABLET ORAL at 12:11

## 2021-05-18 RX ADMIN — ENOXAPARIN SODIUM 40 MG: 40 INJECTION, SOLUTION INTRAVENOUS; SUBCUTANEOUS at 15:05

## 2021-05-18 RX ADMIN — HYDROMORPHONE HYDROCHLORIDE 0.5 MG: 1 INJECTION, SOLUTION INTRAMUSCULAR; INTRAVENOUS; SUBCUTANEOUS at 10:56

## 2021-05-18 RX ADMIN — MORPHINE SULFATE 2 MG: 2 INJECTION, SOLUTION INTRAMUSCULAR; INTRAVENOUS at 00:35

## 2021-05-18 RX ADMIN — FENTANYL CITRATE 25 MCG: 50 INJECTION, SOLUTION INTRAMUSCULAR; INTRAVENOUS at 10:08

## 2021-05-18 RX ADMIN — VANCOMYCIN HYDROCHLORIDE 1500 MG: 1 INJECTION, POWDER, LYOPHILIZED, FOR SOLUTION INTRAVENOUS at 04:10

## 2021-05-18 RX ADMIN — OXYCODONE HYDROCHLORIDE 5 MG: 5 TABLET ORAL at 16:36

## 2021-05-18 RX ADMIN — PANTOPRAZOLE SODIUM 40 MG: 40 TABLET, DELAYED RELEASE ORAL at 05:38

## 2021-05-18 RX ADMIN — FENTANYL CITRATE 25 MCG: 50 INJECTION, SOLUTION INTRAMUSCULAR; INTRAVENOUS at 09:37

## 2021-05-18 RX ADMIN — Medication 0.5 MG: at 10:44

## 2021-05-18 RX ADMIN — HYDROMORPHONE HYDROCHLORIDE 0.5 MG: 1 INJECTION, SOLUTION INTRAMUSCULAR; INTRAVENOUS; SUBCUTANEOUS at 10:35

## 2021-05-18 RX ADMIN — Medication 0.5 MG: at 10:35

## 2021-05-18 RX ADMIN — Medication 0.5 MG: at 10:56

## 2021-05-18 RX ADMIN — VANCOMYCIN HYDROCHLORIDE 2 G: 1 INJECTION, POWDER, LYOPHILIZED, FOR SOLUTION INTRAVENOUS at 09:25

## 2021-05-18 RX ADMIN — POLYETHYLENE GLYCOL 3350 17 G: 17 POWDER, FOR SOLUTION ORAL at 20:46

## 2021-05-18 RX ADMIN — OXYCODONE HYDROCHLORIDE AND ACETAMINOPHEN 1 TABLET: 5; 325 TABLET ORAL at 04:09

## 2021-05-18 RX ADMIN — ACETAMINOPHEN 1000 MG: 500 TABLET ORAL at 18:24

## 2021-05-18 RX ADMIN — ACETAMINOPHEN 650 MG: 325 TABLET ORAL at 03:44

## 2021-05-18 RX ADMIN — LISINOPRIL 10 MG: 10 TABLET ORAL at 12:11

## 2021-05-18 RX ADMIN — SODIUM CHLORIDE, PRESERVATIVE FREE 10 ML: 5 INJECTION INTRAVENOUS at 08:27

## 2021-05-18 RX ADMIN — VANCOMYCIN HYDROCHLORIDE 1500 MG: 1 INJECTION, POWDER, LYOPHILIZED, FOR SOLUTION INTRAVENOUS at 18:27

## 2021-05-18 ASSESSMENT — PAIN SCALES - GENERAL
PAINLEVEL_OUTOF10: 9
PAINLEVEL_OUTOF10: 6
PAINLEVEL_OUTOF10: 6
PAINLEVEL_OUTOF10: 10
PAINLEVEL_OUTOF10: 10
PAINLEVEL_OUTOF10: 8
PAINLEVEL_OUTOF10: 10
PAINLEVEL_OUTOF10: 8
PAINLEVEL_OUTOF10: 10

## 2021-05-18 ASSESSMENT — PAIN DESCRIPTION - ORIENTATION
ORIENTATION: RIGHT

## 2021-05-18 ASSESSMENT — PAIN DESCRIPTION - ONSET: ONSET: ON-GOING

## 2021-05-18 ASSESSMENT — PULMONARY FUNCTION TESTS
PIF_VALUE: 2
PIF_VALUE: 0
PIF_VALUE: 4
PIF_VALUE: 0
PIF_VALUE: 2
PIF_VALUE: 2
PIF_VALUE: 1
PIF_VALUE: 2
PIF_VALUE: 4
PIF_VALUE: 3
PIF_VALUE: 2
PIF_VALUE: 3
PIF_VALUE: 3
PIF_VALUE: 4
PIF_VALUE: 4
PIF_VALUE: 0
PIF_VALUE: 4
PIF_VALUE: 0
PIF_VALUE: 2
PIF_VALUE: 4
PIF_VALUE: 1
PIF_VALUE: 2
PIF_VALUE: 2
PIF_VALUE: 1
PIF_VALUE: 4
PIF_VALUE: 4
PIF_VALUE: 2
PIF_VALUE: 1
PIF_VALUE: 3
PIF_VALUE: 4
PIF_VALUE: 2
PIF_VALUE: 2
PIF_VALUE: 4
PIF_VALUE: 4
PIF_VALUE: 2
PIF_VALUE: 2

## 2021-05-18 ASSESSMENT — PAIN - FUNCTIONAL ASSESSMENT: PAIN_FUNCTIONAL_ASSESSMENT: ACTIVITIES ARE NOT PREVENTED

## 2021-05-18 ASSESSMENT — ENCOUNTER SYMPTOMS
SHORTNESS OF BREATH: 0
COUGH: 0
NAUSEA: 0
BACK PAIN: 1
VOMITING: 0
ABDOMINAL PAIN: 0

## 2021-05-18 ASSESSMENT — PAIN DESCRIPTION - DESCRIPTORS
DESCRIPTORS: BURNING;JABBING;SHARP
DESCRIPTORS: ACHING

## 2021-05-18 ASSESSMENT — PAIN DESCRIPTION - FREQUENCY
FREQUENCY: CONTINUOUS
FREQUENCY: CONTINUOUS

## 2021-05-18 ASSESSMENT — PAIN DESCRIPTION - PROGRESSION: CLINICAL_PROGRESSION: GRADUALLY IMPROVING

## 2021-05-18 NOTE — PROGRESS NOTES
[facility]  Occupational Therapy Not Seen Note    DATE: 2021  Name: Margot Up  : 1965  MRN: [id number]    Patient not available for Occupational Therapy due to:        [x] Other: Per RN, hold OT tx until  as pt has just returned to unit from surgery.        Next Scheduled Treatment:     Lambert Fletcher OT OTR/L

## 2021-05-18 NOTE — PROGRESS NOTES
Pharmacy Note  Vancomycin Consult    Candelario Malloy is a 60 y.o. male started on Vancomycin for SSTI; consult received from Dr. Monse Ordoñez to manage therapy. Also receiving the following antibiotics:n/a.    Patient Active Problem List   Diagnosis    Restless leg syndrome    Disc disease, degenerative, lumbar or lumbosacral    Somatic dysfunction of cervical region    Chronic midline low back pain without sciatica    Essential hypertension    Obstructive sleep apnea syndrome    Lymph node enlargement    Anxiety    Bilateral tinnitus    History of tobacco use    Sensorineural hearing loss, bilateral    Deviated septum    History of placement of ear tubes    Cellulitis of right knee    Prepatellar bursitis of right knee    Hyponatremia    Hypocalcemia    Septic infrapatellar bursitis, left     Allergies:  Patient has no known allergies. Recent Labs     05/17/21  0908 05/18/21  0528   BUN 18 12   CREATININE 0.84 0.78   WBC 15.1* 14.3*       Intake/Output Summary (Last 24 hours) at 5/18/2021 1509  Last data filed at 5/18/2021 0721  Gross per 24 hour   Intake 1686.15 ml   Output 1625 ml   Net 61.15 ml     Culture Date      Source                       Results  5/17                    blood x2                       no growth x12 hours  5/16                    blood                            no growth x1 day    Ht Readings from Last 1 Encounters:   05/18/21 6' 1\" (1.854 m)        Wt Readings from Last 1 Encounters:   05/18/21 197 lb (89.4 kg)       Body mass index is 25.99 kg/m². Estimated Creatinine Clearance: 118 mL/min (based on SCr of 0.78 mg/dL). Goal Trough Level: 10-20 mcg/mL    Assessment/Plan:  Vancomycin initiated yesterday 1500mg Q12 hours. Patient has received 2 doses already. Clarification needed with 0925 dose in system. Charted under wrong drug. Patient was given 2gm Ancef bolus while in surgery per Dr. Monse Ordoñez.  Will continue Vancomcyin 1500mg IV Q12 hours and obtain a trough level 5/19/21 prior to 4th dose at 0630. Level will be determined based on culture results, renal function, and clinical response. Thank you for the consult. Will continue to follow.      Lynnette LanD

## 2021-05-18 NOTE — CONSULTS
Consults  Patient: Fuad Valdovinos  Unit/Bed: 519/668-98  YOB: 1965  MRN: [id number]  Acct: [de-identified]   Admitting Diagnosis: Cellulitis of right knee [O40.420]  Admit Date:  5/16/2021  Hospital Day: 1    Subjective:    Patient is having problems with right knee pain  HPI  Patient Seen, Chart, Labs, Radiology studies, and Consults reviewed. Patient admitted Sunday night with septic pre-patellar bursitis    Pain improved since 2 am        Past Medical History:   Diagnosis Date    Back pain     Disc disease, degenerative, lumbar or lumbosacral     Hyperlipidemia     Hypertension     Restless leg syndrome      Past Surgical History:   Procedure Laterality Date    HERNIA REPAIR      TONSILLECTOMY AND ADENOIDECTOMY      TYMPANOSTOMY TUBE PLACEMENT Bilateral      Family History   Problem Relation Age of Onset    Arthritis Mother      Social History     Occupational History    Not on file   Tobacco Use    Smoking status: Former Smoker     Packs/day: 0.05     Years: 35.00     Pack years: 1.75     Start date: 2010     Quit date: 7/23/2010     Years since quitting: 10.8    Smokeless tobacco: Never Used   Vaping Use    Vaping Use: Never used   Substance and Sexual Activity    Alcohol use: No     Comment: rare    Drug use: Yes     Comment: occ    Sexual activity: Yes     Partners: Female        Objective:   /86   Pulse 70   Temp 101.5 °F (38.6 °C) (Oral)   Resp 16   Ht 6' 1\" (1.854 m)   Wt 197 lb 1.5 oz (89.4 kg)   SpO2 95%   BMI 26.00 kg/m²       Intake/Output Summary (Last 24 hours) at 5/18/2021 0641  Last data filed at 5/18/2021 0612  Gross per 24 hour   Intake 1766.85 ml   Output 1625 ml   Net 141.85 ml     Review of Systems   All other systems reviewed and are negative. Physical Exam  Vitals and nursing note reviewed. Constitutional:       Appearance: He is well-developed. HENT:      Head: Normocephalic and atraumatic.       Nose: Nose normal.   Eyes: Conjunctiva/sclera: Conjunctivae normal.   Pulmonary:      Effort: Pulmonary effort is normal. No respiratory distress. Musculoskeletal:      Cervical back: Normal range of motion and neck supple. Comments: Normal gait     Skin:     General: Skin is warm and dry. Neurological:      Mental Status: He is alert and oriented to person, place, and time. Sensory: No sensory deficit. Psychiatric:         Behavior: Behavior normal.         Thought Content: Thought content normal.     PE c/w septic prepatellar bursitis with cellulitis - per pt erythema no change since admit      Drains:No  Imaging:Yes CT pre-patellar bursitis      Medications:    vancomycin  1,500 mg Intravenous Q12H    fluticasone  1 spray Nasal Daily    lisinopril  10 mg Oral Daily    sodium chloride flush  5-40 mL Intravenous 2 times per day    enoxaparin  40 mg Subcutaneous Daily    pseudoephedrine  240 mg Oral Daily    And    cetirizine  10 mg Oral Daily    pantoprazole  40 mg Oral QAM AC     PRN Meds:oxyCODONE-acetaminophen **OR** oxyCODONE-acetaminophen, sodium chloride, tiZANidine, traMADol, sodium chloride flush, sodium chloride, potassium chloride **OR** potassium alternative oral replacement **OR** potassium chloride, magnesium sulfate, promethazine **OR** ondansetron, polyethylene glycol, nicotine, acetaminophen **OR** acetaminophen, ketorolac, butalbital-acetaminophen-caffeine, albuterol      Data:  CBC:   Recent Labs     05/16/21  2140 05/17/21  0908 05/18/21  0528   WBC 17.4* 15.1* 14.3*   RBC 4.98 4.46* 4.29*   HGB 15.2 13.8 13.2*   HCT 44.8 39.8* 38.8*   MCV 90.0 89.4 90.6   RDW 12.5 12.2* 12.4*    205 190     BNP: No results for input(s): BNP in the last 72 hours. PT/INR: No results for input(s): PROTIME, INR in the last 72 hours.     Assessment:     Principal Problem:    Cellulitis of right knee  Active Problems:    Disc disease, degenerative, lumbar or lumbosacral    Chronic midline low back pain without sciatica    Essential hypertension    History of tobacco use    Sensorineural hearing loss, bilateral    Prepatellar bursitis of right knee  Resolved Problems:    * No resolved hospital problems. *      Plan:    Will require I&D likely    Electronically signed by Timothy Felix MD on 5/18/2021 at 6:41 AM    Rounding Hospitalist

## 2021-05-18 NOTE — PLAN OF CARE
Problem: Falls - Risk of:  Goal: Will remain free from falls  Description: Will remain free from falls  Outcome: Ongoing  Goal: Absence of physical injury  Description: Absence of physical injury  Outcome: Ongoing    Pt remains free from falls at this time. Bed/chair alarms in place to promote safety. Pt has been educated on how to utilize the call light effectively. Problem: Pain:  Goal: Pain level will decrease  Description: Pain level will decrease  Outcome: Ongoing  Goal: Control of acute pain  Description: Control of acute pain  Outcome: Ongoing  Goal: Control of chronic pain  Description: Control of chronic pain  Outcome: Ongoing  Goal: Patient's pain/discomfort is manageable  Description: Patient's pain/discomfort is manageable  Outcome: Ongoing    Pt pain is controlled at this time and evaluated each hour. PRN medications are available and will be administered as needed. Problem: Infection:  Goal: Will remain free from infection  Description: Will remain free from infection  Outcome: Ongoing    Pt is afebrile at this time. Trending labs. Administering antibiotics as ordered. Will continue to monitor. Problem: Safety:  Goal: Free from accidental physical injury  Description: Free from accidental physical injury  Outcome: Ongoing  Goal: Free from intentional harm  Description: Free from intentional harm  Outcome: Ongoing    Pt remains free from falls at this time. Bed/chair alarms in place to promote safety. Pt has been educated on how to utilize the call light effectively. Problem: Daily Care:  Goal: Daily care needs are met  Description: Daily care needs are met  Outcome: Ongoing    Pt is able to perform ADLs with little to no assistance. PT/OT following. Problem: Skin Integrity:  Goal: Skin integrity will stabilize  Description: Skin integrity will stabilize  Outcome: Ongoing    Pt skin is not intact (see charting).  Pt has been encouraged to turn every 2 hours to prevent skin breakdown. Pt has new surgical wound with clean, dry, intact dressing. RN will continue to monitor. Problem: Discharge Planning:  Goal: Patients continuum of care needs are met  Description: Patients continuum of care needs are met  Outcome: Ongoing    Discharge planning has been initiated. Will continue to work with care team to ensure timely discharge.

## 2021-05-18 NOTE — PLAN OF CARE
Problem: Infection:  Goal: Will remain free from infection  Description: Will remain free from infection  5/18/2021 0122 by Bee Cota RN  Outcome: Ongoing  Continue to monitor labwork and administer antibiotics per physician orders  Problem: Daily Care:  Goal: Daily care needs are met  Description: Daily care needs are met  Outcome: Ongoing  RN attempts to have pt. Participate in care, pt. Reluctant, pt. Inconsistent in relaying what med and when pain medications work and don't work for him. Pt. Refusing medications that appeared to work for him during the day. Problem: Falls - Risk of:  Goal: Will remain free from falls  Description: Will remain free from falls  5/18/2021 0122 by Bee Cota RN  Outcome: Ongoing   No falls/injuries this shift, bed in lowest position, brakes on, bed alarm on, call light in reach, side rails up x2   Problem: Pain:  Goal: Control of acute pain  Description: Control of acute pain  5/18/2021 0122 by Bee Cota RN  Outcome: Ongoing   Pt. Inconsistent in relaying what med and when pain medications work and don't work for him. Pt. Refusing medications that appeared to work for him during the day.

## 2021-05-18 NOTE — PROGRESS NOTES
to go to emergency room for further evaluation. He is underlying history of sensorineural hearing loss, hypertension, hyperlipidemia, chronic back pain on Ultram.  Patient was afebrile with heart rate 92. Lab work showed leukocytosis 17.4, with left shift CRP 12. 0. X-ray knee was negative for foreign body. Initial plan included:  Patient status Admit as inpatient in the  Progressive Unit/Step down     1. Prepatellar cellulitis -concern for septic arthritis -change antibiotics to Vanco Zosyn. CT right knee without contrast concerning for joint effusion. -Consult orthopedic surgery. 2. Essential hypertension -stable with lisinopril. Chronic back pain-on tramadol and Ultram.\"    5/18 the patient underwent:  Procedure: Right knee open prepatellar bursa irrigation and debridement     Medications: Allergies:  No Known Allergies    Current Meds:   Scheduled Meds:    vancomycin  1,500 mg Intravenous Q12H    ceFAZolin        acetaminophen  1,000 mg Oral 4 times per day    ketorolac  30 mg Intravenous Q8H    fluticasone  1 spray Nasal Daily    lisinopril  10 mg Oral Daily    sodium chloride flush  5-40 mL Intravenous 2 times per day    pseudoephedrine  240 mg Oral Daily    And    cetirizine  10 mg Oral Daily    pantoprazole  40 mg Oral QAM AC     Continuous Infusions:    sodium chloride 75 mL/hr at 05/17/21 2017    sodium chloride       PRN Meds: oxyCODONE, sodium chloride, tiZANidine, sodium chloride flush, sodium chloride, potassium chloride **OR** potassium alternative oral replacement **OR** potassium chloride, magnesium sulfate, promethazine **OR** ondansetron, polyethylene glycol, nicotine, ketorolac, butalbital-acetaminophen-caffeine, albuterol    Data:     Past Medical History:   has a past medical history of Back pain, Disc disease, degenerative, lumbar or lumbosacral, Hyperlipidemia, Hypertension, Restless leg syndrome, and Septic infrapatellar bursitis, left.     Social History:   reports that he quit smoking about 10 years ago. He started smoking about 11 years ago. He has a 1.75 pack-year smoking history. He has never used smokeless tobacco. He reports current drug use. He reports that he does not drink alcohol. Family History:   Family History   Problem Relation Age of Onset    Arthritis Mother        Review of Systems:     Review of Systems   Constitutional: Positive for activity change (Diminished) and fever. Negative for chills and diaphoresis. HENT: Positive for hearing loss. Respiratory: Negative for cough and shortness of breath. Cardiovascular: Negative for chest pain and palpitations. Gastrointestinal: Negative for abdominal pain, nausea and vomiting. Genitourinary: Negative for flank pain and hematuria. Musculoskeletal: Positive for arthralgias, back pain (chronic ), gait problem (Due to knee pain) and myalgias. His right knee is still stiff and sore       Physical Examination:        Physical Exam  Vitals reviewed. Constitutional:       General: He is not in acute distress. Appearance: He is not diaphoretic. HENT:      Head: Normocephalic. Ears:      Comments: Hearing acuity quite diminished     Nose: Nose normal.   Eyes:      General: No scleral icterus. Conjunctiva/sclera: Conjunctivae normal.   Neck:      Trachea: No tracheal deviation. Cardiovascular:      Rate and Rhythm: Normal rate and regular rhythm. Pulmonary:      Effort: Pulmonary effort is normal. No respiratory distress. Breath sounds: Normal breath sounds. No wheezing or rales. Chest:      Chest wall: No tenderness. Abdominal:      General: Bowel sounds are normal. There is no distension. Palpations: Abdomen is soft. Tenderness: There is no abdominal tenderness. Musculoskeletal:         General: Swelling (Mild incisional/knee swelling) and tenderness present. Cervical back: Neck supple. Skin:     General: Skin is warm and dry.       Comments: Wound is dressed, dry and clean    Neurological:      Mental Status: He is alert and oriented to person, place, and time. Vitals:  /75   Pulse 74   Temp 98.1 °F (36.7 °C) (Oral)   Resp 20   Ht 6' 1\" (1.854 m)   Wt 197 lb (89.4 kg)   SpO2 93%   BMI 25.99 kg/m²   Temp (24hrs), Av.5 °F (37.5 °C), Min:97.4 °F (36.3 °C), Max:102.6 °F (39.2 °C)    No results for input(s): POCGLU in the last 72 hours. I/O (24Hr): Intake/Output Summary (Last 24 hours) at 2021 1324  Last data filed at 2021 0721  Gross per 24 hour   Intake 1686.15 ml   Output 1625 ml   Net 61.15 ml       Labs:  Hematology:  Recent Labs     21  21421  0908 21  05   WBC 17.4* 15.1* 14.3*   RBC 4.98 4.46* 4.29*   HGB 15.2 13.8 13.2*   HCT 44.8 39.8* 38.8*   MCV 90.0 89.4 90.6   MCH 30.5 30.9 30.9   MCHC 33.9 34.6 34.1   RDW 12.5 12.2* 12.4*    205 190   MPV 8.2 7.6 8.3   SEDRATE 3  --   --    CRP 12.0*  --   --      Chemistry:  Recent Labs     21  0908 21  0528   * 132*   K 4.3 3.8    101   CO2 22 24   GLUCOSE 242* 186*   BUN 18 12   CREATININE 0.84 0.78   ANIONGAP 8* 7*   LABGLOM >60 >60   GFRAA >60 >60   CALCIUM 8.4* 8.0*   LACTACIDWB NOT REPORTED  --    No results for input(s): PROT, LABALBU, LABA1C, K1XUWTL, Y4TPSPK, FT4, TSH, AST, ALT, LDH, GGT, ALKPHOS, LABGGT, BILITOT, BILIDIR, AMMONIA, AMYLASE, LIPASE, LACTATE, CHOL, HDL, LDLCHOLESTEROL, CHOLHDLRATIO, TRIG, VLDL, WZM16TV, PHENYTOIN, PHENYF, URICACID, POCGLU in the last 72 hours.   ABG:No results found for: POCPH, PHART, PH, POCPCO2, RVX8RMV, PCO2, POCPO2, PO2ART, PO2, POCHCO3, SDW8PQC, HCO3, NBEA, PBEA, BEART, BE, THGBART, THB, JXG7POK, TLKP6LVI, W5MVNNDF, O2SAT, FIO2  Lab Results   Component Value Date/Time    SPECIAL left arm 20cc 2021 04:44 PM     Lab Results   Component Value Date/Time    CULTURE NO GROWTH 12 HOURS 2021 04:44 PM       Radiology:  XR KNEE RIGHT (3 VIEWS)    Result Date: 2021  No fracture evident. Soft tissue edema, presumably reactive edema, contusion and/or sprain. Follow-up imaging recommended if pain persists or worsens following conservative management. CT KNEE RIGHT WO CONTRAST    Result Date: 5/17/2021  1. Nonspecific subcutaneous edema most pronounced anterior to the patella and patellar tendon compatible with reactive edema or cellulitis. No abscess or soft tissue gas. 2. Small nonspecific joint effusion. If there is clinical concern for septic arthritis please consider arthrocentesis. 3. No evidence of osteomyelitis or other acute osseous abnormality. 4. Mild tricompartmental degenerative changes. Assessment:        Principal Problem:    Septic infrapatellar bursitis, left  Active Problems:    Disc disease, degenerative, lumbar or lumbosacral    Chronic midline low back pain without sciatica    Essential hypertension    Obstructive sleep apnea syndrome    History of tobacco use    Sensorineural hearing loss, bilateral    Cellulitis of right knee    Prepatellar bursitis of right knee    Hyponatremia    Hypocalcemia  Resolved Problems:    * No resolved hospital problems.  *      Plan:        Antibiotics per C&S Results  Ortho evaluation  Pain management  Correct electrolyte abnormalities  Blood Pressure - Monitor and control  CPAP  PT/OT  DVT prophylaxis   Status/plan has needed with the patient and his wife at the bedside    IP CONSULT TO [address],[address], DO  5/18/2021  1:24 PM

## 2021-05-18 NOTE — PROGRESS NOTES
PCT relayed to writer pt. Asking for pain medication (Morphine), writer checked on pt. - pt.  Was snoring

## 2021-05-18 NOTE — PROGRESS NOTES
Pt. asking for more Percocet, rates pain 8/10. Last dose x1 tab administered @ 0117. Consulted NP. Also, has another fever 101.5. Will administer Tylenol.

## 2021-05-18 NOTE — PROGRESS NOTES
Another dose of Percocet ordered for pt. Took med in to pt. And pt. Found snoring, woke pt. Up and administered medication per his previous request of pain.

## 2021-05-18 NOTE — ANESTHESIA PRE PROCEDURE
Department of Anesthesiology  Preprocedure Note       Name:  Kamilah Henriquez   Age:  60 y.o.  :  1965                                          MRN:  [id number]         Date:  2021      Surgeon: Rich Malin):  Nixon Van MD    Procedure: Procedure(s):  KNEE INCISION AND DRAINAGE    Medications prior to admission:   Prior to Admission medications    Medication Sig Start Date End Date Taking?  Authorizing Provider   omeprazole (PRILOSEC) 20 MG delayed release capsule TAKE ONE CAPSULE BY MOUTH EVERY MORNING BEFORE BREAKFAST 3/16/21  Yes GITA Cobb CNP   lisinopril (PRINIVIL;ZESTRIL) 10 MG tablet TAKE ONE TABLET BY MOUTH DAILY 3/16/21  Yes GITA Cobb CNP   HM ALLERGY RELIEF/NASAL DECONG  MG per extended release tablet TAKE ONE TABLET BY MOUTH ONCE DAILY 20  Yes GITA Mcclure CNP   tiZANidine (ZANAFLEX) 4 MG tablet Take 1 tablet by mouth every 8 hours as needed (muscle spasms) 20  Yes GITA Cobb CNP   albuterol sulfate  (90 Base) MCG/ACT inhaler USE AS DIRECTED 10/28/20  Yes GITA Cobb CNP   ibuprofen (ADVIL;MOTRIN) 800 MG tablet Take 1 tablet by mouth every 8 hours as needed for Pain 19  Yes GITA Mcclure CNP   Multiple Vitamins-Minerals (MULTI FOR HIM 50+ PO) Take by mouth   Yes Historical Provider, MD   fluticasone (FLONASE) 50 MCG/ACT nasal spray 1 spray by Nasal route daily 17  Yes GITA Cobb CNP   sodium chloride (OCEAN) 0.65 % nasal spray 1 spray by Nasal route as needed for Congestion 20   GITA Mcclure CNP       Current medications:    Current Facility-Administered Medications   Medication Dose Route Frequency Provider Last Rate Last Admin    vancomycin (VANCOCIN) 1,500 mg in dextrose 5 % 500 mL IVPB  1,500 mg Intravenous Q12H Roslyn Raymundo MD   Stopped at 21 0612    oxyCODONE-acetaminophen (PERCOCET) 5-325 MG per tablet 1 tablet  1 tablet Oral Q4H PRN Mary Beth Rivas APRN - CNP        Or    oxyCODONE-acetaminophen (PERCOCET) 5-325 MG per tablet 2 tablet  2 tablet Oral Q4H PRN Mary Beth Rivas APRN - CNP        fluticasone (FLONASE) 50 MCG/ACT nasal spray 1 spray  1 spray Nasal Daily Mary Bethdiego Rivas APRN - CNP   1 spray at 05/17/21 0818    sodium chloride (OCEAN) 0.65 % nasal spray 1 spray  1 spray Nasal PRN Mary Beth Rivas APRN - CNP        tiZANidine (ZANAFLEX) tablet 4 mg  4 mg Oral Q8H PRN Mary Beth Rivas APRN - CNP        lisinopril (PRINIVIL;ZESTRIL) tablet 10 mg  10 mg Oral Daily Reynaldo Vargas APRN - NP   10 mg at 05/17/21 0816    traMADol (ULTRAM) tablet 50 mg  50 mg Oral Q8H PRN Mary Beth Rivas APRN - CNP   50 mg at 05/17/21 1319    0.9 % sodium chloride infusion   Intravenous Continuous Mary Beth Rivas APRN - CNP 75 mL/hr at 05/17/21 2017 New Bag at 05/17/21 2017    sodium chloride flush 0.9 % injection 5-40 mL  5-40 mL Intravenous 2 times per day Mary Beth Rivas APRN - CNP   10 mL at 05/18/21 0827    sodium chloride flush 0.9 % injection 10 mL  10 mL Intravenous PRN Mary Beth Rivas APRN - CNP        0.9 % sodium chloride infusion  25 mL Intravenous PRN Mary Beth Rivas APRN - CNP        potassium chloride (KLOR-CON M) extended release tablet 40 mEq  40 mEq Oral PRN Mary Beth Rivas, APRN - CNP        Or    potassium bicarb-citric acid (EFFER-K) effervescent tablet 40 mEq  40 mEq Oral PRN Mary Beth Rivas, APRN - CNP        Or    potassium chloride 10 mEq/100 mL IVPB (Peripheral Line)  10 mEq Intravenous PRN Mary Beth Rivas, APRN - CNP        magnesium sulfate 1000 mg in dextrose 5% 100 mL IVPB  1,000 mg Intravenous PRN Mary Beth Rivas APRN - CNP        promethazine (PHENERGAN) tablet 12.5 mg  12.5 mg Oral Q6H PRN GITA Lechuga CNP        Or    ondansetron (ZOFRAN) injection 4 mg  4 mg Intravenous Q6H PRN GITA Lechuga CNP        polyethylene glycol (GLYCOLAX) packet 17 g  17 g Oral Daily PRN Mary Beth ATIF Cookgrosso, APRN - CNP        nicotine (NICODERM CQ) 21 MG/24HR 1 patch  1 patch Transdermal Daily PRN Mary Beth ATIF Delgrosso, APRN - CNP        acetaminophen (TYLENOL) tablet 650 mg  650 mg Oral Q6H PRN Mary Beth ATIF Delgrosso, APRN - CNP   650 mg at 05/18/21 0344    Or    acetaminophen (TYLENOL) suppository 650 mg  650 mg Rectal Q6H PRN Mary Beth ATIF Cookgrosso, APRN - CNP        pseudoephedrine (SUDAFED 12 HR) extended release tablet 240 mg  240 mg Oral Daily Mary Beth D Joeygrosso, APRN - CNP        And    cetirizine (ZYRTEC) tablet 10 mg  10 mg Oral Daily Mary Beth D Delgrosso, APRN - CNP   10 mg at 05/17/21 0816    ketorolac (TORADOL) injection 15 mg  15 mg Intravenous Q6H PRN Jetty Fairly, APRN - NP   15 mg at 05/17/21 1505    butalbital-acetaminophen-caffeine (FIORICET, ESGIC) per tablet 1 tablet  1 tablet Oral Q4H PRN Jetty Fairly, APRN - NP   1 tablet at 05/17/21 1214    pantoprazole (PROTONIX) tablet 40 mg  40 mg Oral QAM AC Jetty Fairly, APRN - NP   40 mg at 05/18/21 0538    albuterol (PROVENTIL) nebulizer solution 2.5 mg  2.5 mg Nebulization Q4H PRN Jetty Fairly, APRN - NP           Allergies:  No Known Allergies    Problem List:    Patient Active Problem List   Diagnosis Code    Restless leg syndrome G25.81    Disc disease, degenerative, lumbar or lumbosacral M51.37    Somatic dysfunction of cervical region M99.01    Chronic midline low back pain without sciatica M54.5, G89.29    Essential hypertension I10    Obstructive sleep apnea syndrome G47.33    Lymph node enlargement R59.9    Anxiety F41.9    Bilateral tinnitus H93.13    History of tobacco use Z87.891    Sensorineural hearing loss, bilateral H90.3    Deviated septum J34.2    History of placement of ear tubes Z96.22    Cellulitis of right knee L03.115    Prepatellar bursitis of right knee M70.41       Past Medical History:        Diagnosis Date    Back pain     Disc disease, degenerative, lumbar or lumbosacral     Hyperlipidemia     Hypertension     Restless leg syndrome        Past Surgical History:        Procedure Laterality Date    HERNIA REPAIR      TONSILLECTOMY AND ADENOIDECTOMY      TYMPANOSTOMY TUBE PLACEMENT Bilateral        Social History:    Social History     Tobacco Use    Smoking status: Former Smoker     Packs/day: 0.05     Years: 35.00     Pack years: 1.75     Start date: 2010     Quit date: 7/23/2010     Years since quitting: 10.8    Smokeless tobacco: Never Used   Substance Use Topics    Alcohol use: No     Comment: rare                                Counseling given: Not Answered      Vital Signs (Current):   Vitals:    05/17/21 2342 05/18/21 0339 05/18/21 0721 05/18/21 0823   BP: 120/77 131/86 116/71 131/86   Pulse: 73 70 70 75   Resp: 18 16 20 13   Temp: 99.7 °F (37.6 °C) 101.5 °F (38.6 °C) 98.9 °F (37.2 °C) 97.4 °F (36.3 °C)   TempSrc: Oral Oral Oral Infrared   SpO2: 95% 95% 95% 98%   Weight:  197 lb 1.5 oz (89.4 kg)  197 lb (89.4 kg)   Height:    6' 1\" (1.854 m)                                              BP Readings from Last 3 Encounters:   05/18/21 131/86   04/29/21 122/76   12/17/20 124/78       NPO Status: Time of last liquid consumption: 0400                        Time of last solid consumption: 1200                        Date of last liquid consumption: 05/18/21                        Date of last solid food consumption: 05/17/21    BMI:   Wt Readings from Last 3 Encounters:   05/18/21 197 lb (89.4 kg)   04/29/21 199 lb 12.8 oz (90.6 kg)   12/17/20 204 lb (92.5 kg)     Body mass index is 25.99 kg/m².     CBC:   Lab Results   Component Value Date    WBC 14.3 05/18/2021    RBC 4.29 05/18/2021    HGB 13.2 05/18/2021    HCT 38.8 05/18/2021    MCV 90.6 05/18/2021    RDW 12.4 05/18/2021     05/18/2021       CMP:   Lab Results   Component Value Date     05/18/2021    K 3.8 05/18/2021     05/18/2021    CO2 24 05/18/2021 BUN 12 05/18/2021    CREATININE 0.78 05/18/2021    GFRAA >60 05/18/2021    LABGLOM >60 05/18/2021    GLUCOSE 186 05/18/2021    PROT 6.6 06/04/2019    CALCIUM 8.0 05/18/2021    BILITOT 0.60 06/04/2019    ALKPHOS 63 06/04/2019    AST 32 06/04/2019    ALT 25 06/04/2019       POC Tests: No results for input(s): POCGLU, POCNA, POCK, POCCL, POCBUN, POCHEMO, POCHCT in the last 72 hours. Coags:   Lab Results   Component Value Date    PROTIME 10.4 06/30/2019    INR 1.0 06/30/2019    APTT 24.9 06/30/2019       HCG (If Applicable): No results found for: PREGTESTUR, PREGSERUM, HCG, HCGQUANT     ABGs: No results found for: PHART, PO2ART, CET8RBU, HBT2LKG, BEART, M2BXLWIQ     Type & Screen (If Applicable):  No results found for: LABABO, LABRH    Drug/Infectious Status (If Applicable):  No results found for: HIV, HEPCAB    COVID-19 Screening (If Applicable):   Lab Results   Component Value Date    COVID19 Not Detected 05/17/2021           Anesthesia Evaluation  Patient summary reviewed and Nursing notes reviewed  Airway: Mallampati: III  TM distance: >3 FB   Neck ROM: full  Mouth opening: > = 3 FB Dental: normal exam         Pulmonary:normal exam    (+) sleep apnea: on noncompliant,  asthma:                           ROS comment: -ASTHMA - USED RESCUE INHALER 4 TIMES IN PAST MONTH  -QUIT SMOKING 2010   Cardiovascular:    (+) hypertension:,                   Neuro/Psych:                ROS comment: -TINNITUS  -HARD OF HEARING  -DDD GI/Hepatic/Renal:   (+) GERD: well controlled,           Endo/Other: Negative Endo/Other ROS                     ROS comment: -NPO AFTER MIDNIGHT  -NKDA Abdominal:           Vascular: negative vascular ROS. Anesthesia Plan      general     ASA 2     (LMA)  Induction: intravenous. MIPS: Postoperative opioids intended and Prophylactic antiemetics administered. Anesthetic plan and risks discussed with patient. Plan discussed with CRNA.     Attending anesthesiologist reviewed and agrees with Pre Eval content              Ant John MD   5/18/2021

## 2021-05-18 NOTE — ANESTHESIA POSTPROCEDURE EVALUATION
Department of Anesthesiology  Postprocedure Note    Patient: Beverly Tejada  MRN: [id number]  YOB: 1965  Date of evaluation: 5/18/2021  Time:  10:42 AM     Procedure Summary     Date: 05/18/21 Room / Location: [address] [place] [address] 02 / [facility]    Anesthesia Start: 6203 Anesthesia Stop: 1031    Procedure: KNEE INCISION AND DRAINAGE (Right ) Diagnosis: (Right knee skin infection [L08.9])    Surgeons: Veronica Salmon MD Responsible Provider: Cherrie Ortez MD    Anesthesia Type: general ASA Status: 2          Anesthesia Type: general    Mónica Phase I: Mónica Score: 9    Mónica Phase II:      Last vitals: Reviewed and per EMR flowsheets.        Anesthesia Post Evaluation    Patient location during evaluation: PACU  Patient participation: complete - patient participated  Level of consciousness: awake and alert  Airway patency: patent  Nausea & Vomiting: no nausea and no vomiting  Complications: no  Cardiovascular status: hemodynamically stable  Respiratory status: face mask and spontaneous ventilation  Hydration status: euvolemic

## 2021-05-18 NOTE — PROGRESS NOTES
Pt. Commented Percocet really helped his pain. Writer will consult NP to inquire if can get a PRN order for further doses.

## 2021-05-18 NOTE — PROGRESS NOTES
Patient was seen following a request by Dr. Gutierrez Nick for assessment for possible I and D of a septic right knee prepatellar bursitis. This has been ongoing for several days and he's been admitted for the past 2 days on IV antibiotics. He states that he's had a fever of 103. Denies chills or sweats. Evaluation of his right knee and lower extremity demonstrates focal swelling and erythema over the anterior aspect of the knee. Tender to palpation with decreased knee flexion due to pain. No joint effusion. Impression and plan: Mr. Yeny Rios is a 60year old male with a septic right knee prepatellar bursitis. - Discussed treatment options. I believe he would benefit from an irrigation and debridement of his knee. We discussed the details of surgery, risks and benefits, expected outcome and postoperative recovery course. Risks as discussed included but were not limited to risk of an persistent infection, wound healing problems, pain, stiffness, neurovascular injury, DVT/PE, and risk of anesthesia. He demonstrated a good understanding of our discussion and would like to proceed with surgery. All questions were appropriately answered. He has been n.p.o. since midnight. We will plan on surgery this morning.

## 2021-05-18 NOTE — BRIEF OP NOTE
Brief Postoperative Note      Patient: Manuel Henry  YOB: 1965  MRN: [id number]    Date of Procedure: 5/18/2021    Pre-Op Diagnosis: Right knee pre-patellar bursitis    Post-Op Diagnosis: Same       Procedure(s):  KNEE INCISION AND DRAINAGE    Surgeon(s):  Rebekah Briggs MD    Assistant:  Resident: Evon Maldonado DO    Anesthesia: General    Estimated Blood Loss (mL): Minimal    Complications: None    Specimens:   ID Type Source Tests Collected by Time Destination   1 : RIGHT KNEE PREPATELLAR BURSA Swab Joint, Knee CULTURE, AEROBIC Rebekah Briggs MD 5/18/2021 5444        Implants:  * No implants in log *      Drains:   Closed/Suction Drain Right Knee Bulb 15 [place] (Active)       Findings: See operative report    Electronically signed by Rebekah Briggs MD on 5/18/2021 at 10:26 AM

## 2021-05-18 NOTE — PROGRESS NOTES
Physical Therapy    DATE: 2021    NAME: Ruslan Tobin  MRN: [id number]   : 1965      Patient not seen this date for Physical Therapy due to: Other: Per nursing hold PT treatment this date; pt just returned from surgery.       Electronically signed by Christine Rojo PT on 2021 at 12:57 PM

## 2021-05-18 NOTE — PROGRESS NOTES
Pt. C/o Morphine not working now (on R knee pain), 'it's not working fast enough, the antibiotics are interferring'    Pt. Can not relay to writer what medication does/doesn't work for his pain, offered, Ultram, Morphine, Toradol, pt. Refused all. Writer consulted NP. Order received.

## 2021-05-18 NOTE — OP NOTE
OPERATIVE REPORT    Date of Surgery: 5/18/2021    Pre-operative Diagnosis: Right knee septic prepatellar bursitis    Post-operative Diagnosis: Right knee septic prepatellar bursitis    Procedure: Right knee open prepatellar bursa irrigation and debridement    Surgeon(s): Stephany Mejia MD    Assistant(s): MICHAEL Parra    Anesthesia: General    Fluids: See anesthesia record    Urine output: See anesthesia record    Estimated blood loss: Minimal    Findings: Gross purulence within the prepatellar bursa with a thickened and inflamed prepatellar bursa. Specimen: Prepatellar bursa cultures    Tourniquet time: 22 minutes    Implants: None    Surgical Indications:  Candelario Malloy is a 60 y.o. old male who presented with a right knee septic prepatellar bursitis. He was on IV antibiotics for couple of days with slow improvement. Following a discussion with the patient regarding both non-operative and operative treatment options, they consented to proceed with right knee open prepatellar bursa irrigation and debridement. He came to this decision after demonstrating an understanding of our discussion regarding details of the procedure, risks and benefits, expected outcome, and postoperative course. Operative technique: Following appropriate identification of the patient and his operative extremity, consent was reviewed with the patient and His operative extremity was signed. He was wheeled to the operating room where he finished a course of pre-operative antibiotic prophylaxis by way of 2 g of IV Ancef. The anesthesia service administered a general anesthetic and secured his airway using an LMA. All bony prominences were appropriately padded and the patient was secured to the operative table in a supine position. A well-padded pneumatic tourniquet was applied to the proximal aspect of the patient's right thigh.   The patient's operative extremity was prepped and draped in a standard sterile fashion and a time out was performed during which the correct patient, operative extremity, and procedure were verified. A longitudinal incision centered on the anterior aspect of the patient's right knee was made sharply through skin after his tourniquet was inflated up to 300 mmHg. Sharp dissection was carried down to the prepatellar bursa where gross purulence was encountered. Aerobic and anaerobic cultures were obtained. I bluntly dissected out the prepatellar bursa and excised this. It was noted to be significantly thickened. I proceeded to debride subcutaneous tissue and lightly debride the deep fascial layer with a combination of curettes and a rongeur. I then proceeded to thoroughly irrigate the area starting with 1 L of diluted Betadine followed by 2 L of sterile saline. The tourniquet was deflated after a total time of 22 minutes and hemostasis achieved using electrocautery. A drain was inserted and layered closure of the patient's incision was performed using 3-0 PDS for subcutaneous closure and 3-0 nylon on the skin. Sterile dressings were applied using Xeroform, 4 x 4 gauze, and Kerlix. His extremity was overwrapped using Ace bandage. He was awoken, transferred to his bed, and wheeled to recovery in stable condition.     Complications: None    Post-operative Condition: Stable

## 2021-05-19 LAB
ANION GAP SERPL CALCULATED.3IONS-SCNC: 5 MMOL/L (ref 9–17)
BUN BLDV-MCNC: 17 MG/DL (ref 6–20)
BUN/CREAT BLD: ABNORMAL (ref 9–20)
CALCIUM SERPL-MCNC: 8.6 MG/DL (ref 8.6–10.4)
CHLORIDE BLD-SCNC: 106 MMOL/L (ref 98–107)
CO2: 24 MMOL/L (ref 20–31)
CREAT SERPL-MCNC: 0.73 MG/DL (ref 0.7–1.2)
GFR AFRICAN AMERICAN: >60 ML/MIN
GFR NON-AFRICAN AMERICAN: >60 ML/MIN
GFR SERPL CREATININE-BSD FRML MDRD: ABNORMAL ML/MIN/{1.73_M2}
GFR SERPL CREATININE-BSD FRML MDRD: ABNORMAL ML/MIN/{1.73_M2}
GLUCOSE BLD-MCNC: 126 MG/DL (ref 70–99)
HCT VFR BLD CALC: 38.2 % (ref 41–53)
HEMOGLOBIN: 12.8 G/DL (ref 13.5–17.5)
MCH RBC QN AUTO: 30.3 PG (ref 26–34)
MCHC RBC AUTO-ENTMCNC: 33.6 G/DL (ref 31–37)
MCV RBC AUTO: 90.1 FL (ref 80–100)
NRBC AUTOMATED: ABNORMAL PER 100 WBC
PDW BLD-RTO: 12.2 % (ref 12.5–15.4)
PLATELET # BLD: 223 K/UL (ref 140–450)
PMV BLD AUTO: 7.8 FL (ref 6–12)
POTASSIUM SERPL-SCNC: 4.7 MMOL/L (ref 3.7–5.3)
RBC # BLD: 4.24 M/UL (ref 4.5–5.9)
SODIUM BLD-SCNC: 135 MMOL/L (ref 135–144)
VANCOMYCIN TROUGH DATE LAST DOSE: ABNORMAL
VANCOMYCIN TROUGH DOSE AMOUNT: ABNORMAL
VANCOMYCIN TROUGH TIME LAST DOSE: ABNORMAL
VANCOMYCIN TROUGH: 9.7 UG/ML (ref 10–20)
WBC # BLD: 19.5 K/UL (ref 3.5–11)

## 2021-05-19 PROCEDURE — 6360000002 HC RX W HCPCS: Performed by: ORTHOPAEDIC SURGERY

## 2021-05-19 PROCEDURE — 6370000000 HC RX 637 (ALT 250 FOR IP): Performed by: NURSE PRACTITIONER

## 2021-05-19 PROCEDURE — 99232 SBSQ HOSP IP/OBS MODERATE 35: CPT | Performed by: INTERNAL MEDICINE

## 2021-05-19 PROCEDURE — 2580000003 HC RX 258: Performed by: INTERNAL MEDICINE

## 2021-05-19 PROCEDURE — 80202 ASSAY OF VANCOMYCIN: CPT

## 2021-05-19 PROCEDURE — 6370000000 HC RX 637 (ALT 250 FOR IP): Performed by: ORTHOPAEDIC SURGERY

## 2021-05-19 PROCEDURE — 2580000003 HC RX 258: Performed by: ORTHOPAEDIC SURGERY

## 2021-05-19 PROCEDURE — 1210000000 HC MED SURG R&B

## 2021-05-19 PROCEDURE — 6360000002 HC RX W HCPCS: Performed by: INTERNAL MEDICINE

## 2021-05-19 PROCEDURE — 97535 SELF CARE MNGMENT TRAINING: CPT

## 2021-05-19 PROCEDURE — 6360000002 HC RX W HCPCS: Performed by: NURSE PRACTITIONER

## 2021-05-19 PROCEDURE — APPSS45 APP SPLIT SHARED TIME 31-45 MINUTES: Performed by: NURSE PRACTITIONER

## 2021-05-19 PROCEDURE — 80048 BASIC METABOLIC PNL TOTAL CA: CPT

## 2021-05-19 PROCEDURE — 85027 COMPLETE CBC AUTOMATED: CPT

## 2021-05-19 PROCEDURE — 97110 THERAPEUTIC EXERCISES: CPT

## 2021-05-19 PROCEDURE — 97116 GAIT TRAINING THERAPY: CPT

## 2021-05-19 PROCEDURE — 36415 COLL VENOUS BLD VENIPUNCTURE: CPT

## 2021-05-19 RX ORDER — MORPHINE SULFATE 4 MG/ML
4 INJECTION, SOLUTION INTRAMUSCULAR; INTRAVENOUS ONCE
Status: COMPLETED | OUTPATIENT
Start: 2021-05-19 | End: 2021-05-19

## 2021-05-19 RX ADMIN — OXYCODONE HYDROCHLORIDE 5 MG: 5 TABLET ORAL at 05:01

## 2021-05-19 RX ADMIN — ENOXAPARIN SODIUM 40 MG: 40 INJECTION, SOLUTION INTRAVENOUS; SUBCUTANEOUS at 08:23

## 2021-05-19 RX ADMIN — LISINOPRIL 10 MG: 10 TABLET ORAL at 08:24

## 2021-05-19 RX ADMIN — TIZANIDINE 4 MG: 4 TABLET ORAL at 16:26

## 2021-05-19 RX ADMIN — CALCIUM CARBONATE (ANTACID) CHEW TAB 500 MG 500 MG: 500 CHEW TAB at 22:15

## 2021-05-19 RX ADMIN — KETOROLAC TROMETHAMINE 15 MG: 15 INJECTION, SOLUTION INTRAMUSCULAR; INTRAVENOUS at 18:25

## 2021-05-19 RX ADMIN — DOCUSATE SODIUM 100 MG: 100 CAPSULE, LIQUID FILLED ORAL at 20:58

## 2021-05-19 RX ADMIN — SODIUM CHLORIDE, PRESERVATIVE FREE 10 ML: 5 INJECTION INTRAVENOUS at 21:00

## 2021-05-19 RX ADMIN — LIDOCAINE HYDROCHLORIDE: 20 SOLUTION ORAL; TOPICAL at 23:39

## 2021-05-19 RX ADMIN — KETOROLAC TROMETHAMINE 15 MG: 15 INJECTION, SOLUTION INTRAMUSCULAR; INTRAVENOUS at 12:15

## 2021-05-19 RX ADMIN — ACETAMINOPHEN 1000 MG: 500 TABLET ORAL at 08:24

## 2021-05-19 RX ADMIN — KETOROLAC TROMETHAMINE 30 MG: 30 INJECTION, SOLUTION INTRAMUSCULAR; INTRAVENOUS at 03:44

## 2021-05-19 RX ADMIN — VANCOMYCIN HYDROCHLORIDE 1500 MG: 1 INJECTION, POWDER, LYOPHILIZED, FOR SOLUTION INTRAVENOUS at 09:27

## 2021-05-19 RX ADMIN — DOCUSATE SODIUM 100 MG: 100 CAPSULE, LIQUID FILLED ORAL at 08:24

## 2021-05-19 RX ADMIN — OXYCODONE HYDROCHLORIDE 5 MG: 5 TABLET ORAL at 23:39

## 2021-05-19 RX ADMIN — ACETAMINOPHEN 1000 MG: 500 TABLET ORAL at 20:59

## 2021-05-19 RX ADMIN — MORPHINE SULFATE 4 MG: 4 INJECTION INTRAVENOUS at 20:58

## 2021-05-19 RX ADMIN — SODIUM CHLORIDE: 9 INJECTION, SOLUTION INTRAVENOUS at 08:24

## 2021-05-19 RX ADMIN — FLUTICASONE PROPIONATE 1 SPRAY: 50 SPRAY, METERED NASAL at 08:24

## 2021-05-19 RX ADMIN — OXYCODONE HYDROCHLORIDE 5 MG: 5 TABLET ORAL at 09:14

## 2021-05-19 RX ADMIN — POLYETHYLENE GLYCOL 3350 17 G: 17 POWDER, FOR SOLUTION ORAL at 08:24

## 2021-05-19 RX ADMIN — OXYCODONE HYDROCHLORIDE 5 MG: 5 TABLET ORAL at 15:14

## 2021-05-19 RX ADMIN — CETIRIZINE HYDROCHLORIDE 10 MG: 10 TABLET, FILM COATED ORAL at 08:24

## 2021-05-19 RX ADMIN — VANCOMYCIN HYDROCHLORIDE 1500 MG: 1 INJECTION, POWDER, LYOPHILIZED, FOR SOLUTION INTRAVENOUS at 22:09

## 2021-05-19 RX ADMIN — PANTOPRAZOLE SODIUM 40 MG: 40 TABLET, DELAYED RELEASE ORAL at 08:24

## 2021-05-19 RX ADMIN — ACETAMINOPHEN 1000 MG: 500 TABLET ORAL at 14:33

## 2021-05-19 RX ADMIN — OXYCODONE HYDROCHLORIDE 5 MG: 5 TABLET ORAL at 00:55

## 2021-05-19 RX ADMIN — OXYCODONE HYDROCHLORIDE 5 MG: 5 TABLET ORAL at 19:29

## 2021-05-19 ASSESSMENT — PAIN DESCRIPTION - LOCATION
LOCATION: KNEE

## 2021-05-19 ASSESSMENT — PAIN DESCRIPTION - ONSET: ONSET: ON-GOING

## 2021-05-19 ASSESSMENT — ENCOUNTER SYMPTOMS
ABDOMINAL PAIN: 0
DIARRHEA: 0
BLOOD IN STOOL: 0
BACK PAIN: 1
CHEST TIGHTNESS: 0
CONSTIPATION: 0
VOMITING: 0
SHORTNESS OF BREATH: 0
COUGH: 0
WHEEZING: 0
NAUSEA: 0

## 2021-05-19 ASSESSMENT — PAIN DESCRIPTION - PAIN TYPE
TYPE: SURGICAL PAIN

## 2021-05-19 ASSESSMENT — PAIN DESCRIPTION - ORIENTATION
ORIENTATION: RIGHT
ORIENTATION: RIGHT
ORIENTATION: LEFT
ORIENTATION: RIGHT

## 2021-05-19 ASSESSMENT — PAIN DESCRIPTION - PROGRESSION
CLINICAL_PROGRESSION: GRADUALLY IMPROVING
CLINICAL_PROGRESSION: GRADUALLY IMPROVING
CLINICAL_PROGRESSION: GRADUALLY WORSENING
CLINICAL_PROGRESSION: GRADUALLY IMPROVING

## 2021-05-19 ASSESSMENT — PAIN - FUNCTIONAL ASSESSMENT
PAIN_FUNCTIONAL_ASSESSMENT: ACTIVITIES ARE NOT PREVENTED
PAIN_FUNCTIONAL_ASSESSMENT: PREVENTS OR INTERFERES SOME ACTIVE ACTIVITIES AND ADLS

## 2021-05-19 ASSESSMENT — PAIN DESCRIPTION - FREQUENCY
FREQUENCY: CONTINUOUS

## 2021-05-19 ASSESSMENT — PAIN SCALES - GENERAL
PAINLEVEL_OUTOF10: 10
PAINLEVEL_OUTOF10: 5
PAINLEVEL_OUTOF10: 6
PAINLEVEL_OUTOF10: 5
PAINLEVEL_OUTOF10: 6
PAINLEVEL_OUTOF10: 5
PAINLEVEL_OUTOF10: 8
PAINLEVEL_OUTOF10: 10
PAINLEVEL_OUTOF10: 6
PAINLEVEL_OUTOF10: 9

## 2021-05-19 ASSESSMENT — PAIN DESCRIPTION - DESCRIPTORS
DESCRIPTORS: ACHING

## 2021-05-19 NOTE — PLAN OF CARE
Problem: Falls - Risk of:  Siderails up x 2  Hourly rounding  Call light in reach  Instructed to call for assist before attempting out of bed. Remains free from falls and accidental injury at this time   Floor free from obstacles  Bed is locked and in lowest position  Adequate lighting provided  Bed alarm on, Red Falling star and Stay with Me signs posted      Goal: Will remain free from falls  Description: Will remain free from falls  5/19/2021 0018 by Manuela Tolentino RN  Outcome: Ongoing  5/18/2021 1737 by Oksana Delgado RN  Outcome: Ongoing  Goal: Absence of physical injury  Description: Absence of physical injury  5/19/2021 0018 by Manuela Tolentino RN  Outcome: Ongoing  5/18/2021 1737 by Oksana Delgado RN  Outcome: Ongoing     Problem: Pain:  Pain level assessment complete.    Patient educated on pain scale and control interventions  PRN pain medication given per patient request  Patient instructed to call out with new onset of pain or unrelieved pain    Goal: Pain level will decrease  Description: Pain level will decrease  5/19/2021 0018 by Manuela Tolentino RN  Outcome: Ongoing  5/18/2021 1737 by Oksana Delgado RN  Outcome: Ongoing  Goal: Control of acute pain  Description: Control of acute pain  5/19/2021 0018 by Manuela Tolentino RN  Outcome: Ongoing  5/18/2021 1737 by Oksana Delgado RN  Outcome: Ongoing  Goal: Control of chronic pain  Description: Control of chronic pain  5/19/2021 0018 by Manuela Tolentino RN  Outcome: Ongoing  5/18/2021 1737 by Oksana Delgado RN  Outcome: Ongoing  Goal: Patient's pain/discomfort is manageable  Description: Patient's pain/discomfort is manageable  5/19/2021 0018 by Manuela Tolentino RN  Outcome: Ongoing  5/18/2021 1737 by Oksana Delgado RN  Outcome: Ongoing     Problem: Infection:  See vital signs, labs, and assessment  Goal: Will remain free from infection  Description: Will remain free from infection  5/19/2021 0018 by Manuela Tolentino RN  Outcome: Ongoing  5/18/2021 1737 by Maik Mo

## 2021-05-19 NOTE — PROGRESS NOTES
No events O/N. Pain appropriately controlled and he states feels better as he was able to get up and ambulate with PT earlier on today. Blood pressure 118/75, pulse 67, temperature 98.1 °F (36.7 °C), temperature source Oral, resp. rate 20, height 6' 1\" (1.854 m), weight 207 lb 3.7 oz (94 kg), SpO2 97 %. Right knee dressing is clean, dry and intact. Drain in place and functioning. Contains serosanguinous fluid. Impression and plan: 61 yo sp right knee septic prepatellar bursitis I and D POD 1  - Pain control  - Intraoperative cultures without growth so far (he was on IV antibiotics prior to obtaining). Continue IV antibiotics.  Infection disease consult placed  - Drain likely out and dressing change tomorrow  - Continue to mobilize with PT

## 2021-05-19 NOTE — PROGRESS NOTES
Occupational Therapy  Facility/Department: [facility]  Daily Treatment Note  NAME: Kelly Bennett  : 1965  MRN: [id number]    Date of Service: 2021     Discharge Recommendations:  No therapy recommended at discharge. OT Equipment Recommendations  Equipment Needed: Yes  Mobility Devices: ADL Assistive Devices; Judd Mustard: Rolling  ADL Assistive Devices: Sock-Aid Hard;Dressing Stick;Long-handled Shoe Horn;Shower Chair with back    Assessment   Performance deficits / Impairments: Decreased functional mobility ; Decreased safe awareness;Decreased balance;Decreased high-level IADLs;Decreased ADL status  Assessment: Pt educated on safety with functional transfers and RW technique with RLE WBAT; pt verbalized and demo'd understanding. pt would benefit from skilled OT service during hospitalization to maximize pt's safety and independence to perform all functional activities. pt expectant for safe return to prior living arrangement with support and supervision of spouse and family as needed. Prognosis: Good  OT Education: Equipment;Transfer Training;Plan of Care;Precautions  Patient Education: RLE WBAT w/RW and safety with functional transfers w/RW  REQUIRES OT FOLLOW UP: Yes  Activity Tolerance  Activity Tolerance: Patient Tolerated treatment well  Safety Devices  Safety Devices in place: Yes  Type of devices: Left in chair;Nurse notified;Call light within reach  Restraints  Initially in place: No         Patient Diagnosis(es): The encounter diagnosis was Right knee skin infection. has a past medical history of Back pain, Disc disease, degenerative, lumbar or lumbosacral, Hyperlipidemia, Hypertension, Restless leg syndrome, and Septic infrapatellar bursitis, left.   has a past surgical history that includes Tonsillectomy and adenoidectomy; hernia repair;  Tympanostomy tube placement (Bilateral); knee surgery (Right, 2021); and knee surgery (Right, Comments: Initial Min A, progressive to CGA-close SBA throughout following education and VCs for RLE WBAT w/use of RW    Toilet Transfers  Toilet - Technique: Ambulating (w/RW)  Equipment Used: Standard toilet (bilateral use of grab bar)  Toilet Transfer: Contact guard assistance  Toilet Transfers Comments: increased time to perform; VCs for safety awareness/body positioning/hand placement    Bed mobility  Supine to Sit: Modified independent  Scooting: Modified independent     Transfers  Sit to stand: Minimal assistance  Stand to sit: Contact guard assistance  Transfer Comments: Initial Min A for sit>stand from bed; CGA throughout remaining session with increased time and  VCs for technique w/RW                       Cognition  Overall Cognitive Status: Exceptions  Safety Judgement: Decreased awareness of need for assistance;Decreased awareness of need for safety  Insights: Decreased awareness of deficits  Cognition Comment: Min VCs for safety awareness and RW technique with WBAT RLE                 Plan   Plan  Times per week: 5-6x/week  Times per day: Daily  Current Treatment Recommendations: Safety Education & Training, Balance Training, Patient/Caregiver Education & Training, Self-Care / ADL, Home Management Training, Functional Mobility Training, Endurance Training, Equipment Evaluation, Education, & procurement    Goals  Short term goals  Time Frame for Short term goals: 14 visits  Short term goal 1: Pt will demo Mod IND, with DME as needed, to perform toileting and LB ADLs. Short term goal 2: Pt will demo Mod IND, with LRD, to perform functional mobility/tranfers during ADLs. Short term goal 3: Pt will tolerate 15+ minutes of dynamic standing balance during functional tasks for increased participation in ADLs. Short term goal 4: Pt independently demo good safety awareness throughout engagement in functional tasks/activities.        Therapy Time   Individual Concurrent Group Co-treatment   Time In 0025 Time Out 1103         Minutes 45         Timed Code Treatment Minutes: 40 Minutes       Taras Chester, OTR/L

## 2021-05-19 NOTE — PLAN OF CARE
Problem: Falls - Risk of:  Goal: Will remain free from falls  Description: Will remain free from falls  5/19/2021 1230 by Lisa Lovell RN  Outcome: Ongoing  5/19/2021 0018 by Umu Blanchard RN  Outcome: Ongoing  Goal: Absence of physical injury  Description: Absence of physical injury  5/19/2021 1230 by Lisa Lovell RN  Outcome: Ongoing  5/19/2021 0018 by Umu Blanchard RN  Outcome: Ongoing    Pt remains free from falls at this time. Bed/chair alarms in place to promote safety. Pt has been educated on how to utilize the call light effectively. Problem: Pain:  Goal: Pain level will decrease  Description: Pain level will decrease  5/19/2021 1230 by Lisa Lovell RN  Outcome: Ongoing  5/19/2021 0018 by Umu Blanchard RN  Outcome: Ongoing  Goal: Control of acute pain  Description: Control of acute pain  5/19/2021 1230 by Lisa Lovell RN  Outcome: Ongoing  5/19/2021 0018 by Umu Blanchard RN  Outcome: Ongoing  Goal: Control of chronic pain  Description: Control of chronic pain  5/19/2021 1230 by Lisa Lovell RN  Outcome: Ongoing  5/19/2021 0018 by Umu Blanchard RN  Outcome: Ongoing  Goal: Patient's pain/discomfort is manageable  Description: Patient's pain/discomfort is manageable  5/19/2021 1230 by Lisa Lovell RN  Outcome: Ongoing  5/19/2021 0018 by Umu Blanchard RN  Outcome: Ongoing    Pt pain is controlled at this time and evaluated each hour. PRN medications are available and will be administered as needed. Problem: Infection:  Goal: Will remain free from infection  Description: Will remain free from infection  5/19/2021 1230 by Lisa Lovell RN  Outcome: Ongoing  5/19/2021 0018 by Umu Blanchard RN  Outcome: Ongoing    Pt is afebrile at this time. Trending labs. Administering antibiotics as ordered. Will continue to monitor.        Problem: Safety:  Goal: Free from accidental physical injury  Description: Free from accidental physical injury  5/19/2021 1230 by Lisa Lovell RN  Outcome: Ongoing  5/19/2021 0018 by Eliza Lovett RN  Outcome: Ongoing  Goal: Free from intentional harm  Description: Free from intentional harm  5/19/2021 1230 by Patricio Gomez RN  Outcome: Ongoing  5/19/2021 0018 by Eliza Lovett RN  Outcome: Ongoing    Pt remains free from falls at this time. Bed/chair alarms in place to promote safety. Pt has been educated on how to utilize the call light effectively. Problem: Daily Care:  Goal: Daily care needs are met  Description: Daily care needs are met  5/19/2021 1230 by Patricio Gomez RN  Outcome: Ongoing  5/19/2021 0018 by Eliza Lovett RN  Outcome: Ongoing    Pt is working with PT/OT to perform ADLs. Problem: Skin Integrity:  Goal: Skin integrity will stabilize  Description: Skin integrity will stabilize  5/19/2021 1230 by Patricio Gomez RN  Outcome: Ongoing  5/19/2021 0018 by Eliza Lovett RN  Outcome: Ongoing    Pt skin is intact (see charting). Pt has been encouraged to turn every 2 hours to prevent skin breakdown. Wound dressing is clean, dry, and intact with minimal drainage. RN will continue to monitor. Problem: Discharge Planning:  Goal: Patients continuum of care needs are met  Description: Patients continuum of care needs are met  5/19/2021 1230 by Patricio Gomez RN  Outcome: Ongoing  5/19/2021 0018 by Eliza Lovett RN  Outcome: Ongoing    Discharge planning has been initiated. Will continue to work with care team to ensure timely discharge.

## 2021-05-19 NOTE — PROGRESS NOTES
[facility]  Office: [address], DO, Bogdan Darlene, DO, Laz Holden, DO, Carter Emerson Blood, DO, Alejandra Palma MD, Ruthie Fonseca MD, Funmi Olmos MD, Speedy Stoddard MD, Fifi Disla MD, Radha Harris MD, Kathy Hernandez MD, Dora Ruiz MD, Luciana Corrigan, DO, Britt Patton MD, Nadia Cullen, DO, Surendra Edge MD,  Philip Fung DO, He Howard MD, Heidy Harry MD, Luda Sagastume MD, Lukas Dorado MD, Missy Portillo, [facility], [facility] Center[facility]IZABETH Hawk, CNP, Feng Choi, CNP, Alice Humphrey, CNS, Ulises Torres, CNP, Tia Narayan, CNP, Travis Jenkins, CNP, Zarina Goodwin, CNP, Titus Ahuja, CNP, Chantelle Yañez PA-C, Zane Sherman, [facility], Willian Oliver, CNP, Rogerio Dean, CNP, Linda Ford, CNP, Emile Iyer, CNP, Uriel Forbes, CNP, Mateo Hampton 7242    Progress Note    5/19/2021    12:39 PM    Name:   Je Prince  MRN:     [id number]     Acct:      [de-identified]   Room:   [address] [place] [address] Day:  2  Admit Date:  5/16/2021  8:26 PM    PCP:   GITA Byrd CNP  Code Status:  Full Code    Subjective:     C/C:   Chief Complaint   Patient presents with    Knee Pain     Interval History Status: improved. This morning he says he feels a lot better. He continue has pain he rates about a 5 that increases to about a 7 with any type of activity but he the weights better than it was. He denies redness of breath, fever, chills, nausea or vomiting. He has been afebrile  BMP within normal limits. Glucose 126  Serial WBCs have been 17.4, 15, 14 and then today 19.5.   Elevation may be related to I&D completed yesterday  Knee aspirate revealed few neutrophils with no organisms seen    Brief History:     Per previous documentation    The patient is D 44 y.o.  Non-/non  male who presents with Knee Pain   and he is admitted to the hospital for the management of  Cellulitis of right (placeholder)   Other RX Placeholder    sterile water        vancomycin  1,500 mg Intravenous Q12H    acetaminophen  1,000 mg Oral 4 times per day    enoxaparin  40 mg Subcutaneous Daily    docusate sodium  100 mg Oral BID    polyethylene glycol  17 g Oral Daily    fluticasone  1 spray Nasal Daily    lisinopril  10 mg Oral Daily    sodium chloride flush  5-40 mL Intravenous 2 times per day    pseudoephedrine  240 mg Oral Daily    And    cetirizine  10 mg Oral Daily    pantoprazole  40 mg Oral QAM AC     Continuous Infusions:    sodium chloride Stopped (21 1230)    sodium chloride       PRN Meds: oxyCODONE, calcium carbonate, sodium chloride, tiZANidine, sodium chloride flush, sodium chloride, potassium chloride **OR** potassium alternative oral replacement **OR** potassium chloride, magnesium sulfate, promethazine **OR** ondansetron, polyethylene glycol, nicotine, ketorolac, butalbital-acetaminophen-caffeine, albuterol    Data:     Past Medical History:   has a past medical history of Back pain, Disc disease, degenerative, lumbar or lumbosacral, Hyperlipidemia, Hypertension, Restless leg syndrome, and Septic infrapatellar bursitis, left. Social History:   reports that he quit smoking about 10 years ago. He started smoking about 11 years ago. He has a 1.75 pack-year smoking history. He has never used smokeless tobacco. He reports current drug use. He reports that he does not drink alcohol. Family History:   Family History   Problem Relation Age of Onset    Arthritis Mother        Vitals:  /72   Pulse 61   Temp 98.2 °F (36.8 °C) (Oral)   Resp 20   Ht 6' 1\" (1.854 m)   Wt 207 lb 3.7 oz (94 kg)   SpO2 97%   BMI 27.34 kg/m²   Temp (24hrs), Av.3 °F (36.3 °C), Min:93.7 °F (34.3 °C), Max:98.4 °F (36.9 °C)    No results for input(s): POCGLU in the last 72 hours. I/O (24Hr):     Intake/Output Summary (Last 24 hours) at 2021 1239  Last data filed at 2021 0720  Gross per 24 hour   Intake 2245 ml   Output 1235 ml   Net 1010 ml       Labs:  Hematology:  Recent Labs     05/16/21  2140 05/17/21  0908 05/18/21  0528 05/19/21  0634   WBC 17.4* 15.1* 14.3* 19.5*   RBC 4.98 4.46* 4.29* 4.24*   HGB 15.2 13.8 13.2* 12.8*   HCT 44.8 39.8* 38.8* 38.2*   MCV 90.0 89.4 90.6 90.1   MCH 30.5 30.9 30.9 30.3   MCHC 33.9 34.6 34.1 33.6   RDW 12.5 12.2* 12.4* 12.2*    205 190 223   MPV 8.2 7.6 8.3 7.8   SEDRATE 3  --   --   --    CRP 12.0*  --   --   --      Chemistry:  Recent Labs     05/17/21  0908 05/18/21  0528 05/19/21  0634   * 132* 135   K 4.3 3.8 4.7    101 106   CO2 22 24 24   GLUCOSE 242* 186* 126*   BUN 18 12 17   CREATININE 0.84 0.78 0.73   ANIONGAP 8* 7* 5*   LABGLOM >60 >60 >60   GFRAA >60 >60 >60   CALCIUM 8.4* 8.0* 8.6   LACTACIDWB NOT REPORTED  --   --    No results for input(s): PROT, LABALBU, LABA1C, V6OXLMN, Q2LYTPN, FT4, TSH, AST, ALT, LDH, GGT, ALKPHOS, LABGGT, BILITOT, BILIDIR, AMMONIA, AMYLASE, LIPASE, LACTATE, CHOL, HDL, LDLCHOLESTEROL, CHOLHDLRATIO, TRIG, VLDL, SSQ25QJ, PHENYTOIN, PHENYF, URICACID, POCGLU in the last 72 hours. ABG:No results found for: POCPH, PHART, PH, POCPCO2, MUK7CXR, PCO2, POCPO2, PO2ART, PO2, POCHCO3, SDO6BWR, HCO3, NBEA, PBEA, BEART, BE, THGBART, THB, WPQ2QQD, UVCS5DXB, V2OWGVUL, O2SAT, FIO2  Lab Results   Component Value Date/Time    SPECIAL NOT REPORTED 05/18/2021 05:12 PM     Lab Results   Component Value Date/Time    CULTURE  05/18/2021 05:12 PM     DUE TO THE SPECIMEN TYPE, THE ORDER WAS CANCELED AND REORDERED. PLEASE REFER TO: AEROBIC ANAEROBIC CULTURE PER ORDER REQUISITION FORM       Radiology:  XR KNEE RIGHT (3 VIEWS)    Result Date: 5/16/2021  No fracture evident. Soft tissue edema, presumably reactive edema, contusion and/or sprain. Follow-up imaging recommended if pain persists or worsens following conservative management. CT KNEE RIGHT WO CONTRAST    Result Date: 5/17/2021  1.  Nonspecific subcutaneous edema most pronounced anterior to the patella and patellar tendon compatible with reactive edema or cellulitis. No abscess or soft tissue gas. 2. Small nonspecific joint effusion. If there is clinical concern for septic arthritis please consider arthrocentesis. 3. No evidence of osteomyelitis or other acute osseous abnormality. 4. Mild tricompartmental degenerative changes. Physical Examination:     Physical Exam  Vitals and nursing note reviewed. HENT:      Mouth/Throat:      Mouth: Mucous membranes are moist.   Eyes:      Extraocular Movements: Extraocular movements intact. Cardiovascular:      Rate and Rhythm: Normal rate and regular rhythm. Pulses: Normal pulses. Heart sounds: Normal heart sounds. Pulmonary:      Effort: Pulmonary effort is normal.      Breath sounds: Normal breath sounds. Abdominal:      General: Bowel sounds are normal.      Palpations: Abdomen is soft. Musculoskeletal:      Right knee: Swelling (Mild) present. Decreased range of motion. Skin:     General: Skin is warm. Capillary Refill: Capillary refill takes less than 2 seconds. Comments: Right knee dressing dry and intact   Neurological:      Mental Status: He is alert and oriented to person, place, and time.    Psychiatric:         Mood and Affect: Mood normal.         Assessment:     Hospital Problems         Last Modified POA    * (Principal) Septic infrapatellar bursitis, left 5/18/2021 Yes    Disc disease, degenerative, lumbar or lumbosacral 5/17/2021 Yes    Chronic midline low back pain without sciatica 5/17/2021 Yes    Essential hypertension (Chronic) 5/17/2021 Yes    Obstructive sleep apnea syndrome 5/18/2021 Yes    History of tobacco use 5/17/2021 Yes    Sensorineural hearing loss, bilateral 5/17/2021 Yes    Cellulitis of right knee 5/18/2021 Yes    Prepatellar bursitis of right knee 5/18/2021 Yes    Hyponatremia 5/18/2021 Yes    Hypocalcemia 5/18/2021 Yes          Plan:     Antibiotics per C&S Results  Ortho following  Pain management  Correct electrolyte abnormalities  Blood Pressure - Monitor and control  CPAP  PT/OT  DVT prophylaxis    GITA Cadena - CNP  5/19/2021  12:39 PM

## 2021-05-19 NOTE — PROGRESS NOTES
Pharmacy Vancomycin Consult     Vancomycin Day: 2  Current Dosin mg q12h  Current indication: SSTI    Temp max:  afebrile    Recent Labs     21  0528 21  0634   BUN 12 17   CREATININE 0.78 0.73   WBC 14.3* 19.5*       Intake/Output Summary (Last 24 hours) at 2021 0834  Last data filed at 2021 0720  Gross per 24 hour   Intake 2245 ml   Output 1235 ml   Net 1010 ml     Culture Date      Source                       Results                     joint                              pending                     blood x 2                      pending    Ht Readings from Last 1 Encounters:   21 6' 1\" (1.854 m)        Wt Readings from Last 1 Encounters:   21 207 lb 3.7 oz (94 kg)       Body mass index is 27.34 kg/m². Estimated Creatinine Clearance: 126 mL/min (based on SCr of 0.73 mg/dL). Trough: 9.7    Assessment/Plan:  Continue current regimen of vancomycin 1500 mg every 12 hours.

## 2021-05-19 NOTE — PROGRESS NOTES
Physical Therapy  Facility/Department: [facility] SURG ICU  Daily Treatment Note  NAME: Eugenio Schwartz  : 1965  MRN: [id number]    Date of Service: 2021    Discharge Recommendations:  Patient would benefit from continued therapy after discharge, Continue to assess pending progress   PT Equipment Recommendations  Equipment Needed: No  Other: Pt reports having equipment at home but will assess needs pending progress. Pt brought single axillary crutch to hospital upon arrival and ambulated safely with it on this date. Assessment   Assessment: Pt has decreased ROM in R knee, pain in RLE with WB, and limitations in ambulation due to pain and swelling secondary to diagnosis. Pt ambulated 105 ft with SBA and single axillary crutch on the L. Pt would benefit from acute PT but should be able to return home with assistance as needed. Further therapy is recommened at d/c.  PT Education: Gait Training; Adaptive Device Training;Transfer Training;Home Exercise Program;Plan of Care; Functional Mobility Training  Patient Education: Pt educated on gait pattern sequencing for reciprocal gait pattern with single axillary crutch on L and safety. REQUIRES PT FOLLOW UP: Yes  Activity Tolerance  Activity Tolerance: Patient limited by fatigue;Patient limited by endurance  Activity Tolerance: Pt reported being tired at end of session, but stated activity \"felt good. \"     Patient Diagnosis(es): The encounter diagnosis was Right knee skin infection. has a past medical history of Back pain, Disc disease, degenerative, lumbar or lumbosacral, Hyperlipidemia, Hypertension, Restless leg syndrome, and Septic infrapatellar bursitis, left.   has a past surgical history that includes Tonsillectomy and adenoidectomy; hernia repair; Tympanostomy tube placement (Bilateral); knee surgery (Right, 2021); and knee surgery (Right, 2021).     Restrictions  Restrictions/Precautions  Restrictions/Precautions: Up as Tolerated, Weight Bearing  Required Braces or Orthoses?: No  Lower Extremity Weight Bearing Restrictions  Right Lower Extremity Weight Bearing: Weight Bearing As Tolerated  Position Activity Restriction  Other position/activity restrictions: Elevate affected limb once session is over. Pt has YASHIRA drain located in R knee. Subjective   General  Chart Reviewed: No  Response To Previous Treatment: Patient with no complaints from previous session. Family / Caregiver Present: No  Subjective  Subjective: Pt agreeable to therapy. RN agreeable to therapy. Pt reports a decrease in pain and an increase in weight bearing tolerance. Pain Screening  Patient Currently in Pain: Yes  Pain Assessment  Pain Assessment: 0-10  Pain Level: 5  Pain Type: Acute pain  Pain Location: Knee  Pain Orientation: Right  Pain Descriptors: Aching; Other (Comment) (Pressure)  Pain Frequency: Continuous  Pain Onset: On-going  Clinical Progression: Gradually improving  Functional Pain Assessment: Prevents or interferes some active activities and ADLs  Non-Pharmaceutical Pain Intervention(s): Repositioned; Ambulation/Increased Activity  Response to Pain Intervention: Patient Satisfied  Vital Signs  Patient Currently in Pain: Yes       Orientation  Orientation  Overall Orientation Status: Within Normal Limits  Cognition   Cognition  Overall Cognitive Status: WFL  Arousal/Alertness: Appropriate responses to stimuli  Following Commands: Follows multistep commands with increased time  Problem Solving: Assistance required to identify errors made  Insights: Fully aware of deficits  Initiation: Does not require cues  Sequencing: Requires cues for some  Cognition Comment: Min verbal cues needed for safety, gait pattern sequencing, and stair negotiation. Objective   Bed mobility  Sit to Supine: Stand by assistance  Scooting: Stand by assistance  Comment: Pt in chair upon arrival and retired to bed at end of session.   Transfers  Sit to Stand: Contact guard provided with HEP of above exercises to improve mobility of R knee. Other exercises  Other exercises?: No     Goals  Short term goals  Time Frame for Short term goals: 15  Short term goal 1: Pt demonstrates 150 ft modIND with appropriate device if needed without LOB to facilitate return home. Short term goal 2: Pt demonstrates transfers modIND with appopriate device as needed to facilitate return home. Short term goal 3: Pt performs seated and standing LE PREs x 12 reps to improve knee ROM for mobility. Short term goal 4: Pt will negotiate 3 stairs with rail on L modIND with appropriate device as needed to facilitate return home. Patient Goals   Patient goals : Pt would like to be pain free and return to prior living situation    Plan    Plan  Times per week: 5x  Times per day: Daily  Current Treatment Recommendations: Strengthening, ROM, Balance Training, Endurance Training, Functional Mobility Training, Transfer Training, Gait Training  Safety Devices  Type of devices: Nurse notified, Gait belt, Bed alarm in place, Left in bed  Restraints  Initially in place: No     Therapy Time   Individual Concurrent Group Co-treatment   Time In 1348         Time Out 1414         Minutes 26         Timed Code Treatment Minutes: 26 Minutes       YNES Castillo  Evaluation/treatment performed by Student PT under the supervision of co-signing PT who agrees with all evaluation/treatment and documentation.

## 2021-05-20 DIAGNOSIS — M15.9 PRIMARY OSTEOARTHRITIS INVOLVING MULTIPLE JOINTS: ICD-10-CM

## 2021-05-20 DIAGNOSIS — M54.50 CHRONIC MIDLINE LOW BACK PAIN WITHOUT SCIATICA: ICD-10-CM

## 2021-05-20 DIAGNOSIS — G89.29 CHRONIC MIDLINE LOW BACK PAIN WITHOUT SCIATICA: ICD-10-CM

## 2021-05-20 DIAGNOSIS — M51.37 DISC DISEASE, DEGENERATIVE, LUMBAR OR LUMBOSACRAL: ICD-10-CM

## 2021-05-20 PROBLEM — D64.9 NORMOCYTIC NORMOCHROMIC ANEMIA: Status: ACTIVE | Noted: 2021-05-20

## 2021-05-20 LAB
ANION GAP SERPL CALCULATED.3IONS-SCNC: 8 MMOL/L (ref 9–17)
BUN BLDV-MCNC: 22 MG/DL (ref 6–20)
BUN/CREAT BLD: ABNORMAL (ref 9–20)
C-REACTIVE PROTEIN: 42.5 MG/L (ref 0–5)
CALCIUM IONIZED: 1.18 MMOL/L (ref 1.13–1.33)
CALCIUM SERPL-MCNC: 8 MG/DL (ref 8.6–10.4)
CHLORIDE BLD-SCNC: 108 MMOL/L (ref 98–107)
CO2: 24 MMOL/L (ref 20–31)
CREAT SERPL-MCNC: 0.74 MG/DL (ref 0.7–1.2)
GFR AFRICAN AMERICAN: >60 ML/MIN
GFR NON-AFRICAN AMERICAN: >60 ML/MIN
GFR SERPL CREATININE-BSD FRML MDRD: ABNORMAL ML/MIN/{1.73_M2}
GFR SERPL CREATININE-BSD FRML MDRD: ABNORMAL ML/MIN/{1.73_M2}
GLUCOSE BLD-MCNC: 98 MG/DL (ref 70–99)
HCT VFR BLD CALC: 36 % (ref 41–53)
HEMOGLOBIN: 12.2 G/DL (ref 13.5–17.5)
MCH RBC QN AUTO: 30.7 PG (ref 26–34)
MCHC RBC AUTO-ENTMCNC: 33.9 G/DL (ref 31–37)
MCV RBC AUTO: 90.5 FL (ref 80–100)
NRBC AUTOMATED: ABNORMAL PER 100 WBC
PDW BLD-RTO: 12.6 % (ref 12.5–15.4)
PLATELET # BLD: 241 K/UL (ref 140–450)
PMV BLD AUTO: 7.9 FL (ref 6–12)
POTASSIUM SERPL-SCNC: 4.1 MMOL/L (ref 3.7–5.3)
RBC # BLD: 3.98 M/UL (ref 4.5–5.9)
SODIUM BLD-SCNC: 140 MMOL/L (ref 135–144)
WBC # BLD: 7.7 K/UL (ref 3.5–11)

## 2021-05-20 PROCEDURE — 80048 BASIC METABOLIC PNL TOTAL CA: CPT

## 2021-05-20 PROCEDURE — 36415 COLL VENOUS BLD VENIPUNCTURE: CPT

## 2021-05-20 PROCEDURE — 97116 GAIT TRAINING THERAPY: CPT

## 2021-05-20 PROCEDURE — 2580000003 HC RX 258: Performed by: INTERNAL MEDICINE

## 2021-05-20 PROCEDURE — 6370000000 HC RX 637 (ALT 250 FOR IP): Performed by: ORTHOPAEDIC SURGERY

## 2021-05-20 PROCEDURE — 86140 C-REACTIVE PROTEIN: CPT

## 2021-05-20 PROCEDURE — 99232 SBSQ HOSP IP/OBS MODERATE 35: CPT | Performed by: INTERNAL MEDICINE

## 2021-05-20 PROCEDURE — 1210000000 HC MED SURG R&B

## 2021-05-20 PROCEDURE — 6370000000 HC RX 637 (ALT 250 FOR IP): Performed by: NURSE PRACTITIONER

## 2021-05-20 PROCEDURE — 85027 COMPLETE CBC AUTOMATED: CPT

## 2021-05-20 PROCEDURE — 6360000002 HC RX W HCPCS: Performed by: INTERNAL MEDICINE

## 2021-05-20 PROCEDURE — 97535 SELF CARE MNGMENT TRAINING: CPT

## 2021-05-20 PROCEDURE — 82330 ASSAY OF CALCIUM: CPT

## 2021-05-20 PROCEDURE — 2580000003 HC RX 258: Performed by: ORTHOPAEDIC SURGERY

## 2021-05-20 PROCEDURE — 97110 THERAPEUTIC EXERCISES: CPT

## 2021-05-20 PROCEDURE — 99222 1ST HOSP IP/OBS MODERATE 55: CPT | Performed by: INTERNAL MEDICINE

## 2021-05-20 PROCEDURE — 6360000002 HC RX W HCPCS: Performed by: NURSE PRACTITIONER

## 2021-05-20 RX ORDER — SUCRALFATE 1 G/1
1 TABLET ORAL 4 TIMES DAILY
Qty: 120 TABLET | Refills: 3 | Status: SHIPPED | OUTPATIENT
Start: 2021-05-20 | End: 2021-06-10 | Stop reason: ALTCHOICE

## 2021-05-20 RX ORDER — SUCRALFATE 1 G/1
1 TABLET ORAL EVERY 6 HOURS SCHEDULED
Status: DISCONTINUED | OUTPATIENT
Start: 2021-05-20 | End: 2021-05-21 | Stop reason: HOSPADM

## 2021-05-20 RX ORDER — TRAMADOL HYDROCHLORIDE 50 MG/1
50 TABLET ORAL EVERY 8 HOURS PRN
Qty: 150 TABLET | Refills: 0 | COMMUNITY
Start: 2021-05-20 | End: 2021-05-21

## 2021-05-20 RX ORDER — FAMOTIDINE 20 MG/1
20 TABLET, FILM COATED ORAL 2 TIMES DAILY
Status: DISCONTINUED | OUTPATIENT
Start: 2021-05-20 | End: 2021-05-21 | Stop reason: HOSPADM

## 2021-05-20 RX ORDER — TRAMADOL HYDROCHLORIDE 50 MG/1
50 TABLET ORAL EVERY 8 HOURS PRN
Qty: 150 TABLET | Refills: 0 | Status: SHIPPED | OUTPATIENT
Start: 2021-05-20 | End: 2021-05-20

## 2021-05-20 RX ORDER — DOXYCYCLINE HYCLATE 100 MG
100 TABLET ORAL 2 TIMES DAILY
Qty: 28 TABLET | Refills: 0 | Status: SHIPPED | OUTPATIENT
Start: 2021-05-20 | End: 2021-06-03

## 2021-05-20 RX ORDER — CEFADROXIL 500 MG/1
500 CAPSULE ORAL 2 TIMES DAILY
Qty: 28 CAPSULE | Refills: 0 | Status: SHIPPED | OUTPATIENT
Start: 2021-05-20 | End: 2021-06-03

## 2021-05-20 RX ADMIN — TIZANIDINE 4 MG: 4 TABLET ORAL at 00:41

## 2021-05-20 RX ADMIN — OXYCODONE HYDROCHLORIDE 5 MG: 5 TABLET ORAL at 18:18

## 2021-05-20 RX ADMIN — SUCRALFATE 1 G: 1 TABLET ORAL at 23:49

## 2021-05-20 RX ADMIN — ACETAMINOPHEN 1000 MG: 500 TABLET ORAL at 20:51

## 2021-05-20 RX ADMIN — VANCOMYCIN HYDROCHLORIDE 1500 MG: 1 INJECTION, POWDER, LYOPHILIZED, FOR SOLUTION INTRAVENOUS at 21:49

## 2021-05-20 RX ADMIN — LISINOPRIL 10 MG: 10 TABLET ORAL at 07:58

## 2021-05-20 RX ADMIN — CETIRIZINE HYDROCHLORIDE 10 MG: 10 TABLET, FILM COATED ORAL at 07:57

## 2021-05-20 RX ADMIN — SODIUM CHLORIDE, PRESERVATIVE FREE 10 ML: 5 INJECTION INTRAVENOUS at 20:52

## 2021-05-20 RX ADMIN — TIZANIDINE 4 MG: 4 TABLET ORAL at 20:51

## 2021-05-20 RX ADMIN — KETOROLAC TROMETHAMINE 15 MG: 15 INJECTION, SOLUTION INTRAMUSCULAR; INTRAVENOUS at 00:41

## 2021-05-20 RX ADMIN — SUCRALFATE 1 G: 1 TABLET ORAL at 15:00

## 2021-05-20 RX ADMIN — ACETAMINOPHEN 1000 MG: 500 TABLET ORAL at 14:36

## 2021-05-20 RX ADMIN — ENOXAPARIN SODIUM 40 MG: 40 INJECTION, SOLUTION INTRAVENOUS; SUBCUTANEOUS at 07:58

## 2021-05-20 RX ADMIN — PANTOPRAZOLE SODIUM 40 MG: 40 TABLET, DELAYED RELEASE ORAL at 06:43

## 2021-05-20 RX ADMIN — OXYCODONE HYDROCHLORIDE 5 MG: 5 TABLET ORAL at 22:22

## 2021-05-20 RX ADMIN — DOCUSATE SODIUM 100 MG: 100 CAPSULE, LIQUID FILLED ORAL at 07:58

## 2021-05-20 RX ADMIN — CALCIUM CARBONATE (ANTACID) CHEW TAB 500 MG 500 MG: 500 CHEW TAB at 20:14

## 2021-05-20 RX ADMIN — OXYCODONE HYDROCHLORIDE 5 MG: 5 TABLET ORAL at 03:49

## 2021-05-20 RX ADMIN — KETOROLAC TROMETHAMINE 15 MG: 15 INJECTION, SOLUTION INTRAMUSCULAR; INTRAVENOUS at 06:43

## 2021-05-20 RX ADMIN — ACETAMINOPHEN 1000 MG: 500 TABLET ORAL at 08:30

## 2021-05-20 RX ADMIN — SODIUM CHLORIDE, PRESERVATIVE FREE 10 ML: 5 INJECTION INTRAVENOUS at 07:59

## 2021-05-20 RX ADMIN — ACETAMINOPHEN 1000 MG: 500 TABLET ORAL at 03:49

## 2021-05-20 RX ADMIN — FLUTICASONE PROPIONATE 1 SPRAY: 50 SPRAY, METERED NASAL at 09:01

## 2021-05-20 RX ADMIN — SUCRALFATE 1 G: 1 TABLET ORAL at 17:56

## 2021-05-20 RX ADMIN — OXYCODONE HYDROCHLORIDE 5 MG: 5 TABLET ORAL at 07:58

## 2021-05-20 RX ADMIN — OXYCODONE HYDROCHLORIDE 5 MG: 5 TABLET ORAL at 12:44

## 2021-05-20 RX ADMIN — VANCOMYCIN HYDROCHLORIDE 1500 MG: 1 INJECTION, POWDER, LYOPHILIZED, FOR SOLUTION INTRAVENOUS at 11:06

## 2021-05-20 RX ADMIN — DOCUSATE SODIUM 100 MG: 100 CAPSULE, LIQUID FILLED ORAL at 20:54

## 2021-05-20 RX ADMIN — POLYETHYLENE GLYCOL 3350 17 G: 17 POWDER, FOR SOLUTION ORAL at 07:57

## 2021-05-20 RX ADMIN — FAMOTIDINE 20 MG: 20 TABLET ORAL at 17:56

## 2021-05-20 ASSESSMENT — PAIN DESCRIPTION - FREQUENCY
FREQUENCY: CONTINUOUS

## 2021-05-20 ASSESSMENT — PAIN DESCRIPTION - PAIN TYPE
TYPE: SURGICAL PAIN

## 2021-05-20 ASSESSMENT — PAIN DESCRIPTION - ONSET
ONSET: ON-GOING

## 2021-05-20 ASSESSMENT — PAIN SCALES - GENERAL
PAINLEVEL_OUTOF10: 6
PAINLEVEL_OUTOF10: 5
PAINLEVEL_OUTOF10: 6
PAINLEVEL_OUTOF10: 6
PAINLEVEL_OUTOF10: 7
PAINLEVEL_OUTOF10: 7
PAINLEVEL_OUTOF10: 5
PAINLEVEL_OUTOF10: 6
PAINLEVEL_OUTOF10: 6
PAINLEVEL_OUTOF10: 8

## 2021-05-20 ASSESSMENT — PAIN DESCRIPTION - LOCATION
LOCATION: KNEE

## 2021-05-20 ASSESSMENT — PAIN DESCRIPTION - DESCRIPTORS
DESCRIPTORS: ACHING
DESCRIPTORS: ACHING
DESCRIPTORS: ACHING;PRESSURE
DESCRIPTORS: ACHING

## 2021-05-20 ASSESSMENT — ENCOUNTER SYMPTOMS
VOMITING: 0
SHORTNESS OF BREATH: 0
ABDOMINAL PAIN: 0
BACK PAIN: 1
COUGH: 0
NAUSEA: 0

## 2021-05-20 ASSESSMENT — PAIN - FUNCTIONAL ASSESSMENT
PAIN_FUNCTIONAL_ASSESSMENT: ACTIVITIES ARE NOT PREVENTED

## 2021-05-20 ASSESSMENT — PAIN DESCRIPTION - PROGRESSION
CLINICAL_PROGRESSION: GRADUALLY IMPROVING

## 2021-05-20 ASSESSMENT — PAIN DESCRIPTION - ORIENTATION
ORIENTATION: RIGHT

## 2021-05-20 NOTE — CARE COORDINATION
TRANSITIONAL CARE PLANNING/ 2 [facility] Day: 3    Reason for Admission: Cellulitis of right knee [X66.049]     Treatment Plan of Care: ortho - R knee cellulitis, ID - abx recommendations    Tests/Procedures still needed: complete    Barriers to Discharge: final ID recommendations for antibiotic therapy    Readmission Risk             Risk of Unplanned Readmission:  11       Patient goals/Treatment Preferences/Transitional Plan: home goal     Referrals Made: [facility], in case IV abx need    Follow Up needed: ARLETTE villasenor

## 2021-05-20 NOTE — PROGRESS NOTES
RN messaged Dr. Lena Dunbar regarding patients pain increase and prn medication is unavailable at this time. New order reiceved for morphine 4mg IVx 1 dose.

## 2021-05-20 NOTE — PROGRESS NOTES
[facility]    Office: [address], DO, Renaldo Teetee, DO, Jose Enrique House, DO, Harleen Katey Blood, DO, Jaime Lawton MD, Helga Childers MD, Rich Blanchard MD, Deedee Brunner, MD, Nayan Jackson MD, Zoie Malave MD, Diya Brooks MD, Rosamaria Fritz MD, Sanjeev Lowery Junior, MD, Joanie Watson DO, Paris Muniz MD, Destiny Sweeney MD, oKri Aaron DO, Shantelle Bryant MD,  Alden Sharpe DO, Dayron Henry MD, Joselyn Barker MD, Lita Resendiz, [facility], [address], [facility], [facility], [facility], Ron Raghav, CNS, Jose Click, CNP, Shu Phalen, CNP, Aldo Pathakry, CNP, Timothy Mins, CNP, Antonio Tobin, CNP, Kera Mullen PA-C, Aminata Vizcarra DNP, Gregg Treviño, CNP, Mimi Sensor, CNP, Sana Hollow, CNP, Carly Jewell, CNP, Bryn Elizondo, [address] [place] [address]    Progress Note    5/20/2021    4:18 PM    Name:   Reese Blair  MRN:     [id number]     Acct:      [de-identified]   Room:   [address] [place] [address] Day:  3  Admit Date:  5/16/2021  8:26 PM    PCP:   GITA Edmonds CNP  Code Status:  Full Code    Subjective:     C/C:   Chief Complaint   Patient presents with    Knee Pain       Interval History Status:  Less pain  Is reported 6/10 discomfort  The patient has been ambulating with crutches/walker  Has been afebrile hoping to go home  Still reporting troublesome GERD    Data Base Updates:  WBC7.7k/uL RBC3. 98Low m/uL Bougttexpo15. 2Low     Jlevsrz72ye/dL     TIL74Mrnn mg/dL CREATININE0.74mg/dL   Bun/Cre RatioNOT REPORTED     Calcium8. 0Low   Calcium, Ion1.18     CRP42. 5High     Culture:  Specimen Description. KNEE, JOINT PATELLAR BURSA Special RequestsNOT REPORTED Direct ExamFEW NEUTROPHILSAbnormal NO ORGANISMS SEEN CultureSTREPTOCOCCI, BETA HEMOLYTIC GROUP C LIGHT GROWTHAbnormal       Brief History:     As documented in the medical record: \"The patient is a 60 y.o.   Non-/non  male who presents with Knee Pain   and he is admitted to the hospital for the management of  Cellulitis of right knee. Patient came to emergency room with difficulty ambulating with pain in right leg and swelling for about 2 to 3 days. Patient does not recall any trauma. Patient initially went to urgent care where needle aspiration and I&D was attempted. Patient was directed to go to emergency room for further evaluation. He is underlying history of sensorineural hearing loss, hypertension, hyperlipidemia, chronic back pain on Ultram.  Patient was afebrile with heart rate 92. Lab work showed leukocytosis 17.4, with left shift CRP 12. 0. X-ray knee was negative for foreign body. Initial plan included:  Patient status Admit as inpatient in the  Progressive Unit/Step down     1. Prepatellar cellulitis -concern for septic arthritis -change antibiotics to Vanco Zosyn. CT right knee without contrast concerning for joint effusion. -Consult orthopedic surgery. 2. Essential hypertension -stable with lisinopril. Chronic back pain-on tramadol and Ultram.\"    5/18 the patient underwent:  Procedure: Right knee open prepatellar bursa irrigation and debridement         Medications:      Allergies:  No Known Allergies    Current Meds:   Scheduled Meds:    famotidine  20 mg Oral BID    sucralfate  1 g Oral 4 times per day    vancomycin (VANCOCIN) intermittent dosing (placeholder)   Other RX Placeholder    vancomycin  1,500 mg Intravenous Q12H    acetaminophen  1,000 mg Oral 4 times per day    enoxaparin  40 mg Subcutaneous Daily    docusate sodium  100 mg Oral BID    polyethylene glycol  17 g Oral Daily    fluticasone  1 spray Nasal Daily    lisinopril  10 mg Oral Daily    sodium chloride flush  5-40 mL Intravenous 2 times per day    pseudoephedrine  240 mg Oral Daily    And    cetirizine  10 mg Oral Daily     Continuous Infusions:    sodium chloride       PRN Meds: oxyCODONE, calcium carbonate, sodium chloride, tiZANidine, sodium chloride flush, sodium chloride, potassium chloride **OR** potassium alternative oral replacement **OR** potassium chloride, magnesium sulfate, promethazine **OR** ondansetron, polyethylene glycol, nicotine, butalbital-acetaminophen-caffeine, albuterol    Data:     Past Medical History:   has a past medical history of Back pain, Disc disease, degenerative, lumbar or lumbosacral, Hyperlipidemia, Hypertension, Restless leg syndrome, and Septic infrapatellar bursitis, left. Social History:   reports that he quit smoking about 10 years ago. He started smoking about 11 years ago. He has a 1.75 pack-year smoking history. He has never used smokeless tobacco. He reports current drug use. He reports that he does not drink alcohol. Family History:   Family History   Problem Relation Age of Onset    Arthritis Mother        Review of Systems:     Review of Systems   Constitutional: Positive for activity change (Improving). Negative for chills, diaphoresis and fever. HENT: Positive for hearing loss. Respiratory: Negative for cough and shortness of breath. Cardiovascular: Negative for chest pain and palpitations. Gastrointestinal: Negative for abdominal pain, nausea and vomiting. Genitourinary: Negative for flank pain and hematuria. Musculoskeletal: Positive for arthralgias, back pain (chronic ), gait problem (Due to knee pain) and myalgias. His right knee is still stiff and sore       Physical Examination:        Physical Exam  Vitals reviewed. Constitutional:       General: He is not in acute distress. Appearance: He is not diaphoretic. HENT:      Head: Normocephalic. Ears:      Comments: Hearing acuity quite diminished     Nose: Nose normal.   Eyes:      General: No scleral icterus. Conjunctiva/sclera: Conjunctivae normal.   Neck:      Trachea: No tracheal deviation. Cardiovascular:      Rate and Rhythm: Normal rate and regular rhythm.    Pulmonary:      Effort: Pulmonary effort is normal. No respiratory distress. Breath sounds: Normal breath sounds. No wheezing or rales. Chest:      Chest wall: No tenderness. Abdominal:      General: Bowel sounds are normal. There is no distension. Palpations: Abdomen is soft. Tenderness: There is no abdominal tenderness. Musculoskeletal:         General: Swelling (Mild incisional/knee swelling) and tenderness present. Cervical back: Neck supple. Comments: The leg is wrapped  YASHIRA drain collecting serosanguineous fluid   Skin:     General: Skin is warm and dry. Comments: Wound is dressed, dry and clean    Neurological:      Mental Status: He is alert and oriented to person, place, and time. Vitals:  BP (!) 145/88   Pulse 59   Temp 97.7 °F (36.5 °C) (Oral)   Resp 18   Ht 6' 1\" (1.854 m)   Wt 207 lb 3.7 oz (94 kg)   SpO2 96%   BMI 27.34 kg/m²   Temp (24hrs), Av.1 °F (36.7 °C), Min:97.7 °F (36.5 °C), Max:98.3 °F (36.8 °C)    No results for input(s): POCGLU in the last 72 hours. I/O (24Hr):     Intake/Output Summary (Last 24 hours) at 2021 1618  Last data filed at 2021 1244  Gross per 24 hour   Intake 910.05 ml   Output 525 ml   Net 385.05 ml       Labs:  Hematology:  Recent Labs     2134 21  0559   WBC 14.3* 19.5* 7.7   RBC 4.29* 4.24* 3.98*   HGB 13.2* 12.8* 12.2*   HCT 38.8* 38.2* 36.0*   MCV 90.6 90.1 90.5   MCH 30.9 30.3 30.7   MCHC 34.1 33.6 33.9   RDW 12.4* 12.2* 12.6    223 241   MPV 8.3 7.8 7.9   CRP  --   --  42.5*     Chemistry:  Recent Labs     2128 21  0634 21  0559   * 135 140   K 3.8 4.7 4.1    106 108*   CO2 24 24 24   GLUCOSE 186* 126* 98   BUN 12 17 22*   CREATININE 0.78 0.73 0.74   ANIONGAP 7* 5* 8*   LABGLOM >60 >60 >60   GFRAA >60 >60 >60   CALCIUM 8.0* 8.6 8.0*   CAION  --   --  1.18   No results for input(s): PROT, LABALBU, LABA1C, U7PEQVA, I5UJFEA, FT4, TSH, AST, ALT, LDH, GGT, ALKPHOS, LABGGT, BILITOT, BILIDIR, AMMONIA, AMYLASE, LIPASE, LACTATE, CHOL, HDL, LDLCHOLESTEROL, CHOLHDLRATIO, TRIG, VLDL, WQY58TQ, PHENYTOIN, PHENYF, URICACID, POCGLU in the last 72 hours. ABG:No results found for: POCPH, PHART, PH, POCPCO2, BKB0DUE, PCO2, POCPO2, PO2ART, PO2, POCHCO3, BFB7FHR, HCO3, NBEA, PBEA, BEART, BE, THGBART, THB, EXE1IBZ, JVEA2RZO, B1RVOUDR, O2SAT, FIO2  Lab Results   Component Value Date/Time    SPECIAL NOT REPORTED 05/18/2021 05:12 PM     Lab Results   Component Value Date/Time    CULTURE  05/18/2021 05:12 PM     DUE TO THE SPECIMEN TYPE, THE ORDER WAS CANCELED AND REORDERED. PLEASE REFER TO: AEROBIC ANAEROBIC CULTURE PER ORDER REQUISITION FORM       Radiology:  XR KNEE RIGHT (3 VIEWS)    Result Date: 5/16/2021  No fracture evident. Soft tissue edema, presumably reactive edema, contusion and/or sprain. Follow-up imaging recommended if pain persists or worsens following conservative management. CT KNEE RIGHT WO CONTRAST    Result Date: 5/17/2021  1. Nonspecific subcutaneous edema most pronounced anterior to the patella and patellar tendon compatible with reactive edema or cellulitis. No abscess or soft tissue gas. 2. Small nonspecific joint effusion. If there is clinical concern for septic arthritis please consider arthrocentesis. 3. No evidence of osteomyelitis or other acute osseous abnormality. 4. Mild tricompartmental degenerative changes. Assessment:        Principal Problem:    Septic infrapatellar bursitis, left  Active Problems:    Disc disease, degenerative, lumbar or lumbosacral    Chronic midline low back pain without sciatica    Essential hypertension    Obstructive sleep apnea syndrome    History of tobacco use    Sensorineural hearing loss, bilateral    Cellulitis of right knee    Prepatellar bursitis of right knee    Hyponatremia    Hypocalcemia    Normocytic normochromic anemia  Resolved Problems:    * No resolved hospital problems.  *      Plan:        Antibiotics per C&S Results /

## 2021-05-20 NOTE — PROGRESS NOTES
Pt complaining of heartburn. PRN tums already given. VY Montoya NP. New order reiceved for gi cocktail.

## 2021-05-20 NOTE — PROGRESS NOTES
Occupational Therapy  Facility/Department: [facility] ICU  Daily Treatment Note  NAME: Paul Laurent  : 1965  MRN: [id number]    Date of Service: 2021    Discharge Recommendations:  No therapy recommended at discharge. OT Equipment Recommendations  Equipment Needed: Yes  Mobility Devices: Crutches  Crutches: Axillary  ADL Assistive Devices: Sock-Aid Hard;Dressing Stick;Long-handled Shoe Horn;Shower Chair with back    Assessment   Assessment: Pt engaged throughout session this date. Education provided on DME (sock aid and shoe horn) to maximize pt independence to perform LB ADLs and safety w/crutch with functional transfers and mobility; pt demo'd Mod IND. No further occupational therapy recommended at this time as pt has met goals and has no acute OT needs. Pt expectant for safe return to prior living arrangement with assistance and supervision from spouse and father as needed. Occupational therapy to be ordered upon any noted changes/concerns for pt to safely and independently perform functional activities. Prognosis: Good  OT Education: OT Role;Plan of Care;ADL Adaptive Strategies;Transfer Training;Equipment;Precautions  Patient Education: sequencing w/crutch; toilet transfer training; use of sock aid and shoe horn for LB dressing  REQUIRES OT FOLLOW UP: No  Activity Tolerance  Activity Tolerance: Patient Tolerated treatment well  Safety Devices  Safety Devices in place: Yes  Type of devices: Call light within reach; Left in bed;Nurse notified  Restraints  Initially in place: No         Patient Diagnosis(es): The encounter diagnosis was Right knee skin infection. has a past medical history of Back pain, Disc disease, degenerative, lumbar or lumbosacral, Hyperlipidemia, Hypertension, Restless leg syndrome, and Septic infrapatellar bursitis, left.   has a past surgical history that includes Tonsillectomy and adenoidectomy; hernia repair;  Tympanostomy tube placement (Bilateral); knee surgery (Right, 05/18/2021); and knee surgery (Right, 5/18/2021). Restrictions  Restrictions/Precautions  Restrictions/Precautions: Up as Tolerated, Weight Bearing  Required Braces or Orthoses?: No  Lower Extremity Weight Bearing Restrictions  Right Lower Extremity Weight Bearing: Weight Bearing As Tolerated  Position Activity Restriction  Other position/activity restrictions: elevate and ice limb     Subjective   General  Patient assessed for rehabilitation services?: Yes  Response to previous treatment: Patient reporting fatigue but able to participate (pt reports feeling tired per not sleeping well last night)  Family / Caregiver Present: No  General Comment  Comments: RN ok'd for tx this date. Pain Assessment  Pain Assessment: 0-10  Pain Level: 5  Pain Type: Surgical pain  Pain Location: Knee  Pain Orientation: Right  Pain Descriptors: Aching  Pain Frequency: Continuous  Pain Onset: On-going  Clinical Progression: Gradually improving  Functional Pain Assessment: Activities are not prevented  Non-Pharmaceutical Pain Intervention(s): Ambulation/Increased Activity; Elevation;Cold applied;Repositioned  Response to Pain Intervention: Patient Satisfied  Vital Signs  Patient Currently in Pain: Yes     Orientation  Orientation  Overall Orientation Status: Within Functional Limits     Objective    ADL  Equipment Provided: Sock aid;Long-handled shoe horn  Grooming: Modified independent  (standing at sink w/o device and distal UE intermittent propping to wash/brush hair; wash/dry face; don deodorant)  UE Bathing: Modified independent ; Increased time to complete (standing at sink w/o device to wash/dry UE/chest)  UE Dressing: Modified independent  (don shirt in standing w/o device)  LE Dressing: Modified independent ; Increased time to complete (don bilateral socks and shoes seated at EOB w/education and demonstration of sock aid and shoe horn)        Balance  Sitting Balance: Modified independent  (dynamic sitting day: Daily  Current Treatment Recommendations: Safety Education & Training, Balance Training, Patient/Caregiver Education & Training, Self-Care / ADL, Home Management Training, Functional Mobility Training, Endurance Training, Equipment Evaluation, Education, & procurement  Plan Comment: Discharge from OT services at this time, as pt Mod IND to perform ADLs and functional mobility/transfers with DME and AD as needed. No further occupational therapy acute needs noted at this time. Therapy to be ordered upon any noted changes/concerns for participation in functional activities. Goals  Pt goals met this date. Pt demo Mod IND, with DME and AD as needed, to perform LB ADLs, toileting, and functional mobility/transfers. Pt tolerated dynamic standing balance and demo'd good safety awareness throughout participation in all functional activities at this time. No further occupational therapy services recommended at this time d/t no acute needs and pt met goals. Therapy to be ordered upon any noted changes/concerns for participation in ADLs and functional mobility/transfers.      Therapy Time   Individual Concurrent Group Co-treatment   Time In 1001         Time Out 1031         Minutes 30         Timed Code Treatment Minutes: 28 Minutes       Jennifer Post OTR/L

## 2021-05-20 NOTE — PROGRESS NOTES
Jn sent to Dr Antwon Horton regarding patient discharge status. Provider unable to see patient today, will see patient tomorrow to remove drain and change dressing at which point patient can be discharged. Primary service updated. Patient and family updated. All questions answered.

## 2021-05-20 NOTE — CONSULTS
[facility]  Initial Consult Note  Date: 5/20/2021    Hospital day :3     Impression:   1. Right knee prepatellar septic bursitis status post surgical drainage 5/18/2021  2. Essential hypertension  3. Chronic back pain    Recommendations   · There is a aspirate culture that has group C streptococcus isolated but the patient is telling me that the aspirate was dry. · So is not clear to me whether this was the true pathogen. · The patient is currently on vancomycin and has had good clinical improvement. · The plan from an infectious disease standpoint is for discharge on oral antimicrobial therapy with Duricef and doxycycline. · I will plan a 2-week course of treatment and the patient can follow-up with me in the office in 2 weeks. · I did tell him that given the location at times this can be an area that takes a little longer to heal    Chief complaint/reason for consultation:   Right knee joint septic patellar bursitis    History of Present Illness:   Ruslan Tobin is a 60y.o.-year-old male who was initially admitted on 5/16/2021. Peter Pillai has a history of hypertension, hyperlipidemia, chronic back pain, sensorineural hearing loss. He presented with difficulty ambulating with pain in the right leg and swelling for about 2 to 3 days with no associated trauma. He ended up going to urgent care where needle aspiration I&D was attempted and was unsuccessful and he was directed to go into the emergency department for evaluation. The patient was diagnosed with prepatellar cellulitis with concern for septic arthritis and started on Zosyn and vancomycin. A CT of the right knee showed concern for right knee joint effusion and the orthopedic surgery service was consulted.     The patient was seen by the orthopedic service and subsequently taken to the operating room for a right knee open prepatellar bursa irrigation and debridement on 5/18/2021 with findings of gross purulence within the prepatellar bursa with a thickened and inflamed prepatellar bursa. The patient has continued on antimicrobial therapy and I was asked to evaluate and help with antibiotic choice. I have personally reviewed the past medical history, past surgical history, medications, social history, and family history, and I have updated the database accordingly.   Past Medical History:     Past Medical History:   Diagnosis Date    Back pain     Disc disease, degenerative, lumbar or lumbosacral     Hyperlipidemia     Hypertension     Restless leg syndrome     Septic infrapatellar bursitis, left 5/18/2021     Past Surgical  History:     Past Surgical History:   Procedure Laterality Date    HERNIA REPAIR      KNEE SURGERY Right 05/18/2021    : KNEE INCISION AND DRAINAGE (Right )    KNEE SURGERY Right 5/18/2021    KNEE INCISION AND DRAINAGE performed by Jose Dillard MD at [place] [address] TYMPANOSTOMY TUBE PLACEMENT Bilateral      Medications:      vancomycin (VANCOCIN) intermittent dosing (placeholder)   Other RX Placeholder    vancomycin  1,500 mg Intravenous Q12H    acetaminophen  1,000 mg Oral 4 times per day    enoxaparin  40 mg Subcutaneous Daily    docusate sodium  100 mg Oral BID    polyethylene glycol  17 g Oral Daily    fluticasone  1 spray Nasal Daily    lisinopril  10 mg Oral Daily    sodium chloride flush  5-40 mL Intravenous 2 times per day    pseudoephedrine  240 mg Oral Daily    And    cetirizine  10 mg Oral Daily    pantoprazole  40 mg Oral QAM      Social History:     Social History     Socioeconomic History    Marital status:      Spouse name: Not on file    Number of children: Not on file    Years of education: Not on file    Highest education level: Not on file   Occupational History    Not on file   Tobacco Use    Smoking status: Former Smoker     Packs/day: 0.05     Years: 35.00     Pack years: 1.75     Start date: 2010 Quit date: 7/23/2010     Years since quitting: 10.8    Smokeless tobacco: Never Used   Vaping Use    Vaping Use: Never used   Substance and Sexual Activity    Alcohol use: No     Comment: rare    Drug use: Yes     Comment: occ    Sexual activity: Yes     Partners: Female   Other Topics Concern    Not on file   Social History Narrative    Not on file     Social Determinants of Health     Financial Resource Strain: Low Risk     Difficulty of Paying Living Expenses: Not hard at all   Food Insecurity: No Food Insecurity    Worried About Running Out of Food in the Last Year: Never true    Sonal of Food in the Last Year: Never true   Transportation Needs: No Transportation Needs    Lack of Transportation (Medical): No    Lack of Transportation (Non-Medical): No   Physical Activity:     Days of Exercise per Week:     Minutes of Exercise per Session:    Stress:     Feeling of Stress :    Social Connections:     Frequency of Communication with Friends and Family:     Frequency of Social Gatherings with Friends and Family:     Attends Tenriism Services:     Active Member of Clubs or Organizations:     Attends Club or Organization Meetings:     Marital Status:    Intimate Partner Violence:     Fear of Current or Ex-Partner:     Emotionally Abused:     Physically Abused:     Sexually Abused:      Family History:     Family History   Problem Relation Age of Onset    Arthritis Mother       Allergies:   Patient has no known allergies. Review of Systems:   General: No fevers or chills. Eyes: No double vision or blurry vision. ENT: No sore throat or runny nose. Cardiovascular: No chest pain or palpitations. Lung: No shortness of breath or cough. Abdomen: No nausea, vomiting, diarrhea, or abdominal pain. Genitourinary: No increased urinary frequency, or dysuria. Musculoskeletal: The patient has right-sided knee pain  Hematologic: No bleeding or bruising.   Neurologic: No headache, weakness, numbness, or tingling. Physical Examination :   BP (!) 145/88   Pulse 59   Temp 97.7 °F (36.5 °C) (Oral)   Resp 18   Ht 6' 1\" (1.854 m)   Wt 207 lb 3.7 oz (94 kg)   SpO2 96%   BMI 27.34 kg/m²     Temperature Range: Temp: 97.7 °F (36.5 °C) Temp  Av.1 °F (36.7 °C)  Min: 97.7 °F (36.5 °C)  Max: 98.3 °F (36.8 °C)  General Appearance: Awake, alert, and in no apparent distress  Head: Normocephalic, without obvious abnormality, atraumatic  Eyes: Pupils equal, round, reactive, to light and accommodation; extraocular movements intact; sclera anicteric; conjunctivae pink  ENT: Oropharynx clear, without erythema, exudate, or thrush. Neck: Supple, without lymphadenopathy. Pulmonary/Chest: Clear to auscultation, without wheezes, rales, or rhonchi  Cardiovascular: Regular rate and rhythm without murmurs, rubs, or gallops. Abdomen: Soft, nontender, nondistended. Extremities: The right lower extremity is in a dressing which was not removed. There is a Hemovac drain in place. Neurologic: No gross sensory or motor deficits. Skin: Warm and dry with no rash. Medical Decision Making:   I have independently reviewed/ordered the following labs:  CBC with Differential:   Recent Labs     21  0634 21  0559   WBC 19.5* 7.7   HGB 12.8* 12.2*   HCT 38.2* 36.0*    241     BMP:   Recent Labs     21  0634 21  0559    140   K 4.7 4.1    108*   CO2 24 24   BUN 17 22*   CREATININE 0.73 0.74     Hepatic Function Panel: No results for input(s): PROT, LABALBU, BILIDIR, IBILI, BILITOT, ALKPHOS, ALT, AST in the last 72 hours.     No results found for: PROCAL    Lab Results   Component Value Date    CRP 42.5 2021    CRP 12.0 2021     No results found for: FERRITIN  No results found for: FIBRINOGEN  No results found for: DDIMER  No results found for: LDH    Lab Results   Component Value Date    SEDRATE 3 2021       Lab Results   Component Value Date    COVID19 Not Detected 05/17/2021     No results found for requested labs within last 30 days. Imaging Studies:   XR KNEE RIGHT (3 VIEWS)    Result Date: 5/16/2021  EXAMINATION: THREE XRAY VIEWS OF THE RIGHT KNEE 5/16/2021 10:09 pm COMPARISON: None. HISTORY: ORDERING SYSTEM PROVIDED HISTORY: pain, infection TECHNOLOGIST PROVIDED HISTORY: pain, infection Reason for Exam: Anterior right sided knee pain, redness, swelling Acuity: Acute Type of Exam: Initial FINDINGS: Prominent prepatellar soft tissue edema. Osseous structures of the right knee are intact and align normally. No retained radiopaque foreign body. Joint spaces appear well maintained. No fracture evident. Soft tissue edema, presumably reactive edema, contusion and/or sprain. Follow-up imaging recommended if pain persists or worsens following conservative management. CT KNEE RIGHT WO CONTRAST    Result Date: 5/17/2021  EXAMINATION: CT OF THE RIGHT KNEE WITHOUT CONTRAST 5/17/2021 5:33 pm TECHNIQUE: CT of the right knee was performed without the administration of intravenous contrast.  Multiplanar reformatted images are provided for review. Dose modulation, iterative reconstruction, and/or weight based adjustment of the mA/kV was utilized to reduce the radiation dose to as low as reasonably achievable. COMPARISON: Right knee radiographs 05/16/2021. HISTORY ORDERING SYSTEM PROVIDED HISTORY: septic arthritis TECHNOLOGIST PROVIDED HISTORY: septic arthritis Reason for Exam: rt knee pain Acuity: Chronic Type of Exam: Initial Additional signs and symptoms: arthritis Relevant Medical/Surgical History: hx HTN FINDINGS: Bones: No fracture or dislocation. No osseous erosion or periostitis. Soft Tissue:  Subcutaneous fat stranding most pronounced anterior to the patella and patellar tendon. No well-defined drainable fluid collection. No soft tissue gas or foreign body identified. The visualized tendons are grossly intact.   The visualized musculature is unremarkable. Joint:  Small joint effusion. No popliteal cyst.  Mild tricompartmental degenerative changes. 1. Nonspecific subcutaneous edema most pronounced anterior to the patella and patellar tendon compatible with reactive edema or cellulitis. No abscess or soft tissue gas. 2. Small nonspecific joint effusion. If there is clinical concern for septic arthritis please consider arthrocentesis. 3. No evidence of osteomyelitis or other acute osseous abnormality. 4. Mild tricompartmental degenerative changes. Cultures:     Culture, Blood 1 [[id number]] Collected: 05/17/21 1644   Order Status: Completed Specimen: Blood Updated: 05/20/21 0003    Specimen Description . BLOOD    Special Requests left arm 20cc    Culture NO GROWTH 3 DAYS   Culture, Blood 1 [[id number]] Collected: 05/16/21 2140   Order Status: Completed Specimen: Blood Updated: 05/20/21 0002    Specimen Description . BLOOD    Special Requests 20 ML RIGHT WRIST    Culture NO GROWTH 3 DAYS   Culture, Blood 1 [[id number]] Collected: 05/16/21 2150   Order Status: Completed Specimen: Blood Updated: 05/20/21 0002    Specimen Description . BLOOD    Special Requests 10ml left arm    Culture NO GROWTH 3 DAYS   Culture, Anaerobic and Aerobic [[id number]] (Abnormal) Collected: 05/18/21 0939   Order Status: Completed Specimen: Joint, Knee Updated: 05/19/21 1845    Specimen Description . KNEE, JOINT PATELLAR BURSA    Special Requests NOT REPORTED    Direct Exam FEW NEUTROPHILSAbnormal      NO ORGANISMS SEEN    Culture STREPTOCOCCI, BETA HEMOLYTIC GROUP C LIGHT GROWTHAbnormal            Thank you for allowing us to participate in the care of this patient. Please call with questions.     Electronically signed by Suzan Luevano MD on 5/20/2021 at 12:51 PM      [facility]  Suzan Luevano MD  [employer] messaging  OFFICE: [phone]      This note is created with the assistance of a speech recognition program.  While intending to generate a document that actually reflects the content of the visit, the document can still have some errors including those of syntax and sound a like substitutions which may escape proof reading. In such instances, actual meaning can be extrapolated by contextual diversion.

## 2021-05-20 NOTE — PROGRESS NOTES
Physical Therapy  Facility/Department: [facility]  Daily Treatment Note  NAME: Lucía Rolle  : 1965  MRN: [id number]    Date of Service: 2021    Discharge Recommendations:  Patient would benefit from continued therapy after discharge, Continue to assess pending progress   PT Equipment Recommendations  Other: Pt reports having equipment at home but will assess needs pending progress. Pt brought single axillary crutch to hospital upon arrival and ambulated safely with it on this date. Assessment   Assessment: Pt has decreased ROM in R knee, pain in RLE with WB, and limitations in ambulation due to pain and swelling secondary to diagnosis. Pt ambulated 100 ft with step-to gait pattern, SBA, and single axillary crutch on the L. Pt would benefit from acute PT but should be able to return home with assistance as needed. Further therapy is recommened at d/c.  PT Education: Gait Training; Adaptive Device Training;Transfer Training;Home Exercise Program;Plan of Care; Functional Mobility Training  Patient Education: Pt accurately demonstrated HEP exercises and recited correct sequencing for stair negotiation. Pt able to peform ambulation with step-to gait pattern sequencing w/ single axillary crutch. REQUIRES PT FOLLOW UP: Yes  Activity Tolerance  Activity Tolerance: Patient limited by endurance; Patient limited by pain     Patient Diagnosis(es): The encounter diagnosis was Right knee skin infection. has a past medical history of Back pain, Disc disease, degenerative, lumbar or lumbosacral, Hyperlipidemia, Hypertension, Restless leg syndrome, and Septic infrapatellar bursitis, left.   has a past surgical history that includes Tonsillectomy and adenoidectomy; hernia repair; Tympanostomy tube placement (Bilateral); knee surgery (Right, 2021); and knee surgery (Right, 2021).     Restrictions  Restrictions/Precautions  Restrictions/Precautions: Up as Tolerated, Weight Bearing  Required Braces or Orthoses?: No  Lower Extremity Weight Bearing Restrictions  Right Lower Extremity Weight Bearing: Weight Bearing As Tolerated  Position Activity Restriction  Other position/activity restrictions: Elevate and ice limb  Subjective   General  Chart Reviewed: No  Response To Previous Treatment: Patient with no complaints from previous session. Family / Caregiver Present: No  Subjective  Subjective: Pt agreeable to therapy. RN agreeable to therapy. Pt reports an increase in pain from yesterday and heartburn which he attributes to new medication. Pain Screening  Patient Currently in Pain: Yes  Pain Assessment  Pain Assessment: 0-10  Pain Level: 7  Pain Type: Surgical pain  Pain Location: Knee  Pain Orientation: Right  Pain Descriptors: Aching;Pressure  Pain Frequency: Continuous  Clinical Progression: Gradually improving  Functional Pain Assessment: Prevents or interferes some active activities and ADLs  Non-Pharmaceutical Pain Intervention(s): Ambulation/Increased Activity  Response to Pain Intervention: Patient Satisfied  Vital Signs  Patient Currently in Pain: Yes       Orientation  Orientation  Overall Orientation Status: Within Functional Limits  Cognition   Cognition  Overall Cognitive Status: WFL  Arousal/Alertness: Appropriate responses to stimuli  Following Commands: Follows multistep commands consistently  Attention Span: Appears intact  Memory: Appears intact  Safety Judgement: Good awareness of safety precautions  Problem Solving: Good awareness of errors made  Insights: Fully aware of deficits  Initiation: Does not require cues  Sequencing: Does not require cues  Cognition Comment: PT able to recall gait pattern sequencing and sequencing for stair negotiation.   Objective   Bed mobility  Supine to Sit: Modified independent (HOB raised ~ 45 degrees)  Sit to Supine: Modified independent (HOB raised ~ 45 degrees)  Scooting: Supervision  Transfers  Sit to Stand: Stand by assistance (Assessed with single axillary crutch on L)  Stand to sit: Stand by assistance  Comment: Pt in bed upon arrival and retired to bed at end of session. Ambulation  Ambulation?: Yes  More Ambulation?: No  Ambulation 1  Surface: level tile  Device: Axillary Crutches (single axillary crutch)  Assistance: Stand by assistance  Quality of Gait: Antalgic gait, limited WB on RLE due to pain use of RW, decreased step length, slow oly, noted heel strike  Distance: 100 ft x 1  Comments: Pt able to ambulated with step-to gait pattern and single axillary crutch on L. Pt increased tolerance for WB on RLE but reports some pain when ambulated. No verbal cues needed for proper gait pattern sequencing. Stairs/Curb  Stairs?: No     Balance  Posture: Fair  Sitting - Static: Good  Sitting - Dynamic: Good  Standing - Static: Fair;+  Standing - Dynamic: Fair;+  Comments: Standing balance assessed with single axillary crutch. Exercises  Hamstring Sets: R 15 reps x 1  Quad Sets: R 15 reps x 1  Heelslides: R 15 reps x 1  Hip Abduction: R 15 reps x 1  Knee Short Arc Quad: R 15 reps x 1  Ankle Pumps: R 15 reps x 1  Comments: Pt demonstrated HEP exercises with minimal verbal cues. Pt recieved HEP handout of above exercises. Other exercises  Other exercises?: No     Goals  Short term goals  Time Frame for Short term goals: 15  Short term goal 1: Pt demonstrates 150 ft modIND with appropriate device if needed without LOB to facilitate return home. Short term goal 2: Pt demonstrates transfers modIND with appopriate device as needed to facilitate return home. Short term goal 3: Pt performs seated and standing LE PREs x 12 reps to improve knee ROM for mobility. Short term goal 4: Pt will negotiate 3 stairs with rail on L modIND with appropriate device as needed to facilitate return home.   Patient Goals   Patient goals : Pt would like to be pain free and return to prior living situation    Plan    Plan  Times per week: 5x  Times per day:

## 2021-05-21 VITALS
OXYGEN SATURATION: 97 % | SYSTOLIC BLOOD PRESSURE: 146 MMHG | DIASTOLIC BLOOD PRESSURE: 87 MMHG | BODY MASS INDEX: 27.67 KG/M2 | RESPIRATION RATE: 18 BRPM | HEART RATE: 65 BPM | HEIGHT: 73 IN | WEIGHT: 208.78 LBS | TEMPERATURE: 98.1 F

## 2021-05-21 LAB
HCT VFR BLD CALC: 37.3 % (ref 41–53)
HEMOGLOBIN: 12.9 G/DL (ref 13.5–17.5)
MCH RBC QN AUTO: 31.1 PG (ref 26–34)
MCHC RBC AUTO-ENTMCNC: 34.4 G/DL (ref 31–37)
MCV RBC AUTO: 90.3 FL (ref 80–100)
NRBC AUTOMATED: ABNORMAL PER 100 WBC
PDW BLD-RTO: 12.3 % (ref 12.5–15.4)
PLATELET # BLD: 268 K/UL (ref 140–450)
PMV BLD AUTO: 7.6 FL (ref 6–12)
RBC # BLD: 4.13 M/UL (ref 4.5–5.9)
WBC # BLD: 7.2 K/UL (ref 3.5–11)

## 2021-05-21 PROCEDURE — 6370000000 HC RX 637 (ALT 250 FOR IP): Performed by: ORTHOPAEDIC SURGERY

## 2021-05-21 PROCEDURE — 97530 THERAPEUTIC ACTIVITIES: CPT

## 2021-05-21 PROCEDURE — 2580000003 HC RX 258: Performed by: INTERNAL MEDICINE

## 2021-05-21 PROCEDURE — 85027 COMPLETE CBC AUTOMATED: CPT

## 2021-05-21 PROCEDURE — 97116 GAIT TRAINING THERAPY: CPT

## 2021-05-21 PROCEDURE — 2580000003 HC RX 258: Performed by: ORTHOPAEDIC SURGERY

## 2021-05-21 PROCEDURE — 6370000000 HC RX 637 (ALT 250 FOR IP): Performed by: NURSE PRACTITIONER

## 2021-05-21 PROCEDURE — 6360000002 HC RX W HCPCS: Performed by: INTERNAL MEDICINE

## 2021-05-21 PROCEDURE — 99238 HOSP IP/OBS DSCHRG MGMT 30/<: CPT | Performed by: INTERNAL MEDICINE

## 2021-05-21 PROCEDURE — 6360000002 HC RX W HCPCS

## 2021-05-21 PROCEDURE — APPSS45 APP SPLIT SHARED TIME 31-45 MINUTES: Performed by: NURSE PRACTITIONER

## 2021-05-21 PROCEDURE — 36415 COLL VENOUS BLD VENIPUNCTURE: CPT

## 2021-05-21 RX ORDER — TRAMADOL HYDROCHLORIDE 50 MG/1
TABLET ORAL
Qty: 150 TABLET | Refills: 0 | Status: SHIPPED | OUTPATIENT
Start: 2021-05-21 | End: 2021-06-18 | Stop reason: SDUPTHER

## 2021-05-21 RX ORDER — OXYCODONE HYDROCHLORIDE 5 MG/1
5 TABLET ORAL EVERY 6 HOURS PRN
Qty: 12 TABLET | Refills: 0 | Status: SHIPPED | OUTPATIENT
Start: 2021-05-21 | End: 2021-05-24

## 2021-05-21 RX ORDER — MORPHINE SULFATE 4 MG/ML
INJECTION, SOLUTION INTRAMUSCULAR; INTRAVENOUS
Status: COMPLETED
Start: 2021-05-21 | End: 2021-05-21

## 2021-05-21 RX ORDER — MORPHINE SULFATE 4 MG/ML
4 INJECTION, SOLUTION INTRAMUSCULAR; INTRAVENOUS
Status: DISCONTINUED | OUTPATIENT
Start: 2021-05-21 | End: 2021-05-21 | Stop reason: HOSPADM

## 2021-05-21 RX ADMIN — LISINOPRIL 10 MG: 10 TABLET ORAL at 09:11

## 2021-05-21 RX ADMIN — VANCOMYCIN HYDROCHLORIDE 1500 MG: 1 INJECTION, POWDER, LYOPHILIZED, FOR SOLUTION INTRAVENOUS at 09:10

## 2021-05-21 RX ADMIN — DOCUSATE SODIUM 100 MG: 100 CAPSULE, LIQUID FILLED ORAL at 09:11

## 2021-05-21 RX ADMIN — OXYCODONE HYDROCHLORIDE 5 MG: 5 TABLET ORAL at 11:49

## 2021-05-21 RX ADMIN — SODIUM CHLORIDE, PRESERVATIVE FREE 10 ML: 5 INJECTION INTRAVENOUS at 15:12

## 2021-05-21 RX ADMIN — TIZANIDINE 4 MG: 4 TABLET ORAL at 05:03

## 2021-05-21 RX ADMIN — MORPHINE SULFATE 4 MG: 4 INJECTION, SOLUTION INTRAMUSCULAR; INTRAVENOUS at 15:09

## 2021-05-21 RX ADMIN — SUCRALFATE 1 G: 1 TABLET ORAL at 11:49

## 2021-05-21 RX ADMIN — OXYCODONE HYDROCHLORIDE 5 MG: 5 TABLET ORAL at 07:21

## 2021-05-21 RX ADMIN — OXYCODONE HYDROCHLORIDE 5 MG: 5 TABLET ORAL at 02:48

## 2021-05-21 RX ADMIN — CETIRIZINE HYDROCHLORIDE 10 MG: 10 TABLET, FILM COATED ORAL at 09:11

## 2021-05-21 RX ADMIN — FLUTICASONE PROPIONATE 1 SPRAY: 50 SPRAY, METERED NASAL at 09:11

## 2021-05-21 RX ADMIN — ACETAMINOPHEN 1000 MG: 500 TABLET ORAL at 15:08

## 2021-05-21 RX ADMIN — MORPHINE SULFATE 4 MG: 4 INJECTION INTRAVENOUS at 15:09

## 2021-05-21 RX ADMIN — SODIUM CHLORIDE, PRESERVATIVE FREE 10 ML: 5 INJECTION INTRAVENOUS at 09:25

## 2021-05-21 RX ADMIN — FAMOTIDINE 20 MG: 20 TABLET ORAL at 09:11

## 2021-05-21 RX ADMIN — ENOXAPARIN SODIUM 40 MG: 40 INJECTION, SOLUTION INTRAVENOUS; SUBCUTANEOUS at 09:10

## 2021-05-21 RX ADMIN — ACETAMINOPHEN 1000 MG: 500 TABLET ORAL at 09:11

## 2021-05-21 RX ADMIN — ACETAMINOPHEN 1000 MG: 500 TABLET ORAL at 02:49

## 2021-05-21 RX ADMIN — SUCRALFATE 1 G: 1 TABLET ORAL at 05:03

## 2021-05-21 ASSESSMENT — PAIN DESCRIPTION - LOCATION
LOCATION: KNEE

## 2021-05-21 ASSESSMENT — ENCOUNTER SYMPTOMS
BLOOD IN STOOL: 0
COUGH: 0
ABDOMINAL PAIN: 0
SHORTNESS OF BREATH: 0
DIARRHEA: 0
CHEST TIGHTNESS: 0
VOMITING: 0
WHEEZING: 0
CONSTIPATION: 0
NAUSEA: 0

## 2021-05-21 ASSESSMENT — PAIN DESCRIPTION - DESCRIPTORS: DESCRIPTORS: ACHING

## 2021-05-21 ASSESSMENT — PAIN SCALES - GENERAL
PAINLEVEL_OUTOF10: 6
PAINLEVEL_OUTOF10: 2

## 2021-05-21 ASSESSMENT — PAIN DESCRIPTION - ORIENTATION
ORIENTATION: RIGHT
ORIENTATION: RIGHT

## 2021-05-21 ASSESSMENT — PAIN - FUNCTIONAL ASSESSMENT: PAIN_FUNCTIONAL_ASSESSMENT: ACTIVITIES ARE NOT PREVENTED

## 2021-05-21 ASSESSMENT — PAIN DESCRIPTION - PAIN TYPE
TYPE: SURGICAL PAIN

## 2021-05-21 ASSESSMENT — PAIN DESCRIPTION - FREQUENCY
FREQUENCY: INTERMITTENT
FREQUENCY: CONTINUOUS

## 2021-05-21 ASSESSMENT — PAIN DESCRIPTION - ONSET
ONSET: ON-GOING
ONSET: ON-GOING

## 2021-05-21 NOTE — PROGRESS NOTES
No events O/N. Patient complains of increased pain today. No fevers, chills or sweats. Blood pressure (!) 146/87, pulse 65, temperature 98.1 °F (36.7 °C), temperature source Oral, resp. rate 18, height 6' 1\" (1.854 m), weight 208 lb 12.4 oz (94.7 kg), SpO2 97 %. Right knee with moderate swelling. Incision is clean, dry and intact. Moderate bruising/erythema present. Impression and plan: 61 yo sp right knee pre-patellar bursa excision/I and D POD 3  - Pain control  - Cultures positive for beta hemolytic group C Strep. Sensitivity pending. On IV vancomycin. CRP up yesterday compared day of admission. ID following. Continue to monitor  - Encourage to get up and out of bed. May WBAT on RLE  - Drain pulled and dressing changed today. Continue with daily dressing changes.

## 2021-05-21 NOTE — CARE COORDINATION
[facility] ICU Quality Flow/Interdisciplinary Rounds Progress Note    Quality Flow Rounds held on May 21, 2021 at [address] Attending:  Bedside Nurse, , Dietary, Respiratory Therapy, Physical Therapy, Occupational Therapy and APRN    Anticipated Discharge Date:  Expected Discharge Date: 05/19/21    Anticipated Discharge Disposition:    Readmission Risk              Risk of Unplanned Readmission:  12           Discussed patient goal for the day, patient clinical progression, and barriers to discharge.   The following Goal(s) of the Day/Commitment(s) have been identified:  Activity Progression  PT/OT treatment continues, Discharge - Obtain Order and await ortho post-op evaluation      Cristal Blount RN  May 21, 2021

## 2021-05-21 NOTE — PROGRESS NOTES
Low Risk Nutrition Note       Recommendations/Plan:  Continue with current diet and encourage po intakes     Nutrition Assessment:  Pt appears to be at low nutritional risk at this time. States he is eating about 50% of his meals. States he is eating a little less than his normal due to his inactivity. No nutritional conerns expressed at this time. Encouraged po intakes and will follow as needed. Reason for Visit:  RD Nutrition Re-Screen/LOS    Current Nutrition Therapies:  DIET GENERAL;    Height:  6' 1\" (185.4 cm)    Current Body Weight:  208 lb (94.3 kg)       Diagnosis:  No nutrition diagnosis at this time. Monitoring and Evaluation:  Patient will be monitored per nutrition standards of care.          Electronically signed by Manuela Núñez RD, LD on 5/21/21 at 12:03 PM EDT    Manuela Núñez RD,LD  Clinical Dietitian  [facility]  [phone]

## 2021-05-21 NOTE — CARE COORDINATION
TRANSITIONAL CARE PLANNING/ 2 [facility] Day: 4    Reason for Admission: Cellulitis of right knee [U63.561]     Treatment Plan of Care: ID - abx recs    Tests/Procedures still needed: none    Barriers to Discharge: ortho s/o    Readmission Risk              Risk of Unplanned Readmission:  12       Patient goals/Treatment Preferences/Transitional Plan: home with spouse    Referrals Made: [facility] - not needed - on PO meds

## 2021-05-21 NOTE — DISCHARGE SUMMARY
presents with Knee Pain   and he is admitted to the hospital for the management of  Cellulitis of right knee. Patient came to emergency room with difficulty ambulating with pain in right leg and swelling for about 2 to 3 days.  Patient does not recall any trauma.  Patient initially went to urgent care where needle aspiration and I&D was attempted.  Patient was directed to go to emergency room for further evaluation. Ryann Chin is underlying history of sensorineural hearing loss, hypertension, hyperlipidemia, chronic back pain on Ultram.  Patient was afebrile with heart rate 92.  Lab work showed leukocytosis 17.4, with left shift CRP 12.0.  X-ray knee was negative for foreign body.      Initial plan included:  Patient status Admit as inpatient in the  Progressive Unit/Step down     1. Prepatellar cellulitis -concern for septic arthritis -change antibiotics to Vanco Zosyn.  CT right knee without contrast concerning for joint effusion. -Consult orthopedic surgery. 2. Essential hypertension -stable with lisinopril. Chronic back pain-on tramadol and Ultram.\"     5/18 the patient underwent:  Procedure: Right knee open prepatellar bursa irrigation and debridement     Home today.  Follow wound care and activity instructions per ortho  Follow-up with PCP in one week, GITA Mcclure - CNP          Significant therapeutic interventions:   Procedure: Right knee open prepatellar bursa irrigation and debridement   vanco  Carafate  toradol      Significant Diagnostic Studies:   Labs / Micro:  CBC:   Lab Results   Component Value Date    WBC 7.2 05/21/2021    RBC 4.13 05/21/2021    HGB 12.9 05/21/2021    HCT 37.3 05/21/2021    MCV 90.3 05/21/2021    MCH 31.1 05/21/2021    MCHC 34.4 05/21/2021    RDW 12.3 05/21/2021     05/21/2021     BMP:    Lab Results   Component Value Date    GLUCOSE 98 05/20/2021     05/20/2021    K 4.1 05/20/2021     05/20/2021    CO2 24 05/20/2021    ANIONGAP 8 05/20/2021    BUN 22 05/20/2021    CREATININE 0.74 05/20/2021    BUNCRER NOT REPORTED 05/20/2021    CALCIUM 8.0 05/20/2021    LABGLOM >60 05/20/2021    GFRAA >60 05/20/2021    GFR      05/20/2021    GFR NOT REPORTED 05/20/2021     HFP:    Lab Results   Component Value Date    PROT 6.6 06/04/2019     CMP:    Lab Results   Component Value Date    GLUCOSE 98 05/20/2021     05/20/2021    K 4.1 05/20/2021     05/20/2021    CO2 24 05/20/2021    BUN 22 05/20/2021    CREATININE 0.74 05/20/2021    ANIONGAP 8 05/20/2021    ALKPHOS 63 06/04/2019    ALT 25 06/04/2019    AST 32 06/04/2019    BILITOT 0.60 06/04/2019    LABALBU 4.2 06/04/2019    ALBUMIN 1.8 06/04/2019    LABGLOM >60 05/20/2021    GFRAA >60 05/20/2021    GFR      05/20/2021    GFR NOT REPORTED 05/20/2021    PROT 6.6 06/04/2019    CALCIUM 8.0 05/20/2021     PT/INR:    Lab Results   Component Value Date    PROTIME 10.4 06/30/2019    INR 1.0 06/30/2019     PTT:   Lab Results   Component Value Date    APTT 24.9 06/30/2019     FLP:    Lab Results   Component Value Date    CHOL 135 06/05/2019    TRIG 131 06/05/2019    HDL 43 06/05/2019     U/A:    Lab Results   Component Value Date    COLORU YELLOW 06/05/2019    TURBIDITY CLEAR 06/05/2019    SPECGRAV 1.019 06/05/2019    HGBUR NEGATIVE 06/05/2019    PHUR 7.0 06/05/2019    PROTEINU NEGATIVE 06/05/2019    GLUCOSEU NEGATIVE 06/05/2019    KETUA NEGATIVE 06/05/2019    BILIRUBINUR NEGATIVE 06/05/2019    UROBILINOGEN Normal 06/05/2019    NITRU NEGATIVE 06/05/2019    LEUKOCYTESUR NEGATIVE 06/05/2019     Radiology:  XR KNEE RIGHT (3 VIEWS)    Result Date: 5/16/2021  No fracture evident. Soft tissue edema, presumably reactive edema, contusion and/or sprain. Follow-up imaging recommended if pain persists or worsens following conservative management. CT KNEE RIGHT WO CONTRAST    Result Date: 5/17/2021  1.  Nonspecific subcutaneous edema most pronounced anterior to the patella and patellar tendon compatible with reactive edema or cellulitis. No abscess or soft tissue gas. 2. Small nonspecific joint effusion. If there is clinical concern for septic arthritis please consider arthrocentesis. 3. No evidence of osteomyelitis or other acute osseous abnormality. 4. Mild tricompartmental degenerative changes. Consultations:    Consults:     Final Specialist Recommendations/Findings:   IP CONSULT TO ORTHOPEDIC SURGERY  PHARMACY TO DOSE VANCOMYCIN  IP CONSULT TO INFECTIOUS DISEASES  IP CONSULT TO INFECTIOUS DISEASES      The patient was seen and examined on day of discharge and this discharge summary is in conjunction with any daily progress note from day of discharge. Discharge plan:     Disposition: Home    Physician Follow Up: [facility] [address] [place] [address], MD  [address]    Schedule an appointment as soon as possible for a visit in 2 weeks  For follow-up    GITA Holloway [place] [address] [address] [place] [address]  [phone]    Schedule an appointment as soon as possible for a visit in 1 week  Follow up appointment    Daphney Allen MD  [address] [facility] 36. [phone]    In 2 weeks  For suture removal, For wound re-check       Diet:   Cardiac low fat    Activity: As tolerated    Instructions to Patient:   Follow wound care and activity instructions per ortho  DO NOT take home tramadol while taking Joslyn    Call 911 or return to the ER if you experience a recurrence of your symptoms or start having fever, chills, chest pain or shortness of breath.       Discharge Medications:      Medication List      START taking these medications    cefadroxil 500 MG capsule  Commonly known as: DURICEF  Take 1 capsule by mouth 2 times daily for 14 days     doxycycline hyclate 100 MG tablet  Commonly known as: VIBRA-TABS  Take 1 tablet by mouth 2 times daily for 14 days     oxyCODONE 5 MG immediate release tablet  Commonly known as: ROXICODONE  Take 1 tablet by mouth every 6 hours as needed for Pain for up to 3 days. sucralfate 1 GM tablet  Commonly known as: CARAFATE  Take 1 tablet by mouth 4 times daily  Notes to patient: Take on an empty stomach. CHANGE how you take these medications    traMADol 50 MG tablet  Commonly known as: ULTRAM  TAKE 1 - 2 TABLETS BY MOUTH EVERY 8 HOURS AS NEEDED FOR PAIN FOR UP TO 30 DAYS. What changed: See the new instructions. CONTINUE taking these medications    albuterol sulfate  (90 Base) MCG/ACT inhaler  USE AS DIRECTED     fluticasone 50 MCG/ACT nasal spray  Commonly known as: FLONASE  1 spray by Nasal route daily      Allergy Relief/Nasal Decong  MG per extended release tablet  Generic drug: loratadine-pseudoephedrine  TAKE ONE TABLET BY MOUTH ONCE DAILY     ibuprofen 800 MG tablet  Commonly known as: ADVIL;MOTRIN  Take 1 tablet by mouth every 8 hours as needed for Pain     lisinopril 10 MG tablet  Commonly known as: PRINIVIL;ZESTRIL  TAKE ONE TABLET BY MOUTH DAILY     MULTI FOR HIM 50+ PO     omeprazole 20 MG delayed release capsule  Commonly known as: PRILOSEC  TAKE ONE CAPSULE BY MOUTH EVERY MORNING BEFORE BREAKFAST     sodium chloride 0.65 % nasal spray  Commonly known as: OCEAN  1 spray by Nasal route as needed for Congestion     tiZANidine 4 MG tablet  Commonly known as: ZANAFLEX  Take 1 tablet by mouth every 8 hours as needed (muscle spasms)           Where to Get Your Medications      These medications were sent to [address] [facility] [address]    Phone: [phone]   · cefadroxil 500 MG capsule  · doxycycline hyclate 100 MG tablet  · oxyCODONE 5 MG immediate release tablet  · sucralfate 1 GM tablet  · traMADol 50 MG tablet         No discharge procedures on file.     Time Spent on discharge is  33 mins in patient examination, evaluation, counseling as well as medication reconciliation, prescriptions for required medications, discharge plan and follow up. Electronically signed by   GITA Walls CNP  5/21/2021  4:56 PM      Thank you GITA Doll CNP for the opportunity to be involved in this patient's care.

## 2021-05-21 NOTE — PROGRESS NOTES
[facility]  Office: [address], DO, Renetta Maravilla, DO, Henry Vasquez, DO, Dominique Ford Blood, DO, Jt Tanner MD, Melvi Feliz MD, Funmilayo Vallejo MD, Michelle Owens MD, Mary Alice Figueredo MD, Ondina Mobley MD, Agnes Green MD, Gabriela Foreman MD, Keven Sandoval DO, Rosi Rahman MD, Adriana Collins DO, Lupe Khalil MD,  Eder Chiu DO, Casper Whatley MD, Haroldo Kearney MD, Bladimir Rankin MD, Montserrat Young MD, Parris Alvarez, Farshad Fermin, CNP, Paris Miller, CNP, Monster Burden, CNS, Flavia Sanchez, CNP, Geni Tran, CNP, Darvin Carroll, CNP, Deo Rodriguez, CNP, Sunday Surya, CNP, Dai Erickson PA-C, Joaquim Essex, STEVEN, Urban Bashir, CNP, Kenyon Pandya, CNP, Karo Garcia, CNP, [place] Stephon, CNP, Cali Maldonado, CNP, Mateo Clayton [address]    Progress Note    5/21/2021    4:49 PM    Name:   Fuad Valdovinos  MRN:     [id number]     Acct:      [de-identified]   Room:   [address] [place] [address] Day:  4  Admit Date:  5/16/2021  8:26 PM    PCP:   GITA Hunt CNP  Code Status:  Full Code    Subjective:     C/C:   Chief Complaint   Patient presents with    Knee Pain     Interval History Status: worsened. He if frustrated that he has not been seen by ortho and he said his pain is worse today because he worked with therapy yesterday. Dr Brenda Woods at bedside. He removed the dressing then the drain. Per his opinion the patient is ready for d/c. We did give the patient a one time dose of morphine because of the dressing change. DSD applied per ortho    He has been afebrile and his white count is down    Brief History:     Per previous documentation    The patient is [phone] [id number] y.o.  Non-/non  male who presents with Knee Pain   and he is admitted to the hospital for the management of  Cellulitis of right knee.   Patient came to emergency room with difficulty ambulating with pain in right leg and swelling for about 2 to 3 days.  Patient does not recall any trauma.  Patient initially went to urgent care where needle aspiration and I&D was attempted.  Patient was directed to go to emergency room for further evaluation. [facility] is underlying history of sensorineural hearing loss, hypertension, hyperlipidemia, chronic back pain on Ultram.  Patient was afebrile with heart rate 92.  Lab work showed leukocytosis 17.4, with left shift CRP 12.0.  X-ray knee was negative for foreign body.      Initial plan included:  Patient status Admit as inpatient in the  Progressive Unit/Step down     1. Prepatellar cellulitis -concern for septic arthritis -change antibiotics to Vanco Zosyn.  CT right knee without contrast concerning for joint effusion. -Consult orthopedic surgery. 2. Essential hypertension -stable with lisinopril. Chronic back pain-on tramadol and Ultram.\"     5/18 the patient underwent:  Procedure: Right knee open prepatellar bursa irrigation and debridement     Home today. Follow wound care and activity instructions per ortho  Follow-up with PCP in one week, GITA Camacho - CNP      Review of Systems:     Review of Systems   Constitutional: Negative for chills, diaphoresis and fever. HENT: Negative for congestion. Eyes: Negative for visual disturbance. Respiratory: Negative for cough, chest tightness, shortness of breath and wheezing. Cardiovascular: Positive for leg swelling. Negative for chest pain and palpitations. Gastrointestinal: Negative for abdominal pain, blood in stool, constipation, diarrhea, nausea and vomiting. Genitourinary: Negative for difficulty urinating. Musculoskeletal: Positive for myalgias. Neurological: Negative for dizziness, weakness, light-headedness, numbness and headaches. Psychiatric/Behavioral: Positive for agitation. All other systems reviewed and are negative. Medications:      Allergies:  No Known Allergies    Current Meds:   Scheduled Meds:    5/21/2021 1010  Gross per 24 hour   Intake 720 ml   Output 1895 ml   Net -1175 ml       Labs:  Hematology:  Recent Labs     05/19/21  0634 05/20/21  0559 05/21/21  0608   WBC 19.5* 7.7 7.2   RBC 4.24* 3.98* 4.13*   HGB 12.8* 12.2* 12.9*   HCT 38.2* 36.0* 37.3*   MCV 90.1 90.5 90.3   MCH 30.3 30.7 31.1   MCHC 33.6 33.9 34.4   RDW 12.2* 12.6 12.3*    241 268   MPV 7.8 7.9 7.6   CRP  --  42.5*  --      Chemistry:  Recent Labs     05/19/21  0634 05/20/21  0559    140   K 4.7 4.1    108*   CO2 24 24   GLUCOSE 126* 98   BUN 17 22*   CREATININE 0.73 0.74   ANIONGAP 5* 8*   LABGLOM >60 >60   GFRAA >60 >60   CALCIUM 8.6 8.0*   CAION  --  1.18   No results for input(s): PROT, LABALBU, LABA1C, J4OGGSB, G0ITJPJ, FT4, TSH, AST, ALT, LDH, GGT, ALKPHOS, LABGGT, BILITOT, BILIDIR, AMMONIA, AMYLASE, LIPASE, LACTATE, CHOL, HDL, LDLCHOLESTEROL, CHOLHDLRATIO, TRIG, VLDL, LMO48SL, PHENYTOIN, PHENYF, URICACID, POCGLU in the last 72 hours. ABG:No results found for: POCPH, PHART, PH, POCPCO2, KHO4KOZ, PCO2, POCPO2, PO2ART, PO2, POCHCO3, NIO3BBZ, HCO3, NBEA, PBEA, BEART, BE, THGBART, THB, HXK0QMC, JVCW5HGZ, Y8RAMDVR, O2SAT, FIO2  Lab Results   Component Value Date/Time    SPECIAL NOT REPORTED 05/18/2021 05:12 PM     Lab Results   Component Value Date/Time    CULTURE  05/18/2021 05:12 PM     DUE TO THE SPECIMEN TYPE, THE ORDER WAS CANCELED AND REORDERED. PLEASE REFER TO: AEROBIC ANAEROBIC CULTURE PER ORDER REQUISITION FORM       Radiology:  XR KNEE RIGHT (3 VIEWS)    Result Date: 5/16/2021  No fracture evident. Soft tissue edema, presumably reactive edema, contusion and/or sprain. Follow-up imaging recommended if pain persists or worsens following conservative management. CT KNEE RIGHT WO CONTRAST    Result Date: 5/17/2021  1. Nonspecific subcutaneous edema most pronounced anterior to the patella and patellar tendon compatible with reactive edema or cellulitis. No abscess or soft tissue gas.  2. Small nonspecific joint effusion. If there is clinical concern for septic arthritis please consider arthrocentesis. 3. No evidence of osteomyelitis or other acute osseous abnormality. 4. Mild tricompartmental degenerative changes. Physical Examination:     Physical Exam  Vitals and nursing note reviewed. Constitutional:       Appearance: Normal appearance. HENT:      Mouth/Throat:      Mouth: Mucous membranes are moist.   Eyes:      Extraocular Movements: Extraocular movements intact. Cardiovascular:      Rate and Rhythm: Normal rate and regular rhythm. Pulses: Normal pulses. Heart sounds: Normal heart sounds. Pulmonary:      Effort: Pulmonary effort is normal.      Breath sounds: Normal breath sounds. Abdominal:      General: Bowel sounds are normal.      Palpations: Abdomen is soft. Musculoskeletal:         General: Swelling and tenderness present. Cervical back: Neck supple. Skin:     Capillary Refill: Capillary refill takes less than 2 seconds. Comments: See photo   Neurological:      Mental Status: He is alert and oriented to person, place, and time.        5/21/2021      Assessment:     Hospital Problems         Last Modified POA    * (Principal) Septic infrapatellar bursitis, left 5/18/2021 Yes    Disc disease, degenerative, lumbar or lumbosacral 5/17/2021 Yes    Chronic midline low back pain without sciatica 5/17/2021 Yes    Essential hypertension (Chronic) 5/17/2021 Yes    Obstructive sleep apnea syndrome 5/18/2021 Yes    History of tobacco use 5/17/2021 Yes    Sensorineural hearing loss, bilateral 5/17/2021 Yes    Cellulitis of right knee 5/18/2021 Yes    Prepatellar bursitis of right knee 5/18/2021 Yes    Hyponatremia 5/18/2021 Yes    Hypocalcemia 5/18/2021 Yes    Normocytic normochromic anemia 5/20/2021 Yes          Plan:     Antibiotics per C&S Results / ID  Transition to Duricef plus doxycycline  Ortho evaluation and f/u Dr Naila Samaniego  Pain management  Blood Pressure -controlled  CPAP  PT/OT  Discharge today  Follow-up with PCP in one week, GITA Rodriguez CNP  Notify PCP of discharge     GITA Jerome CNP  5/21/2021  4:49 PM

## 2021-05-21 NOTE — PLAN OF CARE
Problem: Falls - Risk of:  Goal: Will remain free from falls  Description: Will remain free from falls  Outcome: Ongoing  Goal: Absence of physical injury  Description: Absence of physical injury  Outcome: Ongoing     Problem: Pain:  Description: Pain management should include both nonpharmacologic and pharmacologic interventions.   Goal: Pain level will decrease  Description: Pain level will decrease  Outcome: Ongoing  Goal: Control of acute pain  Description: Control of acute pain  Outcome: Ongoing  Goal: Control of chronic pain  Description: Control of chronic pain  Outcome: Ongoing  Goal: Patient's pain/discomfort is manageable  Description: Patient's pain/discomfort is manageable  Outcome: Ongoing     Problem: Infection:  Goal: Will remain free from infection  Description: Will remain free from infection  Outcome: Ongoing     Problem: Safety:  Goal: Free from accidental physical injury  Description: Free from accidental physical injury  Outcome: Ongoing  Goal: Free from intentional harm  Description: Free from intentional harm  Outcome: Ongoing     Problem: Daily Care:  Goal: Daily care needs are met  Description: Daily care needs are met  Outcome: Ongoing     Problem: Skin Integrity:  Goal: Skin integrity will stabilize  Description: Skin integrity will stabilize  Outcome: Ongoing     Problem: Discharge Planning:  Goal: Patients continuum of care needs are met  Description: Patients continuum of care needs are met  Outcome: Ongoing

## 2021-05-21 NOTE — PLAN OF CARE
Problem: Falls - Risk of:  Siderails up x 2  Hourly rounding  Call light in reach  Instructed to call for assist before attempting out of bed. Remains free from falls and accidental injury at this time   Floor free from obstacles  Bed is locked and in lowest position  Adequate lighting provided        Goal: Will remain free from falls  Description: Will remain free from falls  Outcome: Ongoing  Goal: Absence of physical injury  Description: Absence of physical injury  Outcome: Ongoing     Problem: Pain:  Pain level assessment complete. Patient educated on pain scale and control interventions  PRN pain medication given per patient request  Patient instructed to call out with new onset of pain or unrelieved pain    Goal: Pain level will decrease  Description: Pain level will decrease  Outcome: Ongoing  Goal: Control of acute pain  Description: Control of acute pain  Outcome: Ongoing  Goal: Control of chronic pain  Description: Control of chronic pain  Outcome: Ongoing  Goal: Patient's pain/discomfort is manageable  Description: Patient's pain/discomfort is manageable  Outcome: Ongoing     Problem: Infection:  See labs  See vital signs  See orders  Goal: Will remain free from infection  Description: Will remain free from infection  Outcome: Ongoing     Problem: Safety:  Siderails up x 2  Hourly rounding  Call light in reach  Instructed to call for assist before attempting out of bed. Remains free from falls and accidental injury at this time   Floor free from obstacles  Bed is locked and in lowest position  Adequate lighting provided        Goal: Free from accidental physical injury  Description: Free from accidental physical injury  Outcome: Ongoing  Goal: Free from intentional harm  Description: Free from intentional harm  Outcome: Ongoing     Problem: Daily Care:  ADLs assessed and addressed.   Goal: Daily care needs are met  Description: Daily care needs are met  Outcome: Ongoing     Problem: Skin

## 2021-05-21 NOTE — PROGRESS NOTES
Patient discharged to home in stable condition. IV removed intact and without difficulty. All belongings and discharge instructions reviewed given to patient. All questions answered. Dressing change supplies provided to patient for a few days. Son here to drive him home.

## 2021-05-23 LAB
CULTURE: ABNORMAL
CULTURE: ABNORMAL
CULTURE: NORMAL
DIRECT EXAM: ABNORMAL
DIRECT EXAM: ABNORMAL
Lab: ABNORMAL
Lab: NORMAL
SPECIMEN DESCRIPTION: ABNORMAL
SPECIMEN DESCRIPTION: NORMAL

## 2021-05-28 ENCOUNTER — OFFICE VISIT (OUTPATIENT)
Dept: PRIMARY CARE CLINIC | Age: 56
End: 2021-05-28
Payer: COMMERCIAL

## 2021-05-28 VITALS
WEIGHT: 192.4 LBS | HEART RATE: 64 BPM | DIASTOLIC BLOOD PRESSURE: 78 MMHG | RESPIRATION RATE: 18 BRPM | OXYGEN SATURATION: 98 % | BODY MASS INDEX: 25.38 KG/M2 | SYSTOLIC BLOOD PRESSURE: 132 MMHG

## 2021-05-28 DIAGNOSIS — M70.41 PREPATELLAR BURSITIS OF RIGHT KNEE: ICD-10-CM

## 2021-05-28 DIAGNOSIS — Z12.11 SCREENING FOR COLORECTAL CANCER: ICD-10-CM

## 2021-05-28 DIAGNOSIS — B96.89 SEPTIC INFRAPATELLAR BURSITIS, LEFT: Primary | ICD-10-CM

## 2021-05-28 DIAGNOSIS — Z12.12 SCREENING FOR COLORECTAL CANCER: ICD-10-CM

## 2021-05-28 DIAGNOSIS — M71.162 SEPTIC INFRAPATELLAR BURSITIS, LEFT: Primary | ICD-10-CM

## 2021-05-28 PROCEDURE — 99496 TRANSJ CARE MGMT HIGH F2F 7D: CPT | Performed by: NURSE PRACTITIONER

## 2021-05-28 PROCEDURE — 1111F DSCHRG MED/CURRENT MED MERGE: CPT | Performed by: NURSE PRACTITIONER

## 2021-05-28 RX ORDER — CEPHALEXIN 500 MG/1
CAPSULE ORAL
COMMUNITY
Start: 2021-05-16 | End: 2021-06-10 | Stop reason: ALTCHOICE

## 2021-05-28 ASSESSMENT — ENCOUNTER SYMPTOMS
COUGH: 0
SINUS PRESSURE: 0
DIARRHEA: 0
CONSTIPATION: 0
ABDOMINAL PAIN: 0
VOMITING: 0
SHORTNESS OF BREATH: 0
BLOOD IN STOOL: 0
NAUSEA: 0
WHEEZING: 0
SORE THROAT: 0
TROUBLE SWALLOWING: 0

## 2021-05-28 ASSESSMENT — PATIENT HEALTH QUESTIONNAIRE - PHQ9
2. FEELING DOWN, DEPRESSED OR HOPELESS: 0
1. LITTLE INTEREST OR PLEASURE IN DOING THINGS: 0
SUM OF ALL RESPONSES TO PHQ QUESTIONS 1-9: 0
SUM OF ALL RESPONSES TO PHQ QUESTIONS 1-9: 0
SUM OF ALL RESPONSES TO PHQ9 QUESTIONS 1 & 2: 0

## 2021-05-28 NOTE — PROGRESS NOTES
tablet 3    tiZANidine (ZANAFLEX) 4 MG tablet Take 1 tablet by mouth every 8 hours as needed (muscle spasms) 90 tablet 5    albuterol sulfate  (90 Base) MCG/ACT inhaler USE AS DIRECTED 18 g 5    sodium chloride (OCEAN) 0.65 % nasal spray 1 spray by Nasal route as needed for Congestion 1 Bottle 3    ibuprofen (ADVIL;MOTRIN) 800 MG tablet Take 1 tablet by mouth every 8 hours as needed for Pain 90 tablet 5    Multiple Vitamins-Minerals (MULTI FOR HIM 50+ PO) Take by mouth      fluticasone (FLONASE) 50 MCG/ACT nasal spray 1 spray by Nasal route daily 1 Bottle 3        Medications patient taking as of now reconciled against medications ordered at time of hospital discharge: Yes    Chief Complaint   Patient presents with    Follow-Up from Hospital       Here today for follow up after follow up from hospital stay for septic knee  He had surgical debridement and received IV antibiotic while in hospital  He has continued oral antibiotics since coming home  Has been home about a week  Reports washing right knee incision area with sutures every other day and covering with occlusive dressing  Denies any drainage  States the area is still quite painful, feels best when elevated  However he is planning to return to work next week as he states they will give him accommodations and also majority of work is at his desk      Inpatient course: Discharge summary reviewed- see chart. Interval history/Current status: improving    Review of Systems   Constitutional: Negative for activity change, appetite change, chills, fatigue, fever and unexpected weight change. HENT: Negative for congestion, ear pain, hearing loss, sinus pressure, sore throat and trouble swallowing. Eyes: Negative for visual disturbance. Respiratory: Negative for cough, shortness of breath and wheezing. Cardiovascular: Negative for chest pain, palpitations and leg swelling.    Gastrointestinal: Negative for abdominal pain, blood in stool, constipation, diarrhea, nausea and vomiting. Endocrine: Negative for cold intolerance, heat intolerance, polydipsia, polyphagia and polyuria. Genitourinary: Negative for difficulty urinating, frequency, hematuria and urgency. Musculoskeletal: Positive for arthralgias and gait problem (Limping due to right knee pain). Negative for myalgias. Skin: Positive for wound (Right knee incision). Negative for rash. Allergic/Immunologic: Negative for environmental allergies. Neurological: Negative for dizziness, weakness, light-headedness and headaches. Psychiatric/Behavioral: Negative for confusion. The patient is not nervous/anxious. Vitals:    05/28/21 1412 05/28/21 1417   BP: (!) 142/82 132/78   Pulse: 64    Resp: 18    SpO2: 98%    Weight: 192 lb 6.4 oz (87.3 kg)      Body mass index is 25.38 kg/m². Wt Readings from Last 3 Encounters:   05/28/21 192 lb 6.4 oz (87.3 kg)   05/21/21 208 lb 12.4 oz (94.7 kg)   04/29/21 199 lb 12.8 oz (90.6 kg)     BP Readings from Last 3 Encounters:   05/28/21 132/78   05/21/21 (!) 146/87   05/18/21 (!) 110/58       Physical Exam  Constitutional:       Appearance: He is well-developed. HENT:      Head: Normocephalic. Eyes:      Conjunctiva/sclera: Conjunctivae normal.      Pupils: Pupils are equal, round, and reactive to light. Cardiovascular:      Rate and Rhythm: Normal rate and regular rhythm. Heart sounds: Normal heart sounds. No murmur heard. Pulmonary:      Effort: Pulmonary effort is normal.      Breath sounds: Normal breath sounds. No wheezing. Abdominal:      General: Bowel sounds are normal. There is no distension. Palpations: Abdomen is soft. Musculoskeletal:      Cervical back: Normal range of motion. Right knee: Decreased range of motion. Tenderness present. Comments: Right knee incision well approximated with several sutures intact. No erythema. No drainage.   Area remains tender with gentle palpation   Skin:     General: Skin is warm and dry. Neurological:      Mental Status: He is alert and oriented to person, place, and time. Psychiatric:         Behavior: Behavior normal.         Thought Content: Thought content normal.         Judgment: Judgment normal.             Assessment/Plan: Reviewed and discussed notes, labs and diagnostics from hospital stay  He will follow-up with orthopedic surgeon as planned in 10 days  Advised to complete current oral antibiotics  Avoid occlusive dressing on the knee as appears to be progressing well, cleanse gently daily and shower with antibacterial soap and water, allowed to thoroughly dry, cover loosely with nonadherent dressing  Call/return office if develops any concern for recurrent infection  Encouraged to wait to return to work until evaluated by orthopedic surgeon he states may try and go back next week as feels his work will be cooperative with accommodations  1. Septic infrapatellar bursitis, left    - MI DISCHARGE MEDS RECONCILED W/ CURRENT OUTPATIENT MED LIST    2. Prepatellar bursitis of right knee    - MI DISCHARGE MEDS RECONCILED W/ CURRENT OUTPATIENT MED LIST    3. Screening for colorectal cancer    - Cologuard (For External Results Only);  Future        Medical Decision Making: high complexity

## 2021-06-01 ENCOUNTER — TELEPHONE (OUTPATIENT)
Dept: ORTHOPEDIC SURGERY | Age: 56
End: 2021-06-01

## 2021-06-01 ENCOUNTER — IMMUNIZATION (OUTPATIENT)
Dept: PRIMARY CARE CLINIC | Age: 56
End: 2021-06-01
Payer: COMMERCIAL

## 2021-06-01 ENCOUNTER — NURSE TRIAGE (OUTPATIENT)
Dept: OTHER | Facility: CLINIC | Age: 56
End: 2021-06-01

## 2021-06-01 PROCEDURE — 91300 COVID-19, PFIZER VACCINE 30MCG/0.3ML DOSE: CPT | Performed by: INTERNAL MEDICINE

## 2021-06-01 PROCEDURE — 0002A COVID-19, PFIZER VACCINE 30MCG/0.3ML DOSE: CPT | Performed by: INTERNAL MEDICINE

## 2021-06-01 NOTE — TELEPHONE ENCOUNTER
Patient calling to request a return to work form. He states that he'd like the form to be dated for him to return to work on 6/2/2021. Patient would like a phone call if there will be restrictions listed so that he can verify if his employer will accept. Patient is requesting to have this return to work form emailed to the email that is on file: [email or web address]      Patient is asking for this to be completed today so he can return to work tomorrow if Dr Romana Britain will allow it. Please advise.

## 2021-06-01 NOTE — TELEPHONE ENCOUNTER
Reason for Disposition   Health Information question, no triage required and triager able to answer question    Answer Assessment - Initial Assessment Questions  1. REASON FOR CALL or QUESTION: \"What is your reason for calling today? \" or \"How can I best help you? \" or \"What question do you have that I can help answer? \"      I'm taking an antibiotic for a knee infection. Can I still get my second covid vaccine? Protocols used: INFORMATION ONLY CALL - NO TRIAGE-ADULT-    Call received on [address]. Brief description of triage: vaccine question    Triage indicates for patient to home care    Care advice provided. Recommended using local department of health website for testing locations and up to date information. Caller verbalizes understanding, denies any other questions or concerns; instructed to call back for any new or worsening symptoms. Information given to caller from the [facility] Pre vaccination checklist:    There is no evidence that acute illness reduces vaccine efficacy or increases vaccine adverse events. However, as a precaution with moderate or severe acute illness, all vaccines should be delayed until the illness has improved. Mild illnesses (e.g., upper respiratory infections, diarrhea) are NOT contraindications to vaccination. Do not withhold vaccination if a person is taking antibiotics.

## 2021-06-02 NOTE — TELEPHONE ENCOUNTER
Called patient and his appointment to get stitches out is 6/7/21, so he would like to  the letter on 6/7/21 with a return to work date of 6/8/21. I will put the return to work note in and print, so he can  on Monday.

## 2021-06-07 ENCOUNTER — OFFICE VISIT (OUTPATIENT)
Dept: ORTHOPEDIC SURGERY | Age: 56
End: 2021-06-07

## 2021-06-07 VITALS — BODY MASS INDEX: 25.45 KG/M2 | HEIGHT: 73 IN | WEIGHT: 192 LBS

## 2021-06-07 DIAGNOSIS — B96.89 SEPTIC INFRAPATELLAR BURSITIS, LEFT: Primary | ICD-10-CM

## 2021-06-07 DIAGNOSIS — M71.162 SEPTIC INFRAPATELLAR BURSITIS, LEFT: Primary | ICD-10-CM

## 2021-06-07 PROCEDURE — 99024 POSTOP FOLLOW-UP VISIT: CPT | Performed by: ORTHOPAEDIC SURGERY

## 2021-06-07 NOTE — LETTER
[address]  [place] [facility]  [address] [address]  Phone: [phone]  Fax: [phone]    Latisha Inman MD        June 7, 2021     Patient: Damion Grimaldo   YOB: 1965   Date of Visit: 6/7/2021       To Whom It May Concern: It is my medical opinion that Lacey Child may return to work on 6/9/21 with the following restrictions: No kneeling on the right knee. .    If you have any questions or concerns, please don't hesitate to call.     Sincerely,         Latisha Inman MD

## 2021-06-09 PROBLEM — H93.13 BILATERAL TINNITUS: Status: RESOLVED | Noted: 2020-05-26 | Resolved: 2021-06-09

## 2021-06-10 ENCOUNTER — OFFICE VISIT (OUTPATIENT)
Dept: INFECTIOUS DISEASES | Age: 56
End: 2021-06-10
Payer: COMMERCIAL

## 2021-06-10 VITALS
HEIGHT: 73 IN | OXYGEN SATURATION: 96 % | BODY MASS INDEX: 25.58 KG/M2 | DIASTOLIC BLOOD PRESSURE: 69 MMHG | TEMPERATURE: 98.1 F | SYSTOLIC BLOOD PRESSURE: 113 MMHG | HEART RATE: 72 BPM | WEIGHT: 193 LBS

## 2021-06-10 DIAGNOSIS — Z98.890 HISTORY OF INCISION AND DRAINAGE: ICD-10-CM

## 2021-06-10 DIAGNOSIS — M71.162 SEPTIC INFRAPATELLAR BURSITIS, LEFT: Primary | ICD-10-CM

## 2021-06-10 DIAGNOSIS — B96.89 SEPTIC INFRAPATELLAR BURSITIS, LEFT: Primary | ICD-10-CM

## 2021-06-10 PROCEDURE — G8427 DOCREV CUR MEDS BY ELIG CLIN: HCPCS | Performed by: INTERNAL MEDICINE

## 2021-06-10 PROCEDURE — G8419 CALC BMI OUT NRM PARAM NOF/U: HCPCS | Performed by: INTERNAL MEDICINE

## 2021-06-10 PROCEDURE — 1111F DSCHRG MED/CURRENT MED MERGE: CPT | Performed by: INTERNAL MEDICINE

## 2021-06-10 PROCEDURE — 99213 OFFICE O/P EST LOW 20 MIN: CPT | Performed by: INTERNAL MEDICINE

## 2021-06-10 PROCEDURE — 3017F COLORECTAL CA SCREEN DOC REV: CPT | Performed by: INTERNAL MEDICINE

## 2021-06-10 PROCEDURE — 1036F TOBACCO NON-USER: CPT | Performed by: INTERNAL MEDICINE

## 2021-06-10 ASSESSMENT — ENCOUNTER SYMPTOMS
GASTROINTESTINAL NEGATIVE: 1
RESPIRATORY NEGATIVE: 1
ALLERGIC/IMMUNOLOGIC NEGATIVE: 1

## 2021-06-10 NOTE — PROGRESS NOTES
[facility]  Office Progress Note  Today's Date and Time: 6/10/2021, 11:05 AM    Impression:     1. Septic infrapatellar bursitis, left    2. History of incision and drainage       Recommendations   · The right knee is well-healed and there is no signs of soft tissue infection that remain. · The patient has completed a course of antibiotics with Duricef and doxycycline without any signs of residual infection at this time. · The patient is okay to return to work and he can follow-up with me on an as-needed basis. I have ordered the following medications/ labs:  No orders of the defined types were placed in this encounter. No orders of the defined types were placed in this encounter. Chief complaint/reason for consultation:     Chief Complaint   Patient presents with    Follow-Up from Hospital     follow up        History of Present Illness:   Paul Laurent is a 60y.o.-year-old male who I saw in [facility] and he had a right knee prepatellar septic bursitis for which he underwent surgical drainage on 5/18/2021. The patient had previously had an aspirate culture done that grew group C streptococcus though he had said that the aspirate was dry. The patient had good clinical response to intravenous vancomycin while hospitalized and he was discharged on oral antimicrobial therapy with doxycycline and Duricef. The patient has since completed a 2-week course of therapy tells me that he is doing very well. The knee incision has healed well he does not report any drainage swelling or redness. No subjective fevers or chills. No chest pains or palpitations. No abdominal pain nausea vomiting or diarrhea. He has now been off antibiotics for about 4 days    I have personally reviewedthe past medical history, medications, social history, and I have updated the database accordingly.   Past Medical History:     Past Medical History:   Diagnosis Date    Anxiety 5/26/2020    Back pain     Bilateral tinnitus 5/26/2020    Cellulitis of right knee 5/17/2021    Chronic midline low back pain without sciatica 11/10/2016    Deviated septum 5/26/2020    Disc disease, degenerative, lumbar or lumbosacral     Essential hypertension 11/10/2016    History of placement of ear tubes 9/29/2020    History of tobacco use 5/26/2020    Hyperlipidemia     Hypertension     Hypocalcemia 5/18/2021    Hyponatremia 5/18/2021    Lymph node enlargement     Normocytic normochromic anemia 5/20/2021    Obstructive sleep apnea syndrome 4/11/2017    Prepatellar bursitis of right knee 5/18/2021    Restless leg syndrome     Sensorineural hearing loss, bilateral 5/26/2020    Septic infrapatellar bursitis, left 5/18/2021    Somatic dysfunction of cervical region 12/3/2014     Medications:     Current Outpatient Medications   Medication Sig Dispense Refill    traMADol (ULTRAM) 50 MG tablet TAKE 1 - 2 TABLETS BY MOUTH EVERY 8 HOURS AS NEEDED FOR PAIN FOR UP TO 30 DAYS. 150 tablet 0    omeprazole (PRILOSEC) 20 MG delayed release capsule TAKE ONE CAPSULE BY MOUTH EVERY MORNING BEFORE BREAKFAST 90 capsule 3    lisinopril (PRINIVIL;ZESTRIL) 10 MG tablet TAKE ONE TABLET BY MOUTH DAILY 90 tablet 3    HM ALLERGY RELIEF/NASAL DECONG  MG per extended release tablet TAKE ONE TABLET BY MOUTH ONCE DAILY 90 tablet 3    tiZANidine (ZANAFLEX) 4 MG tablet Take 1 tablet by mouth every 8 hours as needed (muscle spasms) 90 tablet 5    albuterol sulfate  (90 Base) MCG/ACT inhaler USE AS DIRECTED 18 g 5    ibuprofen (ADVIL;MOTRIN) 800 MG tablet Take 1 tablet by mouth every 8 hours as needed for Pain 90 tablet 5    Multiple Vitamins-Minerals (MULTI FOR HIM 50+ PO) Take by mouth      fluticasone (FLONASE) 50 MCG/ACT nasal spray 1 spray by Nasal route daily 1 Bottle 3     No current facility-administered medications for this visit. Allergies:   Patient has no known allergies.      Review of Systems: Review of Systems   Constitutional: Negative. Respiratory: Negative. Cardiovascular: Negative. Gastrointestinal: Negative. Genitourinary: Negative. Musculoskeletal: Negative. Allergic/Immunologic: Negative. Neurological: Negative. Physical Examination :   /69 (Site: Right Upper Arm, Position: Sitting)   Pulse 72   Temp 98.1 °F (36.7 °C) (Temporal)   Ht 6' 1\" (1.854 m)   Wt 193 lb (87.5 kg)   SpO2 96%   BMI 25.46 kg/m²     Physical Exam  Constitutional:       Appearance: He is well-developed. HENT:      Head: Normocephalic and atraumatic. Cardiovascular:      Rate and Rhythm: Normal rate. Heart sounds: Normal heart sounds. No friction rub. No gallop. Pulmonary:      Effort: Pulmonary effort is normal.      Breath sounds: Normal breath sounds. No wheezing. Abdominal:      General: Bowel sounds are normal.      Palpations: Abdomen is soft. There is no mass. Tenderness: There is no abdominal tenderness. Musculoskeletal:      Cervical back: Normal range of motion and neck supple. Lymphadenopathy:      Cervical: No cervical adenopathy. Skin:     General: Skin is warm and dry. Neurological:      Mental Status: He is alert and oriented to person, place, and time.          Laboratory studies :  Medical Decision Making:   I have independently reviewed the following labs:  CBC with Differential:  Lab Results   Component Value Date    WBC 7.2 05/21/2021    WBC 7.7 05/20/2021    HGB 12.9 05/21/2021    HGB 12.2 05/20/2021    HCT 37.3 05/21/2021    HCT 36.0 05/20/2021     05/21/2021     05/20/2021    LYMPHOPCT 5 05/16/2021    LYMPHOPCT 22 06/30/2019    MONOPCT 6 05/16/2021    MONOPCT 7 06/30/2019       BMP:  Lab Results   Component Value Date     05/20/2021     05/19/2021    K 4.1 05/20/2021    K 4.7 05/19/2021     05/20/2021     05/19/2021    CO2 24 05/20/2021    CO2 24 05/19/2021    BUN 22 05/20/2021    BUN 17 05/19/2021 CREATININE 0.74 05/20/2021    CREATININE 0.73 05/19/2021       Hepatic Function Panel:   Lab Results   Component Value Date    PROT 6.6 06/04/2019    PROT 7.5 02/16/2018    LABALBU 4.2 06/04/2019    LABALBU 4.6 02/16/2018    BILITOT 0.60 06/04/2019    BILITOT 0.56 02/16/2018    ALKPHOS 63 06/04/2019    ALKPHOS 73 02/16/2018    ALT 25 06/04/2019    ALT 18 02/16/2018    AST 32 06/04/2019    AST 19 02/16/2018       Lab Results   Component Value Date    CRP 42.5 (H) 05/20/2021     Lab Results   Component Value Date    SEDRATE 3 05/16/2021         Thank you for allowing us to participate in the care of this patient. Pleasecall with questions. Mago Pierce MD  [employer] messaging: [phone]    This note is created with the assistance of a speech recognition program.  While intending to generate a document that actually reflects the content ofthe visit, the document can still have some errors including those of syntax and sound a like substitutions which may escape proof reading. It such instances, actual meaning can be extrapolated by contextual diversion.

## 2021-06-18 DIAGNOSIS — I10 ESSENTIAL HYPERTENSION: Chronic | ICD-10-CM

## 2021-06-18 DIAGNOSIS — M51.37 DISC DISEASE, DEGENERATIVE, LUMBAR OR LUMBOSACRAL: ICD-10-CM

## 2021-06-18 DIAGNOSIS — G89.29 CHRONIC MIDLINE LOW BACK PAIN WITHOUT SCIATICA: ICD-10-CM

## 2021-06-18 DIAGNOSIS — M15.9 PRIMARY OSTEOARTHRITIS INVOLVING MULTIPLE JOINTS: ICD-10-CM

## 2021-06-18 DIAGNOSIS — M54.50 CHRONIC MIDLINE LOW BACK PAIN WITHOUT SCIATICA: ICD-10-CM

## 2021-06-18 RX ORDER — OMEPRAZOLE 20 MG/1
CAPSULE, DELAYED RELEASE ORAL
Qty: 90 CAPSULE | Refills: 3 | Status: SHIPPED | OUTPATIENT
Start: 2021-06-18 | End: 2022-02-25 | Stop reason: SDUPTHER

## 2021-06-18 RX ORDER — TRAMADOL HYDROCHLORIDE 50 MG/1
50-100 TABLET ORAL EVERY 8 HOURS PRN
Qty: 150 TABLET | Refills: 0 | Status: SHIPPED | OUTPATIENT
Start: 2021-06-18 | End: 2021-07-16 | Stop reason: SDUPTHER

## 2021-06-18 RX ORDER — LISINOPRIL 10 MG/1
TABLET ORAL
Qty: 90 TABLET | Refills: 3 | Status: SHIPPED | OUTPATIENT
Start: 2021-06-18 | End: 2022-02-25 | Stop reason: SDUPTHER

## 2021-07-16 DIAGNOSIS — M51.37 DISC DISEASE, DEGENERATIVE, LUMBAR OR LUMBOSACRAL: ICD-10-CM

## 2021-07-16 DIAGNOSIS — M54.50 CHRONIC MIDLINE LOW BACK PAIN WITHOUT SCIATICA: ICD-10-CM

## 2021-07-16 DIAGNOSIS — G89.29 CHRONIC MIDLINE LOW BACK PAIN WITHOUT SCIATICA: ICD-10-CM

## 2021-07-16 DIAGNOSIS — M15.9 PRIMARY OSTEOARTHRITIS INVOLVING MULTIPLE JOINTS: ICD-10-CM

## 2021-07-16 RX ORDER — TRAMADOL HYDROCHLORIDE 50 MG/1
50-100 TABLET ORAL EVERY 8 HOURS PRN
Qty: 150 TABLET | Refills: 0 | Status: SHIPPED | OUTPATIENT
Start: 2021-07-16 | End: 2021-08-13 | Stop reason: SDUPTHER

## 2021-07-16 NOTE — TELEPHONE ENCOUNTER
----- Message from Che Rehman sent at 7/16/2021 10:53 AM EDT -----  Subject: Refill Request    QUESTIONS  Name of Medication? traMADol (ULTRAM) 50 MG tablet  Patient-reported dosage and instructions? Take 1-2 tablets by mouth every   8 hours as needed for Pain   How many days do you have left? 2  Preferred Pharmacy? [address] [place] [address] phone number (if available)? [phone]  ---------------------------------------------------------------------------  --------------  CALL BACK INFO  What is the best way for the office to contact you? OK to leave message on   voicemail  Preferred Call Back Phone Number?  [phone]

## 2021-08-13 DIAGNOSIS — G89.29 CHRONIC MIDLINE LOW BACK PAIN WITHOUT SCIATICA: ICD-10-CM

## 2021-08-13 DIAGNOSIS — M15.9 PRIMARY OSTEOARTHRITIS INVOLVING MULTIPLE JOINTS: ICD-10-CM

## 2021-08-13 DIAGNOSIS — M51.37 DISC DISEASE, DEGENERATIVE, LUMBAR OR LUMBOSACRAL: ICD-10-CM

## 2021-08-13 DIAGNOSIS — M54.50 CHRONIC MIDLINE LOW BACK PAIN WITHOUT SCIATICA: ICD-10-CM

## 2021-08-13 DIAGNOSIS — R06.02 SHORTNESS OF BREATH: ICD-10-CM

## 2021-08-13 RX ORDER — TRAMADOL HYDROCHLORIDE 50 MG/1
50-100 TABLET ORAL EVERY 8 HOURS PRN
Qty: 150 TABLET | Refills: 0 | Status: SHIPPED | OUTPATIENT
Start: 2021-08-13 | End: 2021-09-07 | Stop reason: SDUPTHER

## 2021-08-13 RX ORDER — ALBUTEROL SULFATE 90 UG/1
AEROSOL, METERED RESPIRATORY (INHALATION)
Qty: 18 G | Refills: 5 | Status: SHIPPED | OUTPATIENT
Start: 2021-08-13 | End: 2022-02-17 | Stop reason: SDUPTHER

## 2021-09-05 ENCOUNTER — HOSPITAL ENCOUNTER (OUTPATIENT)
Dept: GENERAL RADIOLOGY | Age: 56
Discharge: HOME | End: 2021-09-07
Payer: COMMERCIAL

## 2021-09-05 ENCOUNTER — HOSPITAL ENCOUNTER (OUTPATIENT)
Age: 56
Discharge: HOME | End: 2021-09-07
Payer: COMMERCIAL

## 2021-09-05 DIAGNOSIS — M54.2 CERVICAL PAIN (NECK): ICD-10-CM

## 2021-09-05 PROCEDURE — 72040 X-RAY EXAM NECK SPINE 2-3 VW: CPT

## 2021-09-07 ENCOUNTER — OFFICE VISIT (OUTPATIENT)
Dept: PRIMARY CARE CLINIC | Age: 56
End: 2021-09-07
Payer: COMMERCIAL

## 2021-09-07 VITALS
DIASTOLIC BLOOD PRESSURE: 86 MMHG | SYSTOLIC BLOOD PRESSURE: 118 MMHG | HEART RATE: 76 BPM | RESPIRATION RATE: 18 BRPM | OXYGEN SATURATION: 98 % | WEIGHT: 187.6 LBS | BODY MASS INDEX: 24.75 KG/M2

## 2021-09-07 DIAGNOSIS — M54.50 CHRONIC MIDLINE LOW BACK PAIN WITHOUT SCIATICA: ICD-10-CM

## 2021-09-07 DIAGNOSIS — Z87.891 FORMER SMOKER: ICD-10-CM

## 2021-09-07 DIAGNOSIS — M51.37 DISC DISEASE, DEGENERATIVE, LUMBAR OR LUMBOSACRAL: ICD-10-CM

## 2021-09-07 DIAGNOSIS — G89.29 CHRONIC NECK PAIN: Primary | ICD-10-CM

## 2021-09-07 DIAGNOSIS — R06.02 SHORTNESS OF BREATH: ICD-10-CM

## 2021-09-07 DIAGNOSIS — Z51.81 THERAPEUTIC DRUG MONITORING: ICD-10-CM

## 2021-09-07 DIAGNOSIS — R93.7 ABNORMAL X-RAY OF CERVICAL SPINE: ICD-10-CM

## 2021-09-07 DIAGNOSIS — M54.2 CHRONIC NECK PAIN: Primary | ICD-10-CM

## 2021-09-07 DIAGNOSIS — G89.29 CHRONIC MIDLINE LOW BACK PAIN WITHOUT SCIATICA: ICD-10-CM

## 2021-09-07 DIAGNOSIS — M15.9 PRIMARY OSTEOARTHRITIS INVOLVING MULTIPLE JOINTS: ICD-10-CM

## 2021-09-07 PROCEDURE — 80305 DRUG TEST PRSMV DIR OPT OBS: CPT | Performed by: NURSE PRACTITIONER

## 2021-09-07 PROCEDURE — 99214 OFFICE O/P EST MOD 30 MIN: CPT | Performed by: NURSE PRACTITIONER

## 2021-09-07 PROCEDURE — 1036F TOBACCO NON-USER: CPT | Performed by: NURSE PRACTITIONER

## 2021-09-07 PROCEDURE — G8420 CALC BMI NORM PARAMETERS: HCPCS | Performed by: NURSE PRACTITIONER

## 2021-09-07 PROCEDURE — G8427 DOCREV CUR MEDS BY ELIG CLIN: HCPCS | Performed by: NURSE PRACTITIONER

## 2021-09-07 PROCEDURE — 3017F COLORECTAL CA SCREEN DOC REV: CPT | Performed by: NURSE PRACTITIONER

## 2021-09-07 RX ORDER — TRAMADOL HYDROCHLORIDE 50 MG/1
50-100 TABLET ORAL EVERY 8 HOURS PRN
Qty: 150 TABLET | Refills: 0 | Status: SHIPPED | OUTPATIENT
Start: 2021-09-07 | End: 2021-10-08 | Stop reason: SDUPTHER

## 2021-09-07 NOTE — PROGRESS NOTES
[facility] PRIMARY CARE  [facility] [address]  [address] [address]  Dept: [phone]  Dept Fax: [phone]    Mehrdad Bradley is a 60 y.o. male who presentstoday for his medical conditions/complaints as noted below. Mehrdad Bradley is c/o of  Chief Complaint   Patient presents with    Follow-up     discuss testing   [facility]     Patient has Cologuard box       HPI:     Here today for follow up  Asking about results of neck xray  He reports his pain seems to be getting progressively worse  His job involves a lot of physical activity, lifting and also bending of the neck  He states often times after looking down for period of time he has difficulty straightening and will have a shooting pain into his shoulder, will also sometimes feel dizzy/lightheaded  He also reports having posterior headache when the neck is flared up  Some days are worse than others, tramadol helps some    Also reports having increased use of albuterol due to more frequent shortness of breath  He states notices mainly with exerting activities  Albuterol offers some relief, he states that he is using approximately 8-10 times per week  He has quit smoking many years ago  Denies any cough  Hypertension  This is a chronic problem. The current episode started more than 1 year ago. The problem is controlled. Associated symptoms include neck pain and shortness of breath (With exertion). Pertinent negatives include no chest pain, headaches, palpitations or peripheral edema. Past treatments include ACE inhibitors. The current treatment provides significant improvement.        No results found for: LABA1C          ( goal A1C is < 7)   No results found for: LABMICR  LDL Cholesterol (mg/dL)   Date Value   06/05/2019 66       (goal LDL is <100)   AST (U/L)   Date Value   06/04/2019 32     ALT (U/L)   Date Value   06/04/2019 25     BUN (mg/dL)   Date Value   05/20/2021 22 (H)     BP Readings Base) MCG/ACT inhaler Inhale 2 puffs into the lungs every 6 hours as needed USE AS DIRECTED 18 g 5    omeprazole (PRILOSEC) 20 MG delayed release capsule TAKE ONE CAPSULE BY MOUTH EVERY MORNING BEFORE BREAKFAST 90 capsule 3    lisinopril (PRINIVIL;ZESTRIL) 10 MG tablet TAKE ONE TABLET BY MOUTH DAILY 90 tablet 3    tiZANidine (ZANAFLEX) 4 MG tablet Take 1 tablet by mouth every 8 hours as needed (muscle spasms) 90 tablet 5    ibuprofen (ADVIL;MOTRIN) 800 MG tablet Take 1 tablet by mouth every 8 hours as needed for Pain 90 tablet 5    Multiple Vitamins-Minerals (MULTI FOR HIM 50+ PO) Take by mouth       No current facility-administered medications for this visit. No Known Allergies    Health Maintenance   Topic Date Due    Hepatitis C screen  Never done    Colon cancer screen colonoscopy  Never done    Shingles Vaccine (1 of 2) Never done    Flu vaccine (1) 09/07/2022 (Originally 9/1/2021)    Potassium monitoring  05/20/2022    Creatinine monitoring  05/20/2022    DTaP/Tdap/Td vaccine (2 - Td or Tdap) 01/01/2024    Lipid screen  06/05/2024    COVID-19 Vaccine  Completed    Hepatitis A vaccine  Aged Out    Hepatitis B vaccine  Aged Out    Hib vaccine  Aged Out    Meningococcal (ACWY) vaccine  Aged Out    Pneumococcal 0-64 years Vaccine  Aged Out    HIV screen  Discontinued       Subjective:      Review of Systems   Constitutional: Negative for activity change, appetite change, chills, fatigue, fever and unexpected weight change. HENT: Negative for congestion, ear pain, hearing loss, sinus pressure, sore throat and trouble swallowing. Eyes: Negative for visual disturbance. Respiratory: Positive for shortness of breath (With exertion). Negative for cough and wheezing. Cardiovascular: Negative for chest pain, palpitations and leg swelling. Gastrointestinal: Negative for abdominal pain, blood in stool, constipation, diarrhea, nausea and vomiting.    Endocrine: Negative for cold intolerance, heat intolerance, polydipsia, polyphagia and polyuria. Genitourinary: Negative for difficulty urinating, frequency, hematuria and urgency. Musculoskeletal: Positive for arthralgias, back pain, neck pain and neck stiffness. Negative for myalgias. Skin: Negative for rash. Allergic/Immunologic: Negative for environmental allergies. Neurological: Negative for dizziness, weakness, light-headedness and headaches. Psychiatric/Behavioral: Negative for confusion. The patient is not nervous/anxious. Objective:     Physical Exam  Constitutional:       Appearance: He is well-developed. HENT:      Head: Normocephalic. Eyes:      Conjunctiva/sclera: Conjunctivae normal.      Pupils: Pupils are equal, round, and reactive to light. Cardiovascular:      Rate and Rhythm: Normal rate and regular rhythm. Heart sounds: Normal heart sounds. No murmur heard. Pulmonary:      Effort: Pulmonary effort is normal.      Breath sounds: Normal breath sounds. No wheezing. Abdominal:      General: Bowel sounds are normal. There is no distension. Palpations: Abdomen is soft. Musculoskeletal:      Cervical back: Decreased range of motion. Skin:     General: Skin is warm and dry. Neurological:      Mental Status: He is alert and oriented to person, place, and time. Psychiatric:         Behavior: Behavior normal.         Thought Content: Thought content normal.         Judgment: Judgment normal.       /86   Pulse 76   Resp 18   Wt 187 lb 9.6 oz (85.1 kg)   SpO2 98%   BMI 24.75 kg/m²     Assessment:       Diagnosis Orders   1. Chronic neck pain  [address]   2. Disc disease, degenerative, lumbar or lumbosacral  traMADol (ULTRAM) 50 MG tablet   3. Chronic midline low back pain without sciatica  traMADol (ULTRAM) 50 MG tablet   4. Primary osteoarthritis involving multiple joints  traMADol (ULTRAM) 50 MG tablet   5.  Therapeutic drug monitoring POCT Rapid Drug Screen   6. Abnormal x-ray of cervical spine  [address], DO, Neurosurgery, [place]   7. Shortness of breath  CT lung screen [Initial/Annual]   8. Former smoker  CT lung screen [Initial/Annual]             Plan:      Return in about 4 months (around 2022). Neck pain, abnormal cervical spine x-raylengthy discussion, symptoms fairly significant, he agrees to consult with neurosurgeon. Also discussed may benefit from PT and/or pain management if surgery not indicated at this time. Continue tramadol for now  Osteoarthritis, chronic back painunchanged other than in his neck, tramadol continues to be helpful as needed. Urine drug screen appropriate for prescribed medications, also noted marijuana to which he admits recent use of edibles  Shortness of breath, former smokerreviewed appropriate use of albuterol prior to activities likely to induce his shortness of breath. He is also past due for annual CT lung screening. Discussed pending results of CT may consider adding daily inhaler if his symptoms persist and/or PFTs  Orders Placed This Encounter   Procedures    CT lung screen [Initial/Annual]     Age: 60 y.o. Smoking History:   Social History    Tobacco Use      Smoking status: Former Smoker        Packs/day: 0.05        Years: 35.00        Pack years: 1.75        Start date:         Quit date: 2010        Years since quittin.1      Smokeless tobacco: Never Used    Vaping Use      Vaping Use: Never used    Alcohol use: No      Comment: rare    Drug use: Yes      Comment: occ    Pack years: 1.75  Last CT lung screen: No previous lung cancer screening exam     Standing Status:   Future     Standing Expiration Date:   2022     Order Specific Question:   Is there documentation of shared decision making? Answer:   Yes     Order Specific Question:   Does the patient show any signs or symptoms of lung cancer?      Answer:   No     Order Specific Question:   Is this the first (baseline) CT or an annual exam?     Answer: Annual [2]     Order Specific Question:   Is this a low dose CT or a routine CT? Answer:   Low Dose CT [1]     Order Specific Question:   Smoking Status? Answer: Former Smoker [4]     Order Specific Question:   Date quit smoking? (must be within 15 years)     Answer:   7/23/2010     Order Specific Question:   Smoking packs per day? Answer:   0.05     Order Specific Question:   Years smoking? Answer:   [address], [address] NDO, Neurosurgery, [place]     Referral Priority:   Routine     Referral Type:   Eval and Treat     Referred to Provider:   Dominik Leon DO     Requested Specialty:   Neurosurgery     Number of Visits Requested:   1    POCT Rapid Drug Screen        Orders Placed This Encounter   Medications    traMADol (ULTRAM) 50 MG tablet     Sig: Take 1-2 tablets by mouth every 8 hours as needed for Pain for up to 30 days. Dispense:  150 tablet     Refill:  0     Reduce doses taken as pain becomes manageable       Patient given educational materials - see patient instructions. Discussed use, benefit, and side effects of prescribed medications. All patientquestions answered. Pt voiced understanding. Reviewed health maintenance. Instructedto continue current medications, diet and exercise. Patient agreed with treatmentplan. Follow up as directed.      Electronicallysigned by GITA Webb CNP on 9/8/2021 at 9:57 AM

## 2021-09-08 PROBLEM — R06.02 SHORTNESS OF BREATH: Status: ACTIVE | Noted: 2021-09-08

## 2021-09-08 PROBLEM — M15.9 PRIMARY OSTEOARTHRITIS INVOLVING MULTIPLE JOINTS: Status: ACTIVE | Noted: 2021-09-08

## 2021-09-08 PROBLEM — M15.0 PRIMARY OSTEOARTHRITIS INVOLVING MULTIPLE JOINTS: Status: ACTIVE | Noted: 2021-09-08

## 2021-09-08 ASSESSMENT — ENCOUNTER SYMPTOMS
BACK PAIN: 1
WHEEZING: 0
CONSTIPATION: 0
COUGH: 0
SORE THROAT: 0
SHORTNESS OF BREATH: 1
SINUS PRESSURE: 0
NAUSEA: 0
DIARRHEA: 0
ABDOMINAL PAIN: 0
TROUBLE SWALLOWING: 0
BLOOD IN STOOL: 0
VOMITING: 0

## 2021-10-06 ENCOUNTER — HOSPITAL ENCOUNTER (OUTPATIENT)
Age: 56
Discharge: HOME | End: 2021-10-06
Payer: COMMERCIAL

## 2021-10-06 ENCOUNTER — OFFICE VISIT (OUTPATIENT)
Dept: NEUROSURGERY | Age: 56
End: 2021-10-06
Payer: COMMERCIAL

## 2021-10-06 VITALS
OXYGEN SATURATION: 94 % | WEIGHT: 187 LBS | HEIGHT: 73 IN | HEART RATE: 62 BPM | SYSTOLIC BLOOD PRESSURE: 113 MMHG | DIASTOLIC BLOOD PRESSURE: 64 MMHG | BODY MASS INDEX: 24.78 KG/M2

## 2021-10-06 DIAGNOSIS — M62.838 CERVICAL PARASPINAL MUSCLE SPASM: Primary | ICD-10-CM

## 2021-10-06 DIAGNOSIS — M47.812 FACET ARTHROPATHY, CERVICAL: ICD-10-CM

## 2021-10-06 DIAGNOSIS — Z86.19 HISTORY OF RECENT ACUTE INFECTION: ICD-10-CM

## 2021-10-06 DIAGNOSIS — M62.838 CERVICAL PARASPINAL MUSCLE SPASM: ICD-10-CM

## 2021-10-06 LAB
C-REACTIVE PROTEIN: <3 MG/L (ref 0–5)
SEDIMENTATION RATE, ERYTHROCYTE: 7 MM (ref 0–20)

## 2021-10-06 PROCEDURE — 86140 C-REACTIVE PROTEIN: CPT

## 2021-10-06 PROCEDURE — G8484 FLU IMMUNIZE NO ADMIN: HCPCS | Performed by: NURSE PRACTITIONER

## 2021-10-06 PROCEDURE — 1036F TOBACCO NON-USER: CPT | Performed by: NURSE PRACTITIONER

## 2021-10-06 PROCEDURE — 36415 COLL VENOUS BLD VENIPUNCTURE: CPT

## 2021-10-06 PROCEDURE — 3017F COLORECTAL CA SCREEN DOC REV: CPT | Performed by: NURSE PRACTITIONER

## 2021-10-06 PROCEDURE — 85652 RBC SED RATE AUTOMATED: CPT

## 2021-10-06 PROCEDURE — G8420 CALC BMI NORM PARAMETERS: HCPCS | Performed by: NURSE PRACTITIONER

## 2021-10-06 PROCEDURE — 99214 OFFICE O/P EST MOD 30 MIN: CPT | Performed by: NURSE PRACTITIONER

## 2021-10-06 PROCEDURE — G8427 DOCREV CUR MEDS BY ELIG CLIN: HCPCS | Performed by: NURSE PRACTITIONER

## 2021-10-06 NOTE — PROGRESS NOTES
[address], [address]  Dept: [phone]    Patient:  Marquise Croft  YOB: 1965  Date: 10/6/21    The patient is a 60 y.o. male who presents today for consult of the following problems:     Chief Complaint   Patient presents with    New Patient    Neck Pain     chronic         HPI:     Marquise Croft is a 60 y.o. male on whom neurosurgical consultation was requested by GITA Garcia CNP for management of neck pain and arm symptoms. Has been experiencing progressive neck pain for the last 3 months. Does have a history significant for septic knee in May, has been following with Dr Antonio Paige for this. Has had various arthralgias over the last several months. Does have associated painful numbness to middle finger of right hand. Did complete a single PT session, TENS unit, topical prescription medication with no relief. Does take tramadol routinely, this does slightly help, ibuprofen as well. Patient was concerned that his new neck pain could be related to infection. Denies any recent fevers, chills. No unexpected weight loss. Pain is worsened with left lateral rotation, states that there are palpable areas to left paraspinal region that are very painful to the touch. MYELOPATHY    Frequently dropping things: No  Difficulty with buttoning clothes, using zipper, putting on watch OR jewelry: No  Changes in handwriting: No  Numbness or tingling: Yes    LIMITATIONS    Pain significantly limiting on a daily basis   Daily pharmacologic pain control include: Tramadol  Neck : Arm pain: all neck pain    MANAGEMENT     Prior Surgery: No  Prior to 1yr ago:   In the last year:    Physical Therapy: Yes-1 session   Chiropractic Interventions: No   Injections: No  Improvement: N/A    Injections/response: N/A    History:     Past Medical History:   Diagnosis Date    Anxiety 5/26/2020    Back pain     Bilateral tinnitus 5/26/2020    Cellulitis of right knee 5/17/2021    Chronic midline low back pain without sciatica 11/10/2016    Deviated septum 5/26/2020    Disc disease, degenerative, lumbar or lumbosacral     Essential hypertension 11/10/2016    History of placement of ear tubes 9/29/2020    History of tobacco use 5/26/2020    Hyperlipidemia     Hypertension     Hypocalcemia 5/18/2021    Hyponatremia 5/18/2021    Lymph node enlargement     Normocytic normochromic anemia 5/20/2021    Obstructive sleep apnea syndrome 4/11/2017    Prepatellar bursitis of right knee 5/18/2021    Restless leg syndrome     Sensorineural hearing loss, bilateral 5/26/2020    Septic infrapatellar bursitis, left 5/18/2021    Somatic dysfunction of cervical region 12/3/2014     Past Surgical History:   Procedure Laterality Date    HERNIA REPAIR      KNEE SURGERY Right 05/18/2021    : KNEE INCISION AND DRAINAGE (Right )    KNEE SURGERY Right 5/18/2021    KNEE INCISION AND DRAINAGE performed by Luanne Bustos MD at [address]  06/2020    TONSILLECTOMY AND ADENOIDECTOMY      TYMPANOSTOMY TUBE PLACEMENT Bilateral      Family History   Problem Relation Age of Onset    Arthritis Mother      Current Outpatient Medications on File Prior to Visit   Medication Sig Dispense Refill    traMADol (ULTRAM) 50 MG tablet Take 1-2 tablets by mouth every 8 hours as needed for Pain for up to 30 days.  150 tablet 0    albuterol sulfate  (90 Base) MCG/ACT inhaler Inhale 2 puffs into the lungs every 6 hours as needed USE AS DIRECTED 18 g 5    omeprazole (PRILOSEC) 20 MG delayed release capsule TAKE ONE CAPSULE BY MOUTH EVERY MORNING BEFORE BREAKFAST 90 capsule 3    lisinopril (PRINIVIL;ZESTRIL) 10 MG tablet TAKE ONE TABLET BY MOUTH DAILY 90 tablet 3    tiZANidine (ZANAFLEX) 4 MG tablet Take 1 tablet by mouth every 8 hours as needed (muscle spasms) 90 tablet 5    ibuprofen (ADVIL;MOTRIN) 800 MG tablet Take 1 tablet by mouth every 8 hours as needed for Pain 90 tablet 5    Multiple Vitamins-Minerals (MULTI FOR HIM 50+ PO) Take by mouth       No current facility-administered medications on file prior to visit. Social History     Tobacco Use    Smoking status: Former Smoker     Packs/day: 0.05     Years: 35.00     Pack years: 1.75     Start date:      Quit date: 2010     Years since quittin.2    Smokeless tobacco: Never Used   Vaping Use    Vaping Use: Never used   Substance Use Topics    Alcohol use: No     Comment: rare    Drug use: Yes     Comment: occ       No Known Allergies    Review of Systems  Constitutional: Negative for activity change and appetite change. HENT: Negative for ear pain and facial swelling. Eyes: Negative for discharge and itching. Respiratory: Negative for choking and chest tightness. Cardiovascular: Negative for chest pain and leg swelling. Gastrointestinal: Negative for nausea and abdominal pain. Endocrine: Negative for cold intolerance and heat intolerance. Genitourinary: Negative for frequency and flank pain. Musculoskeletal: Negative for myalgias and joint swelling. Skin: Negative for rash and wound. Allergic/Immunologic: Negative for environmental allergies and food allergies. Hematological: Negative for adenopathy. Does not bruise/bleed easily. Psychiatric/Behavioral: Negative for self-injury. The patient is not nervous/anxious. Physical Exam:      /64   Pulse 62   Ht 6' 1\" (1.854 m)   Wt 187 lb (84.8 kg)   SpO2 94%   BMI 24.67 kg/m²   Estimated body mass index is 24.67 kg/m² as calculated from the following:    Height as of this encounter: 6' 1\" (1.854 m). Weight as of this encounter: 187 lb (84.8 kg). General:  Linnea Mtz is a 60y.o. year old male who appears his stated age. HEENT: Normocephalic atraumatic. Neck supple.   Chest: regular rate; pulses equal  Abdomen: Soft nontender nondistended. Ext: DP and PT pulses 2+, good cap refill  Neuro    Mentation  Appropriate affect  Registration intact  Orientation intact  3 item recall intact  Judgement intact to situation    Cranial Nerves:   Pupils equal and reactive to light  Extraocular motion intact  Face and shrug symmetric  Tongue midline  No dysarthria  v1-3 sensation symmetric, masseter tone symmetric  Hearing symmetric and intact    Sensation: Intact    Motor  L deltoid 5/5; R deltoid 5/5  L biceps 5/5; R biceps 5/5  L triceps 5/5; R triceps 5/5  L wrist extension 5/5; R wrist extension 5/5  L intrinsics 5/5; R intrinsics 5/5     L iliopsoas 5/5 , R iliopsoas 5/5  L quadriceps 5/5; R quadriceps 5/5  L Dorsiflexion 5/5; R dorsiflexion 5/5  L Plantarflexion 5/5; R plantarflexion 5/5  L EHL 5/5; R EHL 5/5    Reflexes  L Brachioradialis 2+/4; R brachioradialis 2+/4  L Biceps 2+/4; R Biceps 2+/4  L Triceps 2+/4; R Triceps 2+/4  L Patellar 2+/4: R Patellar 2+/4  L Achilles 2+/4; R Achilles 2+/4    hoffmans L: neg  hoffmans R: neg  Clonus L: neg  Clonus R: neg  Babinski L: neg  Babinski R: neg    Spurlings: No  Lhermittes: No    Tinels at elbow: No  Tinels at wrist: No  Phalens: No  Ok sign: No  Froments: No  Benedictine Hand: No    Atrophy of thenar: No  Atrophy of hypothenar: No    + Tenderness to palpation over left paracervical/trapezius region    Studies Review:     X-ray cervical spine flexion/extension 9/5/2021 (images reviewed):   FINDINGS:   Lateral neutral and flexion extension views were obtained.       Multilevel moderate cervical spondylotic changes with loss of disc space   height, endplate sclerosis, osteophytes most notable at C6-C7 and C5-C6.  No   convincing evidence of acute fracture.  There is straightening of normal   cervical lordosis on the neutral view which could be positional or due to   spasm.  Minimal anterolisthesis C4-C5 on the neutral view likely related to   facet disease.  No localized prevertebral soft tissue swelling.  No   significant dynamic instability with flexion/extension. PCP notes reviewed  Ortho notes reviewed    Assessment and Plan:      1. Cervical paraspinal muscle spasm    2. History of recent acute infection    3. Facet arthropathy, cervical          Plan: Patient with 3-month history of predominantly left-sided axial cervical pain, with associated tenderness to palpation as well as muscle tightness to left paracervical/trapezius. Recent known history of septic arthritis to right knee. Will obtain inflammatory markers to evaluate for likelihood of any current infection. Recommend initiation of physical therapy to work on stretching, strengthening, ultrasound for left-sided muscle spasm. We will see the patient back in 6-8 weeks for reevaluation. If inflammatory markers are elevated, will consider obtaining cervical MRI with and without contrast.    Followup: Return in about 6 weeks (around 11/17/2021), or if symptoms worsen or fail to improve. Prescriptions Ordered:  No orders of the defined types were placed in this encounter. Orders Placed:  Orders Placed This Encounter   Procedures    Sedimentation Rate     Standing Status:   Future     Number of Occurrences:   1     Standing Expiration Date:   10/6/2022    C-Reactive Protein     Standing Status:   Future     Number of Occurrences:   1     Standing Expiration Date:   10/6/2022   [facility] [place]     Referral Priority:   Routine     Referral Type:   Eval and Treat     Referral Reason:   Specialty Services Required     Requested Specialty:   Physical Therapy     Number of Visits Requested:   1        Electronically signed by GITA Grace CNP on 10/7/2021 at 10:38 AM    Please note that this chart was generated using voice recognition Dragon dictation software.   Although every effort was made to ensure the accuracy of this automated transcription, some errors in transcription may have occurred.

## 2021-10-08 DIAGNOSIS — M15.9 PRIMARY OSTEOARTHRITIS INVOLVING MULTIPLE JOINTS: ICD-10-CM

## 2021-10-08 DIAGNOSIS — G89.29 CHRONIC MIDLINE LOW BACK PAIN WITHOUT SCIATICA: ICD-10-CM

## 2021-10-08 DIAGNOSIS — M54.50 CHRONIC MIDLINE LOW BACK PAIN WITHOUT SCIATICA: ICD-10-CM

## 2021-10-08 DIAGNOSIS — M51.37 DISC DISEASE, DEGENERATIVE, LUMBAR OR LUMBOSACRAL: ICD-10-CM

## 2021-10-08 RX ORDER — TRAMADOL HYDROCHLORIDE 50 MG/1
50-100 TABLET ORAL EVERY 8 HOURS PRN
Qty: 150 TABLET | Refills: 0 | Status: SHIPPED | OUTPATIENT
Start: 2021-10-08 | End: 2021-11-04 | Stop reason: SDUPTHER

## 2021-11-04 ENCOUNTER — TELEPHONE (OUTPATIENT)
Dept: PRIMARY CARE CLINIC | Age: 56
End: 2021-11-04

## 2021-11-04 DIAGNOSIS — M51.37 DISC DISEASE, DEGENERATIVE, LUMBAR OR LUMBOSACRAL: ICD-10-CM

## 2021-11-04 DIAGNOSIS — M54.50 CHRONIC MIDLINE LOW BACK PAIN WITHOUT SCIATICA: ICD-10-CM

## 2021-11-04 DIAGNOSIS — M15.9 PRIMARY OSTEOARTHRITIS INVOLVING MULTIPLE JOINTS: ICD-10-CM

## 2021-11-04 DIAGNOSIS — G89.29 CHRONIC MIDLINE LOW BACK PAIN WITHOUT SCIATICA: ICD-10-CM

## 2021-11-04 RX ORDER — TRAMADOL HYDROCHLORIDE 50 MG/1
50-100 TABLET ORAL EVERY 8 HOURS PRN
Qty: 150 TABLET | Refills: 0 | Status: SHIPPED | OUTPATIENT
Start: 2021-11-04 | End: 2021-12-03 | Stop reason: SDUPTHER

## 2021-11-04 NOTE — TELEPHONE ENCOUNTER
Patient called in stating that his neck pain has been really bad lately and that he has been having to take 2 Tramadol every 8 hours and is now going to be our sooner than usual. Patient is asking if provider could call his pharmacy and give the ok for him to refill his Tramadol. He states that he can not fill it until the 7th but only has enough to last him through tomorrow. Please advise.

## 2021-11-23 ENCOUNTER — OFFICE VISIT (OUTPATIENT)
Dept: FAMILY MEDICINE CLINIC | Age: 56
End: 2021-11-23
Payer: COMMERCIAL

## 2021-11-23 ENCOUNTER — HOSPITAL ENCOUNTER (OUTPATIENT)
Age: 56
Setting detail: SPECIMEN
Discharge: HOME | End: 2021-11-23

## 2021-11-23 ENCOUNTER — TELEPHONE (OUTPATIENT)
Dept: PRIMARY CARE CLINIC | Age: 56
End: 2021-11-23

## 2021-11-23 VITALS
OXYGEN SATURATION: 98 % | BODY MASS INDEX: 24.67 KG/M2 | SYSTOLIC BLOOD PRESSURE: 131 MMHG | WEIGHT: 187 LBS | HEART RATE: 78 BPM | TEMPERATURE: 98.6 F | DIASTOLIC BLOOD PRESSURE: 70 MMHG | RESPIRATION RATE: 15 BRPM

## 2021-11-23 DIAGNOSIS — R51.9 ACUTE NONINTRACTABLE HEADACHE, UNSPECIFIED HEADACHE TYPE: ICD-10-CM

## 2021-11-23 DIAGNOSIS — R05.9 COUGH: ICD-10-CM

## 2021-11-23 DIAGNOSIS — J44.1 COPD WITH ACUTE EXACERBATION (HCC): Primary | ICD-10-CM

## 2021-11-23 PROCEDURE — 99213 OFFICE O/P EST LOW 20 MIN: CPT | Performed by: NURSE PRACTITIONER

## 2021-11-23 PROCEDURE — 3017F COLORECTAL CA SCREEN DOC REV: CPT | Performed by: NURSE PRACTITIONER

## 2021-11-23 PROCEDURE — G8484 FLU IMMUNIZE NO ADMIN: HCPCS | Performed by: NURSE PRACTITIONER

## 2021-11-23 PROCEDURE — G8926 SPIRO NO PERF OR DOC: HCPCS | Performed by: NURSE PRACTITIONER

## 2021-11-23 PROCEDURE — G8420 CALC BMI NORM PARAMETERS: HCPCS | Performed by: NURSE PRACTITIONER

## 2021-11-23 PROCEDURE — 1036F TOBACCO NON-USER: CPT | Performed by: NURSE PRACTITIONER

## 2021-11-23 PROCEDURE — G8427 DOCREV CUR MEDS BY ELIG CLIN: HCPCS | Performed by: NURSE PRACTITIONER

## 2021-11-23 PROCEDURE — 3023F SPIROM DOC REV: CPT | Performed by: NURSE PRACTITIONER

## 2021-11-23 RX ORDER — AZITHROMYCIN 250 MG/1
250 TABLET, FILM COATED ORAL SEE ADMIN INSTRUCTIONS
Qty: 6 TABLET | Refills: 0 | Status: SHIPPED | OUTPATIENT
Start: 2021-11-23 | End: 2021-12-28 | Stop reason: SDUPTHER

## 2021-11-23 RX ORDER — GUAIFENESIN 600 MG/1
600 TABLET, EXTENDED RELEASE ORAL 2 TIMES DAILY
Qty: 30 TABLET | Refills: 0 | Status: SHIPPED | OUTPATIENT
Start: 2021-11-23 | End: 2021-12-08

## 2021-11-23 RX ORDER — PREDNISONE 20 MG/1
20 TABLET ORAL 2 TIMES DAILY
Qty: 10 TABLET | Refills: 0 | Status: SHIPPED | OUTPATIENT
Start: 2021-11-23 | End: 2021-12-28 | Stop reason: SDUPTHER

## 2021-11-23 RX ORDER — BENZONATATE 100 MG/1
100 CAPSULE ORAL 3 TIMES DAILY PRN
Qty: 21 CAPSULE | Refills: 0 | Status: SHIPPED | OUTPATIENT
Start: 2021-11-23 | End: 2021-12-28 | Stop reason: SDUPTHER

## 2021-11-23 RX ORDER — SUCRALFATE 1 G/1
TABLET ORAL
COMMUNITY
Start: 2021-11-06 | End: 2022-04-26 | Stop reason: ALTCHOICE

## 2021-11-23 ASSESSMENT — ENCOUNTER SYMPTOMS
STRIDOR: 0
SHORTNESS OF BREATH: 0
CHEST TIGHTNESS: 0
WHEEZING: 1
RHINORRHEA: 0
HEARTBURN: 0
COUGH: 1
HEMOPTYSIS: 0
CHOKING: 0
SORE THROAT: 0

## 2021-11-23 NOTE — LETTER
[address] [facility] [place] [facility] [place]  [address] [facility]  Phone: [phone]  Fax: [phone]    GITA Watt CNP        November 23, 2021     Patient: Ermias Roth   YOB: 1965   Date of Visit: 11/23/2021       To Whom It May Concern: It is my medical opinion that Lashell Felix should remain out of work until [facility] results are back in 1-4 days. .    If you have any questions or concerns, please don't hesitate to call.     Sincerely,        GITA Watt CNP

## 2021-11-23 NOTE — PATIENT INSTRUCTIONS
Follow up with family doctor in 1 week as needed. Return immediately if worse, new symptoms develop, symptoms persist or have any questions or concerns. Push fluids, keep hydrated  Cool mist humidifier bedside  Continue all medications as prescribed  May alternate tylenol/motrin over the counter for pain or fever, take per package instructions. The COVID-19 test that was done today can take 1-6 days for results. Until then you should assume you have this disease and adhere to home isolation as described below. When we get the test results back, one of the following readings will be obtained. 1. A positive test means you have the virus. 2.  An inconclusive test means it wasn't sure if you have the virus or not. An inconclusive test result is treated as a positive result and recommendations  are the same as a positive test result. We may ask you to repeat this test in this circumstance. 3.  A negative test means you probably do not have the virus, but it is not conclusive. If your results of the COVID-19 test is POSITIVE- you must isolate yourself from others. You can be around others after:    10 days since symptoms first appeared (immunocompromised will quarantine for 20 days) and  24 hours with no fever without the use of fever-reducing medications and  Other symptoms of COVID-19 are improving*  *Loss of taste and smell may persist for weeks or months after recovery and need not delay the end of isolation  For people who are immunocopromised, quarantine may last for 20 days. Most people do not require testing to decide when they can be around others; however, if your healthcare provider recommends testing, they will let you know when you can resume being around others based on your test results. Note that these recommendations do not apply to persons with severe COVID-19 or with severely weakened immune systems (immunocompromised).  These persons should follow the guidance below for I was severely ill with COVID-19 or have a severely weakened immune system (immunocompromised) due to a health condition or medication. When can I be around others?     [email or web address].[email or web address] html    Prevention steps for People with positive or inconclusive test results or suspected  COVID-19 (including persons under investigation) who do not need to be hospitalized  and   People with confirmed COVID-19 who were hospitalized and determined to be medically stable to go home    Contacts who are NOT healthcare providers or first responders and are asymptomatic (no fever,  cough, shortness of breath, or difficulty breathing) should self-quarantine for 14 days from the last  date of exposure to confirmed COVID-19. Your healthcare provider and public health staff will evaluate whether you can be cared for at home. If it is determined that you do not need to be hospitalized and can be isolated at home, you will be monitored by staff from your health department. You should follow the prevention steps below until a healthcare provider or local or state health department says you can return to your normal activities. Stay home except to get medical care  People who are mildly ill with COVID-19 are able to isolate at home during their illness. You should restrict activities outside your home, except for getting medical care. Do not go to work, school, or public areas. Avoid using public transportation, ride-sharing, or taxis. Separate yourself from other people and animals in your home  People: As much as possible, you should stay in a specific room and away from other people in your home. Also, you should use a separate bathroom, if available. Animals: You should restrict contact with pets and other animals while you are sick with COVID-19, just like you would around other people.  When possible, have another member of your household care for your animals while you bedding with other people or pets in your home. After using these items, they should be washed thoroughly with soap and water. Clean all high-touch surfaces everyday  High touch surfaces include counters, tabletops, doorknobs, bathroom fixtures, toilets, phones, keyboards, tablets, and bedside tables. Also, clean any surfaces that may have blood, stool, or body fluids on them. Use a household cleaning spray or wipe, according to the label instructions. Labels contain instructions for safe and effective use of the cleaning product including precautions you should take when applying the product, such as wearing gloves and making sure you have good ventilation during use of the product. Monitor your symptoms  Seek prompt medical attention if your illness is worsening (e.g., difficulty breathing). Before seeking care, call your healthcare provider and tell them that you have, or are being evaluated for, COVID-19. Put on a facemask before you enter the facility. These steps will help the healthcare providers office to keep other people in the office or waiting room from getting infected or exposed. Persons who are placed under active monitoring or facilitated self-monitoring should follow instructions provided by their local health department or occupational health professionals, as appropriate. When working with your local health department check their available hours. If you have a medical emergency and need to call 911, notify the dispatch personnel that you have, or are being evaluated for COVID-19. If possible, put on a facemask before emergency medical services arrive. Discontinuing home isolation  Patients with confirmed COVID-19 should remain under home isolation precautions until the risk of secondary transmission to others is thought to be low. The decision to discontinue home isolation precautions should be made on a case-by-case basis, in consultation with your physician and the health department. Please do NOT make this decision on your own. Lidia Sims- keeps someone who might have been exposed to the virus away from others  Isolation - keeps someone who is infected with the virus away from others, even in their homes    Scenario 1    Your patient has close contact with an individual who has COVID-19. Your patient will not have further contact. Plan - Your patient is quarantined from the last day of contact for 14 days    Scenario 2   Your patient has lives with someone who has COVID-19 but can avoid further contact. Plan - Your patient is quarantined for 14 days starting when the person with COVID-19 begins home isolation. Scenario 3    Your patient is under quarantine and has additional close contact with someone else who has COVID-19. Plan - Your patient must restart quarantine from the last COVID-19 exposure. Scenario 4   Your patient lives with someone who has COVID-19 and cannot avoid close contact from them. Plan - Your patient is quarantined while the other person is isolating and for 14 days after covid - 23 person meets the criteria to end home isolation. Treatment with monclonoal antibodies is under investigation and can be considered for patients with mild to moderate COVID-19 who are not on supplemental oxygen and are not hospitalized but who are at [address] [place] [address] for clinical progression have had onset of symptoms within the past 10 days. HIGH RISK is defined as patients who meet at least one of the following criteria:    Have a body mass index (BMI) ? 28    Have chronic kidney disease    Have diabetes    Have immunosuppressive disease    Are currently receiving immunosuppressive treatment    Are ?72years of age  Cristhian Michael  Are ?54years of age AND have  *cardiovascular disease, OR  *hypertension, OR  *chronic obstructive pulmonary disease/other chronic respiratory disease.   Are 15- 16years of age AND have  *BMI ? 85th percentile for their age and gender based on CDC growth charts, [email or web address] , OR  *sickle cell disease, OR  *congenital or acquired heart disease, OR  *neurodevelopmental disorders, for example, cerebral palsy, OR  *a medical-related technological dependence, for example, tracheostomy, gastrostomy, or positive pressure ventilation (not related to   COVID-19), OR  *asthma, reactive airway or other chronic respiratory disease that requires daily medication for control. Other risk factors:   Moderate/severe dementia   Cancer being treated with palliative care   Cirrhosis   Pregnancy   Breast feeding   Weight less than 40Kg (88lbs)      If your results of the COVID-19 test is NEGATIVE -     The patient may stop isolation, in consultation with your health care provider, typically when: Your healthcare provider has determined that the cause of the illness is NOT COVID-19 and approves your return to work. OR  Ten (10) days have passed since onset of symptoms AND one day (24 hours) have passed with no fever without taking medication (like Tylenol) to reduce fever,  respiratory symptoms have resolved and you have been evaluated by your health care provider. Please follow up with your physician for evaluation about this. COVID-19 EXPOSURE    Quarantine  Quarantine if you have been in close contact (within 6 feet of someone for a cumulative total of 15 minutes or more over a 24-hour period) with someone who has COVID-19, unless you have been fully vaccinated. People who are fully vaccinated do NOT need to quarantine after contact with someone who had COVID-19 unless they have symptoms. However, fully vaccinated people should get tested 3-5 days after their exposure, even if they dont have symptoms and wear a mask indoors in public for 14 days following exposure or until their test result is negative.     The following websites are the best places for up to date information on this fluid situation. MaleWeight.co.[email or web address]    The following applies to a person who has clinically recovered from  SARS-CoV-2 infection that was confirmed with a viral diagnostic test and then, within 3 months since the date of symptom onset of the previous illness episode (or date of positive viral diagnostic test if the person never experienced symptoms), is identified as a contact of a new case. If the person remains asymptomatic since the new exposure, then they do not need to be retested for SARS-CoV-2 and do not need to be quarantined. However, if the person experiences new symptoms consistent with COVID-19 and an evaluation fails to identify a diagnosis other than SARS-CoV-2 infection (e.g., influenza), then repeat viral diagnostic testing may be warranted, in consultation with an infectious disease specialist and public health authorities for isolation guidance. Patient Education        Learning About COPD and Upper Respiratory Infections  What are upper respiratory infections? An upper respiratory infection, also called a URI, is an infection of the nose, sinuses, or throat. Viruses or bacteria can cause URIs. Colds, the flu, and sinusitis are examples of URIs. These infections are spread by coughs, sneezes, and close contact. How do these infections affect COPD? Colds, the flu, and other upper respiratory infections can make COPD symptoms worse. These symptoms include having too much mucus in your lungs, coughing, and being short of breath. What can you do to manage most infections at home? · Do not smoke. Avoid secondhand smoke. · Take an over-the-counter pain medicine, such as acetaminophen (Tylenol), ibuprofen (Advil, Motrin), or naproxen (Aleve). Read and follow all instructions on the label. · Be careful when taking over-the-counter cold or flu medicines and Tylenol at the same time. Many of these medicines have acetaminophen, which is Tylenol.  Read the labels to make sure that you are not taking more than the recommended dose. Too much acetaminophen (Tylenol) can be harmful. · If your doctor prescribed antibiotics, take them as directed. Do not stop taking them just because you feel better. You need to take the full course of antibiotics. · Ask your doctor about cough medicines and decongestants. Some doctors recommend these medicines, while others feel that they do not help. · Learn breathing techniques for COPD, such as breathing through pursed lips. These techniques can help you breathe easier. What can you do to prevent these infections? Stay healthy   · If you must be around people with colds or the flu, wash your hands often. · Do not smoke. This is the most important step you can take to prevent more damage to your lungs. If you need help quitting, talk to your doctor about stop-smoking programs and medicines. These can increase your chances of quitting for good. · Avoid secondhand smoke and air pollution. Try to stay inside with your windows closed when air pollution is bad. Exercise and eat well   · If your doctor recommends it, get more exercise. Walking is a good choice. Bit by bit, increase the amount you walk every day. Try for at least 30 minutes on most days of the week. · Try to eat a variety of healthy foods. This gives your body energy and helps your body fight infection. · Get plenty of rest and sleep. Follow-up care is a key part of your treatment and safety. Be sure to make and go to all appointments, and call your doctor if you are having problems. It's also a good idea to know your test results and keep a list of the medicines you take. Where can you learn more? Go to https://ann [email or web address] org and sign in to your [employer] account. Enter H300 in the [facility] box to learn more about \"Learning About COPD and Upper Respiratory Infections. \"     If you do not have an account, please click on the \"Sign Up Now\" link.   Current as of: July 6, 2021               Content Version: 13.0  © 5968-0448 Healthwise, Incorporated. Care instructions adapted under license by [facility] ([facility]). If you have questions about a medical condition or this instruction, always ask your healthcare professional. [address] any warranty or liability for your use of this information.

## 2021-11-23 NOTE — PROGRESS NOTES
[address] [facility]  [address] [place]  Dept: [phone]  Dept Fax: [phone]    Emile Beasley is a 60 y.o. male who presents to the urgent care today for his medical conditions/complaints as notedbelow. Emile Beasley is c/o of Chest Congestion (taking OTC sudafed. Onset Saturday.), Cough (productive cough. has copd. ), Congestion, and Tinnitus      HPI:     60 yr old male presents for chest congestion, prod cough. Cough is dry right now but very productive at times. Coming in early for antibiotic so does not get worse  Hx copd, no nasal congestion or fevers, + occasional wheezing but no sob  Has had ear problems for years - tinniitus normal for him    Taking otc cough meds with relief, has alb inhaler. Cough  This is a new problem. The current episode started in the past 7 days (x3d). The problem has been gradually worsening. The problem occurs every few minutes. The cough is productive of purulent sputum. Associated symptoms include ear congestion, headaches and wheezing. Pertinent negatives include no chest pain, chills, ear pain, fever, heartburn, hemoptysis, myalgias, nasal congestion, postnasal drip, rash, rhinorrhea, sore throat, shortness of breath, sweats or weight loss. Nothing aggravates the symptoms. Risk factors for lung disease include occupational exposure. Treatments tried: sudafed and mucinex. The treatment provided no relief. His past medical history is significant for asthma, bronchitis, COPD and environmental allergies. There is no history of pneumonia.        Past Medical History:   Diagnosis Date    Anxiety 5/26/2020    Back pain     Bilateral tinnitus 5/26/2020    Cellulitis of right knee 5/17/2021    Chronic midline low back pain without sciatica 11/10/2016    Deviated septum 5/26/2020    Disc disease, degenerative, lumbar or lumbosacral     Essential hypertension 11/10/2016    History of placement of ear tubes 9/29/2020    History of tobacco use 5/26/2020    Hyperlipidemia     Hypertension     Hypocalcemia 5/18/2021    Hyponatremia 5/18/2021    Lymph node enlargement     Normocytic normochromic anemia 5/20/2021    Obstructive sleep apnea syndrome 4/11/2017    Prepatellar bursitis of right knee 5/18/2021    Restless leg syndrome     Sensorineural hearing loss, bilateral 5/26/2020    Septic infrapatellar bursitis, left 5/18/2021    Somatic dysfunction of cervical region 12/3/2014        Current Outpatient Medications   Medication Sig Dispense Refill    predniSONE (DELTASONE) 20 MG tablet Take 1 tablet by mouth 2 times daily for 5 days 10 tablet 0    azithromycin (ZITHROMAX) 250 MG tablet Take 1 tablet by mouth See Admin Instructions for 5 days 500mg on day 1 followed by 250mg on days 2 - 5 6 tablet 0    benzonatate (TESSALON PERLES) 100 MG capsule Take 1 capsule by mouth 3 times daily as needed for Cough 21 capsule 0    guaiFENesin (MUCINEX) 600 MG extended release tablet Take 1 tablet by mouth 2 times daily for 15 days 30 tablet 0    sucralfate (CARAFATE) 1 GM tablet TAKE 1 TABLET BY MOUTH 4 TIMES DAILY      traMADol (ULTRAM) 50 MG tablet Take 1-2 tablets by mouth every 8 hours as needed for Pain for up to 30 days.  150 tablet 0    albuterol sulfate  (90 Base) MCG/ACT inhaler Inhale 2 puffs into the lungs every 6 hours as needed USE AS DIRECTED 18 g 5    omeprazole (PRILOSEC) 20 MG delayed release capsule TAKE ONE CAPSULE BY MOUTH EVERY MORNING BEFORE BREAKFAST 90 capsule 3    lisinopril (PRINIVIL;ZESTRIL) 10 MG tablet TAKE ONE TABLET BY MOUTH DAILY 90 tablet 3    tiZANidine (ZANAFLEX) 4 MG tablet Take 1 tablet by mouth every 8 hours as needed (muscle spasms) 90 tablet 5    ibuprofen (ADVIL;MOTRIN) 800 MG tablet Take 1 tablet by mouth every 8 hours as needed for Pain 90 tablet 5    Multiple Vitamins-Minerals (MULTI FOR HIM 50+ PO) Take by mouth       No current facility-administered medications for this visit. No Known Allergies    Subjective:      Review of Systems   Constitutional: Negative for chills, fever and weight loss. HENT: Negative for ear pain, postnasal drip, rhinorrhea and sore throat. Respiratory: Positive for cough and wheezing. Negative for hemoptysis, choking, chest tightness, shortness of breath and stridor. Cardiovascular: Negative for chest pain. Gastrointestinal: Negative for heartburn. Musculoskeletal: Negative for myalgias. Skin: Negative for rash. Allergic/Immunologic: Positive for environmental allergies. Neurological: Positive for headaches. All other systems reviewed and are negative. 14 systems reviewed and negative except as listed in HPI. Objective:     Physical Exam  Vitals and nursing note reviewed. Constitutional:       General: He is not in acute distress. Appearance: Normal appearance. He is well-developed. He is not ill-appearing, toxic-appearing or diaphoretic. Comments: nontoxic   HENT:      Head: Normocephalic and atraumatic. Right Ear: Ear canal and external ear normal.      Left Ear: Ear canal and external ear normal.      Ears:      Comments: Rt tm scarred, pearly grey  Left tympanostomy tube in place     Nose: Nose normal.      Mouth/Throat:      Mouth: Mucous membranes are moist.      Pharynx: No oropharyngeal exudate or posterior oropharyngeal erythema. Eyes:      General: No scleral icterus. Right eye: No discharge. Left eye: No discharge. Conjunctiva/sclera: Conjunctivae normal.      Pupils: Pupils are equal, round, and reactive to light. Cardiovascular:      Rate and Rhythm: Normal rate and regular rhythm. Pulses: Normal pulses. Heart sounds: Normal heart sounds. Pulmonary:      Effort: Pulmonary effort is normal. No respiratory distress. Breath sounds: No stridor. Wheezing present. No rhonchi or rales.       Comments: Few scattered exp wheezes that clear with cough  Chest:      Chest wall: No tenderness. Abdominal:      General: Bowel sounds are normal. There is no distension. Palpations: Abdomen is soft. Tenderness: There is no abdominal tenderness. Musculoskeletal:         General: No tenderness or deformity. Normal range of motion. Cervical back: Normal range of motion and neck supple. Lymphadenopathy:      Cervical: No cervical adenopathy. Skin:     General: Skin is warm and dry. Findings: No rash ( no rash to visible skin). Neurological:      General: No focal deficit present. Mental Status: He is alert and oriented to person, place, and time. Motor: No abnormal muscle tone. Coordination: Coordination normal.   Psychiatric:         Mood and Affect: Mood normal.         Behavior: Behavior normal.       /70 (Site: Left Upper Arm, Position: Sitting, Cuff Size: Medium Adult)   Pulse 78   Temp 98.6 °F (37 °C) (Temporal)   Resp 15   Wt 187 lb (84.8 kg)   SpO2 98%   BMI 24.67 kg/m²     Assessment:       Diagnosis Orders   1. COPD with acute exacerbation (HCC)  azithromycin (ZITHROMAX) 250 MG tablet    COVID-19   2. Cough  azithromycin (ZITHROMAX) 250 MG tablet    COVID-19   3. Acute nonintractable headache, unspecified headache type  COVID-19       Plan:    sx 3 days, hx 2 covid vaccinations  copd exac  If + for covid HE IS INTERESTED IN ANTIBODY INFUSION  Pt is [place], I called his spouse and reviewed the sheet and med with her. Prednisone rx  Declines inhaler refill  zpak rx  mucinex rx  Tessalon perles rx  Work note  Reviewed over-the-counter treatments for symptom management. Will send out COVID19 testing. Possible treatment alterations based on the results. Patient instructed to self-quarantine until testing results are back. Patient instructed not to return to work until results are back. Tylenol as needed for fever/pain.   Encouraged adequate hydration and rest.  The patient indicates understanding of these

## 2021-11-23 NOTE — TELEPHONE ENCOUNTER
PT. Wife Ariel Calabrese called back stating that she explained the covid antibody infusion to the pt. And pt. Stated if he tests positive he is willing to go through with the infusion. FYI to provider.

## 2021-11-24 DIAGNOSIS — J44.1 COPD WITH ACUTE EXACERBATION (HCC): ICD-10-CM

## 2021-11-24 DIAGNOSIS — R05.9 COUGH: ICD-10-CM

## 2021-11-24 DIAGNOSIS — R51.9 ACUTE NONINTRACTABLE HEADACHE, UNSPECIFIED HEADACHE TYPE: ICD-10-CM

## 2021-11-24 LAB
SARS-COV-2: NORMAL
SARS-COV-2: NOT DETECTED
SOURCE: NORMAL

## 2021-12-03 DIAGNOSIS — G89.29 CHRONIC MIDLINE LOW BACK PAIN WITHOUT SCIATICA: ICD-10-CM

## 2021-12-03 DIAGNOSIS — M54.50 CHRONIC MIDLINE LOW BACK PAIN WITHOUT SCIATICA: ICD-10-CM

## 2021-12-03 DIAGNOSIS — M15.9 PRIMARY OSTEOARTHRITIS INVOLVING MULTIPLE JOINTS: ICD-10-CM

## 2021-12-03 DIAGNOSIS — M51.37 DISC DISEASE, DEGENERATIVE, LUMBAR OR LUMBOSACRAL: ICD-10-CM

## 2021-12-03 RX ORDER — TRAMADOL HYDROCHLORIDE 50 MG/1
50-100 TABLET ORAL EVERY 8 HOURS PRN
Qty: 150 TABLET | Refills: 0 | Status: SHIPPED | OUTPATIENT
Start: 2021-12-03 | End: 2022-01-02

## 2021-12-28 ENCOUNTER — TELEPHONE (OUTPATIENT)
Dept: PRIMARY CARE CLINIC | Age: 56
End: 2021-12-28

## 2021-12-28 DIAGNOSIS — J44.1 COPD WITH ACUTE EXACERBATION (HCC): ICD-10-CM

## 2021-12-28 DIAGNOSIS — R05.9 COUGH: ICD-10-CM

## 2021-12-28 RX ORDER — PREDNISONE 20 MG/1
20 TABLET ORAL 2 TIMES DAILY
Qty: 10 TABLET | Refills: 0 | Status: SHIPPED | OUTPATIENT
Start: 2021-12-28 | End: 2022-01-07 | Stop reason: SDUPTHER

## 2021-12-28 RX ORDER — BENZONATATE 100 MG/1
100 CAPSULE ORAL 3 TIMES DAILY PRN
Qty: 21 CAPSULE | Refills: 0 | Status: SHIPPED | OUTPATIENT
Start: 2021-12-28 | End: 2022-02-14 | Stop reason: SDUPTHER

## 2021-12-28 RX ORDER — AZITHROMYCIN 250 MG/1
250 TABLET, FILM COATED ORAL SEE ADMIN INSTRUCTIONS
Qty: 6 TABLET | Refills: 0 | Status: SHIPPED | OUTPATIENT
Start: 2021-12-28 | End: 2022-04-19 | Stop reason: SDUPTHER

## 2021-12-28 NOTE — TELEPHONE ENCOUNTER
Pt called in stating he was seen 11/23 at the [facility] for Cough, congestion & headache. Pt was RXd a zpac, steroid, tessalon pearls. Pt is experiencing the same symptomes again. Pt is hoping the same medication can be sent in for him to [facility] [address]. Please advise.

## 2022-01-03 DIAGNOSIS — G89.29 CHRONIC MIDLINE LOW BACK PAIN WITHOUT SCIATICA: ICD-10-CM

## 2022-01-03 DIAGNOSIS — M54.50 CHRONIC MIDLINE LOW BACK PAIN WITHOUT SCIATICA: ICD-10-CM

## 2022-01-03 DIAGNOSIS — M15.9 PRIMARY OSTEOARTHRITIS INVOLVING MULTIPLE JOINTS: ICD-10-CM

## 2022-01-03 DIAGNOSIS — M51.37 DISC DISEASE, DEGENERATIVE, LUMBAR OR LUMBOSACRAL: ICD-10-CM

## 2022-01-03 RX ORDER — TRAMADOL HYDROCHLORIDE 50 MG/1
50-100 TABLET ORAL EVERY 8 HOURS PRN
Qty: 150 TABLET | Refills: 0 | Status: SHIPPED | OUTPATIENT
Start: 2022-01-03 | End: 2022-02-01 | Stop reason: SDUPTHER

## 2022-01-03 NOTE — TELEPHONE ENCOUNTER
Last Visit Date: 9/7/2021   Next Visit Date: Visit date not found     [facility] 1/1/2022.  States sx remain w/ addition to HA

## 2022-01-04 ENCOUNTER — TELEPHONE (OUTPATIENT)
Dept: PRIMARY CARE CLINIC | Age: 57
End: 2022-01-04

## 2022-01-04 NOTE — TELEPHONE ENCOUNTER
Please have him schedule a virtual visit to discuss continued symptoms and returning to work. If he is unable to do virtual can schedule phone call visit.   Okay to double book or schedule in my lunch or 340 slot

## 2022-01-04 NOTE — TELEPHONE ENCOUNTER
Tested positive for COVID 1/3/22. Sx began 11/25. Finished 2nd round of abx 1/3/2022. Would like infusion.   Requesting a letter for returning to work

## 2022-01-07 ENCOUNTER — TELEMEDICINE (OUTPATIENT)
Dept: PRIMARY CARE CLINIC | Age: 57
End: 2022-01-07
Payer: COMMERCIAL

## 2022-01-07 DIAGNOSIS — J01.41 ACUTE RECURRENT PANSINUSITIS: ICD-10-CM

## 2022-01-07 DIAGNOSIS — U07.1 COVID-19: Primary | ICD-10-CM

## 2022-01-07 PROCEDURE — 3017F COLORECTAL CA SCREEN DOC REV: CPT | Performed by: NURSE PRACTITIONER

## 2022-01-07 PROCEDURE — G8427 DOCREV CUR MEDS BY ELIG CLIN: HCPCS | Performed by: NURSE PRACTITIONER

## 2022-01-07 PROCEDURE — 99213 OFFICE O/P EST LOW 20 MIN: CPT | Performed by: NURSE PRACTITIONER

## 2022-01-07 RX ORDER — PREDNISONE 20 MG/1
20 TABLET ORAL 2 TIMES DAILY
Qty: 10 TABLET | Refills: 0 | Status: SHIPPED | OUTPATIENT
Start: 2022-01-07 | End: 2022-04-19 | Stop reason: SDUPTHER

## 2022-01-07 ASSESSMENT — ENCOUNTER SYMPTOMS
NAUSEA: 0
BLOOD IN STOOL: 0
SINUS PAIN: 0
VOMITING: 0
COUGH: 0
ABDOMINAL PAIN: 0
TROUBLE SWALLOWING: 0
SINUS PRESSURE: 0
SORE THROAT: 0
SHORTNESS OF BREATH: 0
CONSTIPATION: 0
WHEEZING: 0
DIARRHEA: 0

## 2022-01-07 NOTE — LETTER
[facility]  [address]  Phone: [phone]  Fax: [phone] [facility] GITA Mantilla CNP        January 7, 2022     Patient: Estevan Duckworth   YOB: 1965   Date of Visit: 1/7/2022       To Whom It May Concern: It is my medical opinion that Johana Gillis be off work Tuesday 1/4/22 through Sunday 1/9/22 due to illness from covid 19. He may RTW Monday 1/10/22 without restrictions. If you have any questions or concerns, please don't hesitate to call.     Sincerely,        GITA Hurt CNP

## 2022-01-07 NOTE — PROGRESS NOTES
[facility] PRIMARY CARE  [facility] [address] [facility] [address]  [address] [address]  Dept: [phone]  Dept Fax: [phone]    Candelario Malloy is a 60 y.o. male who presentstoday for his medical conditions/complaints as noted below.   Candelario Malloy is c/o of  Chief Complaint   Patient presents with    Positive For Covid-19     HPI:     Presents today via video virtual visit for COVID-19  Reports did home covid test 5 days ago and found to be positive  Prior to testing he had been taking atbx for what he thought was recurrence of sinus infection   he started treatment with azithromycin for sinus symptoms and did improve then at the end of December he again developed sinus infection symptoms  Was prescribed another course of antibiotic and did not seem to be getting better so that is what prompted him to test  He has missed the past 4 days of work and is in need of work note  He states he is improving but still has a lot of sinus congestion and stuffiness  Feels he should be ready to return to work by Monday      No results found for: LABA1C          ( goal A1C is < 7)   No results found for: LABMICR  LDL Cholesterol (mg/dL)   Date Value   2019 66       (goal LDL is <100)   AST (U/L)   Date Value   2019 32     ALT (U/L)   Date Value   2019 25     BUN (mg/dL)   Date Value   2021 22 (H)     BP Readings from Last 3 Encounters:   21 131/70   10/06/21 113/64   21 118/86          ([id number]/80)    Past Medical History:   Diagnosis Date    Anxiety 2020    Back pain     Bilateral tinnitus 2020    Cellulitis of right knee 2021    Chronic midline low back pain without sciatica 11/10/2016    Deviated septum 2020    Disc disease, degenerative, lumbar or lumbosacral     Essential hypertension 11/10/2016    History of placement of ear tubes 2020    History of tobacco use 2020    Hyperlipidemia     Hypertension     Hypocalcemia 2021    Hyponatremia 2021    Lymph node enlargement     Normocytic normochromic anemia 2021    Obstructive sleep apnea syndrome 2017    Prepatellar bursitis of right knee 2021    Restless leg syndrome     Sensorineural hearing loss, bilateral 2020    Septic infrapatellar bursitis, left 2021    Somatic dysfunction of cervical region 12/3/2014      Past Surgical History:   Procedure Laterality Date    HERNIA REPAIR      KNEE SURGERY Right 2021    : KNEE INCISION AND DRAINAGE (Right )    KNEE SURGERY Right 2021    KNEE INCISION AND DRAINAGE performed by Mark Quick MD at [address]  2020    TONSILLECTOMY AND ADENOIDECTOMY      TYMPANOSTOMY TUBE PLACEMENT Bilateral        Family History   Problem Relation Age of Onset    Arthritis Mother           Social History     Tobacco Use    Smoking status: Former Smoker     Packs/day: 0.05     Years: 35.00     Pack years: 1.75     Start date:      Quit date: 2010     Years since quittin.4    Smokeless tobacco: Never Used   Substance Use Topics    Alcohol use: No     Comment: rare      Current Outpatient Medications   Medication Sig Dispense Refill    predniSONE (DELTASONE) 20 MG tablet Take 1 tablet by mouth 2 times daily for 5 days 10 tablet 0    traMADol (ULTRAM) 50 MG tablet Take 1-2 tablets by mouth every 8 hours as needed for Pain for up to 30 days.  150 tablet 0    sucralfate (CARAFATE) 1 GM tablet TAKE 1 TABLET BY MOUTH 4 TIMES DAILY      albuterol sulfate  (90 Base) MCG/ACT inhaler Inhale 2 puffs into the lungs every 6 hours as needed USE AS DIRECTED 18 g 5    omeprazole (PRILOSEC) 20 MG delayed release capsule TAKE ONE CAPSULE BY MOUTH EVERY MORNING BEFORE BREAKFAST 90 capsule 3    lisinopril (PRINIVIL;ZESTRIL) 10 MG tablet TAKE ONE TABLET BY MOUTH DAILY 90 tablet 3    tiZANidine (ZANAFLEX) 4 MG tablet Take 1 tablet by mouth every 8 hours as needed (muscle spasms) 90 tablet 5    ibuprofen (ADVIL;MOTRIN) 800 MG tablet Take 1 tablet by mouth every 8 hours as needed for Pain 90 tablet 5    Multiple Vitamins-Minerals (MULTI FOR HIM 50+ PO) Take by mouth       No current facility-administered medications for this visit. No Known Allergies    Health Maintenance   Topic Date Due    Hepatitis C screen  Never done    Pneumococcal 0-64 years Vaccine (1 of 2 - PPSV23) Never done    Colon cancer screen colonoscopy  Never done    Shingles Vaccine (1 of 2) Never done    COVID-19 Vaccine (3 - Booster for Pfizer series) 12/01/2021    Flu vaccine (1) 09/07/2022 (Originally 9/1/2021)    Potassium monitoring  05/20/2022    Creatinine monitoring  05/20/2022    Depression Monitoring  05/28/2022    DTaP/Tdap/Td vaccine (2 - Td or Tdap) 01/01/2024    Lipid screen  06/05/2024    Hepatitis A vaccine  Aged Out    Hepatitis B vaccine  Aged Out    Hib vaccine  Aged Out    Meningococcal (ACWY) vaccine  Aged Out    HIV screen  Discontinued       Subjective:      Review of Systems   Constitutional: Negative for activity change, appetite change, chills, fatigue, fever and unexpected weight change. HENT: Positive for congestion and postnasal drip. Negative for ear pain, hearing loss, sinus pressure, sinus pain, sore throat and trouble swallowing. Eyes: Negative for visual disturbance. Respiratory: Negative for cough, shortness of breath and wheezing. Cardiovascular: Negative for chest pain, palpitations and leg swelling. Gastrointestinal: Negative for abdominal pain, blood in stool, constipation, diarrhea, nausea and vomiting. Endocrine: Negative for cold intolerance, heat intolerance, polydipsia, polyphagia and polyuria. Genitourinary: Negative for difficulty urinating, frequency, hematuria and urgency. Musculoskeletal: Negative for arthralgias and myalgias. Skin: Negative for rash.    Allergic/Immunologic: Negative for environmental allergies. Neurological: Negative for dizziness, weakness, light-headedness and headaches. Psychiatric/Behavioral: Negative for confusion. The patient is not nervous/anxious. Objective:     Physical Exam  Constitutional:       Appearance: Normal appearance. Pulmonary:      Effort: Pulmonary effort is normal.   Neurological:      Mental Status: He is alert and oriented to person, place, and time. There were no vitals taken for this visit. Assessment:       Diagnosis Orders   1. COVID-19     2. Acute recurrent pansinusitis               Plan:      Return if symptoms worsen or fail to improve. Covid, sinusitis-improving but remains with significant congestion, will repeat 5 day course prednisone. RTW note provided for Monday  Edith Walker, was evaluated through a synchronous (real-time) audio-video encounter. The patient (or guardian if applicable) is aware that this is a billable service. Verbal consent to proceed has been obtained within the past 12 months. The visit was conducted pursuant to the emergency declaration under the [address] [place] [address], [address] [place] [address] authority and the [employer] General Act. Patient identification was verified, and a caregiver was present when appropriate. The patient was located in a state where the provider was credentialed to provide care. Total time spent for this encounter: Not billed by time    --GITA Ricks CNP on 1/7/2022 at 11:37 AM    An electronic signature was used to authenticate this note. Orders Placed This Encounter   Medications    predniSONE (DELTASONE) 20 MG tablet     Sig: Take 1 tablet by mouth 2 times daily for 5 days     Dispense:  10 tablet     Refill:  0       Patient given educational materials - see patient instructions. Discussed use, benefit, and side effects of prescribed medications. All patientquestions answered. Pt voiced understanding. Reviewed health maintenance. Instructedto continue current medications, diet and exercise. Patient agreed with treatmentplan. Follow up as directed.      Electronicallysigned by GITA Zelaya CNP on 1/7/2022 at 11:35 AM

## 2022-01-19 ENCOUNTER — TELEPHONE (OUTPATIENT)
Dept: PRIMARY CARE CLINIC | Age: 57
End: 2022-01-19

## 2022-01-19 DIAGNOSIS — M54.12 CERVICAL RADICULAR PAIN: ICD-10-CM

## 2022-01-19 DIAGNOSIS — M50.90 CERVICAL NECK PAIN WITH EVIDENCE OF DISC DISEASE: ICD-10-CM

## 2022-01-19 DIAGNOSIS — G44.52 NEW DAILY PERSISTENT HEADACHE: ICD-10-CM

## 2022-01-19 DIAGNOSIS — R93.7 ABNORMAL X-RAY OF CERVICAL SPINE: Primary | ICD-10-CM

## 2022-01-19 NOTE — TELEPHONE ENCOUNTER
Please let her know the MRI was discussed at his neurosurgeon appt. He was supposed to follow up with them in November.   I have ordered the MRI but he is also advised to return to neurosurg  Thanks

## 2022-01-19 NOTE — TELEPHONE ENCOUNTER
Patients wife calling in office, she states that patient had an MRI order that she was trying to schedule him, but they don't have an order in the system, that there was supposed to be one due to his neck pain/head pain, and chronic headaches. Patients wife states that PT makes him feel worse, so he would just like to do the MRI    Could you please place an order or advise what patient should do.

## 2022-01-20 NOTE — TELEPHONE ENCOUNTER
Called Wife Wendy Leonard ([facility]) Gave her the information to Neuro and central scheduling's number for the MRI

## 2022-02-01 DIAGNOSIS — M51.37 DISC DISEASE, DEGENERATIVE, LUMBAR OR LUMBOSACRAL: ICD-10-CM

## 2022-02-01 DIAGNOSIS — M54.50 CHRONIC MIDLINE LOW BACK PAIN WITHOUT SCIATICA: ICD-10-CM

## 2022-02-01 DIAGNOSIS — G89.29 CHRONIC MIDLINE LOW BACK PAIN WITHOUT SCIATICA: ICD-10-CM

## 2022-02-01 DIAGNOSIS — M15.9 PRIMARY OSTEOARTHRITIS INVOLVING MULTIPLE JOINTS: ICD-10-CM

## 2022-02-01 RX ORDER — TRAMADOL HYDROCHLORIDE 50 MG/1
50-100 TABLET ORAL EVERY 8 HOURS PRN
Qty: 150 TABLET | Refills: 0 | Status: SHIPPED | OUTPATIENT
Start: 2022-02-01 | End: 2022-02-25 | Stop reason: SDUPTHER

## 2022-02-14 ENCOUNTER — TELEPHONE (OUTPATIENT)
Dept: PRIMARY CARE CLINIC | Age: 57
End: 2022-02-14

## 2022-02-14 RX ORDER — BENZONATATE 100 MG/1
100 CAPSULE ORAL 3 TIMES DAILY PRN
Qty: 30 CAPSULE | Refills: 0 | Status: SHIPPED | OUTPATIENT
Start: 2022-02-14 | End: 2022-04-26 | Stop reason: ALTCHOICE

## 2022-02-14 NOTE — TELEPHONE ENCOUNTER
----- Message from Rosanne Piña sent at 2/14/2022  2:05 PM EST -----  Subject: Message to Provider    QUESTIONS  Information for Provider? Patient is requesting a script for benzonatate   (TESSALON PERLES) 100 MG capsule. Patient still has cough and this   medication helped a lot. Patient would like a call back today  ---------------------------------------------------------------------------  --------------  CALL BACK INFO  What is the best way for the office to contact you? OK to leave message on   voicemail  Preferred Call Back Phone Number? [phone]  ---------------------------------------------------------------------------  --------------  SCRIPT ANSWERS  Relationship to Patient? Other  Representative Name? Chito Asher  Is the Representative on the appropriate HIPAA document in Epic?  Yes

## 2022-02-17 ENCOUNTER — OFFICE VISIT (OUTPATIENT)
Dept: FAMILY MEDICINE CLINIC | Age: 57
End: 2022-02-17
Payer: COMMERCIAL

## 2022-02-17 VITALS
SYSTOLIC BLOOD PRESSURE: 131 MMHG | BODY MASS INDEX: 25.98 KG/M2 | OXYGEN SATURATION: 97 % | HEART RATE: 84 BPM | DIASTOLIC BLOOD PRESSURE: 74 MMHG | HEIGHT: 73 IN | WEIGHT: 196 LBS | TEMPERATURE: 98.2 F

## 2022-02-17 DIAGNOSIS — J44.9 CHRONIC OBSTRUCTIVE PULMONARY DISEASE, UNSPECIFIED COPD TYPE (HCC): Primary | ICD-10-CM

## 2022-02-17 DIAGNOSIS — R06.02 SHORTNESS OF BREATH: ICD-10-CM

## 2022-02-17 PROCEDURE — 99213 OFFICE O/P EST LOW 20 MIN: CPT | Performed by: REGISTERED NURSE

## 2022-02-17 PROCEDURE — G8428 CUR MEDS NOT DOCUMENT: HCPCS | Performed by: REGISTERED NURSE

## 2022-02-17 PROCEDURE — 3017F COLORECTAL CA SCREEN DOC REV: CPT | Performed by: REGISTERED NURSE

## 2022-02-17 PROCEDURE — 3023F SPIROM DOC REV: CPT | Performed by: REGISTERED NURSE

## 2022-02-17 PROCEDURE — G8484 FLU IMMUNIZE NO ADMIN: HCPCS | Performed by: REGISTERED NURSE

## 2022-02-17 PROCEDURE — G8419 CALC BMI OUT NRM PARAM NOF/U: HCPCS | Performed by: REGISTERED NURSE

## 2022-02-17 PROCEDURE — 1036F TOBACCO NON-USER: CPT | Performed by: REGISTERED NURSE

## 2022-02-17 RX ORDER — DOXYCYCLINE 100 MG/1
100 TABLET ORAL 2 TIMES DAILY
Qty: 14 TABLET | Refills: 0 | Status: SHIPPED | OUTPATIENT
Start: 2022-02-17 | End: 2022-02-24

## 2022-02-17 RX ORDER — PREDNISONE 10 MG/1
TABLET ORAL
Qty: 20 TABLET | Refills: 0 | Status: SHIPPED | OUTPATIENT
Start: 2022-02-17 | End: 2022-02-27

## 2022-02-17 RX ORDER — ALBUTEROL SULFATE 90 UG/1
AEROSOL, METERED RESPIRATORY (INHALATION)
Qty: 18 G | Refills: 5 | Status: SHIPPED | OUTPATIENT
Start: 2022-02-17 | End: 2022-08-23 | Stop reason: SDUPTHER

## 2022-02-17 ASSESSMENT — ENCOUNTER SYMPTOMS
SINUS PRESSURE: 0
NAUSEA: 0
COUGH: 1
DIARRHEA: 0
WHEEZING: 1
VOMITING: 0
EYES NEGATIVE: 1
SHORTNESS OF BREATH: 1

## 2022-02-17 NOTE — PATIENT INSTRUCTIONS
Patient Education        Learning About COPD, Asthma, and Air Pollution  How does air pollution affect COPD and asthma? When you have COPD or asthma, air pollution may make your symptoms worse. If it does, it means that air pollution is a trigger for you. It is important to know what your triggers are and how to deal with them. If air pollution is a trigger for you, you need to learn about air quality and pay attention to weather forecasts that include how bad the air is expected to be. How can you manage a flare-up caused by air pollution? · Don't panic. Quick treatment at home may help you prevent serious breathing problems. · Take your medicines exactly as your doctor tells you.  ? Use your quick-relief inhaler as directed by your doctor. If your symptoms don't get better after you use your medicine, have someone take you to the emergency room. Call an ambulance if necessary. ? With inhaled medicines, a spacer or a nebulizer may help you get more medicine to your lungs. Ask your doctor or pharmacist how to use them the right way. Practice using the spacer in front of a mirror before you have a flare-up. This may help you get the medicine into your lungs quickly. ? If your doctor has given you steroid pills, take them as directed. ? Talk to your doctor if you have any problems with your medicine. How can you prevent flare-ups? · Try not to be outside when air pollution levels are high. Stay at home with your windows closed. · Don't smoke. This is the most important step you can take to prevent more damage to your lungs and prevent problems. If you already smoke, it's never too late to stop. If you need help quitting, talk to your doctor about stop-smoking programs and medicines. These can increase your chances of quitting for good. · Avoid secondhand smoke. · Take your daily medicines as prescribed. Follow-up care is a key part of your treatment and safety.  Be sure to make and go to all appointments, and call your doctor if you are having problems. It's also a good idea to know your test results and keep a list of the medicines you take. Where can you learn more? Go to https://Eposshannan.Secoo. org and sign in to your "[employer] account. Enter  in the [facility] box to learn more about \"Learning About COPD, Asthma, and Air Pollution. \"     If you do not have an account, please click on the \"Sign Up Now\" link. Current as of: July 6, 2021               Content Version: 13.1  © 6127-8704 Healthwise[employer] Incorporated. Care instructions adapted under license by [facility] ([facility]). If you have questions about a medical condition or this instruction, always ask your healthcare professional. [address] any warranty or liability for your use of this information.

## 2022-02-17 NOTE — LETTER
[facility]  [address] [facility] [address]  Phone: [phone]  Fax: [phone]    GITA Shaw CNP        February 17, 2022     Patient: Edin Galicia   YOB: 1965   Date of Visit: 2/17/2022       To Whom it May Concern:    Jc Hicks was seen in my clinic on 2/17/2022. He may return to work on 2/19/2022. If you have any questions or concerns, please don't hesitate to call.     Sincerely,         GITA Shaw CNP

## 2022-02-17 NOTE — PROGRESS NOTES
[address] WALK-IN  [address] [facility] [address]  [address] [address]  Dept: [phone]  Dept Fax: [phone]    Oc Gomes is a 60 y.o. male who presents today for his medical conditions/complaints of   Chief Complaint   Patient presents with    Other     he has been coughinf severely he has COPD and he is concerned for pneumonia, relates he has had covid this is different, the coughing is interupting his sleep          HPI:     /74   Pulse 84   Temp 98.2 °F (36.8 °C) (Temporal)   Ht 6' 1\" (1.854 m)   Wt 196 lb (88.9 kg)   SpO2 97%   BMI 25.86 kg/m²       Cough  This is a new problem. The current episode started in the past 7 days (Denies oxygen at home and states he has never used oxygen ). The problem has been gradually worsening. The problem occurs hourly. The cough is productive of sputum (clear / yellow sputum ). Associated symptoms include headaches, shortness of breath and wheezing. Pertinent negatives include no chest pain, chills, ear pain or fever. Exacerbated by: sitting up  Risk factors for lung disease include animal exposure (exposed to a cat at his work, however this was 2 months ago. Symptoms have only been ongoing for one week. ). He has tried a beta-agonist inhaler and rest (Tesslon perles ) for the symptoms. The treatment provided no relief. His past medical history is significant for COPD.        Past Medical History:   Diagnosis Date    Anxiety 2020    Back pain     Bilateral tinnitus 2020    Cellulitis of right knee 2021    Chronic midline low back pain without sciatica 11/10/2016    Deviated septum 2020    Disc disease, degenerative, lumbar or lumbosacral     Essential hypertension 11/10/2016    History of placement of ear tubes 2020    History of tobacco use 2020    Hyperlipidemia     Hypertension     Hypocalcemia 2021    Hyponatremia 2021    Lymph node enlargement     Normocytic normochromic anemia 2021    Obstructive sleep apnea syndrome 2017    Prepatellar bursitis of right knee 2021    Restless leg syndrome     Sensorineural hearing loss, bilateral 2020    Septic infrapatellar bursitis, left 2021    Somatic dysfunction of cervical region 12/3/2014        Past Surgical History:   Procedure Laterality Date    HERNIA REPAIR      KNEE SURGERY Right 2021    : KNEE INCISION AND DRAINAGE (Right )    KNEE SURGERY Right 2021    KNEE INCISION AND DRAINAGE performed by Jemma Armando MD at [address]  2020    TONSILLECTOMY AND ADENOIDECTOMY      TYMPANOSTOMY TUBE PLACEMENT Bilateral        Family History   Problem Relation Age of Onset    Arthritis Mother        Social History     Tobacco Use    Smoking status: Former Smoker     Packs/day: 0.05     Years: 35.00     Pack years: 1.75     Start date:      Quit date: 2010     Years since quittin.5    Smokeless tobacco: Never Used   Substance Use Topics    Alcohol use: No     Comment: rare        Prior to Visit Medications    Medication Sig Taking? Authorizing Provider   predniSONE (DELTASONE) 10 MG tablet Take 4 tablets by mouth once daily for 5 days Yes Gianna Millan APRN - CNP   doxycycline monohydrate (ADOXA) 100 MG tablet Take 1 tablet by mouth 2 times daily for 7 days Yes Gianna Millan APRN - CNP   albuterol sulfate  (90 Base) MCG/ACT inhaler Inhale 2 puffs into the lungs every 6 hours as needed USE AS DIRECTED Yes Gianna Millan APRN - CNP   benzonatate (TESSALON PERLES) 100 MG capsule Take 1 capsule by mouth 3 times daily as needed for Cough  Gaby Kary Iron, APRN - CNP   traMADol (ULTRAM) 50 MG tablet Take 1-2 tablets by mouth every 8 hours as needed for Pain for up to 30 days.   Reynaldo Tony, APRN - CNP   sucralfate (CARAFATE) 1 GM tablet TAKE 1 TABLET BY MOUTH 4 TIMES DAILY  Historical Provider, MD   omeprazole (PRILOSEC) 20 MG delayed release capsule TAKE ONE CAPSULE BY MOUTH EVERY MORNING BEFORE BREAKFAST  GITA Sutton CNP   lisinopril (PRINIVIL;ZESTRIL) 10 MG tablet TAKE ONE TABLET BY MOUTH DAILY  GITA Sutton CNP   tiZANidine (ZANAFLEX) 4 MG tablet Take 1 tablet by mouth every 8 hours as needed (muscle spasms)  GITA Diggs CNP   ibuprofen (ADVIL;MOTRIN) 800 MG tablet Take 1 tablet by mouth every 8 hours as needed for Pain  GITA Sutton CNP   Multiple Vitamins-Minerals (MULTI FOR HIM 50+ PO) Take by mouth  Historical Provider, MD       No Known Allergies      Subjective:      Review of Systems   Constitutional: Negative for chills and fever. HENT: Positive for hearing loss (chronic ) and tinnitus. Negative for congestion, drooling, ear pain and sinus pressure. Eyes: Negative. Respiratory: Positive for cough, shortness of breath and wheezing. Cardiovascular: Negative for chest pain and palpitations. Gastrointestinal: Negative for diarrhea, nausea and vomiting. States his abdomen feels \"sore\" from the coughing    Genitourinary: Negative. Skin: Negative. Neurological: Positive for headaches. Objective:     Physical Exam  Constitutional:       Appearance: He is normal weight. HENT:      Head: Normocephalic. Right Ear: A PE tube is present. Left Ear: A PE tube is present. Mouth/Throat:      Mouth: Mucous membranes are moist.      Pharynx: Oropharynx is clear. Eyes:      Conjunctiva/sclera: Conjunctivae normal.   Cardiovascular:      Pulses: Normal pulses. Heart sounds: Normal heart sounds. Pulmonary:      Effort: Tachypnea and accessory muscle usage present. Breath sounds: No stridor. Examination of the right-upper field reveals wheezing. Examination of the left-upper field reveals wheezing. Examination of the right-middle field reveals wheezing.  Examination of the left-middle field reveals wheezing. Examination of the right-lower field reveals wheezing. Examination of the left-lower field reveals wheezing. Wheezing present. Chest:      Chest wall: Tenderness present. Abdominal:      General: Abdomen is flat. Palpations: Abdomen is soft. Tenderness: There is no abdominal tenderness. There is no guarding. Skin:     General: Skin is warm. Neurological:      Mental Status: He is alert. Psychiatric:         Mood and Affect: Mood normal.           MEDICAL DECISION MAKING Assessment/Plan:       Patient presents with wheezing, shortness of breath and productive cough x 1 week. Suzislosocorro fang did not help his cough. He has no fever and oxygen in 97% on room air. Discussed taking albuterol and putting him on a steroid an antibiotic for COPD exacerbation. Rest and increase fluids for the next few days. Steve Montague was seen today for other. Diagnoses and all orders for this visit:    Chronic obstructive pulmonary disease, unspecified COPD type (RUSTca 75.)  -     predniSONE (DELTASONE) 10 MG tablet; Take 4 tablets by mouth once daily for 5 days  -     doxycycline monohydrate (ADOXA) 100 MG tablet; Take 1 tablet by mouth 2 times daily for 7 days    Shortness of breath  -     albuterol sulfate  (90 Base) MCG/ACT inhaler; Inhale 2 puffs into the lungs every 6 hours as needed USE AS DIRECTED        Results for orders placed or performed during the hospital encounter of 11/23/21   COVID-19    Specimen: Nasopharyngeal Swab   Result Value Ref Range    SARS-CoV-2          Source . NASOPHARYNGEAL SWAB     SARS-CoV-2 Not Detected Not Detected       Patient counseled:     Patient given educational materials - see patientinstructions. Discussed use, benefit, and side effects of prescribed medications. Patient stated he does not need a refill on his albuterol at this time.  Encouraged use of his albuterol for quick relief and to take the prescribed oral steroid and doxycycline for his COPD exacerbation. Told patient to report any worsening shortness of breath, chest pain or if symptoms do not subside with treatment. Explained to patient if he is unable to breath to seek emergent care at the ER. All patient questions answered. Pt verbalized understanding. Instructed to continue current medications, diet and exercise. Increase liquids and avoid aggravating activity. Patient agreed with treatment plan. Follow up with PCP within a week.      Electronically signed by GITA Crawley CNP on 2/18/2022 at 2:07 PM

## 2022-02-25 DIAGNOSIS — M51.37 DISC DISEASE, DEGENERATIVE, LUMBAR OR LUMBOSACRAL: ICD-10-CM

## 2022-02-25 DIAGNOSIS — M15.9 PRIMARY OSTEOARTHRITIS INVOLVING MULTIPLE JOINTS: ICD-10-CM

## 2022-02-25 DIAGNOSIS — M54.50 CHRONIC MIDLINE LOW BACK PAIN WITHOUT SCIATICA: ICD-10-CM

## 2022-02-25 DIAGNOSIS — G89.29 CHRONIC MIDLINE LOW BACK PAIN WITHOUT SCIATICA: ICD-10-CM

## 2022-02-25 DIAGNOSIS — I10 ESSENTIAL HYPERTENSION: Chronic | ICD-10-CM

## 2022-02-25 RX ORDER — OMEPRAZOLE 20 MG/1
CAPSULE, DELAYED RELEASE ORAL
Qty: 90 CAPSULE | Refills: 3 | Status: SHIPPED | OUTPATIENT
Start: 2022-02-25 | End: 2022-08-23 | Stop reason: SDUPTHER

## 2022-02-25 RX ORDER — TRAMADOL HYDROCHLORIDE 50 MG/1
50-100 TABLET ORAL EVERY 8 HOURS PRN
Qty: 150 TABLET | Refills: 0 | Status: SHIPPED | OUTPATIENT
Start: 2022-02-25 | End: 2022-03-28 | Stop reason: SDUPTHER

## 2022-02-25 RX ORDER — LISINOPRIL 10 MG/1
TABLET ORAL
Qty: 90 TABLET | Refills: 3 | Status: SHIPPED | OUTPATIENT
Start: 2022-02-25 | End: 2022-07-01 | Stop reason: SDUPTHER

## 2022-02-25 NOTE — TELEPHONE ENCOUNTER
----- Message from Florette Pro sent at 2/25/2022 11:52 AM EST -----  Subject: Refill Request    QUESTIONS  Name of Medication? omeprazole (PRILOSEC) 20 MG delayed release capsule  Patient-reported dosage and instructions? once a day   How many days do you have left? 2  Preferred Pharmacy? [address] phone number (if available)? [phone]  ---------------------------------------------------------------------------  --------------,  Name of Medication? lisinopril (PRINIVIL;ZESTRIL) 10 MG tablet  Patient-reported dosage and instructions? once a day  How many days do you have left? 2  Preferred Pharmacy? [address] phone number (if available)? [phone]  ---------------------------------------------------------------------------  --------------,  Name of Medication? traMADol (ULTRAM) 50 MG tablet  Patient-reported dosage and instructions? everyday  How many days do you have left? 2  Preferred Pharmacy? [address] phone number (if available)? [phone]  ---------------------------------------------------------------------------  --------------  CALL BACK INFO  What is the best way for the office to contact you? OK to leave message on   voicemail  Preferred Call Back Phone Number?  [phone]

## 2022-03-28 DIAGNOSIS — M54.50 CHRONIC MIDLINE LOW BACK PAIN WITHOUT SCIATICA: ICD-10-CM

## 2022-03-28 DIAGNOSIS — M15.9 PRIMARY OSTEOARTHRITIS INVOLVING MULTIPLE JOINTS: ICD-10-CM

## 2022-03-28 DIAGNOSIS — G89.29 CHRONIC MIDLINE LOW BACK PAIN WITHOUT SCIATICA: ICD-10-CM

## 2022-03-28 DIAGNOSIS — M51.37 DISC DISEASE, DEGENERATIVE, LUMBAR OR LUMBOSACRAL: ICD-10-CM

## 2022-03-28 RX ORDER — TRAMADOL HYDROCHLORIDE 50 MG/1
50-100 TABLET ORAL EVERY 8 HOURS PRN
Qty: 150 TABLET | Refills: 0 | Status: SHIPPED | OUTPATIENT
Start: 2022-03-28 | End: 2022-04-26 | Stop reason: SDUPTHER

## 2022-04-19 ENCOUNTER — TELEPHONE (OUTPATIENT)
Dept: PRIMARY CARE CLINIC | Age: 57
End: 2022-04-19

## 2022-04-19 DIAGNOSIS — J44.1 COPD WITH ACUTE EXACERBATION (HCC): ICD-10-CM

## 2022-04-19 DIAGNOSIS — J44.9 CHRONIC OBSTRUCTIVE PULMONARY DISEASE, UNSPECIFIED COPD TYPE (HCC): ICD-10-CM

## 2022-04-19 DIAGNOSIS — R05.9 COUGH: ICD-10-CM

## 2022-04-19 RX ORDER — PREDNISONE 20 MG/1
20 TABLET ORAL 2 TIMES DAILY
Qty: 10 TABLET | Refills: 0 | Status: SHIPPED | OUTPATIENT
Start: 2022-04-19 | End: 2022-04-24

## 2022-04-19 RX ORDER — AZITHROMYCIN 250 MG/1
TABLET, FILM COATED ORAL
Qty: 6 TABLET | Refills: 0 | Status: SHIPPED | OUTPATIENT
Start: 2022-04-19 | End: 2022-04-26 | Stop reason: ALTCHOICE

## 2022-04-19 NOTE — TELEPHONE ENCOUNTER
----- Message from Allyssa Balbuena sent at 4/19/2022  8:00 AM EDT -----  Subject: Message to Provider    QUESTIONS  Information for Provider? Patient is calling because she states he is   having chest congestion again and would like to know if he could be   prescribed predniSONE (DELTASONE) 20 MG tablet and zpack again. His   pharmacy is [facility] [address] [facility] [phone]. Patient was offered a appointment ,but he   wanted to sent a message. Please advise  ---------------------------------------------------------------------------  --------------  CALL BACK INFO  What is the best way for the office to contact you? OK to leave message on   voicemail  Preferred Call Back Phone Number? [phone]  ---------------------------------------------------------------------------  --------------  SCRIPT ANSWERS  Relationship to Patient?  Self

## 2022-04-19 NOTE — TELEPHONE ENCOUNTER
Please let him know I sent in the Z-[place] and the prednisone. However, he needs to schedule an appointment for follow-up with me as he is past due for his 4-month visit.   Have him set up something for next week, that way if he is not improved we will be able to evaluate him at that appointment  Thanks

## 2022-04-26 ENCOUNTER — OFFICE VISIT (OUTPATIENT)
Dept: PRIMARY CARE CLINIC | Age: 57
End: 2022-04-26
Payer: COMMERCIAL

## 2022-04-26 VITALS
DIASTOLIC BLOOD PRESSURE: 74 MMHG | RESPIRATION RATE: 18 BRPM | WEIGHT: 201.6 LBS | BODY MASS INDEX: 26.6 KG/M2 | OXYGEN SATURATION: 98 % | HEART RATE: 93 BPM | SYSTOLIC BLOOD PRESSURE: 130 MMHG

## 2022-04-26 DIAGNOSIS — M51.37 DISC DISEASE, DEGENERATIVE, LUMBAR OR LUMBOSACRAL: ICD-10-CM

## 2022-04-26 DIAGNOSIS — G89.29 CHRONIC MIDLINE LOW BACK PAIN WITHOUT SCIATICA: ICD-10-CM

## 2022-04-26 DIAGNOSIS — Z13.0 SCREENING, ANEMIA, DEFICIENCY, IRON: ICD-10-CM

## 2022-04-26 DIAGNOSIS — M54.50 CHRONIC MIDLINE LOW BACK PAIN WITHOUT SCIATICA: ICD-10-CM

## 2022-04-26 DIAGNOSIS — I10 ESSENTIAL HYPERTENSION: Primary | ICD-10-CM

## 2022-04-26 DIAGNOSIS — M15.9 PRIMARY OSTEOARTHRITIS INVOLVING MULTIPLE JOINTS: ICD-10-CM

## 2022-04-26 DIAGNOSIS — Z91.09 ENVIRONMENTAL ALLERGIES: ICD-10-CM

## 2022-04-26 DIAGNOSIS — J44.9 CHRONIC OBSTRUCTIVE PULMONARY DISEASE, UNSPECIFIED COPD TYPE (HCC): ICD-10-CM

## 2022-04-26 DIAGNOSIS — Z12.5 SCREENING FOR MALIGNANT NEOPLASM OF PROSTATE: ICD-10-CM

## 2022-04-26 PROCEDURE — 1036F TOBACCO NON-USER: CPT | Performed by: NURSE PRACTITIONER

## 2022-04-26 PROCEDURE — 3017F COLORECTAL CA SCREEN DOC REV: CPT | Performed by: NURSE PRACTITIONER

## 2022-04-26 PROCEDURE — G8419 CALC BMI OUT NRM PARAM NOF/U: HCPCS | Performed by: NURSE PRACTITIONER

## 2022-04-26 PROCEDURE — 3023F SPIROM DOC REV: CPT | Performed by: NURSE PRACTITIONER

## 2022-04-26 PROCEDURE — G8427 DOCREV CUR MEDS BY ELIG CLIN: HCPCS | Performed by: NURSE PRACTITIONER

## 2022-04-26 PROCEDURE — 99214 OFFICE O/P EST MOD 30 MIN: CPT | Performed by: NURSE PRACTITIONER

## 2022-04-26 RX ORDER — LORATADINE 10 MG/1
10 CAPSULE, LIQUID FILLED ORAL DAILY
COMMUNITY
End: 2022-04-26 | Stop reason: SDUPTHER

## 2022-04-26 RX ORDER — TRAMADOL HYDROCHLORIDE 50 MG/1
50-100 TABLET ORAL EVERY 8 HOURS PRN
Qty: 150 TABLET | Refills: 0 | Status: SHIPPED | OUTPATIENT
Start: 2022-04-26 | End: 2022-05-27 | Stop reason: SDUPTHER

## 2022-04-26 RX ORDER — LORATADINE 10 MG/1
10 CAPSULE, LIQUID FILLED ORAL DAILY
Qty: 90 CAPSULE | Refills: 3 | Status: SHIPPED | OUTPATIENT
Start: 2022-04-26 | End: 2022-05-20 | Stop reason: SDUPTHER

## 2022-04-26 ASSESSMENT — PATIENT HEALTH QUESTIONNAIRE - PHQ9
2. FEELING DOWN, DEPRESSED OR HOPELESS: 0
SUM OF ALL RESPONSES TO PHQ QUESTIONS 1-9: 0
SUM OF ALL RESPONSES TO PHQ9 QUESTIONS 1 & 2: 0
SUM OF ALL RESPONSES TO PHQ QUESTIONS 1-9: 0
1. LITTLE INTEREST OR PLEASURE IN DOING THINGS: 0

## 2022-04-26 ASSESSMENT — ENCOUNTER SYMPTOMS
SORE THROAT: 0
CONSTIPATION: 0
BLOOD IN STOOL: 0
TROUBLE SWALLOWING: 0
ABDOMINAL PAIN: 0
NAUSEA: 0
SHORTNESS OF BREATH: 1
COUGH: 0
WHEEZING: 0
SINUS PRESSURE: 0
DIARRHEA: 0
VOMITING: 0
BACK PAIN: 1

## 2022-04-26 NOTE — PROGRESS NOTES
[address] PRIMARY CARE  [facility] [address] [facility] [address]  [address] [address]  Dept: [phone]  Dept Fax: [phone]    Wale Smith is a 60 y.o. male who presentstoday for his medical conditions/complaints as noted below. Wale Smith is c/o of  Chief Complaint   Patient presents with    Follow-up     Routine; Concerns about prostate    Health Maintenance     Will call to schedule colonoscopy       HPI:     Here today for follow up  Reports has had 3 courses of antibiotic tx in the past 5 months for bronchitis  He did have visit with pulmonary end of March and will follow up in    He states he feels a lot better when he is taking oral steroids, asking about long-term use  Also finds his albuterol inhaler to be helpful  He has otherwise been stable, his chronic back and arthritis pain are essentially unchanged, tramadol continues to work well for him  Hypertension  This is a chronic problem. The current episode started more than 1 year ago. The problem is controlled. Associated symptoms include shortness of breath (with activity). Pertinent negatives include no chest pain, headaches or palpitations. The current treatment provides significant improvement.        No results found for: LABA1C          ( goal A1C is < 7)   No results found for: LABMICR  LDL Cholesterol (mg/dL)   Date Value   2019 66       (goal LDL is <100)   AST (U/L)   Date Value   2019 32     ALT (U/L)   Date Value   2019 25     BUN (mg/dL)   Date Value   2021 22 (H)     BP Readings from Last 3 Encounters:   22 130/74   22 131/74   21 131/70          ([id number])    Past Medical History:   Diagnosis Date    Anxiety 2020    Back pain     Bilateral tinnitus 2020    Cellulitis of right knee 2021    Chronic midline low back pain without sciatica 11/10/2016    Deviated septum 2020    Disc disease, degenerative, lumbar or lumbosacral  Essential hypertension 11/10/2016    History of placement of ear tubes 2020    History of tobacco use 2020    Hyperlipidemia     Hypertension     Hypocalcemia 2021    Hyponatremia 2021    Lymph node enlargement     Normocytic normochromic anemia 2021    Obstructive sleep apnea syndrome 2017    Prepatellar bursitis of right knee 2021    Restless leg syndrome     Sensorineural hearing loss, bilateral 2020    Septic infrapatellar bursitis, left 2021    Somatic dysfunction of cervical region 12/3/2014      Past Surgical History:   Procedure Laterality Date    HERNIA REPAIR      KNEE SURGERY Right 2021    : KNEE INCISION AND DRAINAGE (Right )    KNEE SURGERY Right 2021    KNEE INCISION AND DRAINAGE performed by Roberto Rehman MD at [address]  2020    TONSILLECTOMY AND ADENOIDECTOMY      TYMPANOSTOMY TUBE PLACEMENT Bilateral        Family History   Problem Relation Age of Onset    Arthritis Mother           Social History     Tobacco Use    Smoking status: Former Smoker     Packs/day: 0.05     Years: 35.00     Pack years: 1.75     Start date:      Quit date: 2010     Years since quittin.7    Smokeless tobacco: Never Used   Substance Use Topics    Alcohol use: No     Comment: rare      Current Outpatient Medications   Medication Sig Dispense Refill    traMADol (ULTRAM) 50 MG tablet Take 1-2 tablets by mouth every 8 hours as needed for Pain for up to 30 days.  150 tablet 0    loratadine (CLARITIN) 10 MG capsule Take 1 capsule by mouth daily 90 capsule 3    omeprazole (PRILOSEC) 20 MG delayed release capsule TAKE ONE CAPSULE BY MOUTH EVERY MORNING BEFORE BREAKFAST 90 capsule 3    lisinopril (PRINIVIL;ZESTRIL) 10 MG tablet TAKE ONE TABLET BY MOUTH DAILY 90 tablet 3    albuterol sulfate  (90 Base) MCG/ACT inhaler Inhale 2 puffs into the lungs every 6 hours as needed USE AS DIRECTED 18 g 5  tiZANidine (ZANAFLEX) 4 MG tablet Take 1 tablet by mouth every 8 hours as needed (muscle spasms) 90 tablet 5    ibuprofen (ADVIL;MOTRIN) 800 MG tablet Take 1 tablet by mouth every 8 hours as needed for Pain 90 tablet 5    Multiple Vitamins-Minerals (MULTI FOR HIM 50+ PO) Take by mouth       No current facility-administered medications for this visit. No Known Allergies    Health Maintenance   Topic Date Due    Pneumococcal 0-64 years Vaccine (1 - PCV) Never done    Hepatitis C screen  Never done    Colorectal Cancer Screen  Never done    Shingles Vaccine (1 of 2) Never done    COVID-19 Vaccine (3 - Booster for Pfizer series) 11/01/2021    Potassium  05/20/2022    Creatinine  05/20/2022    Flu vaccine (Season Ended) 09/07/2022 (Originally 9/1/2022)    Depression Screen  05/28/2022    DTaP/Tdap/Td vaccine (2 - Td or Tdap) 01/01/2024    Lipids  06/05/2024    Hepatitis A vaccine  Aged Out    Hepatitis B vaccine  Aged Out    Hib vaccine  Aged Out    Meningococcal (ACWY) vaccine  Aged Out    HIV screen  Discontinued       Subjective:      Review of Systems   Constitutional: Negative for activity change, appetite change, chills, fatigue, fever and unexpected weight change. HENT: Negative for congestion, ear pain, hearing loss, sinus pressure, sore throat and trouble swallowing. Eyes: Negative for visual disturbance. Respiratory: Positive for shortness of breath (with activity). Negative for cough and wheezing. Cardiovascular: Negative for chest pain, palpitations and leg swelling. Gastrointestinal: Negative for abdominal pain, blood in stool, constipation, diarrhea, nausea and vomiting. Endocrine: Negative for cold intolerance, heat intolerance, polydipsia, polyphagia and polyuria. Genitourinary: Negative for difficulty urinating, frequency, hematuria and urgency. Musculoskeletal: Positive for arthralgias and back pain. Negative for myalgias. Skin: Negative for rash. Allergic/Immunologic: Negative for environmental allergies. Neurological: Negative for dizziness, weakness, light-headedness and headaches. Psychiatric/Behavioral: Negative for confusion. The patient is not nervous/anxious. Objective:     Physical Exam  Constitutional:       Appearance: He is well-developed. HENT:      Head: Normocephalic. Eyes:      Conjunctiva/sclera: Conjunctivae normal.      Pupils: Pupils are equal, round, and reactive to light. Cardiovascular:      Rate and Rhythm: Normal rate and regular rhythm. Heart sounds: Normal heart sounds. No murmur heard. Pulmonary:      Effort: Pulmonary effort is normal.      Breath sounds: Normal breath sounds. No wheezing. Abdominal:      General: Bowel sounds are normal. There is no distension. Palpations: Abdomen is soft. Musculoskeletal:         General: Normal range of motion. Cervical back: Normal range of motion. Skin:     General: Skin is warm and dry. Neurological:      Mental Status: He is alert and oriented to person, place, and time. Psychiatric:         Behavior: Behavior normal.         Thought Content: Thought content normal.         Judgment: Judgment normal.       /74   Pulse 93   Resp 18   Wt 201 lb 9.6 oz (91.4 kg)   SpO2 98%   BMI 26.60 kg/m²     Assessment:       Diagnosis Orders   1. Essential hypertension  Comprehensive Metabolic Panel    Lipid Panel   2. Disc disease, degenerative, lumbar or lumbosacral  traMADol (ULTRAM) 50 MG tablet   3. Chronic midline low back pain without sciatica  traMADol (ULTRAM) 50 MG tablet   4. Primary osteoarthritis involving multiple joints  traMADol (ULTRAM) 50 MG tablet   5. Chronic obstructive pulmonary disease, unspecified COPD type ([facility]     6. Environmental allergies  loratadine (CLARITIN) 10 MG capsule   7. Screening, anemia, deficiency, iron  CBC with Auto Differential   8.  Screening for malignant neoplasm of prostate  PSA Screening Plan:      Return in about 4 months (around 8/26/2022) for hypertension check, back pain. Hypertension-remains stable and well-controlled on ACE, he is due for annual labs  Degenerative disc disease, chronic back pain, osteoarthritis- stable, refill on tramadol, PDMP reflects appropriate use of opioid pain reliever  COPD-reviewed recent note per pulmonologist, he is planning to follow-up in June, discussed he may benefit from daily inhaler and/or steroid inhaler, will defer until after that visit as they will likely initiate    Orders Placed This Encounter   Procedures    CBC with Auto Differential     Standing Status:   Future     Standing Expiration Date:   4/26/2023    Comprehensive Metabolic Panel     Standing Status:   Future     Standing Expiration Date:   4/26/2023    Lipid Panel     Standing Status:   Future     Standing Expiration Date:   4/26/2023     Order Specific Question:   Is Patient Fasting?/# of Hours     Answer:   8    PSA Screening     Standing Status:   Future     Standing Expiration Date:   4/26/2023        Orders Placed This Encounter   Medications    traMADol (ULTRAM) 50 MG tablet     Sig: Take 1-2 tablets by mouth every 8 hours as needed for Pain for up to 30 days. Dispense:  150 tablet     Refill:  0     Reduce doses taken as pain becomes manageable    loratadine (CLARITIN) 10 MG capsule     Sig: Take 1 capsule by mouth daily     Dispense:  90 capsule     Refill:  3       Patient given educational materials - see patient instructions. Discussed use, benefit, and side effects of prescribed medications. All patientquestions answered. Pt voiced understanding. Reviewed health maintenance. Instructedto continue current medications, diet and exercise. Patient agreed with treatmentplan. Follow up as directed.      Electronicallysigned by GITA Portillo CNP on 4/26/2022 at 5:10 PM

## 2022-04-27 DIAGNOSIS — G89.29 CHRONIC MIDLINE LOW BACK PAIN WITHOUT SCIATICA: ICD-10-CM

## 2022-04-27 DIAGNOSIS — M15.9 PRIMARY OSTEOARTHRITIS INVOLVING MULTIPLE JOINTS: ICD-10-CM

## 2022-04-27 DIAGNOSIS — M54.50 CHRONIC MIDLINE LOW BACK PAIN WITHOUT SCIATICA: ICD-10-CM

## 2022-04-27 DIAGNOSIS — M51.37 DISC DISEASE, DEGENERATIVE, LUMBAR OR LUMBOSACRAL: ICD-10-CM

## 2022-04-27 RX ORDER — TRAMADOL HYDROCHLORIDE 50 MG/1
50-100 TABLET ORAL EVERY 8 HOURS PRN
Qty: 150 TABLET | OUTPATIENT
Start: 2022-04-27 | End: 2022-05-27

## 2022-05-19 ENCOUNTER — PATIENT MESSAGE (OUTPATIENT)
Dept: PRIMARY CARE CLINIC | Age: 57
End: 2022-05-19

## 2022-05-19 NOTE — TELEPHONE ENCOUNTER
From: Nixon Hammond  To: Jeremy Fleming  Sent: 5/19/2022 7:14 PM EDT  Subject: COPD cough    I know you have givin me z-paks and steroids more than once but can I get another dose or is there something you can give me I have been coughing for over a week thank you for anything you can do

## 2022-05-20 ENCOUNTER — OFFICE VISIT (OUTPATIENT)
Dept: PRIMARY CARE CLINIC | Age: 57
End: 2022-05-20
Payer: COMMERCIAL

## 2022-05-20 ENCOUNTER — NURSE TRIAGE (OUTPATIENT)
Dept: OTHER | Facility: CLINIC | Age: 57
End: 2022-05-20

## 2022-05-20 VITALS — OXYGEN SATURATION: 92 % | HEART RATE: 88 BPM | DIASTOLIC BLOOD PRESSURE: 82 MMHG | SYSTOLIC BLOOD PRESSURE: 134 MMHG

## 2022-05-20 DIAGNOSIS — R06.02 SOB (SHORTNESS OF BREATH): ICD-10-CM

## 2022-05-20 DIAGNOSIS — R06.2 WHEEZING: Primary | ICD-10-CM

## 2022-05-20 DIAGNOSIS — Z91.09 ENVIRONMENTAL ALLERGIES: ICD-10-CM

## 2022-05-20 PROCEDURE — 1036F TOBACCO NON-USER: CPT | Performed by: NURSE PRACTITIONER

## 2022-05-20 PROCEDURE — 99213 OFFICE O/P EST LOW 20 MIN: CPT | Performed by: NURSE PRACTITIONER

## 2022-05-20 PROCEDURE — 3017F COLORECTAL CA SCREEN DOC REV: CPT | Performed by: NURSE PRACTITIONER

## 2022-05-20 PROCEDURE — G8427 DOCREV CUR MEDS BY ELIG CLIN: HCPCS | Performed by: NURSE PRACTITIONER

## 2022-05-20 PROCEDURE — G8419 CALC BMI OUT NRM PARAM NOF/U: HCPCS | Performed by: NURSE PRACTITIONER

## 2022-05-20 RX ORDER — LORATADINE 10 MG/1
10 CAPSULE, LIQUID FILLED ORAL DAILY
Qty: 90 CAPSULE | Refills: 3 | Status: SHIPPED | OUTPATIENT
Start: 2022-05-20 | End: 2022-09-13 | Stop reason: SDUPTHER

## 2022-05-20 RX ORDER — PREDNISONE 20 MG/1
TABLET ORAL
Qty: 18 TABLET | Refills: 0 | Status: SHIPPED | OUTPATIENT
Start: 2022-05-20

## 2022-05-20 SDOH — ECONOMIC STABILITY: FOOD INSECURITY: WITHIN THE PAST 12 MONTHS, YOU WORRIED THAT YOUR FOOD WOULD RUN OUT BEFORE YOU GOT MONEY TO BUY MORE.: NEVER TRUE

## 2022-05-20 SDOH — ECONOMIC STABILITY: FOOD INSECURITY: WITHIN THE PAST 12 MONTHS, THE FOOD YOU BOUGHT JUST DIDN'T LAST AND YOU DIDN'T HAVE MONEY TO GET MORE.: NEVER TRUE

## 2022-05-20 ASSESSMENT — SOCIAL DETERMINANTS OF HEALTH (SDOH): HOW HARD IS IT FOR YOU TO PAY FOR THE VERY BASICS LIKE FOOD, HOUSING, MEDICAL CARE, AND HEATING?: NOT HARD AT ALL

## 2022-05-20 NOTE — TELEPHONE ENCOUNTER
Received call from [facility] at [facility] with [employer]. Subjective: Caller, wife Zo Malloy, states \"coughing bad, spitting up phlegm, COPD\"     Current Symptoms: patient is at work, she is aware triage will be limited and to take to ER if feels it is emergent. Has a cold for 4 days, bad cough, having a hard time breathing, chest hurts from coughing so much. Had covid in November 2021. He is vaccinated, not boosted. Onset: 4 days ago; sudden      Pain Severity: 0/10; N/A; none    Temperature: denies by parent's tactile estimate    What has been tried: coricidin for HTN, albuterol inhaler      Recommended disposition: Go to [facility]    Care advice provided, patient verbalizes understanding; denies any other questions or concerns; instructed to call back for any new or worsening symptoms. Patient/Caller agrees with recommended disposition; writer provided warm transfer to [facility] at [facility] for appointment scheduling     Attention Provider: Thank you for allowing me to participate in the care of your patient. The patient was connected to triage in response to information provided to the ECC/PSC. Please do not respond through this encounter as the response is not directed to a shared pool.          Reason for Disposition   MILD difficulty breathing (e.g., minimal/no SOB at rest, SOB with walking, pulse <100) and still present when not coughing    Protocols used: COUGH-ADULT-OH

## 2022-05-20 NOTE — PROGRESS NOTES
[address] PRIMARY CARE  [address] [place]  [address]  [address] [address]  Dept: [phone]  Dept Fax: [phone]    Silver English is a 60 y.o. male who presentstoday for his medical conditions/complaints as noted below. Silver English is c/o of  Chief Complaint   Patient presents with    Cough     x10 days - 1wk, sometimes productive    Shortness of Breath    COPD         HPI:     Here with complaint of worsening cough x 7-10 days  Is established pt of Bowen Stein NP  Has been treated for similar episodes with zpack and steroid with improvement  Denies fever or chills, cough is slightly productive of clear/white mucous  He denies feeling acutely ill, no significant SOB, does have wheezing  Has dust exposure at work  Using albuterol inhaler PRN, needs refill.  has appt with pulmonology, questionable COPD?            No results found for: LABA1C          ( goal A1C is < 7)   No results found for: LABMICR  LDL Cholesterol (mg/dL)   Date Value   2019 66       (goal LDL is <100)   AST (U/L)   Date Value   2019 32     ALT (U/L)   Date Value   2019 25     BUN (mg/dL)   Date Value   2021 22 (H)     BP Readings from Last 3 Encounters:   22 134/82   22 130/74   22 131/74          ([id number])    Past Medical History:   Diagnosis Date    Anxiety 2020    Back pain     Bilateral tinnitus 2020    Cellulitis of right knee 2021    Chronic midline low back pain without sciatica 11/10/2016    Deviated septum 2020    Disc disease, degenerative, lumbar or lumbosacral     Essential hypertension 11/10/2016    History of placement of ear tubes 2020    History of tobacco use 2020    Hyperlipidemia     Hypertension     Hypocalcemia 2021    Hyponatremia 2021    Lymph node enlargement     Normocytic normochromic anemia 2021    Obstructive sleep apnea syndrome 2017    Prepatellar bursitis of right knee 2021    Restless leg syndrome     Sensorineural hearing loss, bilateral 2020    Septic infrapatellar bursitis, left 2021    Somatic dysfunction of cervical region 12/3/2014      Past Surgical History:   Procedure Laterality Date    HERNIA REPAIR      KNEE SURGERY Right 2021    : KNEE INCISION AND DRAINAGE (Right )    KNEE SURGERY Right 2021    KNEE INCISION AND DRAINAGE performed by Herve Garrett MD at [address]  2020    TONSILLECTOMY AND ADENOIDECTOMY      TYMPANOSTOMY TUBE PLACEMENT Bilateral        Family History   Problem Relation Age of Onset    Arthritis Mother        Social History     Tobacco Use    Smoking status: Former Smoker     Packs/day: 0.05     Years: 35.00     Pack years: 1.75     Start date:      Quit date: 2010     Years since quittin.8    Smokeless tobacco: Never Used   Substance Use Topics    Alcohol use: No     Comment: rare      Current Outpatient Medications   Medication Sig Dispense Refill    predniSONE (DELTASONE) 20 MG tablet Take 3 tablets x 3 days, then 2 tablets x 3 days, then 1 tablet x 3 days 18 tablet 0    mometasone-formoterol (DULERA) 100-5 MCG/ACT inhaler Inhale 2 puffs into the lungs 2 times daily 13 g 2    loratadine (CLARITIN) 10 MG capsule Take 1 capsule by mouth daily 90 capsule 3    traMADol (ULTRAM) 50 MG tablet Take 1-2 tablets by mouth every 8 hours as needed for Pain for up to 30 days.  150 tablet 0    omeprazole (PRILOSEC) 20 MG delayed release capsule TAKE ONE CAPSULE BY MOUTH EVERY MORNING BEFORE BREAKFAST 90 capsule 3    lisinopril (PRINIVIL;ZESTRIL) 10 MG tablet TAKE ONE TABLET BY MOUTH DAILY 90 tablet 3    albuterol sulfate  (90 Base) MCG/ACT inhaler Inhale 2 puffs into the lungs every 6 hours as needed USE AS DIRECTED 18 g 5    tiZANidine (ZANAFLEX) 4 MG tablet Take 1 tablet by mouth every 8 hours as needed (muscle spasms) 90 tablet 5    ibuprofen (ADVIL;MOTRIN) 800 MG tablet Take 1 tablet by mouth every 8 hours as needed for Pain 90 tablet 5    Multiple Vitamins-Minerals (MULTI FOR HIM 50+ PO) Take by mouth       No current facility-administered medications for this visit. No Known Allergies    Health Maintenance   Topic Date Due    Pneumococcal 0-64 years Vaccine (1 - PCV) Never done    Hepatitis C screen  Never done    Colorectal Cancer Screen  Never done    Shingles vaccine (1 of 2) Never done    COVID-19 Vaccine (3 - Booster for Pfizer series) 11/01/2021    Flu vaccine (Season Ended) 09/07/2022 (Originally 9/1/2022)    Depression Screen  04/26/2023    DTaP/Tdap/Td vaccine (2 - Td or Tdap) 01/01/2024    Lipids  06/05/2024    Hepatitis A vaccine  Aged Out    Hepatitis B vaccine  Aged Out    Hib vaccine  Aged Out    Meningococcal (ACWY) vaccine  Aged Out    HIV screen  Discontinued       Subjective:      Review of Systems   Constitutional: Negative for activity change, fatigue and fever. HENT: Negative for congestion, rhinorrhea and sore throat. Eyes: Negative for visual disturbance. Respiratory: Positive for cough and wheezing. Negative for chest tightness and shortness of breath. Cardiovascular: Negative for chest pain and palpitations. Gastrointestinal: Negative for abdominal pain, diarrhea, nausea and vomiting. Endocrine: Negative for polydipsia. Genitourinary: Negative for difficulty urinating. Musculoskeletal: Negative for arthralgias and myalgias. Skin: Negative for color change. Neurological: Negative for weakness and headaches. Psychiatric/Behavioral: Negative for behavioral problems. The patient is not nervous/anxious. All other systems reviewed and are negative. Objective:   /82   Pulse 88   SpO2 92% Comment: SpO2 raised from the initial reading ot 89% as ot sat  Physical Exam  Vitals reviewed. Constitutional:       General: He is not in acute distress.      Appearance: Normal Patient given educational materials - see patient instructions. Discussed use, benefit, and side effects of prescribed medications. All patient questions answered. Pt voiced understanding. Reviewed health maintenance. Instructed to continue current medications, diet and exercise. Patient agreed with treatment plan. Follow up as directed.        Electronicallysigned by GITA Reynolds CNP on 5/21/2022 at 8:19 AM

## 2022-05-21 ASSESSMENT — ENCOUNTER SYMPTOMS
WHEEZING: 1
ABDOMINAL PAIN: 0
NAUSEA: 0
CHEST TIGHTNESS: 0
RHINORRHEA: 0
COLOR CHANGE: 0
SHORTNESS OF BREATH: 0
VOMITING: 0
COUGH: 1
DIARRHEA: 0
SORE THROAT: 0

## 2022-05-27 DIAGNOSIS — M15.9 PRIMARY OSTEOARTHRITIS INVOLVING MULTIPLE JOINTS: ICD-10-CM

## 2022-05-27 DIAGNOSIS — M54.50 CHRONIC MIDLINE LOW BACK PAIN WITHOUT SCIATICA: ICD-10-CM

## 2022-05-27 DIAGNOSIS — G89.29 CHRONIC MIDLINE LOW BACK PAIN WITHOUT SCIATICA: ICD-10-CM

## 2022-05-27 DIAGNOSIS — M51.37 DISC DISEASE, DEGENERATIVE, LUMBAR OR LUMBOSACRAL: ICD-10-CM

## 2022-05-27 RX ORDER — TRAMADOL HYDROCHLORIDE 50 MG/1
50-100 TABLET ORAL EVERY 8 HOURS PRN
Qty: 150 TABLET | Refills: 0 | Status: SHIPPED | OUTPATIENT
Start: 2022-05-27 | End: 2022-06-23 | Stop reason: SDUPTHER

## 2022-05-27 RX ORDER — TRAMADOL HYDROCHLORIDE 50 MG/1
50-100 TABLET ORAL EVERY 8 HOURS PRN
Qty: 150 TABLET | Refills: 0 | Status: CANCELLED | OUTPATIENT
Start: 2022-05-27 | End: 2022-06-26

## 2022-05-27 NOTE — TELEPHONE ENCOUNTER
Last Visit Date: 4/26/2022   Next Visit Date: 8/26/2022     Patient states [facility] shut down and his refills were to go to [facility], they have all but the Tramadol. Patient is trying to get out of town for the weekend.

## 2022-06-23 DIAGNOSIS — G89.29 CHRONIC MIDLINE LOW BACK PAIN WITHOUT SCIATICA: ICD-10-CM

## 2022-06-23 DIAGNOSIS — M51.37 DISC DISEASE, DEGENERATIVE, LUMBAR OR LUMBOSACRAL: ICD-10-CM

## 2022-06-23 DIAGNOSIS — M54.50 CHRONIC MIDLINE LOW BACK PAIN WITHOUT SCIATICA: ICD-10-CM

## 2022-06-23 DIAGNOSIS — M15.9 PRIMARY OSTEOARTHRITIS INVOLVING MULTIPLE JOINTS: ICD-10-CM

## 2022-06-23 RX ORDER — TRAMADOL HYDROCHLORIDE 50 MG/1
50-100 TABLET ORAL EVERY 8 HOURS PRN
Qty: 150 TABLET | Refills: 0 | Status: SHIPPED | OUTPATIENT
Start: 2022-06-23 | End: 2022-07-21 | Stop reason: SDUPTHER

## 2022-06-23 NOTE — TELEPHONE ENCOUNTER
Patient would like to know if they can get this earlier as he is out of medication and in a lot of pain.    Last Visit Date: 4/26/2022   Next Visit Date: 8/26/2022

## 2022-07-01 DIAGNOSIS — I10 ESSENTIAL HYPERTENSION: Chronic | ICD-10-CM

## 2022-07-01 RX ORDER — LISINOPRIL 10 MG/1
TABLET ORAL
Qty: 90 TABLET | Refills: 3 | Status: SHIPPED | OUTPATIENT
Start: 2022-07-01

## 2022-07-21 DIAGNOSIS — M15.9 PRIMARY OSTEOARTHRITIS INVOLVING MULTIPLE JOINTS: ICD-10-CM

## 2022-07-21 DIAGNOSIS — G89.29 CHRONIC MIDLINE LOW BACK PAIN WITHOUT SCIATICA: ICD-10-CM

## 2022-07-21 DIAGNOSIS — M51.37 DISC DISEASE, DEGENERATIVE, LUMBAR OR LUMBOSACRAL: ICD-10-CM

## 2022-07-21 DIAGNOSIS — M54.50 CHRONIC MIDLINE LOW BACK PAIN WITHOUT SCIATICA: ICD-10-CM

## 2022-07-21 RX ORDER — TRAMADOL HYDROCHLORIDE 50 MG/1
50-100 TABLET ORAL EVERY 8 HOURS PRN
Qty: 150 TABLET | Refills: 0 | Status: SHIPPED | OUTPATIENT
Start: 2022-07-21 | End: 2022-08-20

## 2022-08-22 DIAGNOSIS — M54.50 CHRONIC MIDLINE LOW BACK PAIN WITHOUT SCIATICA: ICD-10-CM

## 2022-08-22 DIAGNOSIS — M51.37 DISC DISEASE, DEGENERATIVE, LUMBAR OR LUMBOSACRAL: ICD-10-CM

## 2022-08-22 DIAGNOSIS — G89.29 CHRONIC MIDLINE LOW BACK PAIN WITHOUT SCIATICA: ICD-10-CM

## 2022-08-22 DIAGNOSIS — M15.9 PRIMARY OSTEOARTHRITIS INVOLVING MULTIPLE JOINTS: ICD-10-CM

## 2022-08-22 RX ORDER — TRAMADOL HYDROCHLORIDE 50 MG/1
50-100 TABLET ORAL EVERY 8 HOURS PRN
Qty: 150 TABLET | Refills: 0 | Status: SHIPPED | OUTPATIENT
Start: 2022-08-22 | End: 2022-09-21 | Stop reason: SDUPTHER

## 2022-08-23 ENCOUNTER — HOSPITAL ENCOUNTER (OUTPATIENT)
Age: 57
Setting detail: SPECIMEN
Discharge: HOME | End: 2022-08-23

## 2022-08-23 ENCOUNTER — OFFICE VISIT (OUTPATIENT)
Dept: PRIMARY CARE CLINIC | Age: 57
End: 2022-08-23
Payer: COMMERCIAL

## 2022-08-23 VITALS
OXYGEN SATURATION: 96 % | WEIGHT: 205 LBS | BODY MASS INDEX: 27.05 KG/M2 | SYSTOLIC BLOOD PRESSURE: 124 MMHG | DIASTOLIC BLOOD PRESSURE: 82 MMHG | HEART RATE: 92 BPM

## 2022-08-23 DIAGNOSIS — Z12.5 SCREENING FOR MALIGNANT NEOPLASM OF PROSTATE: ICD-10-CM

## 2022-08-23 DIAGNOSIS — Z13.0 SCREENING, ANEMIA, DEFICIENCY, IRON: ICD-10-CM

## 2022-08-23 DIAGNOSIS — R53.83 FATIGUE, UNSPECIFIED TYPE: ICD-10-CM

## 2022-08-23 DIAGNOSIS — M15.9 PRIMARY OSTEOARTHRITIS INVOLVING MULTIPLE JOINTS: ICD-10-CM

## 2022-08-23 DIAGNOSIS — J44.9 CHRONIC OBSTRUCTIVE PULMONARY DISEASE, UNSPECIFIED COPD TYPE (HCC): ICD-10-CM

## 2022-08-23 DIAGNOSIS — R06.02 SHORTNESS OF BREATH: ICD-10-CM

## 2022-08-23 DIAGNOSIS — M54.2 PAIN IN NECK: ICD-10-CM

## 2022-08-23 DIAGNOSIS — M51.37 DISC DISEASE, DEGENERATIVE, LUMBAR OR LUMBOSACRAL: ICD-10-CM

## 2022-08-23 DIAGNOSIS — I10 ESSENTIAL HYPERTENSION: Primary | Chronic | ICD-10-CM

## 2022-08-23 DIAGNOSIS — M79.2 RADICULAR PAIN IN LEFT ARM: ICD-10-CM

## 2022-08-23 DIAGNOSIS — I10 ESSENTIAL HYPERTENSION: ICD-10-CM

## 2022-08-23 DIAGNOSIS — R29.898 WEAKNESS OF LEFT ARM: ICD-10-CM

## 2022-08-23 LAB
ABSOLUTE EOS #: 0.38 K/UL (ref 0–0.44)
ABSOLUTE IMMATURE GRANULOCYTE: 0.05 K/UL (ref 0–0.3)
ABSOLUTE LYMPH #: 1.71 K/UL (ref 1.1–3.7)
ABSOLUTE MONO #: 0.84 K/UL (ref 0.1–1.2)
ALBUMIN SERPL-MCNC: 4.7 G/DL (ref 3.5–5.2)
ALBUMIN/GLOBULIN RATIO: 2 (ref 1–2.5)
ALP BLD-CCNC: 96 U/L (ref 40–129)
ALT SERPL-CCNC: 24 U/L (ref 5–41)
ANION GAP SERPL CALCULATED.3IONS-SCNC: 10 MMOL/L (ref 9–17)
AST SERPL-CCNC: 22 U/L
BASOPHILS # BLD: 1 % (ref 0–2)
BASOPHILS ABSOLUTE: 0.07 K/UL (ref 0–0.2)
BILIRUB SERPL-MCNC: 0.28 MG/DL (ref 0.3–1.2)
BUN BLDV-MCNC: 21 MG/DL (ref 6–20)
CALCIUM SERPL-MCNC: 9.8 MG/DL (ref 8.6–10.4)
CHLORIDE BLD-SCNC: 103 MMOL/L (ref 98–107)
CHOLESTEROL/HDL RATIO: 4.3
CHOLESTEROL: 182 MG/DL
CO2: 28 MMOL/L (ref 20–31)
CREAT SERPL-MCNC: 1.23 MG/DL (ref 0.7–1.2)
EOSINOPHILS ABSOLUTE: 594 /UL (ref 200–400)
EOSINOPHILS RELATIVE PERCENT: 4 % (ref 1–4)
GFR AFRICAN AMERICAN: >60 ML/MIN
GFR NON-AFRICAN AMERICAN: >60 ML/MIN
GFR SERPL CREATININE-BSD FRML MDRD: ABNORMAL ML/MIN/{1.73_M2}
GLUCOSE BLD-MCNC: 95 MG/DL (ref 70–99)
HCT VFR BLD CALC: 45.6 % (ref 40.7–50.3)
HDLC SERPL-MCNC: 42 MG/DL
HEMOGLOBIN: 16.3 G/DL (ref 13–17)
IGE: 76 IU/ML
IMMATURE GRANULOCYTES: 1 %
LDL CHOLESTEROL DIRECT: 94 MG/DL
LDL CHOLESTEROL: ABNORMAL MG/DL (ref 0–130)
LYMPHOCYTES # BLD: 17 % (ref 24–43)
MCH RBC QN AUTO: 32.2 PG (ref 25.2–33.5)
MCHC RBC AUTO-ENTMCNC: 35.7 G/DL (ref 28.4–34.8)
MCV RBC AUTO: 90.1 FL (ref 82.6–102.9)
MONOCYTES # BLD: 9 % (ref 3–12)
NRBC AUTOMATED: 0 PER 100 WBC
PDW BLD-RTO: 11.8 % (ref 11.8–14.4)
PLATELET # BLD: 315 K/UL (ref 138–453)
PMV BLD AUTO: 10.2 FL (ref 8.1–13.5)
POTASSIUM SERPL-SCNC: 4.8 MMOL/L (ref 3.7–5.3)
PROSTATE SPECIFIC ANTIGEN: 0.32 NG/ML
RBC # BLD: 5.06 M/UL (ref 4.21–5.77)
SEG NEUTROPHILS: 69 % (ref 36–65)
SEGMENTED NEUTROPHILS ABSOLUTE COUNT: 6.85 K/UL (ref 1.5–8.1)
SODIUM BLD-SCNC: 141 MMOL/L (ref 135–144)
TOTAL PROTEIN: 7 G/DL (ref 6.4–8.3)
TRIGL SERPL-MCNC: 472 MG/DL
WBC # BLD: 9.9 K/UL (ref 3.5–11.3)

## 2022-08-23 PROCEDURE — 3023F SPIROM DOC REV: CPT | Performed by: NURSE PRACTITIONER

## 2022-08-23 PROCEDURE — G8427 DOCREV CUR MEDS BY ELIG CLIN: HCPCS | Performed by: NURSE PRACTITIONER

## 2022-08-23 PROCEDURE — 3017F COLORECTAL CA SCREEN DOC REV: CPT | Performed by: NURSE PRACTITIONER

## 2022-08-23 PROCEDURE — 1036F TOBACCO NON-USER: CPT | Performed by: NURSE PRACTITIONER

## 2022-08-23 PROCEDURE — 99214 OFFICE O/P EST MOD 30 MIN: CPT | Performed by: NURSE PRACTITIONER

## 2022-08-23 PROCEDURE — G8419 CALC BMI OUT NRM PARAM NOF/U: HCPCS | Performed by: NURSE PRACTITIONER

## 2022-08-23 RX ORDER — ALBUTEROL SULFATE 90 UG/1
AEROSOL, METERED RESPIRATORY (INHALATION)
Qty: 18 G | Refills: 5 | Status: SHIPPED | OUTPATIENT
Start: 2022-08-23

## 2022-08-23 RX ORDER — BUDESONIDE, GLYCOPYRROLATE, AND FORMOTEROL FUMARATE 160; 9; 4.8 UG/1; UG/1; UG/1
AEROSOL, METERED RESPIRATORY (INHALATION)
COMMUNITY
Start: 2022-07-14

## 2022-08-23 RX ORDER — OMEPRAZOLE 20 MG/1
CAPSULE, DELAYED RELEASE ORAL
Qty: 90 CAPSULE | Refills: 3 | Status: SHIPPED | OUTPATIENT
Start: 2022-08-23

## 2022-08-23 ASSESSMENT — ENCOUNTER SYMPTOMS
VOMITING: 0
DIARRHEA: 0
WHEEZING: 0
ABDOMINAL PAIN: 0
BACK PAIN: 1
TROUBLE SWALLOWING: 0
COUGH: 1
NAUSEA: 0
SHORTNESS OF BREATH: 1
SORE THROAT: 0
BLOOD IN STOOL: 0
SINUS PRESSURE: 0
CONSTIPATION: 0

## 2022-08-23 NOTE — PROGRESS NOTES
[facility] CARE  [facility] [address] [address]  Dept: [phone]  Dept Fax: [phone]    Pardeep Cunningham is a 59 y.o. male who presentstoday for his medical conditions/complaints as noted below. Pardeep Cunningham is c/o of  Chief Complaint   Patient presents with    Hypertension     F/u    Neck Pain     X1 yr. Discuss imaging            HPI:     Here today for follow-up  He has several concerns  States his neck pain has been progressively worsening  About a year ago he did see a neurosurgeon and was told to try physical therapy  At the time he did not follow through as he did not feel it was a muscular issue  He is now having intermittent weakness of his left arm and daily discomfort in the shoulder region with significant neck stiffness  He would like to have further imaging  Tramadol does not help much  However the tramadol does help his lower back and other areas of joint pain    Reports recent visit with pulmonology  Was supposed to schedule CT scan but was waiting to hear from scheduling facility  He historically has not been clear on how to go about completing medical procedures  He states \"they sent the order somewhere, did they come here? \"  Also mentioned needing some blood work  He continues to have issues with cough and shortness of breath  Has recently started a new inhaler  He also feels he is generally more fatigued than he used to be    Hypertension  This is a chronic problem. The current episode started more than 1 year ago. The problem is controlled. Associated symptoms include neck pain and shortness of breath (Intermittent). Pertinent negatives include no chest pain, headaches, palpitations or peripheral edema. Past treatments include ACE inhibitors. The current treatment provides significant improvement. There are no compliance problems. There is no history of CAD/MI or CVA.      No results found for: LABA1C          ( goal Comment: rare      Current Outpatient Medications   Medication Sig Dispense Refill    JAYNE AEROSPHERE 160-9-4.8 MCG/ACT AERO       omeprazole (PRILOSEC) 20 MG delayed release capsule TAKE ONE CAPSULE BY MOUTH EVERY MORNING BEFORE BREAKFAST 90 capsule 3    albuterol sulfate HFA (PROVENTIL;VENTOLIN;PROAIR) 108 (90 Base) MCG/ACT inhaler Inhale 2 puffs into the lungs every 6 hours as needed USE AS DIRECTED 18 g 5    traMADol (ULTRAM) 50 MG tablet Take 1-2 tablets by mouth every 8 hours as needed for Pain for up to 30 days. 150 tablet 0    lisinopril (PRINIVIL;ZESTRIL) 10 MG tablet TAKE ONE TABLET BY MOUTH DAILY 90 tablet 3    predniSONE (DELTASONE) 20 MG tablet Take 3 tablets x 3 days, then 2 tablets x 3 days, then 1 tablet x 3 days 18 tablet 0    mometasone-formoterol (DULERA) 100-5 MCG/ACT inhaler Inhale 2 puffs into the lungs 2 times daily 13 g 2    loratadine (CLARITIN) 10 MG capsule Take 1 capsule by mouth daily 90 capsule 3    tiZANidine (ZANAFLEX) 4 MG tablet Take 1 tablet by mouth every 8 hours as needed (muscle spasms) 90 tablet 5    ibuprofen (ADVIL;MOTRIN) 800 MG tablet Take 1 tablet by mouth every 8 hours as needed for Pain 90 tablet 5    Multiple Vitamins-Minerals (MULTI FOR HIM 50+ PO) Take by mouth       No current facility-administered medications for this visit.      No Known Allergies    Health Maintenance   Topic Date Due    Pneumococcal 0-64 years Vaccine (1 - PCV) Never done    Hepatitis C screen  Never done    Colorectal Cancer Screen  Never done    Shingles vaccine (1 of 2) Never done    COVID-19 Vaccine (3 - Booster for Pfizer series) 11/01/2021    Flu vaccine (1) 09/01/2022    Depression Screen  04/26/2023    DTaP/Tdap/Td vaccine (2 - Td or Tdap) 01/01/2024    Lipids  06/05/2024    Hepatitis A vaccine  Aged Out    Hepatitis B vaccine  Aged Out    Hib vaccine  Aged Out    Meningococcal (ACWY) vaccine  Aged Out    HIV screen  Discontinued       Subjective:      Review of Systems disease-continues to use tramadol as needed with some relief, will also discuss treatment alternatives with pain management, referral placed  Fatigue-he will complete ordered labs following today's visit, office staff has assisted him to schedule CT scan. Discussed pending results may need to do further investigation if his symptoms persist.  Also discussed may be related to his chronic pain  Orders Placed This Encounter   Procedures    CT CERVICAL SPINE WO CONTRAST     Standing Status:   Future     Standing Expiration Date:   8/23/2023    Eosinophil Count     Standing Status:   Future     Number of Occurrences:   1     Standing Expiration Date:   8/23/2023     Scheduling Instructions:      Results to [facility]      [facility]    IgE     Standing Status:   Future     Number of Occurrences:   1     Standing Expiration Date:   8/23/2023     Scheduling Instructions:      Results to [facility]      Luciana Bhakta MD, Pain Management, [place]     Referral Priority:   Routine     Referral Type:   Eval and Treat     Referral Reason:   Specialty Services Required     Referred to Provider:   Asmita Tony MD     Requested Specialty:   Pain Management     Number of Visits Requested:   1        Orders Placed This Encounter   Medications    omeprazole (PRILOSEC) 20 MG delayed release capsule     Sig: TAKE ONE CAPSULE BY MOUTH EVERY MORNING BEFORE BREAKFAST     Dispense:  90 capsule     Refill:  3    albuterol sulfate HFA (PROVENTIL;VENTOLIN;PROAIR) 108 (90 Base) MCG/ACT inhaler     Sig: Inhale 2 puffs into the lungs every 6 hours as needed USE AS DIRECTED     Dispense:  18 g     Refill:  5       Patient given educational materials - see patient instructions. Discussed use, benefit, and side effects of prescribed medications. All patientquestions answered. Pt voiced understanding.  Reviewed health maintenance. Instructedto continue current medications, diet and exercise. Patient agreed with treatmentplan. Follow up as directed.      Electronicallysigned by GITA Juarez CNP on 8/23/2022 at 4:36 PM

## 2022-08-24 ENCOUNTER — TELEPHONE (OUTPATIENT)
Dept: ONCOLOGY | Age: 57
End: 2022-08-24

## 2022-08-24 DIAGNOSIS — Z87.891 FORMER SMOKER: ICD-10-CM

## 2022-08-24 DIAGNOSIS — R06.02 SOB (SHORTNESS OF BREATH): Primary | ICD-10-CM

## 2022-08-24 NOTE — TELEPHONE ENCOUNTER
History was incorrect, this has been updated with new order placed. Unable to cancel initial order due to him being scheduled.   Thanks

## 2022-08-24 NOTE — TELEPHONE ENCOUNTER
Per smoking history in Epic and on order for CT Lung Screening, patient does not meet Criteria. Patient has 1.75 pack year (0.05 packs per day x 35 years = 1.75 pack year). Criteria is 20 pack year or greater. Patient currently scheduled for 9/1/22. If smoking history is correct then screening will be cancelled. If history is incorrect, please update history and enter new order with corrected information. Thank you. CT LUNG SCREENING CRITERIA  Patient is between the ages of 49-80. Is currently smoking or is a former smoker who has quit smoking within the last 15 years  Has at least a 20 pack-year smoking history (regardless of patient being a current or former smoker). Pack history is packs per day times years smoked equals pack history. IE:  1 pack a day X 20 years = 20 pack year history. Has not had a CT lung screening or any CT chest exam within the last year  Patient is asymptomatic (no signs/symptoms of lung cancer such as shortness of breath, cough, coughing up blood or unexplained significant weight loss). This patient does not have a condition that limits life expectancy to <5 years  This patient has participated in a shared decision-making session during which potential risks and benefits of CT Lung Screening were discussed. This patient was informed of the importance of adherence to annual screening, impact of comorbidities, and ability/willingness to undergo diagnosis and treatment. This patient was informed of the importance of smoking cessation and/or maintaining smoking abstinence. If applicable, this patient was offered information on smoking cessation counseling services. ORDER MUST INCLUDE: THIS INFORMATION IS AUTOMATICALLY ON ORDER IF ENTERED IN EPIC  PACK HISTORY   QUIT DATE IF THEY HAVE QUIT  DIAGNOSIS: Z87.891 PERSONAL HISTORY OF TABACCO USE OR PERSONAL HISTORY OF NICOTINE DEPENDENCE. (BOTH HAS THE SAME Z CODE) THIS IS REQUIRED FOR MEDICARE/MEDICAID.       FOR PRIVATE INSURANCES DIAGNOSIS CODE CAN BE: Z12.2 ENCOUNTER FOR SCREENING FOR MALIGNANT NEOPLASM OF RESPIRATORY ORGANS OR Z87.891 PERSONAL HISTORY OF TABACCO USE OR PERSONAL HISTORY OF NICOTINE DEPENDENCE.

## 2022-09-01 ENCOUNTER — HOSPITAL ENCOUNTER (OUTPATIENT)
Dept: CT IMAGING | Age: 57
Discharge: HOME | End: 2022-09-03
Payer: COMMERCIAL

## 2022-09-01 DIAGNOSIS — M79.2 RADICULAR PAIN IN LEFT ARM: ICD-10-CM

## 2022-09-01 DIAGNOSIS — M54.2 PAIN IN NECK: ICD-10-CM

## 2022-09-01 DIAGNOSIS — R29.898 WEAKNESS OF LEFT ARM: ICD-10-CM

## 2022-09-01 DIAGNOSIS — Z87.891 FORMER SMOKER: ICD-10-CM

## 2022-09-01 DIAGNOSIS — R06.02 SOB (SHORTNESS OF BREATH): ICD-10-CM

## 2022-09-01 PROCEDURE — 72125 CT NECK SPINE W/O DYE: CPT

## 2022-09-01 PROCEDURE — 71271 CT THORAX LUNG CANCER SCR C-: CPT

## 2022-09-13 DIAGNOSIS — Z91.09 ENVIRONMENTAL ALLERGIES: ICD-10-CM

## 2022-09-13 RX ORDER — LORATADINE 10 MG/1
10 CAPSULE, LIQUID FILLED ORAL DAILY
Qty: 90 CAPSULE | Refills: 3 | Status: SHIPPED | OUTPATIENT
Start: 2022-09-13

## 2022-09-21 DIAGNOSIS — M15.9 PRIMARY OSTEOARTHRITIS INVOLVING MULTIPLE JOINTS: ICD-10-CM

## 2022-09-21 DIAGNOSIS — G89.29 CHRONIC MIDLINE LOW BACK PAIN WITHOUT SCIATICA: ICD-10-CM

## 2022-09-21 DIAGNOSIS — M54.50 CHRONIC MIDLINE LOW BACK PAIN WITHOUT SCIATICA: ICD-10-CM

## 2022-09-21 DIAGNOSIS — M51.37 DISC DISEASE, DEGENERATIVE, LUMBAR OR LUMBOSACRAL: ICD-10-CM

## 2022-09-21 RX ORDER — TRAMADOL HYDROCHLORIDE 50 MG/1
50-100 TABLET ORAL EVERY 8 HOURS PRN
Qty: 150 TABLET | Refills: 0 | Status: SHIPPED | OUTPATIENT
Start: 2022-09-21 | End: 2022-10-21 | Stop reason: SDUPTHER

## 2022-09-26 ENCOUNTER — TELEPHONE (OUTPATIENT)
Dept: PRIMARY CARE CLINIC | Age: 57
End: 2022-09-26

## 2022-09-26 RX ORDER — LORATADINE AND PSEUDOEPHEDRINE SULFATE 10; 240 MG/1; MG/1
1 TABLET, EXTENDED RELEASE ORAL DAILY
Qty: 90 TABLET | Refills: 3 | Status: SHIPPED | OUTPATIENT
Start: 2022-09-26

## 2022-09-26 NOTE — TELEPHONE ENCOUNTER
The patient's wife called asking for his PCP to send in a script for Claritin-D to [facility] on [address] in [place]. Patient only has regular Claritin on file for refill.     Last Visit Date: 8/23/2022   Next Visit Date: 12/23/2022

## 2022-10-21 DIAGNOSIS — M15.9 PRIMARY OSTEOARTHRITIS INVOLVING MULTIPLE JOINTS: ICD-10-CM

## 2022-10-21 DIAGNOSIS — G89.29 CHRONIC MIDLINE LOW BACK PAIN WITHOUT SCIATICA: ICD-10-CM

## 2022-10-21 DIAGNOSIS — M54.50 CHRONIC MIDLINE LOW BACK PAIN WITHOUT SCIATICA: ICD-10-CM

## 2022-10-21 DIAGNOSIS — M51.37 DISC DISEASE, DEGENERATIVE, LUMBAR OR LUMBOSACRAL: ICD-10-CM

## 2022-10-21 RX ORDER — TRAMADOL HYDROCHLORIDE 50 MG/1
50-100 TABLET ORAL EVERY 8 HOURS PRN
Qty: 150 TABLET | Refills: 0 | Status: SHIPPED | OUTPATIENT
Start: 2022-10-21 | End: 2022-11-20

## 2022-11-18 DIAGNOSIS — M54.50 CHRONIC MIDLINE LOW BACK PAIN WITHOUT SCIATICA: ICD-10-CM

## 2022-11-18 DIAGNOSIS — M15.9 PRIMARY OSTEOARTHRITIS INVOLVING MULTIPLE JOINTS: ICD-10-CM

## 2022-11-18 DIAGNOSIS — M51.37 DISC DISEASE, DEGENERATIVE, LUMBAR OR LUMBOSACRAL: ICD-10-CM

## 2022-11-18 DIAGNOSIS — G89.29 CHRONIC MIDLINE LOW BACK PAIN WITHOUT SCIATICA: ICD-10-CM

## 2022-11-18 DIAGNOSIS — R06.02 SHORTNESS OF BREATH: ICD-10-CM

## 2022-11-18 RX ORDER — ALBUTEROL SULFATE 90 UG/1
AEROSOL, METERED RESPIRATORY (INHALATION)
Qty: 18 G | Refills: 5 | Status: SHIPPED | OUTPATIENT
Start: 2022-11-18

## 2022-11-18 RX ORDER — TRAMADOL HYDROCHLORIDE 50 MG/1
50-100 TABLET ORAL EVERY 8 HOURS PRN
Qty: 150 TABLET | Refills: 0 | Status: SHIPPED | OUTPATIENT
Start: 2022-11-18 | End: 2022-12-18

## 2022-11-27 ENCOUNTER — PATIENT MESSAGE (OUTPATIENT)
Dept: PRIMARY CARE CLINIC | Age: 57
End: 2022-11-27

## 2022-11-27 DIAGNOSIS — J01.40 ACUTE NON-RECURRENT PANSINUSITIS: Primary | ICD-10-CM

## 2022-11-28 ENCOUNTER — TELEPHONE (OUTPATIENT)
Dept: PRIMARY CARE CLINIC | Age: 57
End: 2022-11-28

## 2022-11-28 RX ORDER — AZITHROMYCIN 250 MG/1
TABLET, FILM COATED ORAL
Qty: 1 PACKET | Refills: 0 | Status: SHIPPED | OUTPATIENT
Start: 2022-11-28

## 2022-11-28 NOTE — TELEPHONE ENCOUNTER
Patient called stating that he is fighting some kind of chest cold. He is asking if a Tyson Rear can be sent in. Writer advised for patient to be seen and evaluated at walk-in clinic. Patient declined and requested message be sent to Zechariah Welch.

## 2022-11-28 NOTE — TELEPHONE ENCOUNTER
From: Mikhail Cota  To: Karine Lin  Sent: 11/27/2022 7:20 AM EST  Subject: Im feeling terrible    Can you call me in a z pac headache couching chest congestion Donovan Peace tested me for Covid it was negative my breathing is giving me the headache again thank you

## 2022-12-16 DIAGNOSIS — I10 ESSENTIAL HYPERTENSION: Chronic | ICD-10-CM

## 2022-12-16 DIAGNOSIS — J01.40 ACUTE NON-RECURRENT PANSINUSITIS: ICD-10-CM

## 2022-12-16 DIAGNOSIS — M51.37 DISC DISEASE, DEGENERATIVE, LUMBAR OR LUMBOSACRAL: ICD-10-CM

## 2022-12-16 DIAGNOSIS — M15.9 PRIMARY OSTEOARTHRITIS INVOLVING MULTIPLE JOINTS: ICD-10-CM

## 2022-12-16 DIAGNOSIS — G89.29 CHRONIC MIDLINE LOW BACK PAIN WITHOUT SCIATICA: ICD-10-CM

## 2022-12-16 DIAGNOSIS — R06.02 SHORTNESS OF BREATH: ICD-10-CM

## 2022-12-16 DIAGNOSIS — M54.50 CHRONIC MIDLINE LOW BACK PAIN WITHOUT SCIATICA: ICD-10-CM

## 2022-12-16 RX ORDER — LISINOPRIL 10 MG/1
TABLET ORAL
Qty: 90 TABLET | Refills: 3 | Status: SHIPPED | OUTPATIENT
Start: 2022-12-16

## 2022-12-16 RX ORDER — ALBUTEROL SULFATE 90 UG/1
AEROSOL, METERED RESPIRATORY (INHALATION)
Qty: 18 G | Refills: 5 | Status: SHIPPED | OUTPATIENT
Start: 2022-12-16

## 2022-12-16 RX ORDER — TRAMADOL HYDROCHLORIDE 50 MG/1
50-100 TABLET ORAL EVERY 8 HOURS PRN
Qty: 150 TABLET | Refills: 0 | Status: SHIPPED | OUTPATIENT
Start: 2022-12-16 | End: 2023-01-15

## 2022-12-19 RX ORDER — AZITHROMYCIN 250 MG/1
TABLET, FILM COATED ORAL
Qty: 6 TABLET | OUTPATIENT
Start: 2022-12-19

## 2022-12-23 ENCOUNTER — TELEMEDICINE (OUTPATIENT)
Dept: PRIMARY CARE CLINIC | Age: 57
End: 2022-12-23
Payer: COMMERCIAL

## 2022-12-23 DIAGNOSIS — M54.2 PAIN IN NECK: ICD-10-CM

## 2022-12-23 DIAGNOSIS — I10 ESSENTIAL HYPERTENSION: ICD-10-CM

## 2022-12-23 DIAGNOSIS — M15.9 PRIMARY OSTEOARTHRITIS INVOLVING MULTIPLE JOINTS: ICD-10-CM

## 2022-12-23 DIAGNOSIS — M79.2 RADICULAR PAIN IN LEFT ARM: Primary | ICD-10-CM

## 2022-12-23 PROCEDURE — G8427 DOCREV CUR MEDS BY ELIG CLIN: HCPCS | Performed by: NURSE PRACTITIONER

## 2022-12-23 PROCEDURE — 99214 OFFICE O/P EST MOD 30 MIN: CPT | Performed by: NURSE PRACTITIONER

## 2022-12-23 PROCEDURE — 3017F COLORECTAL CA SCREEN DOC REV: CPT | Performed by: NURSE PRACTITIONER

## 2022-12-23 RX ORDER — GABAPENTIN 300 MG/1
300 CAPSULE ORAL NIGHTLY
Qty: 90 CAPSULE | Refills: 0 | Status: SHIPPED | OUTPATIENT
Start: 2022-12-23 | End: 2023-03-23

## 2022-12-23 RX ORDER — LISINOPRIL 20 MG/1
TABLET ORAL
Qty: 90 TABLET | Refills: 3 | Status: SHIPPED | OUTPATIENT
Start: 2022-12-23

## 2022-12-23 ASSESSMENT — ENCOUNTER SYMPTOMS
BLOOD IN STOOL: 0
SORE THROAT: 0
BACK PAIN: 1
COUGH: 0
TROUBLE SWALLOWING: 0
DIARRHEA: 0
SHORTNESS OF BREATH: 0
SINUS PRESSURE: 0
CONSTIPATION: 0
NAUSEA: 0
ABDOMINAL PAIN: 0
VOMITING: 0
WHEEZING: 0

## 2022-12-23 NOTE — PROGRESS NOTES
[address] PRIMARY CARE  [address] [facility] [address]  [address] [address]  Dept: [phone]  Dept Fax: [phone]    Giselle Hamm is a 59 y.o. male who presentstoday for his medical conditions/complaints as noted below. Giselle Hamm is c/o of  No chief complaint on file.       HPI:     For the past month his left shoulder and neck have been increasingly problematic  \"Feels like a rag arm\"  Intermittent pain/aching at night  He has a lot of pain going above his head  Has intermittent numbness in back of arm and axillary region  Has been an issue for more than 1 year but continues to worsen  Recalls he only did 1 or 2 sessions of PT, saw neurosurg in Oct of '21    Home BP elevated, does not check regularly  Denies any SOB or chest pain      No results found for: LABA1C          ( goal A1C is < 7)   No results found for: LABMICR  LDL Cholesterol (mg/dL)   Date Value   2022 66       (goal LDL is <100)   AST (U/L)   Date Value   2022 22     ALT (U/L)   Date Value   2022 24     BUN (mg/dL)   Date Value   2022 21 (H)     BP Readings from Last 3 Encounters:   22 124/82   22 134/82   22 130/74          ([id number])    Past Medical History:   Diagnosis Date    Anxiety 2020    Back pain     Bilateral tinnitus 2020    Cellulitis of right knee 2021    Chronic midline low back pain without sciatica 11/10/2016    Deviated septum 2020    Disc disease, degenerative, lumbar or lumbosacral     Essential hypertension 11/10/2016    History of placement of ear tubes 2020    History of tobacco use 2020    Hyperlipidemia     Hypertension     Hypocalcemia 2021    Hyponatremia 2021    Lymph node enlargement     Normocytic normochromic anemia 2021    Obstructive sleep apnea syndrome 2017    Prepatellar bursitis of right knee 2021    Restless leg syndrome     Sensorineural hearing loss, bilateral 2020    Septic infrapatellar bursitis, left 2021    Somatic dysfunction of cervical region 12/3/2014      Past Surgical History:   Procedure Laterality Date    HERNIA REPAIR      KNEE SURGERY Right 2021    : KNEE INCISION AND DRAINAGE (Right )    KNEE SURGERY Right 2021    KNEE INCISION AND DRAINAGE performed by Danyel Mcneil MD at [address] [place] [address]  2020    TONSILLECTOMY AND ADENOIDECTOMY      TYMPANOSTOMY TUBE PLACEMENT Bilateral        Family History   Problem Relation Age of Onset    Arthritis Mother           Social History     Tobacco Use    Smoking status: Former     Packs/day: 1.00     Years: 35.00     Pack years: 35.00     Types: Cigarettes     Start date: 1989     Quit date: 2010     Years since quittin.4    Smokeless tobacco: Never   Substance Use Topics    Alcohol use: No     Comment: rare      Current Outpatient Medications   Medication Sig Dispense Refill    gabapentin (NEURONTIN) 300 MG capsule Take 1 capsule by mouth nightly for 90 days. Intended supply: 30 days 90 capsule 0    lisinopril (PRINIVIL;ZESTRIL) 20 MG tablet TAKE ONE TABLET BY MOUTH DAILY 90 tablet 3    albuterol sulfate HFA (PROVENTIL;VENTOLIN;PROAIR) 108 (90 Base) MCG/ACT inhaler Inhale 2 puffs into the lungs every 6 hours as needed USE AS DIRECTED 18 g 5    traMADol (ULTRAM) 50 MG tablet Take 1-2 tablets by mouth every 8 hours as needed for Pain for up to 30 days.  150 tablet 0    loratadine-pseudoephedrine (CLARITIN-D 24 HOUR)  MG per extended release tablet Take 1 tablet by mouth daily 90 tablet 3    loratadine (CLARITIN) 10 MG capsule Take 1 capsule by mouth daily 90 capsule 3    BREZTRI AEROSPHERE 160-9-4.8 MCG/ACT AERO       omeprazole (PRILOSEC) 20 MG delayed release capsule TAKE ONE CAPSULE BY MOUTH EVERY MORNING BEFORE BREAKFAST 90 capsule 3    predniSONE (DELTASONE) 20 MG tablet Take 3 tablets x 3 days, then 2 tablets x 3 days, then 1 tablet x 3 days 18 tablet 0    mometasone-formoterol (DULERA) 100-5 MCG/ACT inhaler Inhale 2 puffs into the lungs 2 times daily 13 g 2    tiZANidine (ZANAFLEX) 4 MG tablet Take 1 tablet by mouth every 8 hours as needed (muscle spasms) 90 tablet 5    ibuprofen (ADVIL;MOTRIN) 800 MG tablet Take 1 tablet by mouth every 8 hours as needed for Pain 90 tablet 5    Multiple Vitamins-Minerals (MULTI FOR HIM 50+ PO) Take by mouth       No current facility-administered medications for this visit. No Known Allergies    Health Maintenance   Topic Date Due    Pneumococcal 0-64 years Vaccine (1 - PCV) Never done    Hepatitis C screen  Never done    Colorectal Cancer Screen  Never done    Shingles vaccine (1 of 2) Never done    COVID-19 Vaccine (3 - Booster for Pfizer series) 07/27/2021    Flu vaccine (1) Never done    Depression Screen  04/26/2023    Low dose CT lung screening  09/01/2023    DTaP/Tdap/Td vaccine (2 - Td or Tdap) 01/01/2024    Lipids  08/23/2027    Hepatitis A vaccine  Aged Out    Hib vaccine  Aged Out    Meningococcal (ACWY) vaccine  Aged Out    HIV screen  Discontinued       Subjective:      Review of Systems   Constitutional:  Negative for activity change, appetite change, chills, fatigue, fever and unexpected weight change. HENT:  Negative for congestion, ear pain, hearing loss, sinus pressure, sore throat and trouble swallowing. Eyes:  Negative for visual disturbance. Respiratory:  Negative for cough, shortness of breath and wheezing. Cardiovascular:  Negative for chest pain, palpitations and leg swelling. Gastrointestinal:  Negative for abdominal pain, blood in stool, constipation, diarrhea, nausea and vomiting. Endocrine: Negative for cold intolerance, heat intolerance, polydipsia, polyphagia and polyuria. Genitourinary:  Negative for difficulty urinating, frequency, hematuria and urgency. Musculoskeletal:  Positive for arthralgias, back pain, neck pain and neck stiffness.  Negative for myalgias. Skin:  Negative for rash. Allergic/Immunologic: Negative for environmental allergies. Neurological:  Negative for dizziness, weakness, light-headedness and headaches. Psychiatric/Behavioral:  Negative for confusion. The patient is not nervous/anxious. Objective:     Physical Exam  Constitutional:       Appearance: Normal appearance. Pulmonary:      Effort: Pulmonary effort is normal.   Neurological:      Mental Status: He is alert and oriented to person, place, and time. There were no vitals taken for this visit. Assessment:       Diagnosis Orders   1. Radicular pain in left arm  Ctra. Laisha Anand MD, Pain Management, [place]    gabapentin (NEURONTIN) 300 MG capsule      2. Pain in neck  Ctra. Laisha Anand MD, Pain Management, [place]    gabapentin (NEURONTIN) 300 MG capsule      3. Essential hypertension  lisinopril (PRINIVIL;ZESTRIL) 20 MG tablet      4. Primary osteoarthritis involving multiple joints                  Plan:      Return in about 3 months (around 3/23/2023) for hypertension check. Radicular pain left arm, neck pain-reviewed chart and CT neck with pt and wife, he is agreeable to schedule with pain management as does not want to try PT again. Will start gabapentin, cont mobic  HTN-elevated per home reading, will increase lisinopril, check at home and call if no improvement  OA-stable but has pain daily, using tramadol fairly regularly, PDMP reflects appropriate use  Russ Manzano, was evaluated through a synchronous (real-time) audio-video encounter. The patient (or guardian if applicable) is aware that this is a billable service, which includes applicable co-pays. This Virtual Visit was conducted with patient's (and/or legal guardian's) consent.  The visit was conducted pursuant to the emergency declaration under the [facility], [facility]5 waiver authority and the [employer] Appropriations Act. Patient identification was verified, and a caregiver was present when appropriate. The patient was located at Home: [address] [place] [address]. Provider was located at [facility] ([address] [place] [address]): [address] [place] [address] [address]  [address] [address]  Juan Alberto Terrazas,  [facility] [address] Total time spent for this encounter: Not billed by time    --GITA Fagan CNP on 12/23/2022 at 5:00 PM    An electronic signature was used to authenticate this note. Orders Placed This Encounter   Procedures    Lyn Hammer MD, Pain Management, [place]     Referral Priority:   Routine     Referral Type:   Eval and Treat     Referral Reason:   Specialty Services Required     Referred to Provider:   Adriana Mays MD     Requested Specialty:   Pain Management     Number of Visits Requested:   1        Orders Placed This Encounter   Medications    gabapentin (NEURONTIN) 300 MG capsule     Sig: Take 1 capsule by mouth nightly for 90 days. Intended supply: 30 days     Dispense:  90 capsule     Refill:  0    lisinopril (PRINIVIL;ZESTRIL) 20 MG tablet     Sig: TAKE ONE TABLET BY MOUTH DAILY     Dispense:  90 tablet     Refill:  3       Patient given educational materials - see patient instructions. Discussed use, benefit, and side effects of prescribed medications. All patientquestions answered. Pt voiced understanding. Reviewed health maintenance. Instructedto continue current medications, diet and exercise. Patient agreed with treatmentplan. Follow up as directed.      Electronicallysigned by GITA Fagan CNP on 12/23/2022 at 4:47 PM

## 2023-01-09 ENCOUNTER — OFFICE VISIT (OUTPATIENT)
Dept: PRIMARY CARE CLINIC | Age: 58
End: 2023-01-09
Payer: COMMERCIAL

## 2023-01-09 VITALS
WEIGHT: 205.8 LBS | BODY MASS INDEX: 27.15 KG/M2 | SYSTOLIC BLOOD PRESSURE: 114 MMHG | DIASTOLIC BLOOD PRESSURE: 72 MMHG | OXYGEN SATURATION: 95 % | HEART RATE: 92 BPM

## 2023-01-09 DIAGNOSIS — R06.6 HICCUPS: ICD-10-CM

## 2023-01-09 DIAGNOSIS — H72.91 PERFORATION OF RIGHT TYMPANIC MEMBRANE: Primary | ICD-10-CM

## 2023-01-09 DIAGNOSIS — H92.01 OTALGIA OF RIGHT EAR: ICD-10-CM

## 2023-01-09 DIAGNOSIS — M79.2 RADICULAR PAIN IN LEFT ARM: ICD-10-CM

## 2023-01-09 DIAGNOSIS — H90.3 SENSORINEURAL HEARING LOSS, BILATERAL: ICD-10-CM

## 2023-01-09 DIAGNOSIS — M54.2 PAIN IN NECK: ICD-10-CM

## 2023-01-09 DIAGNOSIS — Z96.22 HISTORY OF PLACEMENT OF EAR TUBES: ICD-10-CM

## 2023-01-09 PROCEDURE — G8419 CALC BMI OUT NRM PARAM NOF/U: HCPCS | Performed by: NURSE PRACTITIONER

## 2023-01-09 PROCEDURE — 3078F DIAST BP <80 MM HG: CPT | Performed by: NURSE PRACTITIONER

## 2023-01-09 PROCEDURE — G8484 FLU IMMUNIZE NO ADMIN: HCPCS | Performed by: NURSE PRACTITIONER

## 2023-01-09 PROCEDURE — 3074F SYST BP LT 130 MM HG: CPT | Performed by: NURSE PRACTITIONER

## 2023-01-09 PROCEDURE — G8427 DOCREV CUR MEDS BY ELIG CLIN: HCPCS | Performed by: NURSE PRACTITIONER

## 2023-01-09 PROCEDURE — 3017F COLORECTAL CA SCREEN DOC REV: CPT | Performed by: NURSE PRACTITIONER

## 2023-01-09 PROCEDURE — 1036F TOBACCO NON-USER: CPT | Performed by: NURSE PRACTITIONER

## 2023-01-09 PROCEDURE — 99214 OFFICE O/P EST MOD 30 MIN: CPT | Performed by: NURSE PRACTITIONER

## 2023-01-09 RX ORDER — CIPROFLOXACIN AND DEXAMETHASONE 3; 1 MG/ML; MG/ML
4 SUSPENSION/ DROPS AURICULAR (OTIC) 2 TIMES DAILY
Qty: 1 EACH | Refills: 0 | Status: SHIPPED | OUTPATIENT
Start: 2023-01-09 | End: 2023-01-16

## 2023-01-09 RX ORDER — AMOXICILLIN 875 MG/1
875 TABLET, COATED ORAL 2 TIMES DAILY
Qty: 20 TABLET | Refills: 0 | Status: SHIPPED | OUTPATIENT
Start: 2023-01-09 | End: 2023-01-19

## 2023-01-09 ASSESSMENT — PATIENT HEALTH QUESTIONNAIRE - PHQ9
SUM OF ALL RESPONSES TO PHQ QUESTIONS 1-9: 0
2. FEELING DOWN, DEPRESSED OR HOPELESS: 0
1. LITTLE INTEREST OR PLEASURE IN DOING THINGS: 0
SUM OF ALL RESPONSES TO PHQ QUESTIONS 1-9: 0
SUM OF ALL RESPONSES TO PHQ9 QUESTIONS 1 & 2: 0

## 2023-01-09 NOTE — PROGRESS NOTES
[address] [facility] PRIMARY CARE  [facility] [address] [facility] [address]  [address] [address]  Dept: [phone]  Dept Fax: [phone]    Rajesh Mays is a 59 y.o. male who presentstoday for his medical conditions/complaints as noted below. Rajesh Mays is c/o of  Chief Complaint   Patient presents with    Otalgia     Right ear pain x2 weeks    Hiccups     x2 weeks. HPI:     Here today concerned for right ear pain and hiccups over the past 2 weeks  Has been taking claritin daily with no improvement  He states wife is trying to schedule  him appt with ENT for evaluation and hearing aids  He did see ENT about 2 years ago and had bilateral tubes placed  Since that time the right tube has come out [place] while ago\"  He has had significant hearing loss over the last 3 to 4 years, was evaluated at the time of the tube placement for hearing aids but did not follow through  He states he is concerned because his right ear has again become painful, has a blocked feeling and noticed some brown drainage from it the other day  States he read on the Internet hiccups can be related to pressure or an object on the eardrum  He reports that hiccups are intermittent, chewing gum will often cause him to stop.   Happens several times per day for short duration    Also reports has scheduled an appointment with pain management for his arm pain  He states the gabapentin has been helpful      No results found for: LABA1C          ( goal A1C is < 7)   No results found for: LABMICR  LDL Cholesterol (mg/dL)   Date Value   2022 66       (goal LDL is <100)   AST (U/L)   Date Value   2022 22     ALT (U/L)   Date Value   2022 24     BUN (mg/dL)   Date Value   2022 21 (H)     BP Readings from Last 3 Encounters:   23 114/72   22 124/82   22 134/82          ([id number])    Past Medical History:   Diagnosis Date    Anxiety 2020    Back pain Bilateral tinnitus 2020    Cellulitis of right knee 2021    Chronic midline low back pain without sciatica 11/10/2016    Deviated septum 2020    Disc disease, degenerative, lumbar or lumbosacral     Essential hypertension 11/10/2016    History of placement of ear tubes 2020    History of tobacco use 2020    Hyperlipidemia     Hypertension     Hypocalcemia 2021    Hyponatremia 2021    Lymph node enlargement     Normocytic normochromic anemia 2021    Obstructive sleep apnea syndrome 2017    Prepatellar bursitis of right knee 2021    Restless leg syndrome     Sensorineural hearing loss, bilateral 2020    Septic infrapatellar bursitis, left 2021    Somatic dysfunction of cervical region 12/3/2014      Past Surgical History:   Procedure Laterality Date    HERNIA REPAIR      KNEE SURGERY Right 2021    : KNEE INCISION AND DRAINAGE (Right )    KNEE SURGERY Right 2021    KNEE INCISION AND DRAINAGE performed by Hemalatha Maier MD at [address] [place] [address]  2020    TONSILLECTOMY AND ADENOIDECTOMY      TYMPANOSTOMY TUBE PLACEMENT Bilateral        Family History   Problem Relation Age of Onset    Arthritis Mother           Social History     Tobacco Use    Smoking status: Former     Packs/day: 1.00     Years: 35.00     Pack years: 35.00     Types: Cigarettes     Start date: 1989     Quit date: 2010     Years since quittin.4    Smokeless tobacco: Never   Substance Use Topics    Alcohol use: No     Comment: rare      Current Outpatient Medications   Medication Sig Dispense Refill    ciprofloxacin-dexamethasone (CIPRODEX) 0.3-0.1 % otic suspension Place 4 drops into the right ear 2 times daily for 7 days 1 each 0    amoxicillin (AMOXIL) 875 MG tablet Take 1 tablet by mouth 2 times daily for 10 days 20 tablet 0    lisinopril (PRINIVIL;ZESTRIL) 20 MG tablet TAKE ONE TABLET BY MOUTH DAILY 90 tablet 3    albuterol sulfate HFA (PROVENTIL;VENTOLIN;PROAIR) 108 (90 Base) MCG/ACT inhaler Inhale 2 puffs into the lungs every 6 hours as needed USE AS DIRECTED 18 g 5    traMADol (ULTRAM) 50 MG tablet Take 1-2 tablets by mouth every 8 hours as needed for Pain for up to 30 days. 150 tablet 0    loratadine-pseudoephedrine (CLARITIN-D 24 HOUR)  MG per extended release tablet Take 1 tablet by mouth daily 90 tablet 3    BREZTRI AEROSPHERE 160-9-4.8 MCG/ACT AERO       omeprazole (PRILOSEC) 20 MG delayed release capsule TAKE ONE CAPSULE BY MOUTH EVERY MORNING BEFORE BREAKFAST 90 capsule 3    ibuprofen (ADVIL;MOTRIN) 800 MG tablet Take 1 tablet by mouth every 8 hours as needed for Pain 90 tablet 5    Multiple Vitamins-Minerals (MULTI FOR HIM 50+ PO) Take by mouth      gabapentin (NEURONTIN) 300 MG capsule TAKE ONE CAPSULE BY MOUTH ONCE NIGHTLY 90 capsule 5    loratadine (CLARITIN) 10 MG capsule Take 1 capsule by mouth daily (Patient not taking: Reported on 1/9/2023) 90 capsule 3    predniSONE (DELTASONE) 20 MG tablet Take 3 tablets x 3 days, then 2 tablets x 3 days, then 1 tablet x 3 days (Patient not taking: Reported on 1/9/2023) 18 tablet 0    mometasone-formoterol (DULERA) 100-5 MCG/ACT inhaler Inhale 2 puffs into the lungs 2 times daily (Patient not taking: Reported on 1/9/2023) 13 g 2    tiZANidine (ZANAFLEX) 4 MG tablet Take 1 tablet by mouth every 8 hours as needed (muscle spasms) (Patient not taking: Reported on 1/9/2023) 90 tablet 5     No current facility-administered medications for this visit.      No Known Allergies    Health Maintenance   Topic Date Due    Pneumococcal 0-64 years Vaccine (1 - PCV) Never done    Hepatitis C screen  Never done    Colorectal Cancer Screen  Never done    Shingles vaccine (1 of 2) Never done    COVID-19 Vaccine (3 - Booster for Pfizer series) 07/27/2021    Flu vaccine (1) Never done    Low dose CT lung screening  09/01/2023    DTaP/Tdap/Td vaccine (2 - Td or Tdap) 01/01/2024    Depression Screen  01/09/2024 Lipids  08/23/2027    Hepatitis A vaccine  Aged Out    Hib vaccine  Aged Out    Meningococcal (ACWY) vaccine  Aged Out    HIV screen  Discontinued       Subjective:      Review of Systems   Constitutional:  Negative for activity change, appetite change, chills, fatigue, fever and unexpected weight change. HENT:  Positive for ear pain and hearing loss. Negative for congestion, sinus pressure, sore throat and trouble swallowing. Eyes:  Negative for visual disturbance. Respiratory:  Negative for cough, shortness of breath and wheezing. Cardiovascular:  Negative for chest pain, palpitations and leg swelling. Gastrointestinal:  Negative for abdominal pain, blood in stool, constipation, diarrhea, nausea and vomiting. Endocrine: Negative for cold intolerance, heat intolerance, polydipsia, polyphagia and polyuria. Genitourinary:  Negative for difficulty urinating, frequency, hematuria and urgency. Musculoskeletal:  Negative for arthralgias and myalgias. Skin:  Negative for rash. Allergic/Immunologic: Negative for environmental allergies. Neurological:  Negative for dizziness, weakness, light-headedness and headaches. Psychiatric/Behavioral:  Negative for confusion. The patient is not nervous/anxious. Objective:     Physical Exam  Constitutional:       Appearance: Normal appearance. He is well-developed. HENT:      Head: Normocephalic. Right Ear: Drainage (Dried blood) and tenderness present. No middle ear effusion. There is no impacted cerumen. No foreign body. No PE tube. Tympanic membrane is perforated. Left Ear: No tenderness. No middle ear effusion. A PE tube is present. Tympanic membrane is not perforated. Eyes:      Conjunctiva/sclera: Conjunctivae normal.      Pupils: Pupils are equal, round, and reactive to light. Cardiovascular:      Rate and Rhythm: Normal rate and regular rhythm. Heart sounds: Normal heart sounds. No murmur heard.   Pulmonary:      Effort: Pulmonary effort is normal.      Breath sounds: Normal breath sounds. No wheezing. Abdominal:      General: Bowel sounds are normal. There is no distension. Palpations: Abdomen is soft. Musculoskeletal:         General: Normal range of motion. Cervical back: Normal range of motion. Skin:     General: Skin is warm and dry. Neurological:      Mental Status: He is alert and oriented to person, place, and time. Psychiatric:         Behavior: Behavior normal.         Thought Content: Thought content normal.         Judgment: Judgment normal.     /72   Pulse 92   Wt 205 lb 12.8 oz (93.4 kg)   SpO2 95%   BMI 27.15 kg/m²     Assessment:       Diagnosis Orders   1. Perforation of right tympanic membrane  ciprofloxacin-dexamethasone (CIPRODEX) 0.3-0.1 % otic suspension    amoxicillin (AMOXIL) 875 MG tablet      2. Sensorineural hearing loss, bilateral  External Referral To ENT      3. History of placement of ear tubes  External Referral To ENT      4. Otalgia of right ear  External Referral To ENT      5. Radicular pain in left arm        6. Hiccups                  Plan:      Return in about 3 months (around 4/9/2023) for back pain, hypertension check.     Perforation right TM, hearing loss, history of ear tubes, right ear pain-strongly advised to follow through with reevaluation at ENT, provided with new referral.  Start amoxicillin and Ciprodex drops to right ear  Radicular left arm pain-some improvement with gabapentin, has upcoming appointment with pain management  Hiccups-increase PPI to twice daily, discussed low concern at this time for his ear issue contributing to the hiccups  Orders Placed This Encounter   Procedures    External Referral To ENT     Referral Priority:   Routine     Referral Type:   Eval and Treat     Referral Reason:   Specialty Services Required     Referred to Provider:   Ashley Tam DO     Requested Specialty:   Otolaryngology     Number of Visits Requested: 1        Orders Placed This Encounter   Medications    ciprofloxacin-dexamethasone (CIPRODEX) 0.3-0.1 % otic suspension     Sig: Place 4 drops into the right ear 2 times daily for 7 days     Dispense:  1 each     Refill:  0    amoxicillin (AMOXIL) 875 MG tablet     Sig: Take 1 tablet by mouth 2 times daily for 10 days     Dispense:  20 tablet     Refill:  0       Patient given educational materials - see patient instructions. Discussed use, benefit, and side effects of prescribed medications. All patientquestions answered. Pt voiced understanding. Reviewed health maintenance. Instructedto continue current medications, diet and exercise. Patient agreed with treatmentplan. Follow up as directed.      Electronicallysigned by GITA Harvey CNP on 1/10/2023 at 12:40 PM

## 2023-01-10 RX ORDER — GABAPENTIN 300 MG/1
CAPSULE ORAL
Qty: 90 CAPSULE | Refills: 5 | Status: SHIPPED | OUTPATIENT
Start: 2023-01-10 | End: 2023-02-09

## 2023-01-10 ASSESSMENT — ENCOUNTER SYMPTOMS
NAUSEA: 0
CONSTIPATION: 0
VOMITING: 0
COUGH: 0
SORE THROAT: 0
BLOOD IN STOOL: 0
DIARRHEA: 0
SINUS PRESSURE: 0
SHORTNESS OF BREATH: 0
TROUBLE SWALLOWING: 0
WHEEZING: 0
ABDOMINAL PAIN: 0

## 2023-01-18 DIAGNOSIS — G89.29 CHRONIC MIDLINE LOW BACK PAIN WITHOUT SCIATICA: ICD-10-CM

## 2023-01-18 DIAGNOSIS — M15.9 PRIMARY OSTEOARTHRITIS INVOLVING MULTIPLE JOINTS: ICD-10-CM

## 2023-01-18 DIAGNOSIS — M51.37 DISC DISEASE, DEGENERATIVE, LUMBAR OR LUMBOSACRAL: ICD-10-CM

## 2023-01-18 DIAGNOSIS — M54.50 CHRONIC MIDLINE LOW BACK PAIN WITHOUT SCIATICA: ICD-10-CM

## 2023-01-18 RX ORDER — TRAMADOL HYDROCHLORIDE 50 MG/1
50-100 TABLET ORAL EVERY 8 HOURS PRN
Qty: 150 TABLET | Refills: 0 | Status: SHIPPED | OUTPATIENT
Start: 2023-01-18 | End: 2023-02-17

## 2023-01-26 ENCOUNTER — PATIENT MESSAGE (OUTPATIENT)
Dept: PRIMARY CARE CLINIC | Age: 58
End: 2023-01-26

## 2023-01-26 NOTE — TELEPHONE ENCOUNTER
From: Silva Mccann  To: Mckenna River  Sent: 1/26/2023 9:37 AM EST  Subject: Breathing issues    Hello I wanted to tell you direct because of my concerns in the last week sense Ive seen you and probably a couple of days before I have been short of breath way more than usual Im not sure whats going on maybe this ear infection but I have been hitting my buhteral five to ten times in a day compared to maybe once a day getting winded very quickly am I ok to use it that much thanks for your time

## 2023-01-30 ENCOUNTER — INITIAL CONSULT (OUTPATIENT)
Dept: PAIN MANAGEMENT | Age: 58
End: 2023-01-30
Payer: COMMERCIAL

## 2023-01-30 VITALS — WEIGHT: 205.8 LBS | BODY MASS INDEX: 27.28 KG/M2 | HEIGHT: 73 IN

## 2023-01-30 DIAGNOSIS — M47.812 CERVICAL SPONDYLOSIS: ICD-10-CM

## 2023-01-30 DIAGNOSIS — M25.512 CHRONIC LEFT SHOULDER PAIN: ICD-10-CM

## 2023-01-30 DIAGNOSIS — M54.12 CERVICAL RADICULOPATHY: Primary | ICD-10-CM

## 2023-01-30 DIAGNOSIS — G89.29 CHRONIC LEFT SHOULDER PAIN: ICD-10-CM

## 2023-01-30 PROCEDURE — G8419 CALC BMI OUT NRM PARAM NOF/U: HCPCS | Performed by: PAIN MEDICINE

## 2023-01-30 PROCEDURE — 1036F TOBACCO NON-USER: CPT | Performed by: PAIN MEDICINE

## 2023-01-30 PROCEDURE — 3017F COLORECTAL CA SCREEN DOC REV: CPT | Performed by: PAIN MEDICINE

## 2023-01-30 PROCEDURE — 99204 OFFICE O/P NEW MOD 45 MIN: CPT | Performed by: PAIN MEDICINE

## 2023-01-30 PROCEDURE — G8484 FLU IMMUNIZE NO ADMIN: HCPCS | Performed by: PAIN MEDICINE

## 2023-01-30 PROCEDURE — G8427 DOCREV CUR MEDS BY ELIG CLIN: HCPCS | Performed by: PAIN MEDICINE

## 2023-01-30 ASSESSMENT — PATIENT HEALTH QUESTIONNAIRE - PHQ9
SUM OF ALL RESPONSES TO PHQ9 QUESTIONS 1 & 2: 1
2. FEELING DOWN, DEPRESSED OR HOPELESS: 1
SUM OF ALL RESPONSES TO PHQ QUESTIONS 1-9: 1
SUM OF ALL RESPONSES TO PHQ QUESTIONS 1-9: 1
1. LITTLE INTEREST OR PLEASURE IN DOING THINGS: 0
SUM OF ALL RESPONSES TO PHQ QUESTIONS 1-9: 1
SUM OF ALL RESPONSES TO PHQ QUESTIONS 1-9: 1

## 2023-01-30 NOTE — PROGRESS NOTES
HPI:     Neck Pain   This is a chronic problem. The current episode started more than 1 year ago. The problem occurs constantly. The problem has been unchanged. The pain is associated with nothing. The pain is present in the left side. The quality of the pain is described as shooting. The pain is at a severity of 3/10. The pain is mild. The symptoms are aggravated by bending, position and twisting. The pain is Worse during the night. Stiffness is present All day. Associated symptoms include headaches. He has tried ice, bed rest, home exercises and heat for the symptoms. Chronic neck pain as well as left shoulder pain. CT scan of the cervical spine as well as x-ray with degenerative changes. He has done physical therapy in the past with minimal benefit. Has been to a chiropractor with minimal benefit. History of right knee septic arthritis which required I&D as well as IV antibiotics, infectious disease note reviewed. He is seeing ENT soon for an ear issue. Patient denies any new neurological symptoms. No bowel or bladder incontinence, no weakness, and no falling. Review of OARRS does not show any aberrant prescription behavior.      Past Medical History:   Diagnosis Date    Anxiety 5/26/2020    Back pain     Bilateral tinnitus 5/26/2020    Cellulitis of right knee 5/17/2021    Chronic midline low back pain without sciatica 11/10/2016    Deviated septum 5/26/2020    Disc disease, degenerative, lumbar or lumbosacral     Essential hypertension 11/10/2016    History of placement of ear tubes 9/29/2020    History of tobacco use 5/26/2020    Hyperlipidemia     Hypertension     Hypocalcemia 5/18/2021    Hyponatremia 5/18/2021    Lymph node enlargement     Normocytic normochromic anemia 5/20/2021    Obstructive sleep apnea syndrome 4/11/2017    Prepatellar bursitis of right knee 5/18/2021    Restless leg syndrome     Sensorineural hearing loss, bilateral 5/26/2020    Septic infrapatellar bursitis, left 5/18/2021    Somatic dysfunction of cervical region 12/3/2014       Past Surgical History:   Procedure Laterality Date    HERNIA REPAIR      KNEE SURGERY Right 05/18/2021    : KNEE INCISION AND DRAINAGE (Right )    KNEE SURGERY Right 5/18/2021    KNEE INCISION AND DRAINAGE performed by Vani Gary MD at [address] [place] [address]  06/2020    TONSILLECTOMY AND ADENOIDECTOMY      TYMPANOSTOMY TUBE PLACEMENT Bilateral        No Known Allergies      Current Outpatient Medications:     fluticasone-salmeterol (ADVAIR HFA) 115-21 MCG/ACT inhaler, Inhale 2 puffs into the lungs 2 times daily, Disp: 1 each, Rfl: 5    traMADol (ULTRAM) 50 MG tablet, Take 1-2 tablets by mouth every 8 hours as needed for Pain for up to 30 days. , Disp: 150 tablet, Rfl: 0    gabapentin (NEURONTIN) 300 MG capsule, TAKE ONE CAPSULE BY MOUTH ONCE NIGHTLY, Disp: 90 capsule, Rfl: 5    lisinopril (PRINIVIL;ZESTRIL) 20 MG tablet, TAKE ONE TABLET BY MOUTH DAILY, Disp: 90 tablet, Rfl: 3    albuterol sulfate HFA (PROVENTIL;VENTOLIN;PROAIR) 108 (90 Base) MCG/ACT inhaler, Inhale 2 puffs into the lungs every 6 hours as needed USE AS DIRECTED, Disp: 18 g, Rfl: 5    loratadine-pseudoephedrine (CLARITIN-D 24 HOUR)  MG per extended release tablet, Take 1 tablet by mouth daily, Disp: 90 tablet, Rfl: 3    BREZTRI AEROSPHERE 160-9-4.8 MCG/ACT AERO, , Disp: , Rfl:     omeprazole (PRILOSEC) 20 MG delayed release capsule, TAKE ONE CAPSULE BY MOUTH EVERY MORNING BEFORE BREAKFAST, Disp: 90 capsule, Rfl: 3    ibuprofen (ADVIL;MOTRIN) 800 MG tablet, Take 1 tablet by mouth every 8 hours as needed for Pain, Disp: 90 tablet, Rfl: 5    Multiple Vitamins-Minerals (MULTI FOR HIM 50+ PO), Take by mouth, Disp: , Rfl:     Family History   Problem Relation Age of Onset    Arthritis Mother        Social History     Socioeconomic History    Marital status:      Spouse name: Not on file    Number of children: Not on file    Years of education: Not on file Highest education level: Not on file   Occupational History    Not on file   Tobacco Use    Smoking status: Former     Packs/day: 1.00     Years: 35.00     Pack years: 35.00     Types: Cigarettes     Start date: 1989     Quit date: 2010     Years since quittin.5    Smokeless tobacco: Never   Vaping Use    Vaping Use: Never used   Substance and Sexual Activity    Alcohol use: No     Comment: rare    Drug use: Yes     Comment: occ    Sexual activity: Yes     Partners: Female   Other Topics Concern    Not on file   Social History Narrative    Not on file     Social Determinants of Health     Financial Resource Strain: Low Risk     Difficulty of Paying Living Expenses: Not hard at all   Food Insecurity: No Food Insecurity    Worried About Running Out of Food in the Last Year: Never true    Ran Out of Food in the Last Year: Never true   Transportation Needs: Not on file   Physical Activity: Not on file   Stress: Not on file   Social Connections: Not on file   Intimate Partner Violence: Not on file   Housing Stability: Not on file       Review of Systems:  Review of Systems   Musculoskeletal:  Positive for neck pain. Neurological:  Positive for headaches. Physical Exam:  Ht 6' 1\" (1.854 m)   Wt 205 lb 12.8 oz (93.4 kg)   BMI 27.15 kg/m²     Physical Exam  Constitutional:       Appearance: Normal appearance. Pulmonary:      Effort: Pulmonary effort is normal.   Neurological:      Mental Status: He is alert. Psychiatric:         Attention and Perception: Attention and perception normal.         Mood and Affect: Mood and affect normal.       Record/Diagnostics Review:    As above, I did independently review the imaging    Orders:  Orders Placed This Encounter   Procedures    MRI CERVICAL SPINE WO CONTRAST    XR SHOULDER LEFT (MIN 2 VIEWS)         Assessment:  1. Cervical radiculopathy    2. Cervical spondylosis    3.  Chronic left shoulder pain        Treatment Plan:  DISCUSSION: Treatment options discussed with patient and all questions answered to patient's satisfaction. OARRS Review: Reviewed and acceptable for medications prescribed. TREATMENT OPTIONS:     Discussed different treatment options including continued conservative care such as physical therapy, chiropractic care, acupuncture. Discussed different interventional options such as epidural steroids or medial branch blocks. Also discussed surgical evaluation. Discussed the importance of continued physical therapy and exercise program as well. Has failed conservative measures, including physical therapy and the pain is worsening, I do believe an MRI of the Cervical spine is indicated to further evaluate pathology and guide treatment plan. Consider injection therapies versus surgical evaluation based on imaging results. Left shoulder XR    Of note, did have septic arthritis of the right knee over the summer, would consider infectious disease clearance prior to cervical interventions. Forest Wets M.D. I have reviewed the chief complaint and history of present illness (including ROS and PFSH) and vital documentation by my staff and I agree with their documentation and have added where applicable.

## 2023-01-31 ENCOUNTER — TELEPHONE (OUTPATIENT)
Dept: PRIMARY CARE CLINIC | Age: 58
End: 2023-01-31

## 2023-01-31 ENCOUNTER — PATIENT MESSAGE (OUTPATIENT)
Dept: PRIMARY CARE CLINIC | Age: 58
End: 2023-01-31

## 2023-01-31 DIAGNOSIS — H90.3 SENSORINEURAL HEARING LOSS, BILATERAL: Primary | ICD-10-CM

## 2023-01-31 DIAGNOSIS — H92.01 OTALGIA OF RIGHT EAR: ICD-10-CM

## 2023-01-31 DIAGNOSIS — Z96.22 HISTORY OF PLACEMENT OF EAR TUBES: ICD-10-CM

## 2023-01-31 DIAGNOSIS — H72.91 PERFORATION OF RIGHT TYMPANIC MEMBRANE: ICD-10-CM

## 2023-01-31 NOTE — TELEPHONE ENCOUNTER
From: Magdalena Ray  To: David Cruz  Sent: 1/31/2023 2:42 PM EST  Subject: Ear appt    Gaby they wouldnt see me today they said I owed a balance of 1900.00 before they would see me.  Can you send me a referral to a non [facility] doctor so I can get this taken care of?

## 2023-01-31 NOTE — TELEPHONE ENCOUNTER
Pt wife called and asked if he could get a new referral made for ENT.  Said they don't want Dr. Chito Morris, please advise, thank you

## 2023-01-31 NOTE — TELEPHONE ENCOUNTER
Please let them know I have placed a referral for Dr Anne Guerra group, advise they see the first available provider in the group otherwise may take several weeks to get appt  Thanks

## 2023-02-04 ENCOUNTER — HOSPITAL ENCOUNTER (EMERGENCY)
Age: 58
Discharge: HOME | End: 2023-02-04
Attending: EMERGENCY MEDICINE
Payer: COMMERCIAL

## 2023-02-04 VITALS
HEART RATE: 83 BPM | SYSTOLIC BLOOD PRESSURE: 128 MMHG | WEIGHT: 200 LBS | HEIGHT: 73 IN | OXYGEN SATURATION: 96 % | TEMPERATURE: 98.1 F | RESPIRATION RATE: 20 BRPM | DIASTOLIC BLOOD PRESSURE: 82 MMHG | BODY MASS INDEX: 26.51 KG/M2

## 2023-02-04 DIAGNOSIS — H72.91 CHRONIC OTITIS MEDIA OF RIGHT EAR WITH PERFORATED TYMPANIC MEMBRANE: Primary | ICD-10-CM

## 2023-02-04 DIAGNOSIS — H92.02 LEFT EAR PAIN: ICD-10-CM

## 2023-02-04 DIAGNOSIS — H66.91 CHRONIC OTITIS MEDIA OF RIGHT EAR WITH PERFORATED TYMPANIC MEMBRANE: Primary | ICD-10-CM

## 2023-02-04 PROCEDURE — 69209 REMOVE IMPACTED EAR WAX UNI: CPT

## 2023-02-04 PROCEDURE — 99283 EMERGENCY DEPT VISIT LOW MDM: CPT

## 2023-02-04 PROCEDURE — 6370000000 HC RX 637 (ALT 250 FOR IP): Performed by: PHYSICIAN ASSISTANT

## 2023-02-04 RX ORDER — CIPROFLOXACIN AND DEXAMETHASONE 3; 1 MG/ML; MG/ML
4 SUSPENSION/ DROPS AURICULAR (OTIC) 2 TIMES DAILY
COMMUNITY

## 2023-02-04 RX ORDER — OXYCODONE HYDROCHLORIDE AND ACETAMINOPHEN 5; 325 MG/1; MG/1
1 TABLET ORAL ONCE
Status: COMPLETED | OUTPATIENT
Start: 2023-02-04 | End: 2023-02-04

## 2023-02-04 RX ORDER — CEFUROXIME AXETIL 500 MG/1
500 TABLET ORAL 2 TIMES DAILY
Qty: 20 TABLET | Refills: 0 | Status: SHIPPED | OUTPATIENT
Start: 2023-02-04 | End: 2023-02-14

## 2023-02-04 RX ORDER — AMOXICILLIN 875 MG/1
875 TABLET, COATED ORAL 2 TIMES DAILY
COMMUNITY
End: 2023-02-04 | Stop reason: ALTCHOICE

## 2023-02-04 RX ADMIN — OXYCODONE HYDROCHLORIDE AND ACETAMINOPHEN 1 TABLET: 5; 325 TABLET ORAL at 20:45

## 2023-02-04 ASSESSMENT — PAIN SCALES - GENERAL
PAINLEVEL_OUTOF10: 9
PAINLEVEL_OUTOF10: 9

## 2023-02-05 NOTE — DISCHARGE INSTRUCTIONS
Motrin and tramadol around the clock. Do the drops as prescribed. Antibiotics as prescribed. Warm compresses to the outer ear may be helpful for pain. Follow-up with ENT. PLEASE RETURN TO THE EMERGENCY DEPARTMENT IMMEDIATELY if your symptoms worsen in anyway or in 1-2 days if not improved for re-evaluation. You should immediately return to the ER for symptoms such as new or worsening pain, fever, numbness or weakness to the arms or legs, coolness or color change of the arms or legs. Ceht Mclaughlin!!!    From [facility] ([facility]) and [address] [place] [address] Emergency Services    On behalf of the Emergency Department staff at [facility]), I would like to thank you for giving us the opportunity to address your health care needs and concerns. We hope that during your visit, our service was delivered in a professional and caring manner. Please keep [facility] ([facility]) in mind as we walk with you down the path to your own personal wellness. Please expect an automated text message or email from us so we can ask a few questions about your health and progress. Based on your answers, a clinician may call you back to offer help and instructions. Please understand that early in the process of an illness or injury, an emergency department workup can be falsely reassuring. If you notice any worsening, changing or persistent symptoms please call your family doctor or return to the ER immediately. Tell us how we did during your visit at [email or web address]   and let us know about your experience

## 2023-02-05 NOTE — ED PROVIDER NOTES
[facility] Emergency Department  [id number] [address] [facility] [address] [facility] [address]  Phone: [phone]  Fax: [phone]        Pt Name: Sherron Finch  MRN: [id number]  [place] 1965  Date of evaluation: 2/4/23    CHIEFCOMPLAINT       Chief Complaint   Patient presents with    Otalgia     Patient comes in diagnosed with perforated ear drum right ear and states worsening pain in left ear at this time. Was started on ATB for right ear and pain has spread to left ear at this time. States left ear pain radiates down left side of neck/throat. HISTORY OF PRESENT ILLNESS (Location/Symptom, Timing/Onset, Context/Setting, Quality, Duration, Modifying Factors, Severity)      Sherron Finch is a 59 y.o. male with pertinent PMH of chronic ear issues who presents to the ED via private auto with ear pain. Patient states that he has been treated with oral antibiotics for pain and perforated TM in his right ear and this has not helped much and now over the past 3 days he has developed worsening pain in his left ear. Patient has a chronic history of ear pain and issues and has had ear tubes in multiple times and the last time he had ear tubes and it was done while he was awake and reports it was the worst pain of his life and the right ear tube popped out shortly after the procedure. He has not been back of his ENT and has a referral and follow-up appointment with another ENT in a couple weeks. Denies any exacerbating or alleviating factors. He has tried some OTC meds for pain without much improvement. He does report of decreased hearing bilaterally. Denies fevers, chills, sweats, rhinorrhea, sore throat, cough, shortness of breath, headache, vision changes, eye redness or discharge, nausea, vomiting, diarhea, abdominal pain, rash, or any other concerns at this time. Of note, he mentions that he was prescribed topical antibiotics as well but could not tolerate them due to pain.     PAST MEDICAL / SURGICAL / SOCIAL / FAMILY HISTORY     PMH:  has a past medical history of Anxiety, Back pain, Bilateral tinnitus, Cellulitis of right knee, Chronic midline low back pain without sciatica, Deviated septum, Disc disease, degenerative, lumbar or lumbosacral, Ear drum perforation, right, Essential hypertension, History of placement of ear tubes, History of tobacco use, Hyperlipidemia, Hypertension, Hypocalcemia, Hyponatremia, Lymph node enlargement, Normocytic normochromic anemia, Obstructive sleep apnea syndrome, Prepatellar bursitis of right knee, Restless leg syndrome, Sensorineural hearing loss, bilateral, Septic infrapatellar bursitis, left, and Somatic dysfunction of cervical region. Surgical History:  has a past surgical history that includes Tonsillectomy and adenoidectomy; hernia repair; Tympanostomy tube placement (Bilateral); knee surgery (Right, 2021); knee surgery (Right, 2021); and sinus surgery (2020). Social History:  reports that he quit smoking about 12 years ago. His smoking use included cigarettes. He started smoking about 34 years ago. He has a 35.00 pack-year smoking history. He has never used smokeless tobacco. He reports current drug use. He reports that he does not drink alcohol. Family History: He indicated that his mother is alive. He indicated that his father is . He indicated that both of his sisters are alive. He indicated that all of his three brothers are alive. family history includes Arthritis in his mother. Psychiatric History: None    Allergies: Patient has no known allergies. Home Medications:   Prior to Admission medications    Medication Sig Start Date End Date Taking?  Authorizing Provider   ciprofloxacin-dexamethasone (CIPRODEX) 0.3-0.1 % otic suspension 4 drops 2 times daily   Yes Historical Provider, MD   cefUROXime (CEFTIN) 500 MG tablet Take 1 tablet by mouth 2 times daily for 10 days 23 Yes Gianna Yao PA-C fluticasone-salmeterol (ADVAIR HFA) 115-21 MCG/ACT inhaler Inhale 2 puffs into the lungs 2 times daily 1/26/23   Nils Watton, APRN - CNP   traMADol (ULTRAM) 50 MG tablet Take 1-2 tablets by mouth every 8 hours as needed for Pain for up to 30 days. 1/18/23 2/17/23  Nils Watton, APRN - CNP   gabapentin (NEURONTIN) 300 MG capsule TAKE ONE CAPSULE BY MOUTH ONCE NIGHTLY 1/10/23 2/9/23  Nils Watton, APRN - CNP   lisinopril (PRINIVIL;ZESTRIL) 20 MG tablet TAKE ONE TABLET BY MOUTH DAILY 12/23/22   [facility], APRN - CNP   albuterol sulfate HFA (PROVENTIL;VENTOLIN;PROAIR) 108 (90 Base) MCG/ACT inhaler Inhale 2 puffs into the lungs every 6 hours as needed USE AS DIRECTED 12/16/22   [facility]   loratadine-pseudoephedrine (CLARITIN-D 24 HOUR)  MG per extended release tablet Take 1 tablet by mouth daily 9/26/22   [facility]   BREZTRI AEROSPHERE 160-9-4.8 MCG/ACT AERO  7/14/22   Historical Provider, MD   omeprazole (PRILOSEC) 20 MG delayed release capsule TAKE ONE CAPSULE BY MOUTH EVERY MORNING BEFORE BREAKFAST 8/23/22   [facility], APRN - CNP   ibuprofen (ADVIL;MOTRIN) 800 MG tablet Take 1 tablet by mouth every 8 hours as needed for Pain 8/16/19   [facility], APRN - CNP   Multiple Vitamins-Minerals (MULTI FOR HIM 50+ PO) Take by mouth    Historical Provider, MD       REVIEW OF SYSTEMS  (2-9 systems for level 4, 10 ormore for level 5)      Review of Systems    See HPI. PHYSICAL EXAM  (up to 7 for level 4, 8 or more for level 5)      INITIAL VITALS:  height is 6' 1\" (1.854 m) and weight is 90.7 kg (200 lb). His oral temperature is 98.1 °F (36.7 °C). His blood pressure is 128/82 and his pulse is 83. His respiration is 20 and oxygen saturation is 96%. Vital signs reviewed. Physical Exam    General:  Alert, cooperative, well-groomed, well-nourished, appears stated age, and is in no acute distress.    Head:  Normocephalic, atraumatic, and without obvious abnormality. Eyes:  Sclerae/conjunctivae clear without injection, pallor, or icterus. Corneas clear without opacities. EOM's intact. Ears: [place] are in proper alignment without lesions, deformities, masses, erythema, or tenderness. No tragal tenderness. Hearing is diminished bilaterally. To the left ear there is bulging noted to the TM in the ear toe is noted to the inferior posterior aspect of the eardrum but difficult to see whether or not it is embedded in the TM as there is a fair amount of wax blocking the TM and the ear tube itself is blocked with wax. The TM is not perforated. On the right, there does appear to be a TM perforation however it is very minimally visualized secondary to a large dried ball of bloodied wax/discharge noted to the anterior aspect. No active drainage. There is no tube visualized. No canal erythema to the bilateral ears. Nose: Nares patent bilaterally without flaring, septum midline without perforation, turbinates intact and mucosa pink and moist. No polyps, discharge, or epistaxis. Neck: Supple and symmetrical. Trachea midline. No adenopathy. No jugular venous distention. Extremities: Warm and dry without erythema or edema. Skin: Soft, good turgor, and well-hydrated. No obvious rashes or lesions. Neurologic: GCS is 15 and no focal deficits are appreciated. Grossly normal motor and sensation. Speech clear. DIFFERENTIAL DIAGNOSIS / MDM     Patient presents to the emergency department with ear pain and history and exam as described above. No mastoid tenderness. Mild rhinorrhea, posterior oral pharynx clear. No tonsillar swelling, erythema, or uvular deviation. Vital signs are stable. Patient is afebrile, non-toxic-appearing and appears well-hydrated with moist mucus membranes. In regards to his bilateral ears, I did attempt to remove the wax blocking both TMs to get a better visualization but was unsuccessful due to patient tolerance.   It is possible that his pain is more associated with the bloodied wax in the right ear and tube that is essentially wax onto his eardrum in the left ear. But could also be associated with bacterial infection. Feel that the patient will need additional antibiotics until he is able to see the ENT. We discussed the importance of doing the topical antibiotics to the right ear and the left ear if it does perforate. We discussed doing the oral antibiotics as prescribed. He declined anything for pain at home. The patient appears stable for discharge and has been instructed to return immediately for new concerning symptoms or if the symptoms worsen in any way. We have discussed the symptoms associated with the patient's presentation which are most concerning like fever (new or persistent with antipyretic usage), changing or worsening pain/swelling, ear discharge or bleeding, sore throat, abdominal pain, chest pain, lethargy, syncope, etc. that necessitate immediate return. The patient understands that at this time there is no evidence for a more malignant underlying process, but the patient also understands that early in the process of an illness or injury, an emergency department workup can be falsely reassuring. Routine discharge counseling was given, and the patient understands that worsening, changing or persistent symptoms should prompt an immediate call or follow up with their primary physician or return to the emergency department. The importance of appropriate follow up was also discussed. I have reviewed the disposition diagnosis with the patient and or their family/guardian. I have answered their questions and given discharge instructions. They voiced understanding of these instructions and did not have any further questions or complaints. The collaborating physician was available for consultation and they were apprised of the assessment and plan.     PLAN (LABS / IMAGING / EKG):  No orders of the defined types were placed in this encounter. MEDICATIONS ORDERED:  Orders Placed This Encounter   Medications    oxyCODONE-acetaminophen (PERCOCET) 5-325 MG per tablet 1 tablet    cefUROXime (CEFTIN) 500 MG tablet     Sig: Take 1 tablet by mouth 2 times daily for 10 days     Dispense:  20 tablet     Refill:  0       Controlled Substances Monitoring:     DIAGNOSTIC RESULTS     RADIOLOGY: All images are read by the radiologist and their interpretations are reviewed. No results found. LABS:  No results found for this visit on 02/04/23. EMERGENCY DEPARTMENT COURSE           Vitals:    Vitals:    02/04/23 1946   BP: 128/82   Pulse: 83   Resp: 20   Temp: 98.1 °F (36.7 °C)   TempSrc: Oral   SpO2: 96%   Weight: 90.7 kg (200 lb)   Height: 6' 1\" (1.854 m)     -------------------------  BP: 128/82, Temp: 98.1 °F (36.7 °C), Heart Rate: 83, Resp: 20      RE-EVALUATION:  See MDM/procedure notes. CONSULTS:  None    PROCEDURES:    Ear Cerumen Removal Procedure Note    Indication: unable to visualize tympanic membrane    Procedure: After placing the patient's head in the appropriate position, the patient's right ear canal was curetted and attempted alligator forceps removal very carefully without any trauma to the ear canal but patient was in significant pain anytime I touch to the ball of bloody wax so the procedure was discontinued at the patient's request.  The other ear canal also had cerumen present which was essentially attached to the ear tube and possibly attached to the TM as I did attempt to remove it but it was hard and difficult to move as patient jumped every time I touched it so again the procedure was discontinued at the patient's request. At this point the procedure was complete. The patient tolerated the procedure well. Complications: None      FINAL IMPRESSION      1. Chronic otitis media of right ear with perforated tympanic membrane    2.  Left ear pain          DISPOSITION / PLAN CONDITION ON DISPOSITION:   Good / Stable for discharge.      PATIENT REFERRED TO:  Kevin Wilks MD  [address]  [phone]    Call       Kevin Wilks MD  [address] [place] [address]  [phone]    Call       Kevin Wilks MD  [place] [address]  [phone]    Call         DISCHARGE MEDICATIONS:  New Prescriptions    CEFUROXIME (CEFTIN) 500 MG TABLET    Take 1 tablet by mouth 2 times daily for 10 days       Ade Vigil   Emergency Medicine Physician Assistant    (Please note that portions of this note were completed with a voice recognition program.  Efforts were made to edit the dictations but occasionally words aremis-transcribed.)       Kingsley Bustamante PA-C  02/05/23 4214

## 2023-02-05 NOTE — ED PROVIDER NOTES
Attending Supervising Physicians Attestation Statement  The patient met the criteria for indirect supervision. I discussed the findings and plans with the physician assistant and agree as documented in her note .     Electronically signed by Georgie Carpenter MD on 2/4/23 at 8:10 PM EST         Georgie Carpenter MD  02/04/23 2042

## 2023-02-16 DIAGNOSIS — M15.9 PRIMARY OSTEOARTHRITIS INVOLVING MULTIPLE JOINTS: ICD-10-CM

## 2023-02-16 DIAGNOSIS — G89.29 CHRONIC MIDLINE LOW BACK PAIN WITHOUT SCIATICA: ICD-10-CM

## 2023-02-16 DIAGNOSIS — M54.50 CHRONIC MIDLINE LOW BACK PAIN WITHOUT SCIATICA: ICD-10-CM

## 2023-02-16 DIAGNOSIS — M51.37 DISC DISEASE, DEGENERATIVE, LUMBAR OR LUMBOSACRAL: ICD-10-CM

## 2023-02-16 RX ORDER — TRAMADOL HYDROCHLORIDE 50 MG/1
50-100 TABLET ORAL EVERY 8 HOURS PRN
Qty: 150 TABLET | Refills: 0 | Status: SHIPPED | OUTPATIENT
Start: 2023-02-16 | End: 2023-03-18

## 2023-03-14 DIAGNOSIS — G89.29 CHRONIC MIDLINE LOW BACK PAIN WITHOUT SCIATICA: ICD-10-CM

## 2023-03-14 DIAGNOSIS — M15.9 PRIMARY OSTEOARTHRITIS INVOLVING MULTIPLE JOINTS: ICD-10-CM

## 2023-03-14 DIAGNOSIS — M51.37 DISC DISEASE, DEGENERATIVE, LUMBAR OR LUMBOSACRAL: ICD-10-CM

## 2023-03-14 DIAGNOSIS — M54.50 CHRONIC MIDLINE LOW BACK PAIN WITHOUT SCIATICA: ICD-10-CM

## 2023-03-14 RX ORDER — TRAMADOL HYDROCHLORIDE 50 MG/1
50-100 TABLET ORAL EVERY 8 HOURS PRN
Qty: 150 TABLET | Refills: 0 | Status: SHIPPED | OUTPATIENT
Start: 2023-03-14 | End: 2023-04-13

## 2023-03-24 ENCOUNTER — PATIENT MESSAGE (OUTPATIENT)
Dept: PRIMARY CARE CLINIC | Age: 58
End: 2023-03-24

## 2023-03-24 DIAGNOSIS — J06.9 UPPER RESPIRATORY TRACT INFECTION, UNSPECIFIED TYPE: Primary | ICD-10-CM

## 2023-03-24 NOTE — TELEPHONE ENCOUNTER
From: Sia Villanueva  To: Reyes Carter  Sent: 3/24/2023 8:08 AM EDT  Subject: Cold symptoms     Reynold De Luna Speaker, it seems that I contracted a cold from a co- worker its in my chest Im caughing a bright yellow phlegm causing a heavy chest feeling can you get me a antibiotic ?

## 2023-04-10 PROBLEM — J45.909 ASTHMA IN ADULT: Status: ACTIVE | Noted: 2023-04-10

## 2023-04-10 PROBLEM — J30.1 SEASONAL ALLERGIC RHINITIS DUE TO POLLEN: Status: ACTIVE | Noted: 2023-04-10

## 2023-04-10 PROBLEM — H90.6 MIXED CONDUCTIVE AND SENSORINEURAL HEARING LOSS OF BOTH EARS: Status: ACTIVE | Noted: 2023-04-10

## 2023-04-14 ENCOUNTER — TELEPHONE (OUTPATIENT)
Dept: PRIMARY CARE CLINIC | Age: 58
End: 2023-04-14

## 2023-04-14 DIAGNOSIS — M54.12 CERVICAL RADICULOPATHY: ICD-10-CM

## 2023-04-14 DIAGNOSIS — M51.37 DISC DISEASE, DEGENERATIVE, LUMBAR OR LUMBOSACRAL: ICD-10-CM

## 2023-04-14 DIAGNOSIS — M79.2 RADICULAR PAIN IN LEFT ARM: ICD-10-CM

## 2023-04-14 DIAGNOSIS — M15.9 PRIMARY OSTEOARTHRITIS INVOLVING MULTIPLE JOINTS: ICD-10-CM

## 2023-04-14 DIAGNOSIS — G89.29 CHRONIC MIDLINE LOW BACK PAIN WITHOUT SCIATICA: ICD-10-CM

## 2023-04-14 DIAGNOSIS — M54.50 CHRONIC MIDLINE LOW BACK PAIN WITHOUT SCIATICA: ICD-10-CM

## 2023-04-14 RX ORDER — TRAMADOL HYDROCHLORIDE 50 MG/1
100 TABLET ORAL EVERY 8 HOURS PRN
Qty: 180 TABLET | Refills: 0 | Status: SHIPPED | OUTPATIENT
Start: 2023-04-14 | End: 2023-05-14

## 2023-05-12 DIAGNOSIS — M54.50 CHRONIC MIDLINE LOW BACK PAIN WITHOUT SCIATICA: ICD-10-CM

## 2023-05-12 DIAGNOSIS — M54.12 CERVICAL RADICULOPATHY: ICD-10-CM

## 2023-05-12 DIAGNOSIS — G89.29 CHRONIC MIDLINE LOW BACK PAIN WITHOUT SCIATICA: ICD-10-CM

## 2023-05-12 DIAGNOSIS — M51.37 DISC DISEASE, DEGENERATIVE, LUMBAR OR LUMBOSACRAL: ICD-10-CM

## 2023-05-12 DIAGNOSIS — M15.9 PRIMARY OSTEOARTHRITIS INVOLVING MULTIPLE JOINTS: ICD-10-CM

## 2023-05-12 DIAGNOSIS — M79.2 RADICULAR PAIN IN LEFT ARM: ICD-10-CM

## 2023-05-12 RX ORDER — TRAMADOL HYDROCHLORIDE 50 MG/1
100 TABLET ORAL EVERY 8 HOURS PRN
Qty: 180 TABLET | Refills: 0 | Status: SHIPPED | OUTPATIENT
Start: 2023-05-12 | End: 2023-06-11

## 2023-06-08 DIAGNOSIS — G89.29 CHRONIC MIDLINE LOW BACK PAIN WITHOUT SCIATICA: ICD-10-CM

## 2023-06-08 DIAGNOSIS — M79.2 RADICULAR PAIN IN LEFT ARM: ICD-10-CM

## 2023-06-08 DIAGNOSIS — M54.50 CHRONIC MIDLINE LOW BACK PAIN WITHOUT SCIATICA: ICD-10-CM

## 2023-06-08 DIAGNOSIS — M54.12 CERVICAL RADICULOPATHY: ICD-10-CM

## 2023-06-08 DIAGNOSIS — M15.9 PRIMARY OSTEOARTHRITIS INVOLVING MULTIPLE JOINTS: ICD-10-CM

## 2023-06-08 DIAGNOSIS — M51.37 DISC DISEASE, DEGENERATIVE, LUMBAR OR LUMBOSACRAL: ICD-10-CM

## 2023-06-08 RX ORDER — TRAMADOL HYDROCHLORIDE 50 MG/1
100 TABLET ORAL EVERY 8 HOURS PRN
Qty: 180 TABLET | Refills: 0 | Status: SHIPPED | OUTPATIENT
Start: 2023-06-08 | End: 2023-07-08

## 2023-07-03 DIAGNOSIS — M15.9 PRIMARY OSTEOARTHRITIS INVOLVING MULTIPLE JOINTS: ICD-10-CM

## 2023-07-03 DIAGNOSIS — M54.50 CHRONIC MIDLINE LOW BACK PAIN WITHOUT SCIATICA: ICD-10-CM

## 2023-07-03 DIAGNOSIS — G89.29 CHRONIC MIDLINE LOW BACK PAIN WITHOUT SCIATICA: ICD-10-CM

## 2023-07-03 DIAGNOSIS — M79.2 RADICULAR PAIN IN LEFT ARM: ICD-10-CM

## 2023-07-03 DIAGNOSIS — M51.37 DISC DISEASE, DEGENERATIVE, LUMBAR OR LUMBOSACRAL: ICD-10-CM

## 2023-07-03 DIAGNOSIS — M54.12 CERVICAL RADICULOPATHY: ICD-10-CM

## 2023-07-05 RX ORDER — TRAMADOL HYDROCHLORIDE 50 MG/1
100 TABLET ORAL EVERY 8 HOURS PRN
Qty: 180 TABLET | Refills: 0 | Status: SHIPPED | OUTPATIENT
Start: 2023-07-05 | End: 2023-08-04

## 2023-07-08 DIAGNOSIS — G89.29 CHRONIC MIDLINE LOW BACK PAIN WITHOUT SCIATICA: ICD-10-CM

## 2023-07-08 DIAGNOSIS — M54.12 CERVICAL RADICULOPATHY: ICD-10-CM

## 2023-07-08 DIAGNOSIS — M79.2 RADICULAR PAIN IN LEFT ARM: ICD-10-CM

## 2023-07-08 DIAGNOSIS — M15.9 PRIMARY OSTEOARTHRITIS INVOLVING MULTIPLE JOINTS: ICD-10-CM

## 2023-07-08 DIAGNOSIS — M51.37 DISC DISEASE, DEGENERATIVE, LUMBAR OR LUMBOSACRAL: ICD-10-CM

## 2023-07-08 DIAGNOSIS — M54.50 CHRONIC MIDLINE LOW BACK PAIN WITHOUT SCIATICA: ICD-10-CM

## 2023-07-10 RX ORDER — CELECOXIB 200 MG/1
200 CAPSULE ORAL DAILY
Qty: 90 CAPSULE | Refills: 1 | Status: SHIPPED | OUTPATIENT
Start: 2023-07-10

## 2023-07-20 ENCOUNTER — OFFICE VISIT (OUTPATIENT)
Dept: PRIMARY CARE CLINIC | Age: 58
End: 2023-07-20
Payer: COMMERCIAL

## 2023-07-20 VITALS
HEIGHT: 73 IN | OXYGEN SATURATION: 94 % | RESPIRATION RATE: 16 BRPM | SYSTOLIC BLOOD PRESSURE: 130 MMHG | DIASTOLIC BLOOD PRESSURE: 82 MMHG | WEIGHT: 201.6 LBS | BODY MASS INDEX: 26.72 KG/M2 | HEART RATE: 78 BPM

## 2023-07-20 DIAGNOSIS — M51.37 DISC DISEASE, DEGENERATIVE, LUMBAR OR LUMBOSACRAL: Primary | ICD-10-CM

## 2023-07-20 DIAGNOSIS — J44.9 CHRONIC OBSTRUCTIVE PULMONARY DISEASE, UNSPECIFIED COPD TYPE (HCC): ICD-10-CM

## 2023-07-20 DIAGNOSIS — M15.9 PRIMARY OSTEOARTHRITIS INVOLVING MULTIPLE JOINTS: ICD-10-CM

## 2023-07-20 DIAGNOSIS — M54.12 CERVICAL RADICULOPATHY: ICD-10-CM

## 2023-07-20 DIAGNOSIS — I10 ESSENTIAL HYPERTENSION: ICD-10-CM

## 2023-07-20 DIAGNOSIS — G89.29 CHRONIC MIDLINE LOW BACK PAIN WITHOUT SCIATICA: ICD-10-CM

## 2023-07-20 DIAGNOSIS — M54.50 CHRONIC MIDLINE LOW BACK PAIN WITHOUT SCIATICA: ICD-10-CM

## 2023-07-20 DIAGNOSIS — R06.02 SHORTNESS OF BREATH: ICD-10-CM

## 2023-07-20 DIAGNOSIS — Z97.4 HEARING AID WORN: ICD-10-CM

## 2023-07-20 DIAGNOSIS — M79.2 RADICULAR PAIN IN LEFT ARM: ICD-10-CM

## 2023-07-20 DIAGNOSIS — H90.3 SENSORINEURAL HEARING LOSS, BILATERAL: ICD-10-CM

## 2023-07-20 PROCEDURE — 99214 OFFICE O/P EST MOD 30 MIN: CPT | Performed by: NURSE PRACTITIONER

## 2023-07-20 PROCEDURE — G8419 CALC BMI OUT NRM PARAM NOF/U: HCPCS | Performed by: NURSE PRACTITIONER

## 2023-07-20 PROCEDURE — 3075F SYST BP GE 130 - 139MM HG: CPT | Performed by: NURSE PRACTITIONER

## 2023-07-20 PROCEDURE — G8427 DOCREV CUR MEDS BY ELIG CLIN: HCPCS | Performed by: NURSE PRACTITIONER

## 2023-07-20 PROCEDURE — 3023F SPIROM DOC REV: CPT | Performed by: NURSE PRACTITIONER

## 2023-07-20 PROCEDURE — 1036F TOBACCO NON-USER: CPT | Performed by: NURSE PRACTITIONER

## 2023-07-20 PROCEDURE — 3079F DIAST BP 80-89 MM HG: CPT | Performed by: NURSE PRACTITIONER

## 2023-07-20 PROCEDURE — 3017F COLORECTAL CA SCREEN DOC REV: CPT | Performed by: NURSE PRACTITIONER

## 2023-07-20 RX ORDER — ALBUTEROL SULFATE 90 UG/1
AEROSOL, METERED RESPIRATORY (INHALATION)
Qty: 18 G | Refills: 5 | Status: SHIPPED | OUTPATIENT
Start: 2023-07-20

## 2023-07-20 RX ORDER — FLUTICASONE PROPIONATE AND SALMETEROL XINAFOATE 115; 21 UG/1; UG/1
2 AEROSOL, METERED RESPIRATORY (INHALATION) 2 TIMES DAILY
Qty: 1 EACH | Refills: 5 | Status: SHIPPED | OUTPATIENT
Start: 2023-07-20

## 2023-07-20 RX ORDER — FLUTICASONE PROPIONATE 50 MCG
SPRAY, SUSPENSION (ML) NASAL
COMMUNITY
Start: 2023-05-08

## 2023-07-20 RX ORDER — BUDESONIDE, GLYCOPYRROLATE, AND FORMOTEROL FUMARATE 160; 9; 4.8 UG/1; UG/1; UG/1
AEROSOL, METERED RESPIRATORY (INHALATION)
Status: CANCELLED | OUTPATIENT
Start: 2023-07-20

## 2023-07-20 ASSESSMENT — ENCOUNTER SYMPTOMS
VOMITING: 0
COUGH: 0
ABDOMINAL PAIN: 0
DIARRHEA: 0
TROUBLE SWALLOWING: 0
CONSTIPATION: 0
WHEEZING: 0
NAUSEA: 0
SHORTNESS OF BREATH: 1
SINUS PRESSURE: 0
BACK PAIN: 1
SORE THROAT: 0
BLOOD IN STOOL: 0

## 2023-07-20 ASSESSMENT — PATIENT HEALTH QUESTIONNAIRE - PHQ9
SUM OF ALL RESPONSES TO PHQ QUESTIONS 1-9: 0
1. LITTLE INTEREST OR PLEASURE IN DOING THINGS: 0
2. FEELING DOWN, DEPRESSED OR HOPELESS: 0
SUM OF ALL RESPONSES TO PHQ QUESTIONS 1-9: 0
SUM OF ALL RESPONSES TO PHQ QUESTIONS 1-9: 0
SUM OF ALL RESPONSES TO PHQ9 QUESTIONS 1 & 2: 0
SUM OF ALL RESPONSES TO PHQ QUESTIONS 1-9: 0

## 2023-07-20 NOTE — PROGRESS NOTES
[address]  PRIMARY CARE  [place] [address]  Dept: [phone]  Dept Fax: [phone]    Dionicio Wright is a 59 y.o. male who presentstoday for his medical conditions/complaints as noted below. Dionicio Wright is c/o of  Chief Complaint   Patient presents with    Hypertension    3 Month Follow-Up           HPI:     Here today fr follow up  Reports his neck pain has improved since starting celebrex  Still has intermittent stiffness but feels much better than last visit  States hands are also improved with the celebrex, finds it easier throughout the day to  things  Has continued to use tramadol as needed, mainly for lower back pain at this point, typically will take 3 times a day but has some days where he does not need as often    Has obtained hearing aids and states significant improvement    Requesting refills on inhalers, he has tried both Farnaz and Advair and feels Advair works best.  He is not sure which is better covered by his insurance    Hypertension  This is a chronic problem. The current episode started more than 1 year ago. The problem is controlled. Associated symptoms include shortness of breath (Intermittent with exertion). Pertinent negatives include no chest pain, headaches, palpitations or peripheral edema. Past treatments include ACE inhibitors. The current treatment provides significant improvement. There are no compliance problems. There is no history of CAD/MI or CVA.      No results found for: LABA1C          ( goal A1C is < 7)   No results found for: LABMICR  LDL Cholesterol (mg/dL)   Date Value   08/23/2022 06/05/2019 66       (goal LDL is <100)   AST (U/L)   Date Value   08/23/2022 22     ALT (U/L)   Date Value   08/23/2022 24     BUN (mg/dL)   Date Value   08/23/2022 21 (H)     BP Readings from Last 3 Encounters:   07/20/23 130/82   04/10/23 132/78   02/04/23 128/82          ([id number])    Past

## 2023-08-04 DIAGNOSIS — M54.50 CHRONIC MIDLINE LOW BACK PAIN WITHOUT SCIATICA: ICD-10-CM

## 2023-08-04 DIAGNOSIS — M15.9 PRIMARY OSTEOARTHRITIS INVOLVING MULTIPLE JOINTS: ICD-10-CM

## 2023-08-04 DIAGNOSIS — M79.2 RADICULAR PAIN IN LEFT ARM: ICD-10-CM

## 2023-08-04 DIAGNOSIS — M51.37 DISC DISEASE, DEGENERATIVE, LUMBAR OR LUMBOSACRAL: ICD-10-CM

## 2023-08-04 DIAGNOSIS — G89.29 CHRONIC MIDLINE LOW BACK PAIN WITHOUT SCIATICA: ICD-10-CM

## 2023-08-04 DIAGNOSIS — M54.12 CERVICAL RADICULOPATHY: ICD-10-CM

## 2023-08-04 RX ORDER — TRAMADOL HYDROCHLORIDE 50 MG/1
100 TABLET ORAL EVERY 8 HOURS PRN
Qty: 180 TABLET | Refills: 0 | Status: SHIPPED | OUTPATIENT
Start: 2023-08-04 | End: 2023-09-03

## 2023-08-14 ENCOUNTER — TELEPHONE (OUTPATIENT)
Dept: ONCOLOGY | Age: 58
End: 2023-08-14

## 2023-08-14 DIAGNOSIS — Z87.891 PERSONAL HISTORY OF NICOTINE DEPENDENCE: Primary | ICD-10-CM

## 2023-08-14 NOTE — TELEPHONE ENCOUNTER
Our records indicate that your patient is coming due for their annual lung cancer screening follow up testing. For your convenience, we have pended the order for the scan for you. If you do not agree with the need for the test, please cancel the order and let us know. Sincerely,    [address] Screening Program    Auto printed reminder letter sent to patient.

## 2023-09-05 DIAGNOSIS — M15.9 PRIMARY OSTEOARTHRITIS INVOLVING MULTIPLE JOINTS: ICD-10-CM

## 2023-09-05 DIAGNOSIS — M51.37 DISC DISEASE, DEGENERATIVE, LUMBAR OR LUMBOSACRAL: ICD-10-CM

## 2023-09-05 DIAGNOSIS — G89.29 CHRONIC MIDLINE LOW BACK PAIN WITHOUT SCIATICA: ICD-10-CM

## 2023-09-05 DIAGNOSIS — M54.12 CERVICAL RADICULOPATHY: ICD-10-CM

## 2023-09-05 DIAGNOSIS — M79.2 RADICULAR PAIN IN LEFT ARM: ICD-10-CM

## 2023-09-05 DIAGNOSIS — M54.50 CHRONIC MIDLINE LOW BACK PAIN WITHOUT SCIATICA: ICD-10-CM

## 2023-09-05 RX ORDER — OMEPRAZOLE 20 MG/1
CAPSULE, DELAYED RELEASE ORAL
Qty: 90 CAPSULE | Refills: 3 | Status: SHIPPED | OUTPATIENT
Start: 2023-09-05

## 2023-09-05 RX ORDER — TRAMADOL HYDROCHLORIDE 50 MG/1
100 TABLET ORAL EVERY 8 HOURS PRN
Qty: 180 TABLET | Refills: 0 | Status: SHIPPED | OUTPATIENT
Start: 2023-09-05 | End: 2023-10-05

## 2023-10-02 DIAGNOSIS — M15.9 PRIMARY OSTEOARTHRITIS INVOLVING MULTIPLE JOINTS: ICD-10-CM

## 2023-10-02 DIAGNOSIS — M54.50 CHRONIC MIDLINE LOW BACK PAIN WITHOUT SCIATICA: ICD-10-CM

## 2023-10-02 DIAGNOSIS — G89.29 CHRONIC MIDLINE LOW BACK PAIN WITHOUT SCIATICA: ICD-10-CM

## 2023-10-02 DIAGNOSIS — M51.37 DISC DISEASE, DEGENERATIVE, LUMBAR OR LUMBOSACRAL: ICD-10-CM

## 2023-10-02 DIAGNOSIS — M79.2 RADICULAR PAIN IN LEFT ARM: ICD-10-CM

## 2023-10-02 DIAGNOSIS — M54.12 CERVICAL RADICULOPATHY: ICD-10-CM

## 2023-10-03 RX ORDER — TRAMADOL HYDROCHLORIDE 50 MG/1
100 TABLET ORAL EVERY 8 HOURS PRN
Qty: 180 TABLET | Refills: 0 | Status: SHIPPED | OUTPATIENT
Start: 2023-10-03 | End: 2023-11-01 | Stop reason: SDUPTHER

## 2023-11-01 DIAGNOSIS — M15.9 PRIMARY OSTEOARTHRITIS INVOLVING MULTIPLE JOINTS: ICD-10-CM

## 2023-11-01 DIAGNOSIS — M79.2 RADICULAR PAIN IN LEFT ARM: ICD-10-CM

## 2023-11-01 DIAGNOSIS — M54.12 CERVICAL RADICULOPATHY: ICD-10-CM

## 2023-11-01 DIAGNOSIS — M51.37 DISC DISEASE, DEGENERATIVE, LUMBAR OR LUMBOSACRAL: ICD-10-CM

## 2023-11-01 DIAGNOSIS — G89.29 CHRONIC MIDLINE LOW BACK PAIN WITHOUT SCIATICA: ICD-10-CM

## 2023-11-01 DIAGNOSIS — M54.50 CHRONIC MIDLINE LOW BACK PAIN WITHOUT SCIATICA: ICD-10-CM

## 2023-11-01 RX ORDER — TRAMADOL HYDROCHLORIDE 50 MG/1
100 TABLET ORAL EVERY 8 HOURS PRN
Qty: 180 TABLET | Refills: 0 | Status: SHIPPED | OUTPATIENT
Start: 2023-11-01 | End: 2023-12-01

## 2023-11-24 DIAGNOSIS — J44.9 CHRONIC OBSTRUCTIVE PULMONARY DISEASE, UNSPECIFIED COPD TYPE (HCC): ICD-10-CM

## 2023-11-24 DIAGNOSIS — R06.02 SHORTNESS OF BREATH: ICD-10-CM

## 2023-11-24 RX ORDER — ALBUTEROL SULFATE 90 UG/1
AEROSOL, METERED RESPIRATORY (INHALATION)
Qty: 8.5 G | Refills: 5 | Status: SHIPPED | OUTPATIENT
Start: 2023-11-24

## 2023-11-30 DIAGNOSIS — M51.37 DISC DISEASE, DEGENERATIVE, LUMBAR OR LUMBOSACRAL: ICD-10-CM

## 2023-11-30 DIAGNOSIS — G89.29 CHRONIC MIDLINE LOW BACK PAIN WITHOUT SCIATICA: ICD-10-CM

## 2023-11-30 DIAGNOSIS — M15.9 PRIMARY OSTEOARTHRITIS INVOLVING MULTIPLE JOINTS: ICD-10-CM

## 2023-11-30 DIAGNOSIS — M54.50 CHRONIC MIDLINE LOW BACK PAIN WITHOUT SCIATICA: ICD-10-CM

## 2023-11-30 DIAGNOSIS — M79.2 RADICULAR PAIN IN LEFT ARM: ICD-10-CM

## 2023-11-30 DIAGNOSIS — M54.12 CERVICAL RADICULOPATHY: ICD-10-CM

## 2023-11-30 RX ORDER — TRAMADOL HYDROCHLORIDE 50 MG/1
100 TABLET ORAL EVERY 8 HOURS PRN
Qty: 180 TABLET | Refills: 0 | Status: SHIPPED | OUTPATIENT
Start: 2023-11-30 | End: 2023-12-30

## 2023-12-01 ENCOUNTER — HOSPITAL ENCOUNTER (EMERGENCY)
Age: 58
Discharge: HOME | End: 2023-12-01
Attending: EMERGENCY MEDICINE
Payer: COMMERCIAL

## 2023-12-01 ENCOUNTER — APPOINTMENT (OUTPATIENT)
Dept: CT IMAGING | Age: 58
End: 2023-12-01
Payer: COMMERCIAL

## 2023-12-01 VITALS
HEART RATE: 77 BPM | HEIGHT: 73 IN | RESPIRATION RATE: 28 BRPM | DIASTOLIC BLOOD PRESSURE: 106 MMHG | TEMPERATURE: 98.1 F | BODY MASS INDEX: 25.84 KG/M2 | SYSTOLIC BLOOD PRESSURE: 181 MMHG | OXYGEN SATURATION: 99 % | WEIGHT: 195 LBS

## 2023-12-01 DIAGNOSIS — N20.1 URETERAL STONE: Primary | ICD-10-CM

## 2023-12-01 LAB
ALBUMIN SERPL-MCNC: 4.5 G/DL (ref 3.5–5.2)
ALBUMIN/GLOB SERPL: 1.6 {RATIO} (ref 1–2.5)
ALP SERPL-CCNC: 87 U/L (ref 40–129)
ALT SERPL-CCNC: 26 U/L (ref 5–41)
ANION GAP SERPL CALCULATED.3IONS-SCNC: 10 MMOL/L (ref 9–17)
AST SERPL-CCNC: 32 U/L
BASOPHILS # BLD: 0.02 K/UL (ref 0–0.2)
BASOPHILS NFR BLD: 0 % (ref 0–2)
BILIRUB SERPL-MCNC: 0.6 MG/DL (ref 0.3–1.2)
BILIRUB UR QL STRIP: NEGATIVE
BUN SERPL-MCNC: 23 MG/DL (ref 8–23)
CALCIUM SERPL-MCNC: 9.4 MG/DL (ref 8.6–10.4)
CHARACTER UR: ABNORMAL
CHLORIDE SERPL-SCNC: 100 MMOL/L (ref 98–107)
CLARITY UR: ABNORMAL
CO2 SERPL-SCNC: 29 MMOL/L (ref 20–31)
COLOR UR: YELLOW
CREAT SERPL-MCNC: 1.3 MG/DL (ref 0.7–1.2)
EOSINOPHIL # BLD: 0.19 K/UL (ref 0–0.4)
EOSINOPHILS RELATIVE PERCENT: 2 % (ref 1–4)
EPI CELLS #/AREA URNS HPF: ABNORMAL /HPF (ref 0–5)
ERYTHROCYTE [DISTWIDTH] IN BLOOD BY AUTOMATED COUNT: 12 % (ref 12.5–15.4)
GFR SERPL CREATININE-BSD FRML MDRD: >60 ML/MIN/1.73M2
GLUCOSE SERPL-MCNC: 94 MG/DL (ref 70–99)
GLUCOSE UR STRIP-MCNC: NEGATIVE MG/DL
HCT VFR BLD AUTO: 44.2 % (ref 41–53)
HGB BLD-MCNC: 16.1 G/DL (ref 13.5–17.5)
HGB UR QL STRIP.AUTO: ABNORMAL
KETONES UR STRIP-MCNC: ABNORMAL MG/DL
LACTATE BLDV-SCNC: 1.1 MMOL/L (ref 0.5–2.2)
LEUKOCYTE ESTERASE UR QL STRIP: NEGATIVE
LIPASE SERPL-CCNC: 28 U/L (ref 13–60)
LYMPHOCYTES NFR BLD: 1.39 K/UL (ref 1–4.8)
LYMPHOCYTES RELATIVE PERCENT: 12 % (ref 24–44)
MCH RBC QN AUTO: 31.7 PG (ref 26–34)
MCHC RBC AUTO-ENTMCNC: 36.4 G/DL (ref 31–37)
MCV RBC AUTO: 87 FL (ref 80–100)
MONOCYTES NFR BLD: 0.89 K/UL (ref 0.1–1.2)
MONOCYTES NFR BLD: 8 % (ref 2–11)
NEUTROPHILS NFR BLD: 78 % (ref 36–66)
NEUTS SEG NFR BLD: 8.78 K/UL (ref 1.8–7.7)
NITRITE UR QL STRIP: NEGATIVE
PH UR STRIP: 7 [PH] (ref 5–8)
PLATELET # BLD AUTO: 280 K/UL (ref 140–450)
PMV BLD AUTO: 10 FL (ref 8–14)
POTASSIUM SERPL-SCNC: 4 MMOL/L (ref 3.7–5.3)
PROT SERPL-MCNC: 7.4 G/DL (ref 6.4–8.3)
PROT UR STRIP-MCNC: NEGATIVE MG/DL
RBC # BLD AUTO: 5.08 M/UL (ref 4.5–5.9)
RBC #/AREA URNS HPF: ABNORMAL /HPF (ref 0–2)
SODIUM SERPL-SCNC: 139 MMOL/L (ref 135–144)
SP GR UR STRIP: 1.02 (ref 1–1.03)
TROPONIN I SERPL HS-MCNC: 10 NG/L (ref 0–22)
UROBILINOGEN UR STRIP-ACNC: NORMAL EU/DL (ref 0–1)
WBC #/AREA URNS HPF: ABNORMAL /HPF (ref 0–5)
WBC OTHER # BLD: 11.3 K/UL (ref 3.5–11)

## 2023-12-01 PROCEDURE — 2500000003 HC RX 250 WO HCPCS: Performed by: NURSE PRACTITIONER

## 2023-12-01 PROCEDURE — 81001 URINALYSIS AUTO W/SCOPE: CPT

## 2023-12-01 PROCEDURE — 80053 COMPREHEN METABOLIC PANEL: CPT

## 2023-12-01 PROCEDURE — 84484 ASSAY OF TROPONIN QUANT: CPT

## 2023-12-01 PROCEDURE — 85025 COMPLETE CBC W/AUTO DIFF WBC: CPT

## 2023-12-01 PROCEDURE — 74177 CT ABD & PELVIS W/CONTRAST: CPT

## 2023-12-01 PROCEDURE — 6360000004 HC RX CONTRAST MEDICATION: Performed by: NURSE PRACTITIONER

## 2023-12-01 PROCEDURE — 99285 EMERGENCY DEPT VISIT HI MDM: CPT | Performed by: EMERGENCY MEDICINE

## 2023-12-01 PROCEDURE — 36415 COLL VENOUS BLD VENIPUNCTURE: CPT

## 2023-12-01 PROCEDURE — 83690 ASSAY OF LIPASE: CPT

## 2023-12-01 PROCEDURE — 96376 TX/PRO/DX INJ SAME DRUG ADON: CPT | Performed by: EMERGENCY MEDICINE

## 2023-12-01 PROCEDURE — 83605 ASSAY OF LACTIC ACID: CPT

## 2023-12-01 PROCEDURE — 96375 TX/PRO/DX INJ NEW DRUG ADDON: CPT | Performed by: EMERGENCY MEDICINE

## 2023-12-01 PROCEDURE — 96374 THER/PROPH/DIAG INJ IV PUSH: CPT | Performed by: EMERGENCY MEDICINE

## 2023-12-01 PROCEDURE — 6360000002 HC RX W HCPCS: Performed by: NURSE PRACTITIONER

## 2023-12-01 PROCEDURE — 2580000003 HC RX 258: Performed by: NURSE PRACTITIONER

## 2023-12-01 RX ORDER — 0.9 % SODIUM CHLORIDE 0.9 %
80 INTRAVENOUS SOLUTION INTRAVENOUS ONCE
Status: DISCONTINUED | OUTPATIENT
Start: 2023-12-01 | End: 2023-12-02 | Stop reason: HOSPADM

## 2023-12-01 RX ORDER — ONDANSETRON 2 MG/ML
4 INJECTION INTRAMUSCULAR; INTRAVENOUS ONCE
Status: COMPLETED | OUTPATIENT
Start: 2023-12-01 | End: 2023-12-01

## 2023-12-01 RX ORDER — SODIUM CHLORIDE 0.9 % (FLUSH) 0.9 %
3 SYRINGE (ML) INJECTION EVERY 8 HOURS
Status: DISCONTINUED | OUTPATIENT
Start: 2023-12-01 | End: 2023-12-02 | Stop reason: HOSPADM

## 2023-12-01 RX ORDER — ONDANSETRON 4 MG/1
4 TABLET, FILM COATED ORAL 3 TIMES DAILY PRN
Qty: 15 TABLET | Refills: 0 | Status: SHIPPED | OUTPATIENT
Start: 2023-12-01

## 2023-12-01 RX ORDER — SODIUM CHLORIDE 0.9 % (FLUSH) 0.9 %
10 SYRINGE (ML) INJECTION PRN
Status: DISCONTINUED | OUTPATIENT
Start: 2023-12-01 | End: 2023-12-02 | Stop reason: HOSPADM

## 2023-12-01 RX ORDER — KETOROLAC TROMETHAMINE 30 MG/ML
30 INJECTION, SOLUTION INTRAMUSCULAR; INTRAVENOUS ONCE
Status: COMPLETED | OUTPATIENT
Start: 2023-12-01 | End: 2023-12-01

## 2023-12-01 RX ORDER — TAMSULOSIN HYDROCHLORIDE 0.4 MG/1
0.4 CAPSULE ORAL DAILY
Qty: 7 CAPSULE | Refills: 0 | Status: SHIPPED | OUTPATIENT
Start: 2023-12-01

## 2023-12-01 RX ORDER — HYDROCODONE BITARTRATE AND ACETAMINOPHEN 5; 325 MG/1; MG/1
1 TABLET ORAL EVERY 8 HOURS PRN
Qty: 15 TABLET | Refills: 0 | Status: SHIPPED | OUTPATIENT
Start: 2023-12-01 | End: 2023-12-06

## 2023-12-01 RX ORDER — MORPHINE SULFATE 4 MG/ML
4 INJECTION, SOLUTION INTRAMUSCULAR; INTRAVENOUS ONCE
Status: COMPLETED | OUTPATIENT
Start: 2023-12-01 | End: 2023-12-01

## 2023-12-01 RX ADMIN — IOPAMIDOL 75 ML: 755 INJECTION, SOLUTION INTRAVENOUS at 21:18

## 2023-12-01 RX ADMIN — HYDROMORPHONE HYDROCHLORIDE 1 MG: 1 INJECTION, SOLUTION INTRAMUSCULAR; INTRAVENOUS; SUBCUTANEOUS at 22:43

## 2023-12-01 RX ADMIN — HYDROMORPHONE HYDROCHLORIDE 1 MG: 1 INJECTION, SOLUTION INTRAMUSCULAR; INTRAVENOUS; SUBCUTANEOUS at 20:10

## 2023-12-01 RX ADMIN — MORPHINE SULFATE 4 MG: 4 INJECTION INTRAVENOUS at 21:04

## 2023-12-01 RX ADMIN — SODIUM CHLORIDE, PRESERVATIVE FREE 10 ML: 5 INJECTION INTRAVENOUS at 21:18

## 2023-12-01 RX ADMIN — ONDANSETRON 4 MG: 2 INJECTION INTRAMUSCULAR; INTRAVENOUS at 20:10

## 2023-12-01 RX ADMIN — ONDANSETRON 4 MG: 2 INJECTION INTRAMUSCULAR; INTRAVENOUS at 22:43

## 2023-12-01 RX ADMIN — KETOROLAC TROMETHAMINE 30 MG: 30 INJECTION, SOLUTION INTRAMUSCULAR; INTRAVENOUS at 23:09

## 2023-12-01 RX ADMIN — Medication 100 ML: at 21:19

## 2023-12-01 ASSESSMENT — ENCOUNTER SYMPTOMS
NAUSEA: 1
SORE THROAT: 0
SHORTNESS OF BREATH: 0
ABDOMINAL PAIN: 1
EYE PAIN: 0
DIARRHEA: 0
VOMITING: 0
COUGH: 0
SINUS PAIN: 0
BACK PAIN: 0

## 2023-12-01 ASSESSMENT — PAIN SCALES - GENERAL
PAINLEVEL_OUTOF10: 10
PAINLEVEL_OUTOF10: 7
PAINLEVEL_OUTOF10: 10

## 2023-12-01 ASSESSMENT — PAIN DESCRIPTION - ORIENTATION
ORIENTATION: RIGHT;MID;LOWER
ORIENTATION: RIGHT;LOWER

## 2023-12-01 ASSESSMENT — PAIN DESCRIPTION - LOCATION
LOCATION: ABDOMEN

## 2023-12-01 ASSESSMENT — PAIN - FUNCTIONAL ASSESSMENT: PAIN_FUNCTIONAL_ASSESSMENT: 0-10

## 2023-12-01 ASSESSMENT — PAIN DESCRIPTION - DESCRIPTORS: DESCRIPTORS: SQUEEZING;TEARING

## 2023-12-02 NOTE — DISCHARGE INSTRUCTIONS
Take pain medication as needed for pain, take nausea medication as needed for nausea. Take Flomax daily, monitor for fevers chills nausea vomiting or uncontrolled pain and return to the ER with the symptoms.   Follow-up with the urologist primary care physician

## 2023-12-12 ENCOUNTER — PATIENT MESSAGE (OUTPATIENT)
Dept: PRIMARY CARE CLINIC | Age: 58
End: 2023-12-12

## 2023-12-15 ENCOUNTER — HOSPITAL ENCOUNTER (INPATIENT)
Age: 58
LOS: 1 days | Discharge: HOME | End: 2023-12-16
Attending: EMERGENCY MEDICINE | Admitting: STUDENT IN AN ORGANIZED HEALTH CARE EDUCATION/TRAINING PROGRAM
Payer: COMMERCIAL

## 2023-12-15 ENCOUNTER — ANESTHESIA EVENT (OUTPATIENT)
Dept: OPERATING ROOM | Age: 58
End: 2023-12-15
Payer: COMMERCIAL

## 2023-12-15 ENCOUNTER — APPOINTMENT (OUTPATIENT)
Dept: CT IMAGING | Age: 58
End: 2023-12-15
Payer: COMMERCIAL

## 2023-12-15 DIAGNOSIS — N13.9 OBSTRUCTIVE UROPATHY: Primary | ICD-10-CM

## 2023-12-15 DIAGNOSIS — G47.33 OBSTRUCTIVE SLEEP APNEA SYNDROME: ICD-10-CM

## 2023-12-15 DIAGNOSIS — N20.1 RIGHT URETERAL CALCULUS: ICD-10-CM

## 2023-12-15 LAB
ANION GAP SERPL CALCULATED.3IONS-SCNC: 11 MMOL/L (ref 9–17)
BASOPHILS # BLD: 0 K/UL (ref 0–0.2)
BASOPHILS NFR BLD: 0 % (ref 0–2)
BILIRUB UR QL STRIP: NEGATIVE
BUN SERPL-MCNC: 27 MG/DL (ref 8–23)
CALCIUM SERPL-MCNC: 8.9 MG/DL (ref 8.6–10.4)
CHARACTER UR: ABNORMAL
CHLORIDE SERPL-SCNC: 102 MMOL/L (ref 98–107)
CLARITY UR: CLEAR
CO2 SERPL-SCNC: 26 MMOL/L (ref 20–31)
COLOR UR: YELLOW
CREAT SERPL-MCNC: 1.1 MG/DL (ref 0.7–1.2)
EOSINOPHIL # BLD: 0.3 K/UL (ref 0–0.4)
EOSINOPHILS RELATIVE PERCENT: 3 % (ref 1–4)
EPI CELLS #/AREA URNS HPF: ABNORMAL /HPF (ref 0–5)
ERYTHROCYTE [DISTWIDTH] IN BLOOD BY AUTOMATED COUNT: 12.5 % (ref 12.5–15.4)
GFR SERPL CREATININE-BSD FRML MDRD: >60 ML/MIN/1.73M2
GLUCOSE SERPL-MCNC: 95 MG/DL (ref 70–99)
GLUCOSE UR STRIP-MCNC: NEGATIVE MG/DL
HCT VFR BLD AUTO: 40.8 % (ref 41–53)
HGB BLD-MCNC: 14.3 G/DL (ref 13.5–17.5)
HGB UR QL STRIP.AUTO: ABNORMAL
KETONES UR STRIP-MCNC: NEGATIVE MG/DL
LEUKOCYTE ESTERASE UR QL STRIP: NEGATIVE
LYMPHOCYTES NFR BLD: 1.2 K/UL (ref 1–4.8)
LYMPHOCYTES RELATIVE PERCENT: 11 % (ref 24–44)
MCH RBC QN AUTO: 31.2 PG (ref 26–34)
MCHC RBC AUTO-ENTMCNC: 35.2 G/DL (ref 31–37)
MCV RBC AUTO: 88.6 FL (ref 80–100)
MONOCYTES NFR BLD: 0.9 K/UL (ref 0.1–1.2)
MONOCYTES NFR BLD: 8 % (ref 2–11)
MUCOUS THREADS URNS QL MICRO: ABNORMAL
NEUTROPHILS NFR BLD: 78 % (ref 36–66)
NEUTS SEG NFR BLD: 9 K/UL (ref 1.8–7.7)
NITRITE UR QL STRIP: NEGATIVE
PH UR STRIP: 6 [PH] (ref 5–8)
PLATELET # BLD AUTO: 254 K/UL (ref 140–450)
PMV BLD AUTO: 7.1 FL (ref 6–12)
POTASSIUM SERPL-SCNC: 4.5 MMOL/L (ref 3.7–5.3)
PROT UR STRIP-MCNC: NEGATIVE MG/DL
RBC # BLD AUTO: 4.6 M/UL (ref 4.5–5.9)
RBC #/AREA URNS HPF: ABNORMAL /HPF (ref 0–2)
SODIUM SERPL-SCNC: 139 MMOL/L (ref 135–144)
SP GR UR STRIP: 1.02 (ref 1–1.03)
UROBILINOGEN UR STRIP-ACNC: NORMAL EU/DL (ref 0–1)
WBC #/AREA URNS HPF: ABNORMAL /HPF (ref 0–5)
WBC OTHER # BLD: 11.5 K/UL (ref 3.5–11)

## 2023-12-15 PROCEDURE — 6360000002 HC RX W HCPCS: Performed by: EMERGENCY MEDICINE

## 2023-12-15 PROCEDURE — 74176 CT ABD & PELVIS W/O CONTRAST: CPT

## 2023-12-15 PROCEDURE — 85025 COMPLETE CBC W/AUTO DIFF WBC: CPT

## 2023-12-15 PROCEDURE — 2580000003 HC RX 258: Performed by: EMERGENCY MEDICINE

## 2023-12-15 PROCEDURE — 36415 COLL VENOUS BLD VENIPUNCTURE: CPT

## 2023-12-15 PROCEDURE — 1210000000 HC MED SURG R&B

## 2023-12-15 PROCEDURE — 81001 URINALYSIS AUTO W/SCOPE: CPT

## 2023-12-15 PROCEDURE — 80048 BASIC METABOLIC PNL TOTAL CA: CPT

## 2023-12-15 PROCEDURE — 2580000003 HC RX 258: Performed by: STUDENT IN AN ORGANIZED HEALTH CARE EDUCATION/TRAINING PROGRAM

## 2023-12-15 PROCEDURE — 6370000000 HC RX 637 (ALT 250 FOR IP): Performed by: NURSE PRACTITIONER

## 2023-12-15 RX ORDER — ACETAMINOPHEN 325 MG/1
650 TABLET ORAL EVERY 6 HOURS PRN
Status: DISCONTINUED | OUTPATIENT
Start: 2023-12-15 | End: 2023-12-16 | Stop reason: HOSPADM

## 2023-12-15 RX ORDER — POLYETHYLENE GLYCOL 3350 17 G/17G
17 POWDER, FOR SOLUTION ORAL DAILY PRN
Status: DISCONTINUED | OUTPATIENT
Start: 2023-12-15 | End: 2023-12-16 | Stop reason: HOSPADM

## 2023-12-15 RX ORDER — ONDANSETRON 2 MG/ML
4 INJECTION INTRAMUSCULAR; INTRAVENOUS EVERY 6 HOURS PRN
Status: DISCONTINUED | OUTPATIENT
Start: 2023-12-15 | End: 2023-12-16 | Stop reason: HOSPADM

## 2023-12-15 RX ORDER — ENOXAPARIN SODIUM 100 MG/ML
40 INJECTION SUBCUTANEOUS DAILY
Status: DISCONTINUED | OUTPATIENT
Start: 2023-12-15 | End: 2023-12-16 | Stop reason: HOSPADM

## 2023-12-15 RX ORDER — HYDROCODONE BITARTRATE AND ACETAMINOPHEN 5; 325 MG/1; MG/1
1 TABLET ORAL EVERY 4 HOURS PRN
Status: DISCONTINUED | OUTPATIENT
Start: 2023-12-15 | End: 2023-12-16 | Stop reason: HOSPADM

## 2023-12-15 RX ORDER — POTASSIUM CHLORIDE 20 MEQ/1
40 TABLET, EXTENDED RELEASE ORAL PRN
Status: DISCONTINUED | OUTPATIENT
Start: 2023-12-15 | End: 2023-12-16 | Stop reason: HOSPADM

## 2023-12-15 RX ORDER — SODIUM CHLORIDE 0.9 % (FLUSH) 0.9 %
5-40 SYRINGE (ML) INJECTION PRN
Status: DISCONTINUED | OUTPATIENT
Start: 2023-12-15 | End: 2023-12-16 | Stop reason: HOSPADM

## 2023-12-15 RX ORDER — SODIUM CHLORIDE 0.9 % (FLUSH) 0.9 %
5-40 SYRINGE (ML) INJECTION EVERY 12 HOURS SCHEDULED
Status: DISCONTINUED | OUTPATIENT
Start: 2023-12-15 | End: 2023-12-16 | Stop reason: HOSPADM

## 2023-12-15 RX ORDER — ONDANSETRON 2 MG/ML
4 INJECTION INTRAMUSCULAR; INTRAVENOUS ONCE
Status: COMPLETED | OUTPATIENT
Start: 2023-12-15 | End: 2023-12-15

## 2023-12-15 RX ORDER — HYDROCODONE BITARTRATE AND ACETAMINOPHEN 5; 325 MG/1; MG/1
2 TABLET ORAL EVERY 4 HOURS PRN
Status: DISCONTINUED | OUTPATIENT
Start: 2023-12-15 | End: 2023-12-16 | Stop reason: HOSPADM

## 2023-12-15 RX ORDER — MAGNESIUM SULFATE IN WATER 40 MG/ML
2000 INJECTION, SOLUTION INTRAVENOUS PRN
Status: DISCONTINUED | OUTPATIENT
Start: 2023-12-15 | End: 2023-12-16 | Stop reason: HOSPADM

## 2023-12-15 RX ORDER — ACETAMINOPHEN 650 MG/1
650 SUPPOSITORY RECTAL EVERY 6 HOURS PRN
Status: DISCONTINUED | OUTPATIENT
Start: 2023-12-15 | End: 2023-12-16 | Stop reason: HOSPADM

## 2023-12-15 RX ORDER — POTASSIUM CHLORIDE 7.45 MG/ML
10 INJECTION INTRAVENOUS PRN
Status: DISCONTINUED | OUTPATIENT
Start: 2023-12-15 | End: 2023-12-16 | Stop reason: HOSPADM

## 2023-12-15 RX ORDER — 0.9 % SODIUM CHLORIDE 0.9 %
1000 INTRAVENOUS SOLUTION INTRAVENOUS ONCE
Status: COMPLETED | OUTPATIENT
Start: 2023-12-15 | End: 2023-12-15

## 2023-12-15 RX ORDER — ONDANSETRON 4 MG/1
4 TABLET, ORALLY DISINTEGRATING ORAL EVERY 8 HOURS PRN
Status: DISCONTINUED | OUTPATIENT
Start: 2023-12-15 | End: 2023-12-16 | Stop reason: HOSPADM

## 2023-12-15 RX ORDER — KETOROLAC TROMETHAMINE 15 MG/ML
15 INJECTION, SOLUTION INTRAMUSCULAR; INTRAVENOUS ONCE
Status: COMPLETED | OUTPATIENT
Start: 2023-12-15 | End: 2023-12-15

## 2023-12-15 RX ORDER — SODIUM CHLORIDE 9 MG/ML
INJECTION, SOLUTION INTRAVENOUS PRN
Status: DISCONTINUED | OUTPATIENT
Start: 2023-12-15 | End: 2023-12-16 | Stop reason: HOSPADM

## 2023-12-15 RX ADMIN — HYDROCODONE BITARTRATE AND ACETAMINOPHEN 2 TABLET: 5; 325 TABLET ORAL at 21:10

## 2023-12-15 RX ADMIN — SODIUM CHLORIDE, PRESERVATIVE FREE 10 ML: 5 INJECTION INTRAVENOUS at 19:56

## 2023-12-15 RX ADMIN — SODIUM CHLORIDE 1000 ML: 9 INJECTION, SOLUTION INTRAVENOUS at 17:20

## 2023-12-15 RX ADMIN — ONDANSETRON 4 MG: 2 INJECTION INTRAMUSCULAR; INTRAVENOUS at 17:19

## 2023-12-15 RX ADMIN — KETOROLAC TROMETHAMINE 15 MG: 15 INJECTION, SOLUTION INTRAMUSCULAR; INTRAVENOUS at 17:20

## 2023-12-15 ASSESSMENT — PAIN DESCRIPTION - ORIENTATION
ORIENTATION: RIGHT;LOWER

## 2023-12-15 ASSESSMENT — PAIN SCALES - GENERAL
PAINLEVEL_OUTOF10: 5
PAINLEVEL_OUTOF10: 5
PAINLEVEL_OUTOF10: 6
PAINLEVEL_OUTOF10: 7

## 2023-12-15 ASSESSMENT — PAIN DESCRIPTION - LOCATION
LOCATION: ABDOMEN

## 2023-12-15 ASSESSMENT — PAIN DESCRIPTION - DESCRIPTORS
DESCRIPTORS: THROBBING
DESCRIPTORS: SHARP

## 2023-12-15 ASSESSMENT — PAIN DESCRIPTION - PAIN TYPE
TYPE: ACUTE PAIN
TYPE: ACUTE PAIN

## 2023-12-15 ASSESSMENT — PAIN - FUNCTIONAL ASSESSMENT: PAIN_FUNCTIONAL_ASSESSMENT: 0-10

## 2023-12-16 ENCOUNTER — ANESTHESIA (OUTPATIENT)
Dept: OPERATING ROOM | Age: 58
End: 2023-12-16
Payer: COMMERCIAL

## 2023-12-16 ENCOUNTER — APPOINTMENT (OUTPATIENT)
Dept: GENERAL RADIOLOGY | Age: 58
End: 2023-12-16
Payer: COMMERCIAL

## 2023-12-16 VITALS
RESPIRATION RATE: 14 BRPM | BODY MASS INDEX: 26.73 KG/M2 | HEIGHT: 73 IN | DIASTOLIC BLOOD PRESSURE: 93 MMHG | SYSTOLIC BLOOD PRESSURE: 143 MMHG | OXYGEN SATURATION: 95 % | HEART RATE: 57 BPM | WEIGHT: 201.72 LBS | TEMPERATURE: 98.2 F

## 2023-12-16 PROCEDURE — 2580000003 HC RX 258: Performed by: UROLOGY

## 2023-12-16 PROCEDURE — 6370000000 HC RX 637 (ALT 250 FOR IP)

## 2023-12-16 PROCEDURE — 6370000000 HC RX 637 (ALT 250 FOR IP): Performed by: ANESTHESIOLOGY

## 2023-12-16 PROCEDURE — 6370000000 HC RX 637 (ALT 250 FOR IP): Performed by: STUDENT IN AN ORGANIZED HEALTH CARE EDUCATION/TRAINING PROGRAM

## 2023-12-16 PROCEDURE — 6360000002 HC RX W HCPCS: Performed by: UROLOGY

## 2023-12-16 PROCEDURE — 99221 1ST HOSP IP/OBS SF/LOW 40: CPT | Performed by: STUDENT IN AN ORGANIZED HEALTH CARE EDUCATION/TRAINING PROGRAM

## 2023-12-16 PROCEDURE — 6360000002 HC RX W HCPCS

## 2023-12-16 PROCEDURE — 6370000000 HC RX 637 (ALT 250 FOR IP): Performed by: UROLOGY

## 2023-12-16 PROCEDURE — 6360000002 HC RX W HCPCS: Performed by: ANESTHESIOLOGY

## 2023-12-16 RX ORDER — CEFAZOLIN 2 G/1
INJECTION, POWDER, FOR SOLUTION INTRAMUSCULAR; INTRAVENOUS
Status: DISCONTINUED
Start: 2023-12-16 | End: 2023-12-16 | Stop reason: HOSPADM

## 2023-12-16 RX ORDER — SODIUM CHLORIDE 0.9 % (FLUSH) 0.9 %
5-40 SYRINGE (ML) INJECTION EVERY 12 HOURS SCHEDULED
Status: DISCONTINUED | OUTPATIENT
Start: 2023-12-16 | End: 2023-12-16

## 2023-12-16 RX ORDER — LIDOCAINE HYDROCHLORIDE 10 MG/ML
1 INJECTION, SOLUTION EPIDURAL; INFILTRATION; INTRACAUDAL; PERINEURAL
Status: DISCONTINUED | OUTPATIENT
Start: 2023-12-16 | End: 2023-12-16

## 2023-12-16 RX ORDER — ALBUTEROL SULFATE 90 UG/1
AEROSOL, METERED RESPIRATORY (INHALATION) PRN
Status: DISCONTINUED | OUTPATIENT
Start: 2023-12-16 | End: 2023-12-16 | Stop reason: SDUPTHER

## 2023-12-16 RX ORDER — LISINOPRIL 5 MG/1
5 TABLET ORAL DAILY
Status: DISCONTINUED | OUTPATIENT
Start: 2023-12-16 | End: 2023-12-16 | Stop reason: HOSPADM

## 2023-12-16 RX ORDER — PROPOFOL 10 MG/ML
INJECTION, EMULSION INTRAVENOUS CONTINUOUS PRN
Status: DISCONTINUED | OUTPATIENT
Start: 2023-12-16 | End: 2023-12-16 | Stop reason: SDUPTHER

## 2023-12-16 RX ORDER — ACETAMINOPHEN AND CODEINE PHOSPHATE 300; 30 MG/1; MG/1
1 TABLET ORAL EVERY 4 HOURS PRN
Qty: 42 TABLET | Refills: 0 | Status: SHIPPED | OUTPATIENT
Start: 2023-12-16 | End: 2023-12-16 | Stop reason: HOSPADM

## 2023-12-16 RX ORDER — HYDROCODONE BITARTRATE AND ACETAMINOPHEN 5; 325 MG/1; MG/1
1 TABLET ORAL EVERY 6 HOURS PRN
Qty: 18 TABLET | Refills: 0 | Status: SHIPPED | OUTPATIENT
Start: 2023-12-16 | End: 2023-12-23

## 2023-12-16 RX ORDER — PROCHLORPERAZINE EDISYLATE 5 MG/ML
5 INJECTION INTRAMUSCULAR; INTRAVENOUS
Status: DISCONTINUED | OUTPATIENT
Start: 2023-12-16 | End: 2023-12-16

## 2023-12-16 RX ORDER — SODIUM CHLORIDE 9 MG/ML
INJECTION, SOLUTION INTRAVENOUS PRN
Status: DISCONTINUED | OUTPATIENT
Start: 2023-12-16 | End: 2023-12-16

## 2023-12-16 RX ORDER — HYDROCODONE BITARTRATE AND ACETAMINOPHEN 5; 325 MG/1; MG/1
TABLET ORAL
Status: COMPLETED
Start: 2023-12-16 | End: 2023-12-16

## 2023-12-16 RX ORDER — SODIUM CHLORIDE 0.9 % (FLUSH) 0.9 %
5-40 SYRINGE (ML) INJECTION PRN
Status: DISCONTINUED | OUTPATIENT
Start: 2023-12-16 | End: 2023-12-16

## 2023-12-16 RX ORDER — SODIUM CHLORIDE 9 MG/ML
INJECTION, SOLUTION INTRAVENOUS CONTINUOUS
Status: DISCONTINUED | OUTPATIENT
Start: 2023-12-16 | End: 2023-12-16 | Stop reason: HOSPADM

## 2023-12-16 RX ORDER — LABETALOL HYDROCHLORIDE 5 MG/ML
10 INJECTION, SOLUTION INTRAVENOUS
Status: DISCONTINUED | OUTPATIENT
Start: 2023-12-16 | End: 2023-12-16

## 2023-12-16 RX ORDER — HYDRALAZINE HYDROCHLORIDE 20 MG/ML
10 INJECTION INTRAMUSCULAR; INTRAVENOUS
Status: DISCONTINUED | OUTPATIENT
Start: 2023-12-16 | End: 2023-12-16

## 2023-12-16 RX ORDER — PANTOPRAZOLE SODIUM 40 MG/1
40 TABLET, DELAYED RELEASE ORAL
Status: DISCONTINUED | OUTPATIENT
Start: 2023-12-17 | End: 2023-12-16 | Stop reason: HOSPADM

## 2023-12-16 RX ORDER — FENTANYL CITRATE 50 UG/ML
INJECTION, SOLUTION INTRAMUSCULAR; INTRAVENOUS PRN
Status: DISCONTINUED | OUTPATIENT
Start: 2023-12-16 | End: 2023-12-16 | Stop reason: SDUPTHER

## 2023-12-16 RX ORDER — SODIUM CHLORIDE, SODIUM LACTATE, POTASSIUM CHLORIDE, CALCIUM CHLORIDE 600; 310; 30; 20 MG/100ML; MG/100ML; MG/100ML; MG/100ML
INJECTION, SOLUTION INTRAVENOUS CONTINUOUS
Status: DISCONTINUED | OUTPATIENT
Start: 2023-12-16 | End: 2023-12-16

## 2023-12-16 RX ORDER — HYDROCODONE BITARTRATE AND ACETAMINOPHEN 5; 325 MG/1; MG/1
1 TABLET ORAL EVERY 6 HOURS PRN
Qty: 21 TABLET | Refills: 0 | Status: SHIPPED | OUTPATIENT
Start: 2023-12-16 | End: 2023-12-16 | Stop reason: HOSPADM

## 2023-12-16 RX ADMIN — CEFAZOLIN 2000 MG: 2 INJECTION, POWDER, FOR SOLUTION INTRAMUSCULAR; INTRAVENOUS at 08:07

## 2023-12-16 RX ADMIN — FENTANYL CITRATE 25 MCG: 50 INJECTION, SOLUTION INTRAMUSCULAR; INTRAVENOUS at 08:11

## 2023-12-16 RX ADMIN — HYOSCYAMINE SULFATE 125 MCG: 0.12 TABLET ORAL; SUBLINGUAL at 08:56

## 2023-12-16 RX ADMIN — PROPOFOL 20 MG: 10 INJECTION, EMULSION INTRAVENOUS at 08:14

## 2023-12-16 RX ADMIN — HYDROCODONE BITARTRATE AND ACETAMINOPHEN 2 TABLET: 5; 325 TABLET ORAL at 13:07

## 2023-12-16 RX ADMIN — HYDROCODONE BITARTRATE AND ACETAMINOPHEN 2 TABLET: 5; 325 TABLET ORAL at 09:10

## 2023-12-16 RX ADMIN — ACETAMINOPHEN 650 MG: 325 TABLET ORAL at 11:28

## 2023-12-16 RX ADMIN — Medication 0.5 MG: at 08:59

## 2023-12-16 RX ADMIN — PROPOFOL 100 MCG/KG/MIN: 10 INJECTION, EMULSION INTRAVENOUS at 08:06

## 2023-12-16 RX ADMIN — Medication 125 MCG: at 08:56

## 2023-12-16 RX ADMIN — PROPOFOL 50 MG: 10 INJECTION, EMULSION INTRAVENOUS at 08:09

## 2023-12-16 RX ADMIN — FENTANYL CITRATE 25 MCG: 50 INJECTION, SOLUTION INTRAMUSCULAR; INTRAVENOUS at 08:14

## 2023-12-16 RX ADMIN — ALBUTEROL SULFATE 2 PUFF: 90 AEROSOL, METERED RESPIRATORY (INHALATION) at 07:59

## 2023-12-16 RX ADMIN — FENTANYL CITRATE 50 MCG: 50 INJECTION, SOLUTION INTRAMUSCULAR; INTRAVENOUS at 08:07

## 2023-12-16 RX ADMIN — HYDROMORPHONE HYDROCHLORIDE 0.5 MG: 1 INJECTION, SOLUTION INTRAMUSCULAR; INTRAVENOUS; SUBCUTANEOUS at 08:59

## 2023-12-16 RX ADMIN — LISINOPRIL 5 MG: 5 TABLET ORAL at 11:28

## 2023-12-16 RX ADMIN — SODIUM CHLORIDE: 9 INJECTION, SOLUTION INTRAVENOUS at 09:53

## 2023-12-16 RX ADMIN — SODIUM CHLORIDE, PRESERVATIVE FREE 10 ML: 5 INJECTION INTRAVENOUS at 09:32

## 2023-12-16 ASSESSMENT — PAIN - FUNCTIONAL ASSESSMENT: PAIN_FUNCTIONAL_ASSESSMENT: NONE - DENIES PAIN

## 2023-12-16 ASSESSMENT — PAIN DESCRIPTION - LOCATION
LOCATION: GROIN
LOCATION: SCROTUM
LOCATION: GROIN
LOCATION: ABDOMEN
LOCATION: ABDOMEN;SCROTUM

## 2023-12-16 ASSESSMENT — PAIN SCALES - GENERAL
PAINLEVEL_OUTOF10: 2
PAINLEVEL_OUTOF10: 7
PAINLEVEL_OUTOF10: 7
PAINLEVEL_OUTOF10: 6
PAINLEVEL_OUTOF10: 2
PAINLEVEL_OUTOF10: 8
PAINLEVEL_OUTOF10: 2
PAINLEVEL_OUTOF10: 4

## 2023-12-16 ASSESSMENT — PAIN DESCRIPTION - DESCRIPTORS
DESCRIPTORS: SORE
DESCRIPTORS: SQUEEZING;ACHING
DESCRIPTORS: SQUEEZING

## 2023-12-16 ASSESSMENT — PAIN DESCRIPTION - ORIENTATION
ORIENTATION: RIGHT;LOWER
ORIENTATION: RIGHT;LOWER

## 2023-12-16 NOTE — OP NOTE
Dr. Shankar Fish MD  Urologic Surgery      [place], [place]. [facility]  12/16/23    Patient:  Raad Jc  MRN: [id number]  YOB: 1965    Surgeon: Dr. Shankar Fish MD  Assistant: None    Pre-op Diagnosis: Right ureteral stone. 5mm mid ureter. No other right sided stones. Post-op Diagnosis: Same    Procedure:   Cystoscopy with Right Ureteral Stent Placement    Anesthesia: Monitor Anesthesia Care  Complications: None  OR Blood Loss: Minimal  Fluids: Cystalloids  Implants: Right 1Ll20fu Double-J Ureteral Stent  Specimens: None    Indication:  5mm right mid ureteral stone    Narrative of the Procedure:    After informed consent was obtained in the preoperative area, the patient was taken back to the operating room and transferred to the operating table in supine position. EPC cuffs were placed. The machine was turned on. Anesthesia was induced and antibiotics were given. The patient was placed in modified dorsal lithotomy position and sterilely prepped and draped in a standard fashion. A timeout occurred. Two patient identifiers were used. We entered the urethra with a 22 [place] scope. The Right ureteral orifice was visualized. A guidewire was carefully advanced into the kidney and position was confirmed with xray. Under direct visualization the stent was advanced over the wire until it was in proper location. The Glidewire was then removed. A curl could be seen in the Right renal pelvis under using fluoroscopic vision, and in the bladder under direct visualization. The patients bladder was drained. All instrumentation was removed. The patient was then awakened, extubated, and discharged back to the PACU in good and stable condition. Follow-Up: Cystoscopy, right ureteroscopy with laser lithotripsy, and right ureteral stent placement.     Shankar Fish MD  Electronically signed on 12/16/2023 at 8:16 AM

## 2023-12-16 NOTE — ANESTHESIA PRE PROCEDURE
Department of Anesthesiology  Preprocedure Note       Name:  Shyla Sheehan   Age:  59 y.o.  :  1965                                          MRN:  [id number]         Date:  12/15/2023      Surgeon: Gilmar Franklin):  Lizett Darby MD    Procedure: Procedure(s):  CYSTOSCOPY RIGHT URETERAL STENT INSERTION    Medications prior to admission:   Prior to Admission medications    Medication Sig Start Date End Date Taking? Authorizing Provider   tamsulosin (FLOMAX) 0.4 MG capsule Take 1 capsule by mouth daily 23   Marian CoverGITA CNP   ondansetron (ZOFRAN) 4 MG tablet Take 1 tablet by mouth 3 times daily as needed for Nausea or Vomiting 23   Marian Cover, GITA Ohara CNP   traMADol (ULTRAM) 50 MG tablet Take 2 tablets by mouth every 8 hours as needed for Pain for up to 30 days.  Max Daily Amount: 300 mg 23  GITA Green CNP   albuterol sulfate HFA (PROVENTIL;VENTOLIN;PROAIR) 108 (90 Base) MCG/ACT inhaler INHALE 2 PUFFS BY MOUTH EVERY 6 HOURS AS NEEDED 23   Daniel Franklin APRN - CNP   omeprazole (PRILOSEC) 20 MG delayed release capsule TAKE 1 CAPSULE BY MOUTH EVERY MORNING BEFORE BREAKFAST 23   GITA Li CNP   fluticasone Tilda Piper) 50 MCG/ACT nasal spray  23   Provider, MD Robert   fluticasone-salmeterol (ADVAIR HFA) 115-21 MCG/ACT inhaler Inhale 2 puffs into the lungs 2 times daily 23   GITA Green CNP   celecoxib (CELEBREX) 200 MG capsule TAKE 1 CAPSULE BY MOUTH DAILY 7/10/23   Daniel Franklin APRN - CNP   lisinopril (PRINIVIL;ZESTRIL) 10 MG tablet TAKE ONE TABLET BY MOUTH DAILY 4/10/23   Daniel Franklin APRN - CNP   loratadine-pseudoephedrine (CLARITIN-D 24 HOUR)  MG per extended release tablet Take 1 tablet by mouth daily 22   Kielmeyer, Daniel Schiller, APRN - CNP   Multiple Vitamins-Minerals (MULTI FOR HIM 50+ PO) Take by mouth    Provider, MD Robert       Current medications:    No current

## 2023-12-16 NOTE — PLAN OF CARE
Problem: Discharge Planning  Goal: Discharge to home or other facility with appropriate resources  12/16/2023 1357 by Adam Giordano RN  Outcome: Progressing     Problem: Pain  Goal: Verbalizes/displays adequate comfort level or baseline comfort level  12/16/2023 1357 by Adam Giordano RN  Outcome: Progressing     Problem: Gastrointestinal - Adult  Goal: Maintains or returns to baseline bowel function  12/16/2023 1357 by Adam Giordano RN  Outcome: Progressing     Problem: Genitourinary - Adult  Goal: Absence of urinary retention  12/16/2023 1357 by Adam Giordano RN  Outcome: Progressing

## 2023-12-16 NOTE — PLAN OF CARE
Problem: Discharge Planning  Goal: Discharge to home or other facility with appropriate resources  Outcome: Progressing     Problem: Pain  Goal: Verbalizes/displays adequate comfort level or baseline comfort level  Outcome: Progressing     Problem: Gastrointestinal - Adult  Goal: Maintains or returns to baseline bowel function  Outcome: Progressing     Problem: Genitourinary - Adult  Goal: Absence of urinary retention  Outcome: Progressing

## 2023-12-16 NOTE — ANESTHESIA POSTPROCEDURE EVALUATION
Department of Anesthesiology  Postprocedure Note    Patient: Aria Hernandez  MRN: [id number]  YOB: 1965  Date of evaluation: 12/16/2023    Procedure Summary       Date: 12/16/23 Room / Location: [facility] OR 01 / [address]    Anesthesia Start: 0803 Anesthesia Stop: 9779    Procedure: CYSTOSCOPY RIGHT URETERAL STENT INSERTION (Right: Ureter) Diagnosis:       Right ureteral stone      (Right ureteral stone [N20.1])    Surgeons: Etta Thomas MD Responsible Provider: Celia Duong MD    Anesthesia Type: MAC ASA Status: 3            Anesthesia Type: No value filed. Mónica Phase I: Mónica Score: 10    Mónica Phase II: Mónica Score: 10    Anesthesia Post Evaluation    Patient location during evaluation: PACU  Patient participation: complete - patient participated  Level of consciousness: awake  Airway patency: patent  Nausea & Vomiting: no nausea and no vomiting  Cardiovascular status: blood pressure returned to baseline  Respiratory status: acceptable  Hydration status: euvolemic  Pain management: adequate    No notable events documented.

## 2023-12-16 NOTE — CONSULTS
Jose Ramon Dickinson, Kandi Solitario, & David  Urology Consultation      Patient:  Shyla Sheehan  MRN: [id number]  YOB: 1965    CHIEF COMPLAINT:  right 5mm mid ureteral stone    HISTORY OF PRESENT ILLNESS:   The patient is a 59 y.o. male who presents with 5mm right uretereal stone. CT images reviewed. Right flank. 10/10. Sharp and achy. 1 week. Mild nasuea. Patient's old records, notes and chart reviewed and summarized above.     Past Medical History:    Past Medical History:   Diagnosis Date    Anxiety 05/26/2020    Back pain     Bilateral tinnitus 05/26/2020    Cellulitis of right knee 05/17/2021    Chronic midline low back pain without sciatica 11/10/2016    Deviated septum 05/26/2020    Disc disease, degenerative, lumbar or lumbosacral     Ear drum perforation, right     Essential hypertension 11/10/2016    History of placement of ear tubes 09/29/2020    History of tobacco use 05/26/2020    Hyperlipidemia     Hypertension     Hypocalcemia 05/18/2021    Hyponatremia 05/18/2021    Lymph node enlargement     Normocytic normochromic anemia 05/20/2021    Obstructive sleep apnea syndrome 04/11/2017    Prepatellar bursitis of right knee 05/18/2021    Restless leg syndrome     Sensorineural hearing loss, bilateral 05/26/2020    Septic infrapatellar bursitis, left 05/18/2021    Somatic dysfunction of cervical region 12/03/2014       Past Surgical History:    Past Surgical History:   Procedure Laterality Date    HERNIA REPAIR      KNEE SURGERY Right 05/18/2021    : KNEE INCISION AND DRAINAGE (Right )    KNEE SURGERY Right 5/18/2021    KNEE INCISION AND DRAINAGE performed by Tejas Hernández MD at [address] [facility]  06/2020    TONSILLECTOMY AND ADENOIDECTOMY      TYMPANOSTOMY TUBE PLACEMENT Bilateral        Medications:      Current Facility-Administered Medications:     0.9 % sodium chloride infusion, , IntraVENous, Continuous, Swathi Grimaldo MD

## 2023-12-16 NOTE — H&P
[facility]  Office: [address], DO, Kike Dys, DO, Lodaniela Finely, DO, Dionne Pastures Blood, DO, Nancy Freed MD, Serena Allen MD, Boni Miller MD, Heather Arce MD,  Kirsten Garcia MD, Galo Youssef MD, Nabil Galvez MD,  Jessica Bermudez MD, Dax Saldaña MD, Olivia Arnold, DO, Patrice Colvin MD,  Jeremías Woo, DO, Jaelyn Mendoza MD, Raphael Ward MD, Kelsy Patel MD, Anthony Reddy MD,  Yadi Jacome MD, Lorena Madrid MD, Reggie Roca MD, Reema Mahmood MD, Quyen Swartz MD, Casimiro Nj MD, Otoniel Albarran DO, Leonidas Jacques, DO, Enma Conway MD,  Nolan Masters MD, David Catalan, [facility],  Shannan Arevalo, CNP, Yusuf Hurt, CNP,  Gwendolyn Goodwin, [facility], Sherron Andre, CNP, Siri Toro, CNP, Jensen Kan, CNP, Ursula Victor, CNP, Neelima Anna, CNP, Kailyn Shannon PA-C, Najma Avila PA-C, Edy, CNP, Blade Diggs, CNS, Barbara Bob, CNP, Subhash Kan, CNP, [facility]    HISTORY AND PHYSICAL EXAMINATION            Date:   12/16/2023  Patient name:  Kylee Roberto  Date of admission:  12/15/2023  2:29 PM  MRN:   [id number]  Account:  [de-identified]  YOB: 1965  PCP:    GITA Angulo CNP  Room:   [facility]/NONE  Code Status:    Full Code    Chief Complaint:     Chief Complaint   Patient presents with    Flank Pain     RLQ abdominal pains. Recent flank pain and kidney stone diagnosis. Pain more severe today. History Obtained From:     patient    History of Present Illness:     Kylee Roberto is a 59 y.o. Non- / non  male who presents with Flank Pain (RLQ abdominal pains. Recent flank pain and kidney stone diagnosis. Pain more severe today. )   and is admitted to the hospital for the management of Right ureteral calculus.     39-year-old male with past medical history of hypertension who presented to the hospital with right flank pain that started last Friday and is getting progressively worse associated with nausea but he denies vomiting denies any other complaint, denies chest pain fever chills, denies cough denies abdominal pain or any bowel or urinary habit changes, on emergency department at time of arrival patient vital sign blood pressure 152/88, temperature 96.9, heart rate 63, respiratory rate 18, oxygen saturation 96 on room air, his lab remarkable for WBC 11.5, his CT abdomen done and showed mild right ureteral calculus measuring 5.2 mm and there is just mild right-sided hydronephrosis and hydroureter, and showed mild dilation of the intra infrarenal abdominal aorta, urology was consulted and patient was admitted for further evaluation and treatment    Past Medical History:     Past Medical History:   Diagnosis Date    Anxiety 05/26/2020    Back pain     Bilateral tinnitus 05/26/2020    Cellulitis of right knee 05/17/2021    Chronic midline low back pain without sciatica 11/10/2016    Deviated septum 05/26/2020    Disc disease, degenerative, lumbar or lumbosacral     Ear drum perforation, right     Essential hypertension 11/10/2016    History of placement of ear tubes 09/29/2020    History of tobacco use 05/26/2020    Hyperlipidemia     Hypertension     Hypocalcemia 05/18/2021    Hyponatremia 05/18/2021    Lymph node enlargement     Normocytic normochromic anemia 05/20/2021    Obstructive sleep apnea syndrome 04/11/2017    Prepatellar bursitis of right knee 05/18/2021    Restless leg syndrome     Sensorineural hearing loss, bilateral 05/26/2020    Septic infrapatellar bursitis, left 05/18/2021    Somatic dysfunction of cervical region 12/03/2014        Past Surgical History:     Past Surgical History:   Procedure Laterality Date    HERNIA REPAIR      KNEE SURGERY Right 05/18/2021    : KNEE INCISION AND DRAINAGE (Right )    KNEE SURGERY Right 5/18/2021    KNEE INCISION AND

## 2023-12-17 NOTE — DISCHARGE SUMMARY
[facility]  Office: [phone]  Robsonstefany Oral, DO, Farhan Kapoor DO, Almita Acevedo DO, Bill Ontiveros DO, Semaj Llanos MD, Valeri Jackson MD, Darryle Hartmann, MD, Nicolette Alfonso MD,  Gil Raines MD, Mitchell Solis MD, Yan Albarran MD,  Wiliam Kayser, DO, Luther Ferrell MD, Chris Guzman MD, Lavern Jacobson DO, Merry Wills MD,  Jessy Figueroa DO, Anjana Larios MD, Marina Rai MD, Aileen Porter MD, Ricardo Mckenzie MD,  Nazanin Laws MD, Danny Diaz MD, Jimmy Schaumann, MD, Tex Cason MD, Claudeen Ferron, MD, Gisela Muñoz MD, Nando Packer DO, Ryan Cobrett DO, Marla Awad MD,  Jack Agosto MD, Lili Cope, CNP,  Adelita Gloss, CNP, Belinda Navarrete, CNP,  Eun Blackmon, DNP, Tracey Joshi, CNP, Lexx Gilmore, CNP, Faisal Howell, CNP, Sanjay Ramirez, CNP, Navid Pimentel, CNP, Alisa Barrios PA-C, MIGUEL MoscosoC, Edy, CNP, Alcides Louis, CNS, Cole Bryson, CNP, Sang Mott, CNP, [facility]    Discharge Summary     Patient ID: Sybil Greene  :  1965   MRN: [id number]     ACCOUNT:  [de-identified]   Patient's PCP: GITA Randle CNP  Admit Date: 12/15/2023   Discharge Date: 2023    Length of Stay: 1  Code Status:  Prior  Admitting Physician: Kaley Ballard MD  Discharge Physician: Kaley Ballard MD     Active Discharge Diagnoses:   Right ureteral calculus status post right stent placement on 23  Hypertension   GERD      Admission Condition:  fair     Discharged Condition: good    Hospital Stay:     Hospital Course:   57-year-old male with past medical history of hypertension who presented to the hospital with right flank pain that started last Friday and is getting progressively worse associated with nausea but he denies vomiting denies any other complaint, denies chest pain fever chills, denies cough denies

## 2023-12-19 ENCOUNTER — HOSPITAL ENCOUNTER (EMERGENCY)
Age: 58
Discharge: HOME | DRG: 699 | End: 2023-12-19
Attending: EMERGENCY MEDICINE
Payer: COMMERCIAL

## 2023-12-19 VITALS
DIASTOLIC BLOOD PRESSURE: 97 MMHG | RESPIRATION RATE: 15 BRPM | SYSTOLIC BLOOD PRESSURE: 149 MMHG | OXYGEN SATURATION: 99 % | WEIGHT: 201 LBS | HEIGHT: 73 IN | BODY MASS INDEX: 26.64 KG/M2 | TEMPERATURE: 97.8 F | HEART RATE: 80 BPM

## 2023-12-19 DIAGNOSIS — R31.9 URINARY TRACT INFECTION WITH HEMATURIA, SITE UNSPECIFIED: Primary | ICD-10-CM

## 2023-12-19 DIAGNOSIS — N39.0 URINARY TRACT INFECTION WITH HEMATURIA, SITE UNSPECIFIED: Primary | ICD-10-CM

## 2023-12-19 DIAGNOSIS — N20.0 KIDNEY STONE: ICD-10-CM

## 2023-12-19 LAB
BACTERIA URNS QL MICRO: ABNORMAL
BILIRUB UR QL STRIP: ABNORMAL
CHARACTER UR: ABNORMAL
CLARITY UR: ABNORMAL
COLOR UR: ABNORMAL
COMMENT: ABNORMAL
EPI CELLS #/AREA URNS HPF: ABNORMAL /HPF (ref 0–5)
GLUCOSE UR STRIP-MCNC: NEGATIVE MG/DL
HGB UR QL STRIP.AUTO: ABNORMAL
KETONES UR STRIP-MCNC: NEGATIVE MG/DL
LEUKOCYTE ESTERASE UR QL STRIP: ABNORMAL
NITRITE UR QL STRIP: NEGATIVE
PH UR STRIP: 6 [PH] (ref 5–8)
PROT UR STRIP-MCNC: ABNORMAL MG/DL
RBC #/AREA URNS HPF: ABNORMAL /HPF (ref 0–2)
SP GR UR STRIP: 1.02 (ref 1–1.03)
UROBILINOGEN UR STRIP-ACNC: NORMAL EU/DL (ref 0–1)
WBC #/AREA URNS HPF: ABNORMAL /HPF (ref 0–5)

## 2023-12-19 PROCEDURE — 87086 URINE CULTURE/COLONY COUNT: CPT

## 2023-12-19 PROCEDURE — 81001 URINALYSIS AUTO W/SCOPE: CPT

## 2023-12-19 PROCEDURE — 99284 EMERGENCY DEPT VISIT MOD MDM: CPT

## 2023-12-19 PROCEDURE — 6370000000 HC RX 637 (ALT 250 FOR IP): Performed by: EMERGENCY MEDICINE

## 2023-12-19 PROCEDURE — 96372 THER/PROPH/DIAG INJ SC/IM: CPT

## 2023-12-19 PROCEDURE — 6360000002 HC RX W HCPCS: Performed by: EMERGENCY MEDICINE

## 2023-12-19 PROCEDURE — 87186 SC STD MICRODIL/AGAR DIL: CPT

## 2023-12-19 PROCEDURE — 87077 CULTURE AEROBIC IDENTIFY: CPT

## 2023-12-19 RX ORDER — KETOROLAC TROMETHAMINE 30 MG/ML
30 INJECTION, SOLUTION INTRAMUSCULAR; INTRAVENOUS ONCE
Status: COMPLETED | OUTPATIENT
Start: 2023-12-19 | End: 2023-12-19

## 2023-12-19 RX ORDER — SULFAMETHOXAZOLE AND TRIMETHOPRIM 800; 160 MG/1; MG/1
1 TABLET ORAL 2 TIMES DAILY
Qty: 14 TABLET | Refills: 0 | Status: ON HOLD | OUTPATIENT
Start: 2023-12-19 | End: 2023-12-22 | Stop reason: HOSPADM

## 2023-12-19 RX ORDER — SULFAMETHOXAZOLE AND TRIMETHOPRIM 800; 160 MG/1; MG/1
1 TABLET ORAL ONCE
Status: COMPLETED | OUTPATIENT
Start: 2023-12-19 | End: 2023-12-19

## 2023-12-19 RX ORDER — ONDANSETRON 4 MG/1
4 TABLET, ORALLY DISINTEGRATING ORAL EVERY 8 HOURS PRN
Status: DISCONTINUED | OUTPATIENT
Start: 2023-12-19 | End: 2023-12-19 | Stop reason: HOSPADM

## 2023-12-19 RX ORDER — KETOROLAC TROMETHAMINE 10 MG/1
10 TABLET, FILM COATED ORAL EVERY 6 HOURS PRN
Qty: 20 TABLET | Refills: 0 | Status: ON HOLD | OUTPATIENT
Start: 2023-12-19 | End: 2023-12-22 | Stop reason: HOSPADM

## 2023-12-19 RX ADMIN — ONDANSETRON 4 MG: 4 TABLET, ORALLY DISINTEGRATING ORAL at 04:19

## 2023-12-19 RX ADMIN — KETOROLAC TROMETHAMINE 30 MG: 30 INJECTION, SOLUTION INTRAMUSCULAR; INTRAVENOUS at 04:16

## 2023-12-19 RX ADMIN — SULFAMETHOXAZOLE AND TRIMETHOPRIM 1 TABLET: 800; 160 TABLET ORAL at 05:34

## 2023-12-20 ENCOUNTER — HOSPITAL ENCOUNTER (INPATIENT)
Age: 58
LOS: 2 days | Discharge: HOME | DRG: 699 | End: 2023-12-22
Attending: EMERGENCY MEDICINE | Admitting: FAMILY MEDICINE
Payer: COMMERCIAL

## 2023-12-20 ENCOUNTER — APPOINTMENT (OUTPATIENT)
Dept: CT IMAGING | Age: 58
DRG: 699 | End: 2023-12-20
Payer: COMMERCIAL

## 2023-12-20 DIAGNOSIS — N10 ACUTE PYELONEPHRITIS: ICD-10-CM

## 2023-12-20 DIAGNOSIS — Z96.0 URETERAL STENT PRESENT: ICD-10-CM

## 2023-12-20 DIAGNOSIS — R10.9 LEFT FLANK PAIN: Primary | ICD-10-CM

## 2023-12-20 DIAGNOSIS — N39.0 COMPLICATED UTI (URINARY TRACT INFECTION): ICD-10-CM

## 2023-12-20 PROBLEM — Z87.891 FORMER SMOKER: Chronic | Status: ACTIVE | Noted: 2020-05-26

## 2023-12-20 PROBLEM — J44.9 CHRONIC OBSTRUCTIVE PULMONARY DISEASE (HCC): Chronic | Status: ACTIVE | Noted: 2022-08-23

## 2023-12-20 PROBLEM — G47.33 OBSTRUCTIVE SLEEP APNEA SYNDROME: Chronic | Status: ACTIVE | Noted: 2017-04-11

## 2023-12-20 PROBLEM — N20.0 LEFT NEPHROLITHIASIS: Status: ACTIVE | Noted: 2023-12-20

## 2023-12-20 PROBLEM — J45.909 ASTHMA IN ADULT: Chronic | Status: ACTIVE | Noted: 2023-04-10

## 2023-12-20 PROBLEM — K21.9 GASTROESOPHAGEAL REFLUX DISEASE WITHOUT ESOPHAGITIS: Chronic | Status: ACTIVE | Noted: 2023-12-20

## 2023-12-20 PROBLEM — K21.9 GASTROESOPHAGEAL REFLUX DISEASE WITHOUT ESOPHAGITIS: Status: ACTIVE | Noted: 2023-12-20

## 2023-12-20 LAB
ANION GAP SERPL CALCULATED.3IONS-SCNC: 12 MMOL/L (ref 9–17)
BACTERIA URNS QL MICRO: ABNORMAL
BASOPHILS # BLD: 0.03 K/UL (ref 0–0.2)
BASOPHILS NFR BLD: 0 % (ref 0–2)
BILIRUB UR QL STRIP: NEGATIVE
BUN SERPL-MCNC: 23 MG/DL (ref 8–23)
CALCIUM SERPL-MCNC: 9.8 MG/DL (ref 8.6–10.4)
CHARACTER UR: ABNORMAL
CHLORIDE SERPL-SCNC: 100 MMOL/L (ref 98–107)
CLARITY UR: ABNORMAL
CO2 SERPL-SCNC: 23 MMOL/L (ref 20–31)
COLOR UR: ABNORMAL
CREAT SERPL-MCNC: 1.2 MG/DL (ref 0.7–1.2)
EOSINOPHIL # BLD: 0.05 K/UL (ref 0–0.4)
EOSINOPHILS RELATIVE PERCENT: 0 % (ref 1–4)
EPI CELLS #/AREA URNS HPF: ABNORMAL /HPF (ref 0–5)
ERYTHROCYTE [DISTWIDTH] IN BLOOD BY AUTOMATED COUNT: 11.8 % (ref 12.5–15.4)
GFR SERPL CREATININE-BSD FRML MDRD: >60 ML/MIN/1.73M2
GLUCOSE SERPL-MCNC: 161 MG/DL (ref 70–99)
GLUCOSE UR STRIP-MCNC: NEGATIVE MG/DL
HCT VFR BLD AUTO: 42.7 % (ref 41–53)
HGB BLD-MCNC: 15.6 G/DL (ref 13.5–17.5)
HGB UR QL STRIP.AUTO: ABNORMAL
KETONES UR STRIP-MCNC: ABNORMAL MG/DL
LACTATE BLDV-SCNC: 1.2 MMOL/L (ref 0.5–1.9)
LEUKOCYTE ESTERASE UR QL STRIP: ABNORMAL
LIPASE SERPL-CCNC: 53 U/L (ref 13–60)
LYMPHOCYTES NFR BLD: 0.66 K/UL (ref 1–4.8)
LYMPHOCYTES RELATIVE PERCENT: 3 % (ref 24–44)
MCH RBC QN AUTO: 31.3 PG (ref 26–34)
MCHC RBC AUTO-ENTMCNC: 36.5 G/DL (ref 31–37)
MCV RBC AUTO: 85.6 FL (ref 80–100)
MICROORGANISM SPEC CULT: ABNORMAL
MONOCYTES NFR BLD: 1.4 K/UL (ref 0.1–1.2)
MONOCYTES NFR BLD: 7 % (ref 2–11)
MUCOUS THREADS URNS QL MICRO: ABNORMAL
NEUTROPHILS NFR BLD: 90 % (ref 36–66)
NEUTS SEG NFR BLD: 18.49 K/UL (ref 1.8–7.7)
NITRITE UR QL STRIP: NEGATIVE
PH UR STRIP: 6 [PH] (ref 5–8)
PLATELET # BLD AUTO: 241 K/UL (ref 140–450)
PMV BLD AUTO: 9.7 FL (ref 8–14)
POTASSIUM SERPL-SCNC: 4.1 MMOL/L (ref 3.7–5.3)
PROT UR STRIP-MCNC: ABNORMAL MG/DL
RBC # BLD AUTO: 4.99 M/UL (ref 4.5–5.9)
RBC #/AREA URNS HPF: ABNORMAL /HPF (ref 0–2)
SODIUM SERPL-SCNC: 135 MMOL/L (ref 135–144)
SP GR UR STRIP: 1.02 (ref 1–1.03)
SPECIMEN DESCRIPTION: ABNORMAL
TROPONIN I SERPL HS-MCNC: 14 NG/L (ref 0–22)
UROBILINOGEN UR STRIP-ACNC: NORMAL EU/DL (ref 0–1)
WBC #/AREA URNS HPF: ABNORMAL /HPF (ref 0–5)
WBC OTHER # BLD: 20.6 K/UL (ref 3.5–11)

## 2023-12-20 PROCEDURE — 83690 ASSAY OF LIPASE: CPT

## 2023-12-20 PROCEDURE — 85025 COMPLETE CBC W/AUTO DIFF WBC: CPT

## 2023-12-20 PROCEDURE — 99285 EMERGENCY DEPT VISIT HI MDM: CPT

## 2023-12-20 PROCEDURE — 84484 ASSAY OF TROPONIN QUANT: CPT

## 2023-12-20 PROCEDURE — 96375 TX/PRO/DX INJ NEW DRUG ADDON: CPT

## 2023-12-20 PROCEDURE — 2500000003 HC RX 250 WO HCPCS: Performed by: EMERGENCY MEDICINE

## 2023-12-20 PROCEDURE — 80048 BASIC METABOLIC PNL TOTAL CA: CPT

## 2023-12-20 PROCEDURE — 6360000002 HC RX W HCPCS

## 2023-12-20 PROCEDURE — 87186 SC STD MICRODIL/AGAR DIL: CPT

## 2023-12-20 PROCEDURE — 81001 URINALYSIS AUTO W/SCOPE: CPT

## 2023-12-20 PROCEDURE — 2580000003 HC RX 258: Performed by: STUDENT IN AN ORGANIZED HEALTH CARE EDUCATION/TRAINING PROGRAM

## 2023-12-20 PROCEDURE — 83605 ASSAY OF LACTIC ACID: CPT

## 2023-12-20 PROCEDURE — 2580000003 HC RX 258: Performed by: EMERGENCY MEDICINE

## 2023-12-20 PROCEDURE — 1200000000 HC SEMI PRIVATE

## 2023-12-20 PROCEDURE — 6360000002 HC RX W HCPCS: Performed by: EMERGENCY MEDICINE

## 2023-12-20 PROCEDURE — 99222 1ST HOSP IP/OBS MODERATE 55: CPT | Performed by: FAMILY MEDICINE

## 2023-12-20 PROCEDURE — 96374 THER/PROPH/DIAG INJ IV PUSH: CPT

## 2023-12-20 PROCEDURE — 2580000003 HC RX 258

## 2023-12-20 PROCEDURE — 87040 BLOOD CULTURE FOR BACTERIA: CPT

## 2023-12-20 PROCEDURE — 6370000000 HC RX 637 (ALT 250 FOR IP)

## 2023-12-20 PROCEDURE — 6360000002 HC RX W HCPCS: Performed by: STUDENT IN AN ORGANIZED HEALTH CARE EDUCATION/TRAINING PROGRAM

## 2023-12-20 PROCEDURE — 74176 CT ABD & PELVIS W/O CONTRAST: CPT

## 2023-12-20 PROCEDURE — 36415 COLL VENOUS BLD VENIPUNCTURE: CPT

## 2023-12-20 PROCEDURE — 87077 CULTURE AEROBIC IDENTIFY: CPT

## 2023-12-20 PROCEDURE — 87086 URINE CULTURE/COLONY COUNT: CPT

## 2023-12-20 RX ORDER — MORPHINE SULFATE 2 MG/ML
2 INJECTION, SOLUTION INTRAMUSCULAR; INTRAVENOUS EVERY 4 HOURS PRN
Status: DISCONTINUED | OUTPATIENT
Start: 2023-12-20 | End: 2023-12-20

## 2023-12-20 RX ORDER — SODIUM CHLORIDE 9 MG/ML
INJECTION, SOLUTION INTRAVENOUS PRN
Status: DISCONTINUED | OUTPATIENT
Start: 2023-12-20 | End: 2023-12-22 | Stop reason: HOSPADM

## 2023-12-20 RX ORDER — ONDANSETRON 2 MG/ML
4 INJECTION INTRAMUSCULAR; INTRAVENOUS EVERY 6 HOURS PRN
Status: DISCONTINUED | OUTPATIENT
Start: 2023-12-20 | End: 2023-12-22 | Stop reason: HOSPADM

## 2023-12-20 RX ORDER — MORPHINE SULFATE 4 MG/ML
4 INJECTION, SOLUTION INTRAMUSCULAR; INTRAVENOUS ONCE
Status: COMPLETED | OUTPATIENT
Start: 2023-12-20 | End: 2023-12-20

## 2023-12-20 RX ORDER — MAGNESIUM SULFATE IN WATER 40 MG/ML
2000 INJECTION, SOLUTION INTRAVENOUS PRN
Status: DISCONTINUED | OUTPATIENT
Start: 2023-12-20 | End: 2023-12-22 | Stop reason: HOSPADM

## 2023-12-20 RX ORDER — ACETAMINOPHEN 325 MG/1
650 TABLET ORAL EVERY 4 HOURS PRN
Status: DISCONTINUED | OUTPATIENT
Start: 2023-12-20 | End: 2023-12-22 | Stop reason: HOSPADM

## 2023-12-20 RX ORDER — ONDANSETRON 4 MG/1
4 TABLET, ORALLY DISINTEGRATING ORAL EVERY 8 HOURS PRN
Status: DISCONTINUED | OUTPATIENT
Start: 2023-12-20 | End: 2023-12-22 | Stop reason: HOSPADM

## 2023-12-20 RX ORDER — LISINOPRIL 5 MG/1
5 TABLET ORAL DAILY
Status: DISCONTINUED | OUTPATIENT
Start: 2023-12-20 | End: 2023-12-22 | Stop reason: HOSPADM

## 2023-12-20 RX ORDER — KETOROLAC TROMETHAMINE 30 MG/ML
30 INJECTION, SOLUTION INTRAMUSCULAR; INTRAVENOUS EVERY 6 HOURS
Status: DISCONTINUED | OUTPATIENT
Start: 2023-12-20 | End: 2023-12-22 | Stop reason: HOSPADM

## 2023-12-20 RX ORDER — KETOROLAC TROMETHAMINE 30 MG/ML
30 INJECTION, SOLUTION INTRAMUSCULAR; INTRAVENOUS ONCE
Status: COMPLETED | OUTPATIENT
Start: 2023-12-20 | End: 2023-12-20

## 2023-12-20 RX ORDER — SODIUM CHLORIDE 0.9 % (FLUSH) 0.9 %
5-40 SYRINGE (ML) INJECTION EVERY 12 HOURS SCHEDULED
Status: DISCONTINUED | OUTPATIENT
Start: 2023-12-20 | End: 2023-12-22 | Stop reason: HOSPADM

## 2023-12-20 RX ORDER — SODIUM CHLORIDE 0.9 % (FLUSH) 0.9 %
5-40 SYRINGE (ML) INJECTION PRN
Status: DISCONTINUED | OUTPATIENT
Start: 2023-12-20 | End: 2023-12-22 | Stop reason: HOSPADM

## 2023-12-20 RX ORDER — SODIUM CHLORIDE 9 MG/ML
INJECTION, SOLUTION INTRAVENOUS CONTINUOUS
Status: DISCONTINUED | OUTPATIENT
Start: 2023-12-20 | End: 2023-12-22 | Stop reason: HOSPADM

## 2023-12-20 RX ORDER — OXYBUTYNIN CHLORIDE 5 MG/1
10 TABLET, EXTENDED RELEASE ORAL NIGHTLY
Status: DISCONTINUED | OUTPATIENT
Start: 2023-12-20 | End: 2023-12-22 | Stop reason: HOSPADM

## 2023-12-20 RX ORDER — ONDANSETRON 2 MG/ML
4 INJECTION INTRAMUSCULAR; INTRAVENOUS ONCE
Status: COMPLETED | OUTPATIENT
Start: 2023-12-20 | End: 2023-12-20

## 2023-12-20 RX ORDER — POTASSIUM CHLORIDE 20 MEQ/1
40 TABLET, EXTENDED RELEASE ORAL PRN
Status: DISCONTINUED | OUTPATIENT
Start: 2023-12-20 | End: 2023-12-22 | Stop reason: HOSPADM

## 2023-12-20 RX ORDER — ACETAMINOPHEN 325 MG/1
650 TABLET ORAL EVERY 6 HOURS PRN
Status: DISCONTINUED | OUTPATIENT
Start: 2023-12-20 | End: 2023-12-20 | Stop reason: SDUPTHER

## 2023-12-20 RX ORDER — POTASSIUM CHLORIDE 7.45 MG/ML
10 INJECTION INTRAVENOUS PRN
Status: DISCONTINUED | OUTPATIENT
Start: 2023-12-20 | End: 2023-12-22 | Stop reason: HOSPADM

## 2023-12-20 RX ORDER — POLYETHYLENE GLYCOL 3350 17 G/17G
17 POWDER, FOR SOLUTION ORAL DAILY PRN
Status: DISCONTINUED | OUTPATIENT
Start: 2023-12-20 | End: 2023-12-22 | Stop reason: HOSPADM

## 2023-12-20 RX ORDER — PHENAZOPYRIDINE HYDROCHLORIDE 100 MG/1
200 TABLET, FILM COATED ORAL
Status: DISCONTINUED | OUTPATIENT
Start: 2023-12-20 | End: 2023-12-22 | Stop reason: HOSPADM

## 2023-12-20 RX ORDER — MORPHINE SULFATE 4 MG/ML
4 INJECTION, SOLUTION INTRAMUSCULAR; INTRAVENOUS EVERY 4 HOURS PRN
Status: DISCONTINUED | OUTPATIENT
Start: 2023-12-20 | End: 2023-12-22 | Stop reason: HOSPADM

## 2023-12-20 RX ORDER — ENOXAPARIN SODIUM 100 MG/ML
40 INJECTION SUBCUTANEOUS DAILY
Status: DISCONTINUED | OUTPATIENT
Start: 2023-12-20 | End: 2023-12-22 | Stop reason: HOSPADM

## 2023-12-20 RX ORDER — KETOROLAC TROMETHAMINE 30 MG/ML
30 INJECTION, SOLUTION INTRAMUSCULAR; INTRAVENOUS EVERY 6 HOURS PRN
Status: DISCONTINUED | OUTPATIENT
Start: 2023-12-20 | End: 2023-12-22 | Stop reason: HOSPADM

## 2023-12-20 RX ORDER — ACETAMINOPHEN 650 MG/1
650 SUPPOSITORY RECTAL EVERY 6 HOURS PRN
Status: DISCONTINUED | OUTPATIENT
Start: 2023-12-20 | End: 2023-12-22 | Stop reason: HOSPADM

## 2023-12-20 RX ORDER — OXYCODONE HYDROCHLORIDE AND ACETAMINOPHEN 5; 325 MG/1; MG/1
1 TABLET ORAL EVERY 6 HOURS PRN
Status: DISCONTINUED | OUTPATIENT
Start: 2023-12-20 | End: 2023-12-22 | Stop reason: HOSPADM

## 2023-12-20 RX ORDER — 0.9 % SODIUM CHLORIDE 0.9 %
1000 INTRAVENOUS SOLUTION INTRAVENOUS ONCE
Status: COMPLETED | OUTPATIENT
Start: 2023-12-20 | End: 2023-12-20

## 2023-12-20 RX ADMIN — ONDANSETRON 4 MG: 2 INJECTION INTRAMUSCULAR; INTRAVENOUS at 12:24

## 2023-12-20 RX ADMIN — SODIUM CHLORIDE: 9 INJECTION, SOLUTION INTRAVENOUS at 16:37

## 2023-12-20 RX ADMIN — MORPHINE SULFATE 2 MG: 2 INJECTION, SOLUTION INTRAMUSCULAR; INTRAVENOUS at 12:16

## 2023-12-20 RX ADMIN — SODIUM CHLORIDE 1000 ML: 9 INJECTION, SOLUTION INTRAVENOUS at 05:48

## 2023-12-20 RX ADMIN — KETOROLAC TROMETHAMINE 30 MG: 30 INJECTION, SOLUTION INTRAMUSCULAR; INTRAVENOUS at 20:56

## 2023-12-20 RX ADMIN — LISINOPRIL 5 MG: 5 TABLET ORAL at 12:16

## 2023-12-20 RX ADMIN — MORPHINE SULFATE 4 MG: 4 INJECTION INTRAVENOUS at 05:47

## 2023-12-20 RX ADMIN — PHENAZOPYRIDINE HYDROCHLORIDE 200 MG: 100 TABLET ORAL at 16:39

## 2023-12-20 RX ADMIN — VANCOMYCIN HYDROCHLORIDE 1250 MG: 1.25 INJECTION, POWDER, LYOPHILIZED, FOR SOLUTION INTRAVENOUS at 12:21

## 2023-12-20 RX ADMIN — ACETAMINOPHEN 650 MG: 325 TABLET ORAL at 12:16

## 2023-12-20 RX ADMIN — AMPICILLIN SODIUM 2000 MG: 2 INJECTION, POWDER, FOR SOLUTION INTRAVENOUS at 18:05

## 2023-12-20 RX ADMIN — MORPHINE SULFATE 4 MG: 4 INJECTION INTRAVENOUS at 15:29

## 2023-12-20 RX ADMIN — HYDROMORPHONE HYDROCHLORIDE 1 MG: 1 INJECTION, SOLUTION INTRAMUSCULAR; INTRAVENOUS; SUBCUTANEOUS at 07:14

## 2023-12-20 RX ADMIN — CEFTRIAXONE SODIUM 1000 MG: 1 INJECTION, POWDER, FOR SOLUTION INTRAMUSCULAR; INTRAVENOUS at 10:53

## 2023-12-20 RX ADMIN — KETOROLAC TROMETHAMINE 30 MG: 30 INJECTION, SOLUTION INTRAMUSCULAR; INTRAVENOUS at 13:21

## 2023-12-20 RX ADMIN — AMPICILLIN SODIUM 2000 MG: 2 INJECTION, POWDER, FOR SOLUTION INTRAVENOUS at 23:37

## 2023-12-20 RX ADMIN — ONDANSETRON 4 MG: 2 INJECTION INTRAMUSCULAR; INTRAVENOUS at 05:47

## 2023-12-20 RX ADMIN — KETOROLAC TROMETHAMINE 30 MG: 30 INJECTION, SOLUTION INTRAMUSCULAR; INTRAVENOUS at 09:56

## 2023-12-20 RX ADMIN — OXYBUTYNIN CHLORIDE 10 MG: 5 TABLET, EXTENDED RELEASE ORAL at 19:59

## 2023-12-20 RX ADMIN — MORPHINE SULFATE 4 MG: 4 INJECTION INTRAVENOUS at 19:55

## 2023-12-20 RX ADMIN — OXYCODONE HYDROCHLORIDE AND ACETAMINOPHEN 1 TABLET: 5; 325 TABLET ORAL at 17:42

## 2023-12-20 RX ADMIN — PHENAZOPYRIDINE HYDROCHLORIDE 200 MG: 100 TABLET ORAL at 12:16

## 2023-12-20 NOTE — H&P
perinephric inflammatory stranding and fluid. Urinary retention involving the bladder. Stable mild dilatation the common bile duct. Findings possibly related to constipation. Normal appearance of the appendix. Additional findings noted above RECOMMENDATIONS: Mild dilatation of the infrarenal abdominal aorta. Follow-up CT in 5 years to document stability of this finding may be helpful. Assessment :      Hospital Problems             Last Modified POA    * (Principal) Complicated UTI (urinary tract infection) 12/20/2023 Yes    Chronic obstructive pulmonary disease ([address]) 12/20/2023 Yes    Essential hypertension (Chronic) 12/20/2023 Yes    Former smoker 12/20/2023 Yes    Ureteral stent present 12/20/2023 Yes    Left nephrolithiasis 12/20/2023 Yes       Plan:     Complicated UTI   Right Ureteral stent present   Recent history of obstructing right ureteral stone, patient is s/p right ureteral stent placement on 12/16/23  Reported to ED yesterday with flank pain, chills - discharged on Bactrim  Urine culture from yesterday speciated as E. Faecalis >100,000 CFUs  Reported to ED today with same, but worsened symptoms  UA- elmira cloudy urine, with trace of ketones, 2 + protein, small leukocyte esterase, moderate bacteria. Urine culture from today pending  S/p IV Rocephin in ED  Vancomycin added on after admission due to prior urine culture review  Urology notified nurse later in day that ampicillin recommended, vancomycin discontinued and ampicillin started  FU Blood and urine cultures    Ordered Oxybutynin 10 mg and Pyridium per discussion with urology - pt continues with significant flank pain  Pain control: Tylenol for mild, Toradol, morphine for mod-severe pain  CT abdomen pelvis non obstructing urinary tract calculus.  Very mild right hydronephrosis significantly improved from prior interval placement of ureteric stent, right ureteric calculus no longer visualized, no acute infective or inflammatory

## 2023-12-20 NOTE — CARE COORDINATION
12/20/23 1616   Readmission Assessment   Number of Days since last admission? 1-7 days   Previous Disposition Home with Family   Who is being Interviewed Patient   What was the patient's/caregiver's perception as to why they think they needed to return back to the hospital? Other (Comment)  (Pain control)   Did you visit your Primary Care Physician after you left the hospital, before you returned this time? No   Why weren't you able to visit your PCP? Did not have an appointment   Did you see a specialist, such as Cardiac, Pulmonary, Orthopedic Physician, etc. after you left the hospital? Yes   Who advised the patient to return to the hospital? Self-referral   Does the patient report anything that got in the way of taking their medications? No   In our efforts to provide the best possible care to you and others like you, can you think of anything that we could have done to help you after you left the hospital the first time, so that you might not have needed to return so soon?  Other (Comment)  (none)
Potential DME:    Patient expects to discharge to: House  Plan for transportation at discharge: Family    Financial    Payor: Millie Jernigan / Plan: Zev Nicoleelor / Product Type: *No Product type* /     Does insurance require precert for SNF: Yes    Potential assistance Purchasing Medications: No  Meds-to-Beds request:        [address] [place] [address] [phone]  [address] [address]  Phone: [phone] Fax: [phone]    [address] [place] [address] [address] [facility] [phone] - F [phone]  [address] [facility] [address]  Phone: [phone] Fax: [address] [facility] [address] [facility], [address] [place] [address],[address] [address]  Phone: [phone] Fax: [phone]      Notes:    Factors facilitating achievement of predicted outcomes: Family support and Cooperative    Barriers to discharge: Medical complications    Additional Case Management Notes: Plan is home with spouse to assist as needed. Denies any needs at present, will continue to follow    The Plan for Transition of Care is related to the following treatment goals of Acute pyelonephritis [N10]  Left flank pain [B65.0]  Complicated UTI (urinary tract infection) [Q35.9]    IF APPLICABLE: The Patient and/or patient representative Theodore Chacon and his family were provided with a choice of provider and agrees with the discharge plan. Freedom of choice list with basic dialogue that supports the patient's individualized plan of care/goals and shares the quality data associated with the providers was provided to:     Patient Representative Name:       The Patient and/or Patient Representative Agree with the Discharge Plan?       Ulices Gaines RN  Case Management Department  Ph:  Fax:

## 2023-12-20 NOTE — PLAN OF CARE
Problem: Discharge Planning  Goal: Discharge to home or other facility with appropriate resources  Outcome: Progressing  Flowsheets (Taken 12/20/2023 1547)  Discharge to home or other facility with appropriate resources:   Identify barriers to discharge with patient and caregiver   Arrange for needed discharge resources and transportation as appropriate   Identify discharge learning needs (meds, wound care, etc)   Refer to discharge planning if patient needs post-hospital services based on physician order or complex needs related to functional status, cognitive ability or social support system     Problem: Pain  Goal: Verbalizes/displays adequate comfort level or baseline comfort level  Outcome: Progressing  Flowsheets (Taken 12/20/2023 1547)  Verbalizes/displays adequate comfort level or baseline comfort level:   Encourage patient to monitor pain and request assistance   Assess pain using appropriate pain scale   Administer analgesics based on type and severity of pain and evaluate response   Implement non-pharmacological measures as appropriate and evaluate response   Consider cultural and social influences on pain and pain management   Notify Licensed Independent Practitioner if interventions unsuccessful or patient reports new pain

## 2023-12-21 LAB
ANION GAP SERPL CALCULATED.3IONS-SCNC: 7 MMOL/L (ref 9–17)
BASOPHILS # BLD: 0 K/UL (ref 0–0.2)
BASOPHILS NFR BLD: 0 % (ref 0–2)
BUN SERPL-MCNC: 19 MG/DL (ref 8–23)
CALCIUM SERPL-MCNC: 8.4 MG/DL (ref 8.6–10.4)
CHLORIDE SERPL-SCNC: 104 MMOL/L (ref 98–107)
CO2 SERPL-SCNC: 25 MMOL/L (ref 20–31)
CREAT SERPL-MCNC: 1.2 MG/DL (ref 0.7–1.2)
EOSINOPHIL # BLD: 0 K/UL (ref 0–0.4)
EOSINOPHILS RELATIVE PERCENT: 0 % (ref 1–4)
ERYTHROCYTE [DISTWIDTH] IN BLOOD BY AUTOMATED COUNT: 12.1 % (ref 12.5–15.4)
GFR SERPL CREATININE-BSD FRML MDRD: >60 ML/MIN/1.73M2
GLUCOSE SERPL-MCNC: 141 MG/DL (ref 70–99)
HCT VFR BLD AUTO: 38.8 % (ref 41–53)
HGB BLD-MCNC: 13.7 G/DL (ref 13.5–17.5)
LYMPHOCYTES NFR BLD: 1.66 K/UL (ref 1–4.8)
LYMPHOCYTES RELATIVE PERCENT: 8 % (ref 24–44)
MCH RBC QN AUTO: 31.3 PG (ref 26–34)
MCHC RBC AUTO-ENTMCNC: 35.3 G/DL (ref 31–37)
MCV RBC AUTO: 88.6 FL (ref 80–100)
MONOCYTES NFR BLD: 1.66 K/UL (ref 0.1–0.8)
MONOCYTES NFR BLD: 8 % (ref 1–7)
MORPHOLOGY: NORMAL
NEUTROPHILS NFR BLD: 84 % (ref 36–66)
NEUTS SEG NFR BLD: 17.38 K/UL (ref 1.8–7.7)
PLATELET # BLD AUTO: 211 K/UL (ref 140–450)
PMV BLD AUTO: 7.6 FL (ref 6–12)
POTASSIUM SERPL-SCNC: 4.5 MMOL/L (ref 3.7–5.3)
RBC # BLD AUTO: 4.38 M/UL (ref 4.5–5.9)
SODIUM SERPL-SCNC: 136 MMOL/L (ref 135–144)
WBC OTHER # BLD: 20.7 K/UL (ref 3.5–11)

## 2023-12-21 PROCEDURE — 99232 SBSQ HOSP IP/OBS MODERATE 35: CPT | Performed by: FAMILY MEDICINE

## 2023-12-21 PROCEDURE — 6370000000 HC RX 637 (ALT 250 FOR IP)

## 2023-12-21 PROCEDURE — 1200000000 HC SEMI PRIVATE

## 2023-12-21 PROCEDURE — 6360000002 HC RX W HCPCS

## 2023-12-21 PROCEDURE — 80048 BASIC METABOLIC PNL TOTAL CA: CPT

## 2023-12-21 PROCEDURE — 85025 COMPLETE CBC W/AUTO DIFF WBC: CPT

## 2023-12-21 PROCEDURE — 2580000003 HC RX 258

## 2023-12-21 PROCEDURE — 36415 COLL VENOUS BLD VENIPUNCTURE: CPT

## 2023-12-21 RX ORDER — SENNA AND DOCUSATE SODIUM 50; 8.6 MG/1; MG/1
2 TABLET, FILM COATED ORAL DAILY PRN
Status: DISCONTINUED | OUTPATIENT
Start: 2023-12-21 | End: 2023-12-22 | Stop reason: HOSPADM

## 2023-12-21 RX ADMIN — KETOROLAC TROMETHAMINE 30 MG: 30 INJECTION, SOLUTION INTRAMUSCULAR; INTRAVENOUS at 14:33

## 2023-12-21 RX ADMIN — PHENAZOPYRIDINE HYDROCHLORIDE 200 MG: 100 TABLET ORAL at 18:29

## 2023-12-21 RX ADMIN — KETOROLAC TROMETHAMINE 30 MG: 30 INJECTION, SOLUTION INTRAMUSCULAR; INTRAVENOUS at 09:05

## 2023-12-21 RX ADMIN — AMPICILLIN SODIUM 2000 MG: 2 INJECTION, POWDER, FOR SOLUTION INTRAVENOUS at 23:44

## 2023-12-21 RX ADMIN — AMPICILLIN SODIUM 2000 MG: 2 INJECTION, POWDER, FOR SOLUTION INTRAVENOUS at 18:34

## 2023-12-21 RX ADMIN — MORPHINE SULFATE 4 MG: 4 INJECTION INTRAVENOUS at 16:49

## 2023-12-21 RX ADMIN — SODIUM CHLORIDE: 9 INJECTION, SOLUTION INTRAVENOUS at 20:56

## 2023-12-21 RX ADMIN — PHENAZOPYRIDINE HYDROCHLORIDE 200 MG: 100 TABLET ORAL at 12:13

## 2023-12-21 RX ADMIN — MORPHINE SULFATE 4 MG: 4 INJECTION INTRAVENOUS at 01:50

## 2023-12-21 RX ADMIN — AMPICILLIN SODIUM 2000 MG: 2 INJECTION, POWDER, FOR SOLUTION INTRAVENOUS at 05:34

## 2023-12-21 RX ADMIN — SENNOSIDES AND DOCUSATE SODIUM 2 TABLET: 50; 8.6 TABLET ORAL at 22:46

## 2023-12-21 RX ADMIN — SODIUM CHLORIDE, PRESERVATIVE FREE 10 ML: 5 INJECTION INTRAVENOUS at 09:05

## 2023-12-21 RX ADMIN — ACETAMINOPHEN 650 MG: 325 TABLET ORAL at 20:41

## 2023-12-21 RX ADMIN — KETOROLAC TROMETHAMINE 30 MG: 30 INJECTION, SOLUTION INTRAMUSCULAR; INTRAVENOUS at 20:46

## 2023-12-21 RX ADMIN — KETOROLAC TROMETHAMINE 30 MG: 30 INJECTION, SOLUTION INTRAMUSCULAR; INTRAVENOUS at 02:47

## 2023-12-21 RX ADMIN — SODIUM CHLORIDE: 9 INJECTION, SOLUTION INTRAVENOUS at 12:09

## 2023-12-21 RX ADMIN — SODIUM CHLORIDE: 9 INJECTION, SOLUTION INTRAVENOUS at 01:54

## 2023-12-21 RX ADMIN — MORPHINE SULFATE 4 MG: 4 INJECTION INTRAVENOUS at 22:22

## 2023-12-21 RX ADMIN — AMPICILLIN SODIUM 2000 MG: 2 INJECTION, POWDER, FOR SOLUTION INTRAVENOUS at 12:21

## 2023-12-21 RX ADMIN — CEFTRIAXONE SODIUM 1000 MG: 1 INJECTION, POWDER, FOR SOLUTION INTRAMUSCULAR; INTRAVENOUS at 11:12

## 2023-12-21 RX ADMIN — OXYBUTYNIN CHLORIDE 10 MG: 5 TABLET, EXTENDED RELEASE ORAL at 20:43

## 2023-12-21 RX ADMIN — POLYETHYLENE GLYCOL 3350 17 G: 17 POWDER, FOR SOLUTION ORAL at 11:38

## 2023-12-21 RX ADMIN — ACETAMINOPHEN 650 MG: 325 TABLET ORAL at 01:57

## 2023-12-21 RX ADMIN — OXYCODONE HYDROCHLORIDE AND ACETAMINOPHEN 1 TABLET: 5; 325 TABLET ORAL at 12:03

## 2023-12-21 RX ADMIN — PHENAZOPYRIDINE HYDROCHLORIDE 200 MG: 100 TABLET ORAL at 07:59

## 2023-12-21 NOTE — PROGRESS NOTES
12/21/23 1051   Encounter Summary   Encounter Overview/Reason  Volunteer Encounter   Service Provided For: Patient   Referral/Consult From: Rounding   Last Encounter  12/21/23   Complexity of Encounter Low   Spiritual/Emotional needs   Type Spiritual Support   Assessment/Intervention/Outcome   Intervention Active listening;Sustaining Presence/Ministry of presence;Prayer (assurance of)/[place]

## 2023-12-21 NOTE — PROGRESS NOTES
Message sent to Dr. Georgette Muniz with notification of consult. Need to know if any plans of intervention for Kidney stone findings / recurrent UTI's.

## 2023-12-21 NOTE — PLAN OF CARE
Problem: Discharge Planning  Goal: Discharge to home or other facility with appropriate resources  Outcome: Progressing  Flowsheets (Taken 12/21/2023 0800)  Discharge to home or other facility with appropriate resources:   Identify barriers to discharge with patient and caregiver   Arrange for needed discharge resources and transportation as appropriate   Identify discharge learning needs (meds, wound care, etc)     Problem: Pain  Goal: Verbalizes/displays adequate comfort level or baseline comfort level  Outcome: Progressing     Problem: Chronic Conditions and Co-morbidities  Goal: Patient's chronic conditions and co-morbidity symptoms are monitored and maintained or improved  Outcome: Progressing

## 2023-12-21 NOTE — PROGRESS NOTES
Patient is a 58 yo male with PMHx of HTN, COPD, restless leg syndrome, osteoarthritis, cervical radiculopathy, nephrolithiasis, and GERD who presented to the ED with left flank pain. Patient had a recent right ureteral calculus measuring 5.2 mm with mild right-sided hydronephrosis and hydroureter, had a stent placed on 12/16/23. Patient admitted for complicated UTI. Patient is scheduled to get stent removed and lithotripsy on Friday 12/22/23. Pertinent labs reported leukocytosis 20.6, neutrophils 90%, BMP unremarkable. UA reported elmira cloudy urine, with trace of ketones, 2 + protein, small leukocyte esterase, moderate bacteria. Urine culture pending, though urine culture from 12/19 reported enterococcus faecalis. Started on IV Ampicillin and Rocephin. For pain oxybutynin and pyridium for bladder/ureter relaxation. Toradol scheduled q6h and PRN Percocet and Morphine. Patient rates pain today 6/10. Urology consulted and pending on further recommendations. Plan:     Continue IV Ampicillin and Rocephin (urology recommended continuing Rocephin in addition to ampicillin)  Await urology recommendations, no note in chart  Urine culture from 12/19 speciated as E. Faecalis >100,000 CFUs, sensitive to ampicillin  Urine culture from 12/20 also E. Faecalis  FU Blood cultures  Continue Oxybutynin 10 mg and Pyridium   Pain control: scheduled Toradol q6h and Percocet PRN and Morphine PRN   Urology consulted   BP elevated, likely secondary to pain, not currently in severe range  Will hold home lisinopril due to mildly increased Cr compared to baseline on admission  Continue home medications Advair inhaler, albuterol as needed during stay     Anticoagulation: Lovenox  Dispo: home   Diet: ADULT DIET;  Regular    Patient was seen, evaluated and discussed with Karyle Masson, MD Jenean Madden, MD   Family Medicine Resident, PGY-1   12/21/2023  9:43 AM      Senior Resident Attestation:    I have independently seen Nadya Jain Brittanie Pierson and discussed the assessment and plan with the intern listed above and the attending Dash Jenkins MD. I have reviewed the note and have included any edits or additional information above.      Micheal Larsen MD   Family Medicine Resident, PGY-3  12/21/2023  6:32 PM

## 2023-12-21 NOTE — PLAN OF CARE
Problem: Discharge Planning  Goal: Discharge to home or other facility with appropriate resources  12/21/2023 0030 by Lexx Shabazz RN  Outcome: Progressing  Flowsheets (Taken 12/20/2023 2020)  Discharge to home or other facility with appropriate resources: Identify barriers to discharge with patient and caregiver  12/20/2023 1547 by Mya Soler RN  Outcome: Progressing  Flowsheets (Taken 12/20/2023 1547)  Discharge to home or other facility with appropriate resources:   Identify barriers to discharge with patient and caregiver   Arrange for needed discharge resources and transportation as appropriate   Identify discharge learning needs (meds, wound care, etc)   Refer to discharge planning if patient needs post-hospital services based on physician order or complex needs related to functional status, cognitive ability or social support system     Problem: Pain  Goal: Verbalizes/displays adequate comfort level or baseline comfort level  12/21/2023 0030 by Lexx Shabazz RN  Outcome: Progressing  12/20/2023 1547 by Mya Soler RN  Outcome: Progressing  Flowsheets (Taken 12/20/2023 1547)  Verbalizes/displays adequate comfort level or baseline comfort level:   Encourage patient to monitor pain and request assistance   Assess pain using appropriate pain scale   Administer analgesics based on type and severity of pain and evaluate response   Implement non-pharmacological measures as appropriate and evaluate response   Consider cultural and social influences on pain and pain management   Notify Licensed Independent Practitioner if interventions unsuccessful or patient reports new pain

## 2023-12-22 VITALS
BODY MASS INDEX: 27.73 KG/M2 | HEIGHT: 73 IN | RESPIRATION RATE: 16 BRPM | TEMPERATURE: 98.1 F | WEIGHT: 209.22 LBS | HEART RATE: 67 BPM | DIASTOLIC BLOOD PRESSURE: 81 MMHG | OXYGEN SATURATION: 97 % | SYSTOLIC BLOOD PRESSURE: 144 MMHG

## 2023-12-22 PROBLEM — N10 ACUTE PYELONEPHRITIS: Status: ACTIVE | Noted: 2023-12-22

## 2023-12-22 PROBLEM — K59.01 SLOW TRANSIT CONSTIPATION: Status: ACTIVE | Noted: 2023-12-22

## 2023-12-22 PROBLEM — R10.9 LEFT FLANK PAIN: Status: ACTIVE | Noted: 2023-12-22

## 2023-12-22 LAB
ANION GAP SERPL CALCULATED.3IONS-SCNC: 10 MMOL/L (ref 9–17)
BASOPHILS # BLD: 0 K/UL (ref 0–0.2)
BASOPHILS NFR BLD: 0 % (ref 0–2)
BUN SERPL-MCNC: 17 MG/DL (ref 8–23)
CALCIUM SERPL-MCNC: 8 MG/DL (ref 8.6–10.4)
CHLORIDE SERPL-SCNC: 107 MMOL/L (ref 98–107)
CO2 SERPL-SCNC: 20 MMOL/L (ref 20–31)
CREAT SERPL-MCNC: 0.8 MG/DL (ref 0.7–1.2)
EOSINOPHIL # BLD: 0.1 K/UL (ref 0–0.4)
EOSINOPHILS RELATIVE PERCENT: 1 % (ref 1–4)
ERYTHROCYTE [DISTWIDTH] IN BLOOD BY AUTOMATED COUNT: 12.2 % (ref 12.5–15.4)
GFR SERPL CREATININE-BSD FRML MDRD: >60 ML/MIN/1.73M2
GLUCOSE SERPL-MCNC: 132 MG/DL (ref 70–99)
HCT VFR BLD AUTO: 34.6 % (ref 41–53)
HGB BLD-MCNC: 12.3 G/DL (ref 13.5–17.5)
LYMPHOCYTES NFR BLD: 0.9 K/UL (ref 1–4.8)
LYMPHOCYTES RELATIVE PERCENT: 7 % (ref 24–44)
MCH RBC QN AUTO: 31.5 PG (ref 26–34)
MCHC RBC AUTO-ENTMCNC: 35.5 G/DL (ref 31–37)
MCV RBC AUTO: 88.7 FL (ref 80–100)
MICROORGANISM SPEC CULT: ABNORMAL
MONOCYTES NFR BLD: 1 K/UL (ref 0.1–1.2)
MONOCYTES NFR BLD: 8 % (ref 2–11)
NEUTROPHILS NFR BLD: 84 % (ref 36–66)
NEUTS SEG NFR BLD: 11.5 K/UL (ref 1.8–7.7)
PLATELET # BLD AUTO: 186 K/UL (ref 140–450)
PMV BLD AUTO: 7.4 FL (ref 6–12)
POTASSIUM SERPL-SCNC: 4.4 MMOL/L (ref 3.7–5.3)
RBC # BLD AUTO: 3.9 M/UL (ref 4.5–5.9)
SODIUM SERPL-SCNC: 137 MMOL/L (ref 135–144)
SPECIMEN DESCRIPTION: ABNORMAL
WBC OTHER # BLD: 13.6 K/UL (ref 3.5–11)

## 2023-12-22 PROCEDURE — 36415 COLL VENOUS BLD VENIPUNCTURE: CPT

## 2023-12-22 PROCEDURE — 6370000000 HC RX 637 (ALT 250 FOR IP)

## 2023-12-22 PROCEDURE — 6360000002 HC RX W HCPCS

## 2023-12-22 PROCEDURE — 80048 BASIC METABOLIC PNL TOTAL CA: CPT

## 2023-12-22 PROCEDURE — 85025 COMPLETE CBC W/AUTO DIFF WBC: CPT

## 2023-12-22 PROCEDURE — 2580000003 HC RX 258

## 2023-12-22 PROCEDURE — 99238 HOSP IP/OBS DSCHRG MGMT 30/<: CPT | Performed by: STUDENT IN AN ORGANIZED HEALTH CARE EDUCATION/TRAINING PROGRAM

## 2023-12-22 RX ORDER — ACETAMINOPHEN 325 MG/1
650 TABLET ORAL EVERY 6 HOURS PRN
Qty: 120 TABLET | Refills: 0 | Status: SHIPPED | OUTPATIENT
Start: 2023-12-22

## 2023-12-22 RX ORDER — SENNA AND DOCUSATE SODIUM 50; 8.6 MG/1; MG/1
2 TABLET, FILM COATED ORAL DAILY PRN
Qty: 60 TABLET | Refills: 1 | Status: SHIPPED | OUTPATIENT
Start: 2023-12-22

## 2023-12-22 RX ORDER — PHENAZOPYRIDINE HYDROCHLORIDE 200 MG/1
200 TABLET, FILM COATED ORAL
Qty: 30 TABLET | Refills: 0 | Status: SHIPPED | OUTPATIENT
Start: 2023-12-22 | End: 2023-12-25

## 2023-12-22 RX ORDER — OXYCODONE HYDROCHLORIDE AND ACETAMINOPHEN 5; 325 MG/1; MG/1
1 TABLET ORAL EVERY 6 HOURS PRN
Qty: 28 TABLET | Refills: 0 | Status: SHIPPED | OUTPATIENT
Start: 2023-12-22 | End: 2023-12-29

## 2023-12-22 RX ORDER — IBUPROFEN 600 MG/1
600 TABLET ORAL 3 TIMES DAILY PRN
Qty: 30 TABLET | Refills: 0 | Status: SHIPPED | OUTPATIENT
Start: 2023-12-22

## 2023-12-22 RX ORDER — OXYBUTYNIN CHLORIDE 10 MG/1
10 TABLET, EXTENDED RELEASE ORAL NIGHTLY
Qty: 10 TABLET | Refills: 0 | Status: SHIPPED | OUTPATIENT
Start: 2023-12-22 | End: 2023-12-29 | Stop reason: SDUPTHER

## 2023-12-22 RX ORDER — AMOXICILLIN 875 MG/1
875 TABLET, COATED ORAL 2 TIMES DAILY
Qty: 14 TABLET | Refills: 0 | Status: SHIPPED | OUTPATIENT
Start: 2023-12-22 | End: 2023-12-29

## 2023-12-22 RX ORDER — POLYETHYLENE GLYCOL 3350 17 G/17G
17 POWDER, FOR SOLUTION ORAL DAILY PRN
Qty: 527 G | Refills: 1 | Status: SHIPPED | OUTPATIENT
Start: 2023-12-22 | End: 2024-01-21

## 2023-12-22 RX ADMIN — PHENAZOPYRIDINE HYDROCHLORIDE 200 MG: 100 TABLET ORAL at 08:44

## 2023-12-22 RX ADMIN — KETOROLAC TROMETHAMINE 30 MG: 30 INJECTION, SOLUTION INTRAMUSCULAR; INTRAVENOUS at 08:43

## 2023-12-22 RX ADMIN — OXYCODONE HYDROCHLORIDE AND ACETAMINOPHEN 1 TABLET: 5; 325 TABLET ORAL at 13:51

## 2023-12-22 RX ADMIN — POLYETHYLENE GLYCOL 3350 17 G: 17 POWDER, FOR SOLUTION ORAL at 08:43

## 2023-12-22 RX ADMIN — PHENAZOPYRIDINE HYDROCHLORIDE 200 MG: 100 TABLET ORAL at 13:51

## 2023-12-22 RX ADMIN — KETOROLAC TROMETHAMINE 30 MG: 30 INJECTION, SOLUTION INTRAMUSCULAR; INTRAVENOUS at 03:04

## 2023-12-22 RX ADMIN — AMPICILLIN SODIUM 2000 MG: 2 INJECTION, POWDER, FOR SOLUTION INTRAVENOUS at 05:37

## 2023-12-22 RX ADMIN — SODIUM CHLORIDE: 9 INJECTION, SOLUTION INTRAVENOUS at 05:34

## 2023-12-22 RX ADMIN — AMPICILLIN SODIUM 2000 MG: 2 INJECTION, POWDER, FOR SOLUTION INTRAVENOUS at 13:12

## 2023-12-22 RX ADMIN — CEFTRIAXONE SODIUM 1000 MG: 1 INJECTION, POWDER, FOR SOLUTION INTRAMUSCULAR; INTRAVENOUS at 11:16

## 2023-12-22 RX ADMIN — SENNOSIDES AND DOCUSATE SODIUM 2 TABLET: 50; 8.6 TABLET ORAL at 08:43

## 2023-12-22 RX ADMIN — LISINOPRIL 5 MG: 5 TABLET ORAL at 08:43

## 2023-12-22 NOTE — DISCHARGE SUMMARY
left kidney. 2. Very mild right hydronephrosis significantly improved from prior with interval placement of right double pigtail ureteric stent. Right ureteric calculus no longer visualized. 3. No acute infective or inflammatory process. Consultations:    Consults:     Final Specialist Recommendations/Findings:   PHARMACY TO DOSE VANCOMYCIN  IP CONSULT TO UROLOGY  IP CONSULT TO INFECTIOUS DISEASES      The patient was seen and examined on day of discharge and this discharge summary is in conjunction with any daily progress note from day of discharge. Immunization History   Administered Date(s) Administered    COVID-19, PFIZER PURPLE top, DILUTE for use, (age 15 y+), 30mcg/0.3mL 04/29/2021, 06/01/2021    TDaP, ADACEL (age 6y-58y), [facility] (age 10y+), IM, 0.5mL 01/01/2014        Discharge plan:     Disposition: Home    Physician Follow Up:   Kierra Yip MD  [id number] [place] [address]    Follow up in 1 week(s)      Elizabeth Landau, APRN - [address]  [address]  [facility]  [phone]    Follow up in 3 day(s)         Requiring Further Evaluation/Follow Up POST HOSPITALIZATION/Incidental Findings/Notes to PCP:   Patient needs to follow up with urology for stent and UTI management. Discharged on antibiotics to cover UTI    Diet: regular diet    Activity: As tolerated    Instructions given to patient:   - Please call urology, Dr. Josh Payne for follow up and management on stent/UTI. You are being discharged on antibiotics to cover your urinary tract infection   - Please return immediately to the [facility] Emergency Department if worsening pain, fevers, chills.        Discharge Medications:      Medication List        START taking these medications      acetaminophen 325 MG tablet  Commonly known as: EQ Acetaminophen  Take 2 tablets by mouth every 6 hours as needed for Pain     amoxicillin 875 MG tablet  Commonly known as: DITROPAN-XL  Take 1 tablet by mouth nightly     oxyCODONE-acetaminophen 5-325 MG per tablet  Commonly known as: PERCOCET  Take 1 tablet by mouth every 6 hours as needed for Pain for up to 7 days.  Max Daily Amount: 4 tablets     phenazopyridine 200 MG tablet  Commonly known as: PYRIDIUM  Take 1 tablet by mouth 3 times daily (with meals) for 3 days     polyethylene glycol 17 g packet  Commonly known as: GLYCOLAX  Take 1 packet by mouth daily as needed for Constipation     sennosides-docusate sodium 8.6-50 MG tablet  Commonly known as: SENOKOT-S  Take 2 tablets by mouth daily as needed for Constipation            CHANGE how you take these medications      lisinopril 10 MG tablet  Commonly known as: PRINIVIL;ZESTRIL  TAKE ONE TABLET BY MOUTH DAILY  What changed:   how much to take  how to take this  when to take this            CONTINUE taking these medications      albuterol sulfate  (90 Base) MCG/ACT inhaler  Commonly known as: PROVENTIL;VENTOLIN;PROAIR  INHALE 2 PUFFS BY MOUTH EVERY 6 HOURS AS NEEDED     Claritin-D 24 Hour  MG per extended release tablet  Generic drug: loratadine-pseudoephedrine  Take 1 tablet by mouth daily     fluticasone 50 MCG/ACT nasal spray  Commonly known as: FLONASE     fluticasone-salmeterol 115-21 MCG/ACT inhaler  Commonly known as: Advair HFA  Inhale 2 puffs into the lungs 2 times daily     MULTI FOR HIM 50+ PO     omeprazole 20 MG delayed release capsule  Commonly known as: PRILOSEC  TAKE 1 CAPSULE BY MOUTH EVERY MORNING BEFORE BREAKFAST     ondansetron 4 MG tablet  Commonly known as: ZOFRAN  Take 1 tablet by mouth 3 times daily as needed for Nausea or Vomiting            STOP taking these medications      celecoxib 200 MG capsule  Commonly known as: CELEBREX     HYDROcodone-acetaminophen 5-325 MG per tablet  Commonly known as: Norco     ketorolac 10 MG tablet  Commonly known as: TORADOL     sulfamethoxazole-trimethoprim 800-160 MG per

## 2023-12-22 NOTE — PROGRESS NOTES
Physician Progress Note      PATIENT:               Camilla Gonzales  CSN #:                  [id number]  :                       1965  ADMIT DATE:       2023 5:31 AM  DISCH DATE:  RESPONDING  PROVIDER #:        Ruby Lora MD          QUERY TEXT:    Pt admitted with UTI. Pt noted to have a history of rt ureteral stone with   placement of a ureteral stent placed on 12/15 and discharged home on   antibiotics after it was found to have a UTI. Urine culture per H&P positive   for E faecalis. Please clarify one of the following: The medical record reflects the following:  Risk Factors: UTI urinary calculus s/p stent  Clinical Indicators:admitted with UTI. Pt noted to have a history of rt   ureteral stone with placement of a ureteral stent placed on 12/15 and   discharged home on antibiotics after it was found to have a UTI. Urine culture   per H&P positive for E faecalis. Treatment: iv antibiotics, Urology consult, UA    Thank You Héctor Martinez RN BSN CCDS  Email [email or web address] [employer] [phone]  office hours M-F 6am to 2:30p  Options provided:  -- UTI not due to ureteral stent but likely d/t failure of outpatient   antibiotic treatment  -- UTI not due to ureteral stent  -- UTI due to ureteral stent  -- Other - I will add my own diagnosis  -- Disagree - Not applicable / Not valid  -- Disagree - Clinically unable to determine / Unknown  -- Refer to Clinical Documentation Reviewer    PROVIDER RESPONSE TEXT:    UTI is due to ureteral stent    Query created by:  Jose Anderson on 2023 8:33 AM      Electronically signed by:  Ruby Lora MD 2023 12:33 PM

## 2023-12-22 NOTE — PLAN OF CARE
Problem: Discharge Planning  Goal: Discharge to home or other facility with appropriate resources  12/21/2023 2317 by Dayan Coello RN  Outcome: Progressing  12/21/2023 1855 by Saturnino Howell RN  Outcome: Progressing  Flowsheets (Taken 12/21/2023 0800)  Discharge to home or other facility with appropriate resources:   Identify barriers to discharge with patient and caregiver   Arrange for needed discharge resources and transportation as appropriate   Identify discharge learning needs (meds, wound care, etc)     Problem: Pain  Goal: Verbalizes/displays adequate comfort level or baseline comfort level  12/21/2023 2317 by Dayan Coello RN  Outcome: Progressing  12/21/2023 1855 by Saturnino Howell RN  Outcome: Progressing     Problem: Chronic Conditions and Co-morbidities  Goal: Patient's chronic conditions and co-morbidity symptoms are monitored and maintained or improved  12/21/2023 2317 by Dayan Coello RN  Outcome: Progressing  12/21/2023 1855 by Saturnino Howell RN  Outcome: Progressing

## 2023-12-22 NOTE — DISCHARGE INSTRUCTIONS
Date of admission: 12/20/2023  Date of discharge: 12/22/23    Your care was provided by the attending and resident physicians of the [address] [place] [address] Residency Program. The primary encounter diagnosis was Left flank pain. A diagnosis of Acute pyelonephritis was also pertinent to this visit. FOLLOW-UP  - Follow up with your primary care physician @PCP@ in 3 days.  - Follow up with your urologist in 1  weeks. MEDICATIONS  -  your antibiotics and pain medications from the pharmacy and take as directed. - Continue taking your other home medications as directed. ADDITIONAL INSTRUCTIONS  - Please call urology, Dr. Rosi Bamberger for follow up and management on stent/UTI. You are being discharged on antibiotics to cover your urinary tract infection   - Please return immediately to the [facility] Emergency Department if worsening pain, fevers, chills.

## 2023-12-22 NOTE — CONSULTS
negative  Lungs: Respiratory effort is normal  Cardiovascular: Normal peripheral pulses  Abdomen: Soft, non-tender, non-distended with no CVA, flank pain or hepatosplenomegaly. No hernias. Kidneys normal.  Lymphatics: No palpable lymphadenopathy. Bladder non-tender and not distended.   Pelvic exam: deferred  Rectal exam not indicated    LABS:   Recent Labs     12/20/23  0545 12/21/23  0654 12/22/23  0646   WBC 20.6* 20.7* 13.6*   HGB 15.6 13.7 12.3*   HCT 42.7 38.8* 34.6*   MCV 85.6 88.6 88.7    211 186     Recent Labs     12/20/23  0545 12/21/23  0654 12/22/23  0646    136 137   K 4.1 4.5 4.4    104 107   CO2 23 25 20   BUN 23 19 17   CREATININE 1.2 1.2 0.8       Additional Lab/culture results:    Urinalysis:   Recent Labs     12/20/23  0807   COLORU MELANIE*   PHUR 6.0   WBCUA 10 TO 20   RBCUA TOO NUMEROUS TO COUNT   MUCUS 2+*   BACTERIA MODERATE*   SPECGRAV 1.023   LEUKOCYTESUR SMALL*   UROBILINOGEN Normal   BILIRUBINUR NEGATIVE        -----------------------------------------------------------------  Imaging Results:      Assessment and Plan   Impression:    Patient Active Problem List   Diagnosis    Restless leg syndrome    Hypertriglyceridemia    Disc disease, degenerative, lumbar or lumbosacral    Somatic dysfunction of cervical region    Chronic midline low back pain without sciatica    Essential hypertension    Obstructive sleep apnea syndrome    Lymph node enlargement    Anxiety    Former smoker    Sensorineural hearing loss, bilateral    Deviated septum    History of placement of ear tubes    Cellulitis of right knee    Prepatellar bursitis of right knee    Hyponatremia    Hypocalcemia    Septic infrapatellar bursitis, left    Normocytic normochromic anemia    Primary osteoarthritis involving multiple joints    Shortness of breath    Pain in neck    Radicular pain in left arm    Chronic obstructive pulmonary disease (HCC)    Asthma in adult    Mixed conductive and sensorineural hearing

## 2023-12-22 NOTE — PROGRESS NOTES
Physician Progress Note      PATIENT:               Geeta Moyer  CSN #:                  [id number]  :                       1965  ADMIT DATE:       2023 5:31 AM  DISCH DATE:  RESPONDING  PROVIDER #:        Olesya Platt          QUERY TEXT:    Pt admitted with Complicated UTI . Pt noted to have wbc 20.6>20.7, heart rate   up to 103 and temp high of 38.3 oral. . If possible, please document in the   progress notes and discharge summary if you are evaluating and /or treating   any of the following: The medical record reflects the following:  Risk Factors: urinary calculus s/p stent, UTI  Clinical Indicators: admitted with Complicated UTI . Pt noted to have wbc   20.6>20.7, heart rate up to 103 and temp high of 38.3 oral. .  Treatment: iv antibiotics monitoring WBC, urology consult    Thank Ruperto Escobar RN BSN CCDS  Email [email or web address] [employer]  Cell [phone]  office hours M-F 6am to 2:30p  Options provided:  -- Sepsis, present on admission  -- UTI, without Sepsis  -- Other - I will add my own diagnosis  -- Disagree - Not applicable / Not valid  -- Disagree - Clinically unable to determine / Unknown  -- Refer to Clinical Documentation Reviewer    PROVIDER RESPONSE TEXT:    This patient has UTI, without Sepsis. Query created by:  Sujey Cordova on 2023 8:39 AM      Electronically signed by:  Olesya Platt 2023 12:51 PM

## 2023-12-25 LAB
MICROORGANISM SPEC CULT: NORMAL
MICROORGANISM SPEC CULT: NORMAL
SERVICE CMNT-IMP: NORMAL
SERVICE CMNT-IMP: NORMAL
SPECIMEN DESCRIPTION: NORMAL
SPECIMEN DESCRIPTION: NORMAL

## 2023-12-27 DIAGNOSIS — I10 ESSENTIAL HYPERTENSION: ICD-10-CM

## 2023-12-27 RX ORDER — LISINOPRIL 10 MG/1
TABLET ORAL
Qty: 90 TABLET | Refills: 3 | Status: SHIPPED | OUTPATIENT
Start: 2023-12-27

## 2023-12-28 LAB
BASOPHILS ABSOLUTE: 0.1 /ΜL
BASOPHILS RELATIVE PERCENT: 0.6 %
EOSINOPHILS ABSOLUTE: 0.5 /ΜL
EOSINOPHILS RELATIVE PERCENT: 4.1 %
HCT VFR BLD CALC: 38.5 % (ref 41–53)
HEMOGLOBIN: 13.3 G/DL (ref 13.5–17.5)
LYMPHOCYTES ABSOLUTE: 1.6 /ΜL
LYMPHOCYTES RELATIVE PERCENT: 13.4 %
MCH RBC QN AUTO: 30.6 PG
MCHC RBC AUTO-ENTMCNC: 34.5 G/DL
MCV RBC AUTO: 88.7 FL
MONOCYTES ABSOLUTE: 0.6 /ΜL
MONOCYTES RELATIVE PERCENT: 5.2 %
NEUTROPHILS ABSOLUTE: 8.8 /ΜL
NEUTROPHILS RELATIVE PERCENT: 74.7 %
PLATELET # BLD: 452 K/ΜL
PMV BLD AUTO: 8.5 FL
RBC # BLD: 4.34 10^6/ΜL
WBC # BLD: 11.8 10^3/ML

## 2023-12-29 DIAGNOSIS — M54.12 CERVICAL RADICULOPATHY: ICD-10-CM

## 2023-12-29 DIAGNOSIS — M79.2 RADICULAR PAIN IN LEFT ARM: ICD-10-CM

## 2023-12-29 DIAGNOSIS — M54.50 CHRONIC MIDLINE LOW BACK PAIN WITHOUT SCIATICA: ICD-10-CM

## 2023-12-29 DIAGNOSIS — M51.37 DISC DISEASE, DEGENERATIVE, LUMBAR OR LUMBOSACRAL: ICD-10-CM

## 2023-12-29 DIAGNOSIS — M15.9 PRIMARY OSTEOARTHRITIS INVOLVING MULTIPLE JOINTS: ICD-10-CM

## 2023-12-29 DIAGNOSIS — G89.29 CHRONIC MIDLINE LOW BACK PAIN WITHOUT SCIATICA: ICD-10-CM

## 2023-12-29 RX ORDER — OXYBUTYNIN CHLORIDE 10 MG/1
10 TABLET, EXTENDED RELEASE ORAL NIGHTLY
Qty: 10 TABLET | Refills: 0 | OUTPATIENT
Start: 2023-12-29

## 2023-12-29 RX ORDER — OXYBUTYNIN CHLORIDE 10 MG/1
10 TABLET, EXTENDED RELEASE ORAL NIGHTLY
Qty: 30 TABLET | Refills: 0 | Status: SHIPPED | OUTPATIENT
Start: 2023-12-29 | End: 2024-01-05 | Stop reason: ALTCHOICE

## 2023-12-29 RX ORDER — TRAMADOL HYDROCHLORIDE 50 MG/1
100 TABLET ORAL EVERY 8 HOURS PRN
Qty: 180 TABLET | Refills: 0 | Status: SHIPPED | OUTPATIENT
Start: 2023-12-29 | End: 2024-01-28

## 2023-12-29 NOTE — TELEPHONE ENCOUNTER
Patient called in to make a hospital follow up, he was in there for a week due to kidney stones. He would like to know if PCP will refill his tramadol and if he should get another refill of his antibiotic to make sure the infection is gone since he only has a few pills left for the antibiotic.    Last Visit Date: 7/20/2023  Next Visit Date: 1/5/2024

## 2023-12-29 NOTE — TELEPHONE ENCOUNTER
He should not need additional antibiotics once he completes the amoxicillin he was given at discharge.  We can recheck his urine when he comes in next week

## 2023-12-29 NOTE — TELEPHONE ENCOUNTER
He needs hospital follow up appt, please call him to schedule. He can go in any open slot next week. Thanks

## 2024-01-05 ENCOUNTER — OFFICE VISIT (OUTPATIENT)
Dept: PRIMARY CARE CLINIC | Age: 59
End: 2024-01-05

## 2024-01-05 VITALS
SYSTOLIC BLOOD PRESSURE: 124 MMHG | DIASTOLIC BLOOD PRESSURE: 70 MMHG | BODY MASS INDEX: 26.4 KG/M2 | HEART RATE: 72 BPM | OXYGEN SATURATION: 95 % | HEIGHT: 73 IN | WEIGHT: 199.2 LBS | RESPIRATION RATE: 16 BRPM

## 2024-01-05 DIAGNOSIS — Z09 HOSPITAL DISCHARGE FOLLOW-UP: Primary | ICD-10-CM

## 2024-01-05 DIAGNOSIS — R20.0 NUMBNESS AND TINGLING OF RIGHT UPPER EXTREMITY: ICD-10-CM

## 2024-01-05 DIAGNOSIS — R20.2 NUMBNESS AND TINGLING OF RIGHT UPPER EXTREMITY: ICD-10-CM

## 2024-01-05 DIAGNOSIS — N20.0 LEFT NEPHROLITHIASIS: ICD-10-CM

## 2024-01-05 DIAGNOSIS — N39.0 COMPLICATED UTI (URINARY TRACT INFECTION): ICD-10-CM

## 2024-01-05 DIAGNOSIS — N20.1 CALCULUS OF URETER: ICD-10-CM

## 2024-01-05 DIAGNOSIS — M54.12 CERVICAL RADICULOPATHY: ICD-10-CM

## 2024-01-05 RX ORDER — GABAPENTIN 300 MG/1
300 CAPSULE ORAL 2 TIMES DAILY
Qty: 60 CAPSULE | Refills: 2 | Status: SHIPPED | OUTPATIENT
Start: 2024-01-05 | End: 2024-04-04

## 2024-01-05 ASSESSMENT — PATIENT HEALTH QUESTIONNAIRE - PHQ9
1. LITTLE INTEREST OR PLEASURE IN DOING THINGS: 0
SUM OF ALL RESPONSES TO PHQ QUESTIONS 1-9: 0
SUM OF ALL RESPONSES TO PHQ9 QUESTIONS 1 & 2: 0
2. FEELING DOWN, DEPRESSED OR HOPELESS: 0
SUM OF ALL RESPONSES TO PHQ QUESTIONS 1-9: 0

## 2024-01-05 ASSESSMENT — ENCOUNTER SYMPTOMS
COUGH: 0
CONSTIPATION: 0
VOMITING: 0
NAUSEA: 0
SHORTNESS OF BREATH: 0
SINUS PRESSURE: 0
WHEEZING: 0
BLOOD IN STOOL: 0
SORE THROAT: 0
ABDOMINAL PAIN: 0
DIARRHEA: 0
TROUBLE SWALLOWING: 0

## 2024-01-05 NOTE — PROGRESS NOTES
Post-Discharge Transitional Care Follow Up      Dez Fernández   YOB: 1965    Date of Office Visit:  1/5/2024  Date of Hospital Admission: 12/20/23  Date of Hospital Discharge: 12/22/23  Readmission Risk Score (high >=14%. Medium >=10%):Readmission Risk Score: 13.1      Care management risk score Rising risk (score 2-5) and Complex Care (Scores >=6): No Risk Score On File     Non face to face  following discharge, date last encounter closed (first attempt may have been earlier): *No documented post hospital discharge outreach found in the last 14 days     Call initiated 2 business days of discharge: *No response recorded in the last 14 days     Hospital discharge [id number] notes, labs and diagnostics from hospital stay.  At this time patient has fully recovered since having lithotripsy.  Referral placed to urologist as he has not yet scheduled a follow-up hospital appointment with them.  Labs appeared normal at time of discharge with no compromise to renal function at that time.  -     CO DISCHARGE MEDS RECONCILED W/ CURRENT OUTPATIENT MED LIST  Cervical radiculopathy-discussed neck condition likely contributing to his current arm numbness on the right side.  He had previous symptoms on the left that responded well to PT.  Encouraged resuming his home PT program with neck stretches bilaterally, heat as needed.  Will start gabapentin twice daily.  Discussed may need to titrate dosage in order to achieve some symptom relief.  Check EMG.  Pending results will likely require referral to surgeon if symptoms persist  Reviewed CT from 8/2022, had mild to moderate canal and foraminal stenosis at that time  -     EMG; Future  -     gabapentin (NEURONTIN) 300 MG capsule; Take 1 capsule by mouth in the morning and at bedtime for 90 days. Intended supply: 30 days, Disp-60 capsule, R-2Normal  -     [facility] - Jonathan Wong MD, Neurology, [place]  Numbness and tingling of right upper extremity  -

## 2024-01-11 ENCOUNTER — PATIENT MESSAGE (OUTPATIENT)
Dept: PRIMARY CARE CLINIC | Age: 59
End: 2024-01-11

## 2024-01-11 RX ORDER — METHYLPREDNISOLONE 4 MG/1
TABLET ORAL
Qty: 1 KIT | Refills: 0 | Status: SHIPPED | OUTPATIENT
Start: 2024-01-11 | End: 2024-01-17

## 2024-01-11 NOTE — TELEPHONE ENCOUNTER
From: Dez Fernández  To: Gaby Franklin  Sent: 1/11/2024 1:22 PM EST  Subject: Arm pain    Reynold Griffin the gabapenton does not work for me it seems to slow me way down and I have not gotten no relief and no sleep is there anything I can get for help my doctors appointment for this problem is not until the 23rd does prednisone help? Anything would be appreciated

## 2024-01-27 DIAGNOSIS — R06.02 SHORTNESS OF BREATH: ICD-10-CM

## 2024-01-27 DIAGNOSIS — J44.9 CHRONIC OBSTRUCTIVE PULMONARY DISEASE, UNSPECIFIED COPD TYPE (HCC): ICD-10-CM

## 2024-01-29 DIAGNOSIS — M54.50 CHRONIC MIDLINE LOW BACK PAIN WITHOUT SCIATICA: ICD-10-CM

## 2024-01-29 DIAGNOSIS — M54.12 CERVICAL RADICULOPATHY: ICD-10-CM

## 2024-01-29 DIAGNOSIS — M51.37 DISC DISEASE, DEGENERATIVE, LUMBAR OR LUMBOSACRAL: ICD-10-CM

## 2024-01-29 DIAGNOSIS — M79.2 RADICULAR PAIN IN LEFT ARM: ICD-10-CM

## 2024-01-29 DIAGNOSIS — M15.9 PRIMARY OSTEOARTHRITIS INVOLVING MULTIPLE JOINTS: ICD-10-CM

## 2024-01-29 DIAGNOSIS — G89.29 CHRONIC MIDLINE LOW BACK PAIN WITHOUT SCIATICA: ICD-10-CM

## 2024-01-29 RX ORDER — FLUTICASONE PROPIONATE AND SALMETEROL XINAFOATE 115; 21 UG/1; UG/1
2 AEROSOL, METERED RESPIRATORY (INHALATION) 2 TIMES DAILY
Qty: 12 G | Refills: 3 | Status: SHIPPED | OUTPATIENT
Start: 2024-01-29

## 2024-01-29 RX ORDER — TRAMADOL HYDROCHLORIDE 50 MG/1
100 TABLET ORAL EVERY 8 HOURS PRN
Qty: 180 TABLET | Refills: 0 | Status: SHIPPED | OUTPATIENT
Start: 2024-01-29 | End: 2024-02-28

## 2024-02-01 RX ORDER — OXYBUTYNIN CHLORIDE 10 MG/1
10 TABLET, EXTENDED RELEASE ORAL NIGHTLY
Qty: 30 TABLET | Refills: 0 | OUTPATIENT
Start: 2024-02-01

## 2024-02-02 ENCOUNTER — TELEPHONE (OUTPATIENT)
Dept: PRIMARY CARE CLINIC | Age: 59
End: 2024-02-02

## 2024-02-02 DIAGNOSIS — M79.2 RADICULAR PAIN IN LEFT ARM: ICD-10-CM

## 2024-02-02 DIAGNOSIS — R94.131 ABNORMAL EMG: ICD-10-CM

## 2024-02-02 DIAGNOSIS — M54.12 CERVICAL RADICULOPATHY: Primary | ICD-10-CM

## 2024-02-02 DIAGNOSIS — R20.0 NUMBNESS AND TINGLING OF RIGHT UPPER EXTREMITY: ICD-10-CM

## 2024-02-02 DIAGNOSIS — R20.2 NUMBNESS AND TINGLING OF RIGHT UPPER EXTREMITY: ICD-10-CM

## 2024-02-02 NOTE — TELEPHONE ENCOUNTER
He was referred there for completion of EMG  Please contact their office, Dr Jonathan Wong, and clarify what is needed  Thanks

## 2024-02-02 NOTE — TELEPHONE ENCOUNTER
Last Visit Date: 1/5/2024   Next Visit Date: 4/5/2024    FYI Pt called asking about how to get an MRI for the numbness in his arm. Pt reports his arm \"feels dead.\"  Pt reports he was referred to the [facility] and they informed him he needs an MRI.  Writer asked if that office provided him with a referral for an MRI and the subsequent information. Pt seemed unsure. Writer advised pt to follow up with that office but wanted you to be aware.   Thank you

## 2024-02-05 NOTE — TELEPHONE ENCOUNTER
Patient calling into the office, states that his arm is hurting really bad and the Neurologist states that he needs the MRI because the EMG he had done made it seem like there is a block in one of his nerves. He would like order placed so he can get this done ASAP to get some relief. Writer attempted to call Neurology office to get results and see what dx code needs to be, office is closed.    Can you please order MRI. Patient states that neurology office advised that it needs to come from our office    Last Visit Date: 1/5/2024   Next Visit Date: 4/5/2024

## 2024-02-05 NOTE — TELEPHONE ENCOUNTER
Patient advised. Gave central schedulings phone number    Last Visit Date: 1/5/2024   Next Visit Date: 4/5/2024

## 2024-02-16 ENCOUNTER — HOSPITAL ENCOUNTER (OUTPATIENT)
Dept: MRI IMAGING | Age: 59
Discharge: HOME | End: 2024-02-16
Payer: COMMERCIAL

## 2024-02-16 DIAGNOSIS — R94.131 ABNORMAL EMG: ICD-10-CM

## 2024-02-16 DIAGNOSIS — M54.12 CERVICAL RADICULOPATHY: ICD-10-CM

## 2024-02-16 DIAGNOSIS — R20.0 NUMBNESS AND TINGLING OF RIGHT UPPER EXTREMITY: ICD-10-CM

## 2024-02-16 DIAGNOSIS — R20.2 NUMBNESS AND TINGLING OF RIGHT UPPER EXTREMITY: ICD-10-CM

## 2024-02-16 PROCEDURE — 72141 MRI NECK SPINE W/O DYE: CPT

## 2024-02-29 DIAGNOSIS — M79.2 RADICULAR PAIN IN LEFT ARM: ICD-10-CM

## 2024-02-29 DIAGNOSIS — M54.12 CERVICAL RADICULOPATHY: ICD-10-CM

## 2024-02-29 DIAGNOSIS — M15.9 PRIMARY OSTEOARTHRITIS INVOLVING MULTIPLE JOINTS: ICD-10-CM

## 2024-02-29 DIAGNOSIS — M51.37 DISC DISEASE, DEGENERATIVE, LUMBAR OR LUMBOSACRAL: ICD-10-CM

## 2024-02-29 DIAGNOSIS — M54.50 CHRONIC MIDLINE LOW BACK PAIN WITHOUT SCIATICA: ICD-10-CM

## 2024-02-29 DIAGNOSIS — G89.29 CHRONIC MIDLINE LOW BACK PAIN WITHOUT SCIATICA: ICD-10-CM

## 2024-02-29 RX ORDER — TRAMADOL HYDROCHLORIDE 50 MG/1
100 TABLET ORAL EVERY 8 HOURS PRN
Qty: 180 TABLET | Refills: 0 | Status: SHIPPED | OUTPATIENT
Start: 2024-02-29 | End: 2024-03-30

## 2024-03-06 ENCOUNTER — HOSPITAL ENCOUNTER (OUTPATIENT)
Dept: PAIN MANAGEMENT | Age: 59
Discharge: HOME | End: 2024-03-06
Payer: COMMERCIAL

## 2024-03-06 ENCOUNTER — HOSPITAL ENCOUNTER (OUTPATIENT)
Dept: PHYSICAL THERAPY | Age: 59
Setting detail: THERAPIES SERIES
Discharge: HOME | End: 2024-03-06
Payer: COMMERCIAL

## 2024-03-06 VITALS — BODY MASS INDEX: 26.37 KG/M2 | WEIGHT: 199 LBS | HEIGHT: 73 IN

## 2024-03-06 DIAGNOSIS — M47.812 CERVICAL SPONDYLOSIS: ICD-10-CM

## 2024-03-06 DIAGNOSIS — M54.12 CERVICAL RADICULOPATHY: Primary | ICD-10-CM

## 2024-03-06 DIAGNOSIS — M50.30 DEGENERATIVE DISC DISEASE, CERVICAL: ICD-10-CM

## 2024-03-06 PROCEDURE — 99214 OFFICE O/P EST MOD 30 MIN: CPT | Performed by: STUDENT IN AN ORGANIZED HEALTH CARE EDUCATION/TRAINING PROGRAM

## 2024-03-06 PROCEDURE — 97110 THERAPEUTIC EXERCISES: CPT

## 2024-03-06 PROCEDURE — 99215 OFFICE O/P EST HI 40 MIN: CPT

## 2024-03-06 PROCEDURE — 97161 PT EVAL LOW COMPLEX 20 MIN: CPT

## 2024-03-06 ASSESSMENT — PAIN SCALES - GENERAL: PAINLEVEL_OUTOF10: 4

## 2024-03-06 NOTE — CONSULTS
[] [facility]  Outpatient Rehabilitation &  Therapy  [address]  [phone]  [phone] [] [facility]  Outpatient Rehabilitation &  Therapy  [address]  P: [phone]  F: [phone] [] [facility]  Outpatient Rehabilitation &  Therapy  [address]  P: [phone]  F: [phone] [] [facility]  Outpatient Rehabilitation &  Therapy  [address]  [phone]  [phone] [] [facility]  Outpatient Rehabilitation &  Therapy  [address]   P: [phone]  F: [phone]  [] [facility]  Outpatient Rehabilitation &  Therapy  [address]  P: [phone]  F: [phone] [] [facility]  Outpatient Rehabilitation &  Therapy  [address]  P: [phone]  F: [phone] [] [facility]  Outpatient Rehabilitation &  Therapy  [address]  P: [phone]  F: [phone] [] [facility]  Outpatient Rehabilitation &  Therapy  [address]  P: [phone]  F: [phone]  [x] [facility] Outpatient Rehabilitation &  Therapy  [address]  P: [phone]  F: [phone]     Physical Therapy Spine Evaluation    Date:  3/6/2024  Patient: Dez Fernández  : 1965  MRN: [id number]  Physician: Gaby PELAYO-CNP   Insurance: [employer]: BMN,  No Hard Max and No Auth Required  Medical Diagnosis:   M79.2 (ICD-10-CM) - Radicular pain in left arm   M54.12 (ICD-10-CM) - Cervical radiculopathy   R20.0, R20.2 (ICD-10-CM) - Numbness and tingling of right upper extremity   R94.131 (ICD-10-CM) - Abnormal EMG     Rehab Codes: M54.2,  M54  Onset Date: 23  Next 's appt.: TBD    Subjective:  Pt reports about 23 pain started right side of neck and into his right shoulder and arm pit.  He reports

## 2024-03-06 NOTE — PROGRESS NOTES
Chronic Pain Clinic Note     Encounter Date: 3/6/2024     SUBJECTIVE:  Chief Complaint   Patient presents with    Neck Pain       History of Present Illness:   Dez Fernández is a 58 y.o. male who presents with neck pain    Medication Refill: n/a    Current Complaints of Pain:   Location: neck    Radiation: Right arm  Severity: Moderate  Pain Numerical Score - 4/5 today   Average: 6     Highest: 10  Lowest: 3  Character/Quality: Complains of pain that is  pressure, buzzing  Timing: Constant  Associated symptoms: none  Numbness: yes  Weakness: yes  Exacerbating factors:  reaching out  Alleviating factors:  keeping the arm moving  Length of time pain has been present: Started about the week of Nov 2023  Inciting event/injury: no  Bowel/Bladder incontinence:  no  Falls: no  Physical Therapy: no    History of Interventions:   Surgery: No previous lumbar/cervical surgeries  Injections: None    Imaging:    MRI Cervical 02/18/2024    FINDINGS:  BONES/ALIGNMENT: There is reversal the normal cervical lordosis with mild  cervical kyphosis.  There is grade 1 anterolisthesis of C2 on C3, C3 on C4,  C4 on C5, and these changes have slightly progressed from prior exam.  There  is diffuse disc desiccation throughout the cervical spine.  Early marrow  endplate degenerative changes are seen at C6-C7.     SPINAL CORD: No abnormal cord signal is seen.     SOFT TISSUES: No paraspinal mass identified.     C2-C3: Central disc protrusion contacting the ventral surface of the cord.  Mild spinal canal stenosis.  No foraminal stenosis.     C3-C4: Central disc protrusion contacting the ventral surface of the cord.  Mild spinal canal stenosis.  Bilateral facet hypertrophy.  No foraminal  stenosis.     C4-C5: Central disc protrusion contacting and flattening the ventral surface  of the cord.  Mild spinal canal stenosis.  Left facet hypertrophy.  No  foraminal stenosis.     C5-C6: Left uncovertebral joint hypertrophy and left paracentral

## 2024-03-13 ENCOUNTER — HOSPITAL ENCOUNTER (OUTPATIENT)
Dept: PHYSICAL THERAPY | Age: 59
Setting detail: THERAPIES SERIES
Discharge: HOME | End: 2024-03-13
Payer: COMMERCIAL

## 2024-03-13 NOTE — FLOWSHEET NOTE
[] [facility]  Outpatient Rehabilitation &  Therapy  [address]  [phone]  [phone] [] [facility]  Outpatient Rehabilitation &  Therapy  [address]  P: [phone]  F: [phone] [] [facility]  Outpatient Rehabilitation &  Therapy  [address]  P: [phone]  F: [phone] [] [facility]  Outpatient Rehabilitation &  Therapy  [address]  [phone]  [phone] [] [facility]  Outpatient Rehabilitation &  Therapy  [address]   P: [phone]  F: [phone]  [] [facility]  Outpatient Rehabilitation &  Therapy  [address]  P: [phone]  F: [phone] [] [facility]  Outpatient Rehabilitation &  Therapy  [address]  P: [phone]  F: [phone] [] [facility]  Outpatient Rehabilitation &  Therapy  [address]  P: [phone]  F: [phone] [] [facility]  Outpatient Rehabilitation &  Therapy  [address]  P: [phone]  F: [phone]  [] [facility] Outpatient Rehabilitation &  Therapy  [address]  P: [phone]  F: [phone]     Therapy Cancel/No Show note    Date: 3/13/2024  Patient: Dez Fernández  : 1965  MRN: [id number]    Cancels/No Shows to date: 10    For today's appointment patient:    [x]  Cancelled    [] Rescheduled appointment    [] No-show     Reason given by patient:    []  Patient ill    []  Conflicting appointment    [] No transportation      [x] Conflict with work    [] No reason given    [] Weather related    [] COVID-19    [] Other:      Comments:        [x] Next appointment was confirmed    Electronically signed by: Patricio Palencia PT

## 2024-03-18 ENCOUNTER — HOSPITAL ENCOUNTER (OUTPATIENT)
Dept: PHYSICAL THERAPY | Age: 59
Setting detail: THERAPIES SERIES
Discharge: HOME | End: 2024-03-18
Payer: COMMERCIAL

## 2024-03-18 PROCEDURE — 97110 THERAPEUTIC EXERCISES: CPT

## 2024-03-18 NOTE — FLOWSHEET NOTE
[] [facility]  Outpatient Rehabilitation &  Therapy  [address]  [phone]  [phone] [] [facility]  Outpatient Rehabilitation &  Therapy  [address]  P: [phone]  F: [phone] [] [facility]  Outpatient Rehabilitation &  Therapy  [address]  P: [phone]  F: [phone] [] [facility]  Outpatient Rehabilitation &  Therapy  [address]  [phone]  [phone] [] [facility]  Outpatient Rehabilitation &  Therapy  [address]   P: [phone]  F: [phone]  [] [facility]  Outpatient Rehabilitation &  Therapy  [address]  P: [phone]  F: [phone] [] [facility]  Outpatient Rehabilitation &  Therapy  [address]  P: [phone]  F: [phone] [] [facility]  Outpatient Rehabilitation &  Therapy  [address]  P: [phone]  F: [phone] [] [facility]  Outpatient Rehabilitation &  Therapy  [address]  P: [phone]  F: [phone]  [x] [facility] Outpatient Rehabilitation &  Therapy  [address]  P: [phone]  F: [phone]     Physical Therapy Daily Treatment Note    Date:  3/18/2024  Patient Name:  Dez Fernández    :  1965  MRN: [id number]  Patient: Dez Fernández                : 1965                      MRN: [id number]  Physician: Gaby Franklin APRN-CNP                            Insurance: [employer]: BMN,  No Hard Max and No Auth Required  Medical Diagnosis:   M79.2 (ICD-10-CM) - Radicular pain in left arm   M54.12 (ICD-10-CM) - Cervical radiculopathy   R20.0, R20.2 (ICD-10-CM) - Numbness and tingling of right upper extremity   R94.131 (ICD-10-CM) - Abnormal EMG                           Rehab Codes: M54.2,  M54  Onset Date: 23

## 2024-03-26 ENCOUNTER — HOSPITAL ENCOUNTER (OUTPATIENT)
Dept: PAIN MANAGEMENT | Facility: CLINIC | Age: 59
Discharge: HOME | End: 2024-03-26
Payer: COMMERCIAL

## 2024-03-26 VITALS
BODY MASS INDEX: 26.37 KG/M2 | OXYGEN SATURATION: 92 % | TEMPERATURE: 98.1 F | RESPIRATION RATE: 11 BRPM | WEIGHT: 199 LBS | HEIGHT: 73 IN | SYSTOLIC BLOOD PRESSURE: 115 MMHG | DIASTOLIC BLOOD PRESSURE: 83 MMHG | HEART RATE: 74 BPM

## 2024-03-26 DIAGNOSIS — R52 PAIN MANAGEMENT: ICD-10-CM

## 2024-03-26 PROCEDURE — 99152 MOD SED SAME PHYS/QHP 5/>YRS: CPT | Performed by: STUDENT IN AN ORGANIZED HEALTH CARE EDUCATION/TRAINING PROGRAM

## 2024-03-26 PROCEDURE — 2500000003 HC RX 250 WO HCPCS: Performed by: STUDENT IN AN ORGANIZED HEALTH CARE EDUCATION/TRAINING PROGRAM

## 2024-03-26 PROCEDURE — 62321 NJX INTERLAMINAR CRV/THRC: CPT

## 2024-03-26 PROCEDURE — 2580000003 HC RX 258: Performed by: STUDENT IN AN ORGANIZED HEALTH CARE EDUCATION/TRAINING PROGRAM

## 2024-03-26 PROCEDURE — 62321 NJX INTERLAMINAR CRV/THRC: CPT | Performed by: STUDENT IN AN ORGANIZED HEALTH CARE EDUCATION/TRAINING PROGRAM

## 2024-03-26 PROCEDURE — 6360000004 HC RX CONTRAST MEDICATION: Performed by: STUDENT IN AN ORGANIZED HEALTH CARE EDUCATION/TRAINING PROGRAM

## 2024-03-26 PROCEDURE — 6360000002 HC RX W HCPCS: Performed by: STUDENT IN AN ORGANIZED HEALTH CARE EDUCATION/TRAINING PROGRAM

## 2024-03-26 RX ORDER — MIDAZOLAM HYDROCHLORIDE 2 MG/2ML
INJECTION, SOLUTION INTRAMUSCULAR; INTRAVENOUS
Status: COMPLETED | OUTPATIENT
Start: 2024-03-26 | End: 2024-03-26

## 2024-03-26 RX ORDER — DEXAMETHASONE SODIUM PHOSPHATE 10 MG/ML
INJECTION, SOLUTION INTRAMUSCULAR; INTRAVENOUS
Status: COMPLETED | OUTPATIENT
Start: 2024-03-26 | End: 2024-03-26

## 2024-03-26 RX ORDER — LIDOCAINE HYDROCHLORIDE 10 MG/ML
INJECTION, SOLUTION EPIDURAL; INFILTRATION; INTRACAUDAL; PERINEURAL
Status: COMPLETED | OUTPATIENT
Start: 2024-03-26 | End: 2024-03-26

## 2024-03-26 RX ORDER — SODIUM CHLORIDE 0.9 % (FLUSH) 0.9 %
SYRINGE (ML) INJECTION
Status: COMPLETED | OUTPATIENT
Start: 2024-03-26 | End: 2024-03-26

## 2024-03-26 RX ADMIN — IOHEXOL 1 ML: 180 INJECTION INTRAVENOUS at 14:20

## 2024-03-26 RX ADMIN — MIDAZOLAM HYDROCHLORIDE 2 MG: 1 INJECTION, SOLUTION INTRAMUSCULAR; INTRAVENOUS at 14:16

## 2024-03-26 RX ADMIN — LIDOCAINE HYDROCHLORIDE 2 ML: 10 INJECTION, SOLUTION EPIDURAL; INFILTRATION; INTRACAUDAL at 14:20

## 2024-03-26 RX ADMIN — DEXAMETHASONE SODIUM PHOSPHATE 20 MG: 10 INJECTION, SOLUTION INTRAMUSCULAR; INTRAVENOUS at 14:20

## 2024-03-26 RX ADMIN — Medication 2 ML: at 14:20

## 2024-03-26 RX ADMIN — LIDOCAINE HYDROCHLORIDE 3 ML: 10 INJECTION, SOLUTION EPIDURAL; INFILTRATION; INTRACAUDAL at 14:18

## 2024-03-26 ASSESSMENT — PAIN DESCRIPTION - DIRECTION: RADIATING_TOWARDS: RIGHT ARM AND HAND

## 2024-03-26 ASSESSMENT — PAIN SCALES - GENERAL: PAINLEVEL_OUTOF10: 5

## 2024-03-26 ASSESSMENT — PAIN - FUNCTIONAL ASSESSMENT
PAIN_FUNCTIONAL_ASSESSMENT: PREVENTS OR INTERFERES WITH MANY ACTIVE NOT PASSIVE ACTIVITIES
PAIN_FUNCTIONAL_ASSESSMENT: NONE - DENIES PAIN

## 2024-03-26 ASSESSMENT — PAIN DESCRIPTION - ORIENTATION: ORIENTATION: RIGHT

## 2024-03-26 ASSESSMENT — PAIN DESCRIPTION - DESCRIPTORS: DESCRIPTORS: OTHER (COMMENT)

## 2024-03-26 ASSESSMENT — PAIN DESCRIPTION - PAIN TYPE: TYPE: CHRONIC PAIN

## 2024-03-26 ASSESSMENT — PAIN DESCRIPTION - LOCATION: LOCATION: NECK

## 2024-03-26 NOTE — INTERVAL H&P NOTE
Update History & Physical    The patient's History and Physical of March 6, 2024 was reviewed with the patient and I examined the patient. There was no change. The surgical site was confirmed by the patient and me.     Plan: The risks, benefits, expected outcome, and alternative to the recommended procedure have been discussed with the patient. Patient understands and wants to proceed with the procedure.     ASA CLASSIFICATION  2  MP   CLASSIFICATION  3    Electronically signed by Koffi Overton DO on 3/26/2024 at 1:36 PM

## 2024-03-26 NOTE — OP NOTE
insertion site was dressed appropriately.  The patient did not experience any hemodynamic or neurological sequelae.    The patient was transferred to the postoperative care unit in stable condition.  Written discharge instructions were given to the patient.    COMPLICATIONS:  There were no apparent complications.  The patient tolerated the procedure well.     SEDATION NOTE:     ASA CLASSIFICATION  2  MP   CLASSIFICATION  3     Moderate intravenous conscious sedation was supervised by Dr. Overton  The patient was independently monitored by a Registered Nurse assigned to the Procedure Room  Monitoring included automated blood pressure, continuous EKG, Capnography and continuous pulse oximetry.   The detailed Conscious Record is permanently stored in the Hospital Information System.      The following is the conscious sedation record:  Start Time:  1412  End Time:  1422  Duration:  10 minutes  MEDS GIVEN Versed 2 mg

## 2024-03-27 DIAGNOSIS — M51.37 DISC DISEASE, DEGENERATIVE, LUMBAR OR LUMBOSACRAL: ICD-10-CM

## 2024-03-27 DIAGNOSIS — M15.9 PRIMARY OSTEOARTHRITIS INVOLVING MULTIPLE JOINTS: ICD-10-CM

## 2024-03-27 DIAGNOSIS — J44.9 CHRONIC OBSTRUCTIVE PULMONARY DISEASE, UNSPECIFIED COPD TYPE (HCC): ICD-10-CM

## 2024-03-27 DIAGNOSIS — M79.2 RADICULAR PAIN IN LEFT ARM: ICD-10-CM

## 2024-03-27 DIAGNOSIS — M54.12 CERVICAL RADICULOPATHY: ICD-10-CM

## 2024-03-27 DIAGNOSIS — R06.02 SHORTNESS OF BREATH: ICD-10-CM

## 2024-03-27 DIAGNOSIS — M54.50 CHRONIC MIDLINE LOW BACK PAIN WITHOUT SCIATICA: ICD-10-CM

## 2024-03-27 DIAGNOSIS — G89.29 CHRONIC MIDLINE LOW BACK PAIN WITHOUT SCIATICA: ICD-10-CM

## 2024-03-27 RX ORDER — TRAMADOL HYDROCHLORIDE 50 MG/1
100 TABLET ORAL EVERY 8 HOURS PRN
Qty: 180 TABLET | Refills: 0 | Status: SHIPPED | OUTPATIENT
Start: 2024-03-27 | End: 2024-04-26

## 2024-03-27 RX ORDER — FLUTICASONE PROPIONATE AND SALMETEROL XINAFOATE 115; 21 UG/1; UG/1
2 AEROSOL, METERED RESPIRATORY (INHALATION) 2 TIMES DAILY
Qty: 12 G | Refills: 3 | Status: SHIPPED | OUTPATIENT
Start: 2024-03-27 | End: 2024-03-28

## 2024-03-27 RX ORDER — ALBUTEROL SULFATE 90 UG/1
AEROSOL, METERED RESPIRATORY (INHALATION)
Qty: 8.5 G | Refills: 5 | Status: SHIPPED | OUTPATIENT
Start: 2024-03-27

## 2024-03-28 RX ORDER — FLUTICASONE PROPIONATE AND SALMETEROL 100; 50 UG/1; UG/1
1 POWDER RESPIRATORY (INHALATION) EVERY 12 HOURS
Qty: 60 EACH | Refills: 5 | Status: SHIPPED | OUTPATIENT
Start: 2024-03-28

## 2024-03-28 NOTE — TELEPHONE ENCOUNTER
Received fax from [employer]. Stating that the preferred plan alternative for Fluticasone/salm inhaler is Breyna, Budesonideromoterol, fluticasonesalmeterol, wixelainhub, advairhfa, breollipta    Please advise    Last Visit Date: 1/5/2024   Next Visit Date: 4/5/2024

## 2024-03-30 NOTE — PROGRESS NOTES
Physical Therapy  Facility/Department: [facility] SURG ICU  Daily Treatment Note  NAME: Je Prince  : 1965  MRN: [id number]    Date of Service: 2021    Discharge Recommendations:  Patient would benefit from continued therapy after discharge   PT Equipment Recommendations  Equipment Needed: Yes  Mobility Devices: Simon Cadet: Rolling  Other: Continue to assess- pt has previously demonstrated ability to ambulate safety with one axillary crutch but with increase in pain pt with decreased tolerance to weight bearing and unable to tolerate crutch today. Assessment   Body structures, Functions, Activity limitations: Decreased functional mobility ; Decreased ROM; Decreased balance;Decreased strength;Decreased endurance  Assessment: Pt with decrease in tolerance to mobility with increase in pain. Pt with noted swelling and increase in pain. Pt will benefit from further therapy in the OP setting at discharge and may benefit from a walker as well pending his overall improvement in pain and tolerance to weight bearing. Prognosis: Good  PT Education: Transfer Training;Functional Mobility Training;Gait Training;Home Exercise Program  Patient Education: Educated earlier this morning on importance of mobility, performance of exercises- pt adamantly declined performing the full set of exercises this morning. Pt later agreeable to ambulate and performance of ROM for measurements in later morning. REQUIRES PT FOLLOW UP: Yes  Activity Tolerance  Activity Tolerance: Patient limited by pain  Activity Tolerance: Limited by right knee pain     Patient Diagnosis(es): The primary encounter diagnosis was Right knee skin infection. Diagnoses of Disc disease, degenerative, lumbar or lumbosacral, Chronic midline low back pain without sciatica, and Primary osteoarthritis involving multiple joints were also pertinent to this visit.      has a past medical history of Back pain, Disc disease, degenerative, lumbar or lumbosacral, Hyperlipidemia, Hypertension, Restless leg syndrome, and Septic infrapatellar bursitis, left.   has a past surgical history that includes Tonsillectomy and adenoidectomy; hernia repair; Tympanostomy tube placement (Bilateral); knee surgery (Right, 05/18/2021); and knee surgery (Right, 5/18/2021). Restrictions  Restrictions/Precautions  Restrictions/Precautions: Up as Tolerated, Weight Bearing  Required Braces or Orthoses?: No  Lower Extremity Weight Bearing Restrictions  Right Lower Extremity Weight Bearing: Weight Bearing As Tolerated  Position Activity Restriction  Other position/activity restrictions: Keep right knee elevated at all times; YASHIRA drain  Subjective   General  Response To Previous Treatment: Patient with no complaints from previous session. Family / Caregiver Present: No  Subjective  Subjective: Pt supine in bed and agreeable to therapy. Pt is frustrated by his increase in pain and decrease in mobility. Pt with complaints of right knee pain.   Pain Screening  Patient Currently in Pain: Yes  Pain Assessment  Pain Assessment: 0-10  Pain Level: 6  Pain Type: Surgical pain;Acute pain  Pain Location: Knee  Pain Orientation: Right  Pain Descriptors: Aching;Discomfort  Functional Pain Assessment: Prevents or interferes some active activities and ADLs  Non-Pharmaceutical Pain Intervention(s): Ambulation/Increased Activity;Cold applied;Distraction;Elevation;Repositioned  Response to Pain Intervention: Patient Satisfied  Vital Signs  Patient Currently in Pain: Yes       Objective   Bed mobility  Supine to Sit: Modified independent  Sit to Supine: Modified independent  Scooting: Supervision  Comment: HOB elevated 45 degrees, increased time  Transfers  Sit to Stand: Stand by assistance  Stand to sit: Stand by assistance  Comment: Cues for lining up with bed and allowing RLE to be out for the decreased in tolerance of bending  Ambulation  Ambulation?: Yes  More Ambulation?: No  Ambulation No

## 2024-04-04 DIAGNOSIS — R20.0 NUMBNESS AND TINGLING OF RIGHT UPPER EXTREMITY: ICD-10-CM

## 2024-04-04 DIAGNOSIS — M54.12 CERVICAL RADICULOPATHY: ICD-10-CM

## 2024-04-04 DIAGNOSIS — R20.2 NUMBNESS AND TINGLING OF RIGHT UPPER EXTREMITY: ICD-10-CM

## 2024-04-04 RX ORDER — GABAPENTIN 300 MG/1
300 CAPSULE ORAL 2 TIMES DAILY
Qty: 60 CAPSULE | Refills: 5 | Status: SHIPPED | OUTPATIENT
Start: 2024-04-04 | End: 2024-10-01

## 2024-04-16 ENCOUNTER — TELEPHONE (OUTPATIENT)
Dept: PRIMARY CARE CLINIC | Age: 59
End: 2024-04-16

## 2024-04-16 RX ORDER — AZITHROMYCIN 250 MG/1
TABLET, FILM COATED ORAL
Qty: 1 PACKET | Refills: 0 | Status: SHIPPED | OUTPATIENT
Start: 2024-04-16

## 2024-04-16 RX ORDER — METHYLPREDNISOLONE 4 MG/1
TABLET ORAL
Qty: 1 KIT | Refills: 0 | Status: SHIPPED | OUTPATIENT
Start: 2024-04-16 | End: 2024-04-22

## 2024-04-16 NOTE — TELEPHONE ENCOUNTER
Pts wife called into office, states at their place of employment illness is going around.    Pts sx started 4 days ago, pt has a cough with green phlegm and has been using his inhaler more. Pt & wife are hoping PCP will send something in for pt.      Ph[facility]acy: [facility] [address]    Please return call to wife, Kat

## 2024-04-16 NOTE — TELEPHONE ENCOUNTER
Please let them know I sent in an antibiotic and also steroids. He may  use OTC cough syrup/drop additionally   If he does not improve he will need to come in for evaluation

## 2024-04-25 DIAGNOSIS — M54.12 CERVICAL RADICULOPATHY: ICD-10-CM

## 2024-04-25 DIAGNOSIS — M15.9 PRIMARY OSTEOARTHRITIS INVOLVING MULTIPLE JOINTS: ICD-10-CM

## 2024-04-25 DIAGNOSIS — M79.2 RADICULAR PAIN IN LEFT ARM: ICD-10-CM

## 2024-04-25 DIAGNOSIS — M51.37 DISC DISEASE, DEGENERATIVE, LUMBAR OR LUMBOSACRAL: ICD-10-CM

## 2024-04-25 DIAGNOSIS — G89.29 CHRONIC MIDLINE LOW BACK PAIN WITHOUT SCIATICA: ICD-10-CM

## 2024-04-25 DIAGNOSIS — M54.50 CHRONIC MIDLINE LOW BACK PAIN WITHOUT SCIATICA: ICD-10-CM

## 2024-04-26 RX ORDER — TRAMADOL HYDROCHLORIDE 50 MG/1
100 TABLET ORAL EVERY 8 HOURS PRN
Qty: 180 TABLET | Refills: 0 | Status: SHIPPED | OUTPATIENT
Start: 2024-04-26 | End: 2024-05-26

## 2024-04-30 DIAGNOSIS — R06.02 SHORTNESS OF BREATH: ICD-10-CM

## 2024-04-30 DIAGNOSIS — J44.9 CHRONIC OBSTRUCTIVE PULMONARY DISEASE, UNSPECIFIED COPD TYPE (HCC): ICD-10-CM

## 2024-04-30 RX ORDER — FLUTICASONE PROPIONATE AND SALMETEROL 100; 50 UG/1; UG/1
1 POWDER RESPIRATORY (INHALATION) EVERY 12 HOURS
Qty: 60 EACH | Refills: 5 | Status: SHIPPED | OUTPATIENT
Start: 2024-04-30

## 2024-04-30 RX ORDER — FLUTICASONE PROPIONATE AND SALMETEROL XINAFOATE 115; 21 UG/1; UG/1
2 AEROSOL, METERED RESPIRATORY (INHALATION) 2 TIMES DAILY
Qty: 12 G | Refills: 3 | OUTPATIENT
Start: 2024-04-30

## 2024-05-19 DIAGNOSIS — J44.9 CHRONIC OBSTRUCTIVE PULMONARY DISEASE, UNSPECIFIED COPD TYPE (HCC): ICD-10-CM

## 2024-05-19 DIAGNOSIS — R06.02 SHORTNESS OF BREATH: ICD-10-CM

## 2024-05-20 RX ORDER — ALBUTEROL SULFATE 90 UG/1
AEROSOL, METERED RESPIRATORY (INHALATION)
Qty: 8.5 G | Refills: 5 | Status: SHIPPED | OUTPATIENT
Start: 2024-05-20

## 2024-05-24 ENCOUNTER — OFFICE VISIT (OUTPATIENT)
Dept: PRIMARY CARE CLINIC | Age: 59
End: 2024-05-24
Payer: COMMERCIAL

## 2024-05-24 VITALS
WEIGHT: 195.8 LBS | SYSTOLIC BLOOD PRESSURE: 118 MMHG | BODY MASS INDEX: 25.95 KG/M2 | HEART RATE: 76 BPM | DIASTOLIC BLOOD PRESSURE: 80 MMHG | OXYGEN SATURATION: 97 % | HEIGHT: 73 IN

## 2024-05-24 DIAGNOSIS — I10 ESSENTIAL HYPERTENSION: Primary | Chronic | ICD-10-CM

## 2024-05-24 DIAGNOSIS — M51.37 DISC DISEASE, DEGENERATIVE, LUMBAR OR LUMBOSACRAL: ICD-10-CM

## 2024-05-24 DIAGNOSIS — J41.0 SIMPLE CHRONIC BRONCHITIS (HCC): Chronic | ICD-10-CM

## 2024-05-24 DIAGNOSIS — M79.2 RADICULAR PAIN IN LEFT ARM: ICD-10-CM

## 2024-05-24 DIAGNOSIS — M15.9 PRIMARY OSTEOARTHRITIS INVOLVING MULTIPLE JOINTS: ICD-10-CM

## 2024-05-24 DIAGNOSIS — M54.50 CHRONIC MIDLINE LOW BACK PAIN WITHOUT SCIATICA: ICD-10-CM

## 2024-05-24 DIAGNOSIS — M54.2 PAIN IN NECK: ICD-10-CM

## 2024-05-24 DIAGNOSIS — M50.30 DEGENERATIVE DISC DISEASE, CERVICAL: ICD-10-CM

## 2024-05-24 DIAGNOSIS — G89.29 CHRONIC MIDLINE LOW BACK PAIN WITHOUT SCIATICA: ICD-10-CM

## 2024-05-24 PROBLEM — M70.41 PREPATELLAR BURSITIS OF RIGHT KNEE: Status: RESOLVED | Noted: 2021-05-18 | Resolved: 2024-05-24

## 2024-05-24 PROBLEM — R10.9 LEFT FLANK PAIN: Status: RESOLVED | Noted: 2023-12-22 | Resolved: 2024-05-24

## 2024-05-24 PROBLEM — B96.89 SEPTIC INFRAPATELLAR BURSITIS, LEFT: Status: RESOLVED | Noted: 2021-05-18 | Resolved: 2024-05-24

## 2024-05-24 PROBLEM — R06.02 SHORTNESS OF BREATH: Status: RESOLVED | Noted: 2021-09-08 | Resolved: 2024-05-24

## 2024-05-24 PROBLEM — Z96.0 URETERAL STENT PRESENT: Status: RESOLVED | Noted: 2023-12-20 | Resolved: 2024-05-24

## 2024-05-24 PROBLEM — J45.909 ASTHMA IN ADULT: Chronic | Status: RESOLVED | Noted: 2023-04-10 | Resolved: 2024-05-24

## 2024-05-24 PROBLEM — K59.01 SLOW TRANSIT CONSTIPATION: Status: RESOLVED | Noted: 2023-12-22 | Resolved: 2024-05-24

## 2024-05-24 PROBLEM — M71.162 SEPTIC INFRAPATELLAR BURSITIS, LEFT: Status: RESOLVED | Noted: 2021-05-18 | Resolved: 2024-05-24

## 2024-05-24 PROBLEM — E83.51 HYPOCALCEMIA: Status: RESOLVED | Noted: 2021-05-18 | Resolved: 2024-05-24

## 2024-05-24 PROBLEM — L03.115 CELLULITIS OF RIGHT KNEE: Status: RESOLVED | Noted: 2021-05-17 | Resolved: 2024-05-24

## 2024-05-24 PROBLEM — N39.0 COMPLICATED UTI (URINARY TRACT INFECTION): Status: RESOLVED | Noted: 2023-12-20 | Resolved: 2024-05-24

## 2024-05-24 PROBLEM — N20.1 CALCULUS OF URETER: Status: RESOLVED | Noted: 2023-12-15 | Resolved: 2024-05-24

## 2024-05-24 PROBLEM — E87.1 HYPONATREMIA: Status: RESOLVED | Noted: 2021-05-18 | Resolved: 2024-05-24

## 2024-05-24 PROBLEM — N10 ACUTE PYELONEPHRITIS: Status: RESOLVED | Noted: 2023-12-22 | Resolved: 2024-05-24

## 2024-05-24 PROCEDURE — 3074F SYST BP LT 130 MM HG: CPT | Performed by: NURSE PRACTITIONER

## 2024-05-24 PROCEDURE — 1036F TOBACCO NON-USER: CPT | Performed by: NURSE PRACTITIONER

## 2024-05-24 PROCEDURE — 3017F COLORECTAL CA SCREEN DOC REV: CPT | Performed by: NURSE PRACTITIONER

## 2024-05-24 PROCEDURE — G8427 DOCREV CUR MEDS BY ELIG CLIN: HCPCS | Performed by: NURSE PRACTITIONER

## 2024-05-24 PROCEDURE — G8419 CALC BMI OUT NRM PARAM NOF/U: HCPCS | Performed by: NURSE PRACTITIONER

## 2024-05-24 PROCEDURE — 3023F SPIROM DOC REV: CPT | Performed by: NURSE PRACTITIONER

## 2024-05-24 PROCEDURE — 99214 OFFICE O/P EST MOD 30 MIN: CPT | Performed by: NURSE PRACTITIONER

## 2024-05-24 PROCEDURE — 3079F DIAST BP 80-89 MM HG: CPT | Performed by: NURSE PRACTITIONER

## 2024-05-24 SDOH — ECONOMIC STABILITY: FOOD INSECURITY: WITHIN THE PAST 12 MONTHS, YOU WORRIED THAT YOUR FOOD WOULD RUN OUT BEFORE YOU GOT MONEY TO BUY MORE.: NEVER TRUE

## 2024-05-24 SDOH — ECONOMIC STABILITY: INCOME INSECURITY: HOW HARD IS IT FOR YOU TO PAY FOR THE VERY BASICS LIKE FOOD, HOUSING, MEDICAL CARE, AND HEATING?: NOT HARD AT ALL

## 2024-05-24 SDOH — ECONOMIC STABILITY: HOUSING INSECURITY
IN THE LAST 12 MONTHS, WAS THERE A TIME WHEN YOU DID NOT HAVE A STEADY PLACE TO SLEEP OR SLEPT IN A SHELTER (INCLUDING NOW)?: NO

## 2024-05-24 SDOH — ECONOMIC STABILITY: FOOD INSECURITY: WITHIN THE PAST 12 MONTHS, THE FOOD YOU BOUGHT JUST DIDN'T LAST AND YOU DIDN'T HAVE MONEY TO GET MORE.: NEVER TRUE

## 2024-05-24 ASSESSMENT — ENCOUNTER SYMPTOMS
COUGH: 0
WHEEZING: 0
DIARRHEA: 0
SORE THROAT: 0
VOMITING: 0
BLOOD IN STOOL: 0
TROUBLE SWALLOWING: 0
SHORTNESS OF BREATH: 1
SINUS PRESSURE: 0
CONSTIPATION: 0
ABDOMINAL PAIN: 0
NAUSEA: 0
BACK PAIN: 1

## 2024-05-24 ASSESSMENT — PATIENT HEALTH QUESTIONNAIRE - PHQ9
SUM OF ALL RESPONSES TO PHQ QUESTIONS 1-9: 0
1. LITTLE INTEREST OR PLEASURE IN DOING THINGS: NOT AT ALL
SUM OF ALL RESPONSES TO PHQ QUESTIONS 1-9: 0
SUM OF ALL RESPONSES TO PHQ9 QUESTIONS 1 & 2: 0
2. FEELING DOWN, DEPRESSED OR HOPELESS: NOT AT ALL
SUM OF ALL RESPONSES TO PHQ QUESTIONS 1-9: 0
SUM OF ALL RESPONSES TO PHQ QUESTIONS 1-9: 0

## 2024-05-24 NOTE — PROGRESS NOTES
[facility]  [facility] PRIMARY CARE  [address] [place] [address] [address]  [address]  [facility] [address]  Dept: [phone]  Dept Fax: [phone]    Dez Fernández is a 58 y.o. male who presentstoday for his medical conditions/complaints as noted below.  Dez Fernández is c/o of  Chief Complaint   Patient presents with    Follow-up     cervical radiculopathy, still having numbness in hands. Does not think he has improved. Had 1 cervical injection with pain management and did not call them again.         HPI:     Here today for follow up  Reports had injection to neck with no improvement  He has not yet followed up as he thought pain management office would reach out to him  He has continued gabapentin and also tramadol as needed  He admits some improvement in his upper arm and axillary pain/discomfort.  \"I no longer have to hold my arm\".  However, he continues to have significant numbness in his right forearm and fourth and fifth fingers.  He did complete EMG    Asking about inhalers.  Reports using albuterol 1-2 times a day most days.  He is not sure on when he was supposed to be using the other inhaler, Advair  Reports he becomes short of breath with activities like singing or exertion physically  Albuterol is helpful      Hypertension  This is a chronic problem. The current episode started more than 1 year ago. The problem is controlled. Associated symptoms include neck pain and shortness of breath (Intermittent with exertion). Pertinent negatives include no chest pain, headaches, palpitations or peripheral edema. Past treatments include ACE inhibitors. The current treatment provides significant improvement. There are no compliance problems.  There is no history of CAD/MI or CVA.       No results found for: \"LABA1C\"          ( goal A1C is < 7)   No components found for: \"LABMICR\"  No components found for: \"LDLCHOLESTEROL\", \"LDLCALC\"    (goal LDL is <100)   AST (U/L)   Date Value   12/01/2023 32

## 2024-05-26 DIAGNOSIS — G89.29 CHRONIC MIDLINE LOW BACK PAIN WITHOUT SCIATICA: ICD-10-CM

## 2024-05-26 DIAGNOSIS — M79.2 RADICULAR PAIN IN LEFT ARM: ICD-10-CM

## 2024-05-26 DIAGNOSIS — M51.37 DISC DISEASE, DEGENERATIVE, LUMBAR OR LUMBOSACRAL: ICD-10-CM

## 2024-05-26 DIAGNOSIS — M15.9 PRIMARY OSTEOARTHRITIS INVOLVING MULTIPLE JOINTS: ICD-10-CM

## 2024-05-26 DIAGNOSIS — M54.50 CHRONIC MIDLINE LOW BACK PAIN WITHOUT SCIATICA: ICD-10-CM

## 2024-05-26 DIAGNOSIS — M54.12 CERVICAL RADICULOPATHY: ICD-10-CM

## 2024-05-28 RX ORDER — LORATADINE PSEUDOEPHEDRINE SULFATE 10; 240 MG/1; MG/1
1 TABLET, EXTENDED RELEASE ORAL DAILY
Qty: 90 TABLET | Refills: 3 | Status: SHIPPED | OUTPATIENT
Start: 2024-05-28

## 2024-05-28 RX ORDER — TRAMADOL HYDROCHLORIDE 50 MG/1
100 TABLET ORAL EVERY 8 HOURS PRN
Qty: 180 TABLET | Refills: 0 | Status: SHIPPED | OUTPATIENT
Start: 2024-05-28 | End: 2024-06-27

## 2024-05-29 DIAGNOSIS — R94.131 ABNORMAL EMG: ICD-10-CM

## 2024-05-29 DIAGNOSIS — M54.12 CERVICAL RADICULOPATHY: Primary | ICD-10-CM

## 2024-05-29 DIAGNOSIS — R93.7 ABNORMAL MRI, CERVICAL SPINE: ICD-10-CM

## 2024-06-11 ENCOUNTER — PATIENT MESSAGE (OUTPATIENT)
Dept: PRIMARY CARE CLINIC | Age: 59
End: 2024-06-11

## 2024-06-11 RX ORDER — AZITHROMYCIN 250 MG/1
TABLET, FILM COATED ORAL
Qty: 1 PACKET | Refills: 0 | Status: SHIPPED | OUTPATIENT
Start: 2024-06-11

## 2024-06-11 NOTE — TELEPHONE ENCOUNTER
From: Dez Fernández  To: Gaby Franklin  Sent: 6/11/2024 10:09 AM EDT  Subject: Congestion     Reynold Griffin I’m in need of an antibiotic I have a thick mucus buildup if you could help me please I would appreciate it I would consider it a birthday gift two years in a row I have been sick on my birthday

## 2024-06-25 DIAGNOSIS — M51.37 DISC DISEASE, DEGENERATIVE, LUMBAR OR LUMBOSACRAL: ICD-10-CM

## 2024-06-25 DIAGNOSIS — G89.29 CHRONIC MIDLINE LOW BACK PAIN WITHOUT SCIATICA: ICD-10-CM

## 2024-06-25 DIAGNOSIS — M79.2 RADICULAR PAIN IN LEFT ARM: ICD-10-CM

## 2024-06-25 DIAGNOSIS — M15.9 PRIMARY OSTEOARTHRITIS INVOLVING MULTIPLE JOINTS: ICD-10-CM

## 2024-06-25 DIAGNOSIS — M54.12 CERVICAL RADICULOPATHY: ICD-10-CM

## 2024-06-25 DIAGNOSIS — M54.50 CHRONIC MIDLINE LOW BACK PAIN WITHOUT SCIATICA: ICD-10-CM

## 2024-06-25 RX ORDER — TRAMADOL HYDROCHLORIDE 50 MG/1
100 TABLET ORAL EVERY 8 HOURS PRN
Qty: 180 TABLET | Refills: 0 | Status: SHIPPED | OUTPATIENT
Start: 2024-06-25 | End: 2024-07-25

## 2024-06-25 NOTE — TELEPHONE ENCOUNTER
Last OV:  5/24/2024    Next OV: 8/26/2024    Pharmacy:     [facility] #[id number] - [place], [place] - [address] - P [phone] - F [phone]  [address] [place] [address]  [place] [address]  Phone: [phone] Fax: [phone]       Confirmed? Yes

## 2024-06-26 ENCOUNTER — APPOINTMENT (OUTPATIENT)
Dept: CT IMAGING | Age: 59
End: 2024-06-26
Payer: COMMERCIAL

## 2024-06-26 ENCOUNTER — HOSPITAL ENCOUNTER (EMERGENCY)
Age: 59
Discharge: HOME | End: 2024-06-26
Attending: EMERGENCY MEDICINE
Payer: COMMERCIAL

## 2024-06-26 VITALS
WEIGHT: 195 LBS | BODY MASS INDEX: 25.73 KG/M2 | OXYGEN SATURATION: 96 % | RESPIRATION RATE: 13 BRPM | TEMPERATURE: 98.4 F | DIASTOLIC BLOOD PRESSURE: 93 MMHG | SYSTOLIC BLOOD PRESSURE: 148 MMHG | HEART RATE: 53 BPM

## 2024-06-26 DIAGNOSIS — I10 HYPERTENSION, UNSPECIFIED TYPE: ICD-10-CM

## 2024-06-26 DIAGNOSIS — R51.9 ACUTE NONINTRACTABLE HEADACHE, UNSPECIFIED HEADACHE TYPE: Primary | ICD-10-CM

## 2024-06-26 LAB
ALBUMIN SERPL-MCNC: 4 G/DL (ref 3.5–5.2)
ALBUMIN/GLOB SERPL: 1.5 {RATIO} (ref 1–2.5)
ALP SERPL-CCNC: 76 U/L (ref 40–129)
ALT SERPL-CCNC: 22 U/L (ref 5–41)
ANION GAP SERPL CALCULATED.3IONS-SCNC: 9 MMOL/L (ref 9–17)
AST SERPL-CCNC: 23 U/L
BASOPHILS # BLD: 0 K/UL (ref 0–0.2)
BASOPHILS NFR BLD: 1 % (ref 0–2)
BILIRUB SERPL-MCNC: 0.4 MG/DL (ref 0.3–1.2)
BUN SERPL-MCNC: 22 MG/DL (ref 8–23)
CALCIUM SERPL-MCNC: 8.9 MG/DL (ref 8.6–10.4)
CHLORIDE SERPL-SCNC: 106 MMOL/L (ref 98–107)
CO2 SERPL-SCNC: 27 MMOL/L (ref 20–31)
CREAT SERPL-MCNC: 1 MG/DL (ref 0.7–1.2)
EOSINOPHIL # BLD: 0.4 K/UL (ref 0–0.4)
EOSINOPHILS RELATIVE PERCENT: 5 % (ref 1–4)
ERYTHROCYTE [DISTWIDTH] IN BLOOD BY AUTOMATED COUNT: 12.7 % (ref 12.5–15.4)
GFR, ESTIMATED: 86 ML/MIN/1.73M2
GLUCOSE SERPL-MCNC: 106 MG/DL (ref 70–99)
HCT VFR BLD AUTO: 40.1 % (ref 41–53)
HGB BLD-MCNC: 14.1 G/DL (ref 13.5–17.5)
LYMPHOCYTES NFR BLD: 1.2 K/UL (ref 1–4.8)
LYMPHOCYTES RELATIVE PERCENT: 16 % (ref 24–44)
MAGNESIUM SERPL-MCNC: 2 MG/DL (ref 1.6–2.6)
MCH RBC QN AUTO: 31.1 PG (ref 26–34)
MCHC RBC AUTO-ENTMCNC: 35.2 G/DL (ref 31–37)
MCV RBC AUTO: 88.5 FL (ref 80–100)
MONOCYTES NFR BLD: 0.7 K/UL (ref 0.1–1.2)
MONOCYTES NFR BLD: 9 % (ref 2–11)
NEUTROPHILS NFR BLD: 69 % (ref 36–66)
NEUTS SEG NFR BLD: 5.4 K/UL (ref 1.8–7.7)
PLATELET # BLD AUTO: 254 K/UL (ref 140–450)
PMV BLD AUTO: 7.8 FL (ref 6–12)
POTASSIUM SERPL-SCNC: 4.5 MMOL/L (ref 3.7–5.3)
PROT SERPL-MCNC: 6.6 G/DL (ref 6.4–8.3)
RBC # BLD AUTO: 4.53 M/UL (ref 4.5–5.9)
SODIUM SERPL-SCNC: 142 MMOL/L (ref 135–144)
TROPONIN I SERPL HS-MCNC: 9 NG/L (ref 0–22)
WBC OTHER # BLD: 7.8 K/UL (ref 3.5–11)

## 2024-06-26 PROCEDURE — 6360000002 HC RX W HCPCS: Performed by: EMERGENCY MEDICINE

## 2024-06-26 PROCEDURE — 36415 COLL VENOUS BLD VENIPUNCTURE: CPT

## 2024-06-26 PROCEDURE — 96374 THER/PROPH/DIAG INJ IV PUSH: CPT

## 2024-06-26 PROCEDURE — 2580000003 HC RX 258: Performed by: EMERGENCY MEDICINE

## 2024-06-26 PROCEDURE — 84484 ASSAY OF TROPONIN QUANT: CPT

## 2024-06-26 PROCEDURE — 70450 CT HEAD/BRAIN W/O DYE: CPT

## 2024-06-26 PROCEDURE — 85025 COMPLETE CBC W/AUTO DIFF WBC: CPT

## 2024-06-26 PROCEDURE — 93005 ELECTROCARDIOGRAM TRACING: CPT | Performed by: EMERGENCY MEDICINE

## 2024-06-26 PROCEDURE — 83735 ASSAY OF MAGNESIUM: CPT

## 2024-06-26 PROCEDURE — 80053 COMPREHEN METABOLIC PANEL: CPT

## 2024-06-26 PROCEDURE — 96375 TX/PRO/DX INJ NEW DRUG ADDON: CPT

## 2024-06-26 RX ORDER — DIPHENHYDRAMINE HYDROCHLORIDE 50 MG/ML
25 INJECTION INTRAMUSCULAR; INTRAVENOUS ONCE
Status: COMPLETED | OUTPATIENT
Start: 2024-06-26 | End: 2024-06-26

## 2024-06-26 RX ORDER — SODIUM CHLORIDE 9 MG/ML
INJECTION, SOLUTION INTRAVENOUS CONTINUOUS
Status: DISCONTINUED | OUTPATIENT
Start: 2024-06-26 | End: 2024-06-27 | Stop reason: HOSPADM

## 2024-06-26 RX ORDER — METOCLOPRAMIDE HYDROCHLORIDE 5 MG/ML
10 INJECTION INTRAMUSCULAR; INTRAVENOUS ONCE
Status: COMPLETED | OUTPATIENT
Start: 2024-06-26 | End: 2024-06-26

## 2024-06-26 RX ORDER — KETOROLAC TROMETHAMINE 15 MG/ML
15 INJECTION, SOLUTION INTRAMUSCULAR; INTRAVENOUS ONCE
Status: COMPLETED | OUTPATIENT
Start: 2024-06-26 | End: 2024-06-26

## 2024-06-26 RX ADMIN — DIPHENHYDRAMINE HYDROCHLORIDE 25 MG: 50 INJECTION INTRAMUSCULAR; INTRAVENOUS at 21:07

## 2024-06-26 RX ADMIN — KETOROLAC TROMETHAMINE 15 MG: 15 INJECTION, SOLUTION INTRAMUSCULAR; INTRAVENOUS at 21:08

## 2024-06-26 RX ADMIN — SODIUM CHLORIDE: 9 INJECTION, SOLUTION INTRAVENOUS at 21:04

## 2024-06-26 RX ADMIN — METOCLOPRAMIDE 10 MG: 5 INJECTION, SOLUTION INTRAMUSCULAR; INTRAVENOUS at 21:09

## 2024-06-26 ASSESSMENT — PAIN SCALES - GENERAL
PAINLEVEL_OUTOF10: 7
PAINLEVEL_OUTOF10: 0
PAINLEVEL_OUTOF10: 8

## 2024-06-26 ASSESSMENT — PAIN - FUNCTIONAL ASSESSMENT: PAIN_FUNCTIONAL_ASSESSMENT: 0-10

## 2024-06-26 NOTE — ED PROVIDER NOTES
[facility] EMERGENCY DEPARTMENT  EMERGENCY DEPARTMENT ENCOUNTER      Pt Name: Dez Fernández  MRN: [id number]  Birthdate 1965  Date of evaluation: 6/26/2024  Provider: Thomas Osman MD    CHIEF COMPLAINT     Chief Complaint   Patient presents with    Headache     Patient c/o frontal headache that started around 10am this morning. Patient went to work and came home, took his blood pressure and it was 180's /100's. Patient states he still has his headache.     Hypertension         HISTORY OF PRESENT ILLNESS   (Location/Symptom, Timing/Onset, Context/Setting,Quality, Duration, Modifying Factors, Severity)  Note limiting factors.   Dez Fernández is a 59 y.o. male who presents to the emergency department complaining of a frontal headache that started suddenly around 10 this morning it is accompanied with photophobia, neck pain and nausea.  Blood pressures in the 180s over the 110s.  Patient has a history of hypertension for the last 2 years has been on lisinopril 5 mg daily.    The history is provided by the patient.       Nursing Notes werereviewed.    REVIEW OF SYSTEMS    (2-9 systems for level 4, 10 or more for level 5)     Review of Systems   All other systems reviewed and are negative.      Except as noted above the remainder of the review of systems was reviewed and negative.       PAST MEDICAL HISTORY     Past Medical History:   Diagnosis Date    Anxiety 05/26/2020    Back pain     Bilateral tinnitus 05/26/2020    Cellulitis of right knee 05/17/2021    Chronic midline low back pain without sciatica 11/10/2016    Deviated septum 05/26/2020    Disc disease, degenerative, lumbar or lumbosacral     Ear drum perforation, right     Essential hypertension 11/10/2016    History of placement of ear tubes 09/29/2020    History of tobacco use 05/26/2020    Hyperlipidemia     Hypertension     Hypocalcemia 05/18/2021    Hyponatremia 05/18/2021    Lymph node enlargement     Normocytic normochromic

## 2024-06-27 NOTE — DISCHARGE INSTRUCTIONS
Increase lisinopril to 10 mg daily.  Tylenol for headache as needed.  Return anytime for worsening symptoms.

## 2024-06-28 LAB
EKG ATRIAL RATE: 52 BPM
EKG P AXIS: 71 DEGREES
EKG P-R INTERVAL: 148 MS
EKG Q-T INTERVAL: 426 MS
EKG QRS DURATION: 82 MS
EKG QTC CALCULATION (BAZETT): 396 MS
EKG R AXIS: 79 DEGREES
EKG T AXIS: 69 DEGREES
EKG VENTRICULAR RATE: 52 BPM

## 2024-06-30 RX ORDER — OMEPRAZOLE 20 MG/1
CAPSULE, DELAYED RELEASE ORAL
Qty: 90 CAPSULE | Refills: 3 | Status: SHIPPED | OUTPATIENT
Start: 2024-06-30

## 2024-07-23 DIAGNOSIS — I10 ESSENTIAL HYPERTENSION: ICD-10-CM

## 2024-07-23 DIAGNOSIS — M79.2 RADICULAR PAIN IN LEFT ARM: ICD-10-CM

## 2024-07-23 DIAGNOSIS — M51.37 DISC DISEASE, DEGENERATIVE, LUMBAR OR LUMBOSACRAL: ICD-10-CM

## 2024-07-23 DIAGNOSIS — M54.12 CERVICAL RADICULOPATHY: ICD-10-CM

## 2024-07-23 DIAGNOSIS — M54.50 CHRONIC MIDLINE LOW BACK PAIN WITHOUT SCIATICA: ICD-10-CM

## 2024-07-23 DIAGNOSIS — M15.9 PRIMARY OSTEOARTHRITIS INVOLVING MULTIPLE JOINTS: ICD-10-CM

## 2024-07-23 DIAGNOSIS — G89.29 CHRONIC MIDLINE LOW BACK PAIN WITHOUT SCIATICA: ICD-10-CM

## 2024-07-23 RX ORDER — LISINOPRIL 10 MG/1
TABLET ORAL
Qty: 90 TABLET | Refills: 3 | Status: SHIPPED | OUTPATIENT
Start: 2024-07-23

## 2024-07-23 RX ORDER — TRAMADOL HYDROCHLORIDE 50 MG/1
100 TABLET ORAL EVERY 8 HOURS PRN
Qty: 180 TABLET | Refills: 0 | Status: SHIPPED | OUTPATIENT
Start: 2024-07-23 | End: 2024-08-22

## 2024-07-23 NOTE — TELEPHONE ENCOUNTER
Last OV:  5/24/2024    Next OV: 8/26/2024    Pharmacy:     [facility] #[id number] - [place], [place] - [address] - P [phone] - F [phone]  [address] [place] [address]  [place] [address]  Phone: [phone] Fax: [phone]       Confirmed? Yes

## 2024-08-14 DIAGNOSIS — R09.81 SINUS CONGESTION: Primary | ICD-10-CM

## 2024-08-22 DIAGNOSIS — M54.12 CERVICAL RADICULOPATHY: ICD-10-CM

## 2024-08-22 DIAGNOSIS — M15.9 PRIMARY OSTEOARTHRITIS INVOLVING MULTIPLE JOINTS: ICD-10-CM

## 2024-08-22 DIAGNOSIS — M79.2 RADICULAR PAIN IN LEFT ARM: ICD-10-CM

## 2024-08-22 DIAGNOSIS — M51.37 DISC DISEASE, DEGENERATIVE, LUMBAR OR LUMBOSACRAL: ICD-10-CM

## 2024-08-22 DIAGNOSIS — G89.29 CHRONIC MIDLINE LOW BACK PAIN WITHOUT SCIATICA: ICD-10-CM

## 2024-08-22 DIAGNOSIS — M54.50 CHRONIC MIDLINE LOW BACK PAIN WITHOUT SCIATICA: ICD-10-CM

## 2024-08-22 RX ORDER — TRAMADOL HYDROCHLORIDE 50 MG/1
100 TABLET ORAL EVERY 8 HOURS PRN
Qty: 180 TABLET | Refills: 0 | Status: SHIPPED | OUTPATIENT
Start: 2024-08-22 | End: 2024-09-21

## 2024-08-22 RX ORDER — LORATADINE PSEUDOEPHEDRINE SULFATE 10; 240 MG/1; MG/1
1 TABLET, EXTENDED RELEASE ORAL DAILY
Qty: 90 TABLET | Refills: 3 | Status: SHIPPED | OUTPATIENT
Start: 2024-08-22

## 2024-08-26 ENCOUNTER — OFFICE VISIT (OUTPATIENT)
Dept: PRIMARY CARE CLINIC | Age: 59
End: 2024-08-26
Payer: COMMERCIAL

## 2024-08-26 VITALS
HEART RATE: 61 BPM | OXYGEN SATURATION: 94 % | WEIGHT: 197.4 LBS | DIASTOLIC BLOOD PRESSURE: 82 MMHG | SYSTOLIC BLOOD PRESSURE: 134 MMHG | BODY MASS INDEX: 26.04 KG/M2

## 2024-08-26 DIAGNOSIS — M54.12 CERVICAL RADICULOPATHY: ICD-10-CM

## 2024-08-26 DIAGNOSIS — I10 ESSENTIAL HYPERTENSION: Primary | Chronic | ICD-10-CM

## 2024-08-26 DIAGNOSIS — M15.9 PRIMARY OSTEOARTHRITIS INVOLVING MULTIPLE JOINTS: ICD-10-CM

## 2024-08-26 DIAGNOSIS — M47.812 CERVICAL SPONDYLOSIS: ICD-10-CM

## 2024-08-26 DIAGNOSIS — M54.2 PAIN IN NECK: ICD-10-CM

## 2024-08-26 PROCEDURE — 1036F TOBACCO NON-USER: CPT | Performed by: NURSE PRACTITIONER

## 2024-08-26 PROCEDURE — 3017F COLORECTAL CA SCREEN DOC REV: CPT | Performed by: NURSE PRACTITIONER

## 2024-08-26 PROCEDURE — 99214 OFFICE O/P EST MOD 30 MIN: CPT | Performed by: NURSE PRACTITIONER

## 2024-08-26 PROCEDURE — G8427 DOCREV CUR MEDS BY ELIG CLIN: HCPCS | Performed by: NURSE PRACTITIONER

## 2024-08-26 PROCEDURE — G8419 CALC BMI OUT NRM PARAM NOF/U: HCPCS | Performed by: NURSE PRACTITIONER

## 2024-08-26 PROCEDURE — 3075F SYST BP GE 130 - 139MM HG: CPT | Performed by: NURSE PRACTITIONER

## 2024-08-26 PROCEDURE — 3079F DIAST BP 80-89 MM HG: CPT | Performed by: NURSE PRACTITIONER

## 2024-08-26 ASSESSMENT — ENCOUNTER SYMPTOMS
ABDOMINAL PAIN: 0
SINUS PRESSURE: 0
CONSTIPATION: 0
TROUBLE SWALLOWING: 0
DIARRHEA: 0
BLOOD IN STOOL: 0
WHEEZING: 0
SHORTNESS OF BREATH: 0
BACK PAIN: 1
NAUSEA: 0
COUGH: 0
VOMITING: 0
SORE THROAT: 0

## 2024-08-26 NOTE — PROGRESS NOTES
[facility]  [facility] PRIMARY CARE  [address] [place] [address] [address]  [address]  [facility] [address]  Dept: [phone]  Dept Fax: [phone]    Dez Fernández is a 59 y.o. male who presentstoday for his medical conditions/complaints as noted below.  Dez Fernández is c/o of  Chief Complaint   Patient presents with    Hypertension    Neck Pain       HPI:     Here today for a follow up  He has ongoing neck and shoulder pain  He went to pain management in march and also completed a few PT sessions  States he did not get any relief with the one injection and is hesitant to go back due to cost  He does his own therapy exercises at home and is using his TENS unit twice a week for pain   Takes ibuprofen as needed and tramadol 1-2 times daily  He remains with right arm numbness most of the time.  States his neck mainly feels stiff    Has otherwise been stable  Reports recently recovered from a viral infection/sinus and ear discomfort    Had left side throat pain and ear pain about 2 weeks ago  States his symptoms have since resolved    Hypertension  This is a chronic problem. The current episode started more than 1 year ago. The problem is controlled. Associated symptoms include neck pain. Pertinent negatives include no chest pain, headaches, palpitations, peripheral edema or shortness of breath. Past treatments include ACE inhibitors. The current treatment provides significant improvement. There are no compliance problems.  There is no history of CAD/MI.       No results found for: \"LABA1C\"          ( goal A1C is < 7)   No components found for: \"LABMICR\"  No components found for: \"LDLCHOLESTEROL\", \"LDLCALC\"    (goal LDL is <100)   AST (U/L)   Date Value   06/26/2024 23     ALT (U/L)   Date Value   06/26/2024 22     BUN (mg/dL)   Date Value   06/26/2024 22     BP Readings from Last 3 Encounters:   08/26/24 134/82   06/26/24 (!) 148/93   05/24/24 118/80          ([id number])    Past Medical History:

## 2024-09-17 DIAGNOSIS — M79.2 RADICULAR PAIN IN LEFT ARM: ICD-10-CM

## 2024-09-17 DIAGNOSIS — M51.37 DISC DISEASE, DEGENERATIVE, LUMBAR OR LUMBOSACRAL: ICD-10-CM

## 2024-09-17 DIAGNOSIS — G89.29 CHRONIC MIDLINE LOW BACK PAIN WITHOUT SCIATICA: ICD-10-CM

## 2024-09-17 DIAGNOSIS — M54.50 CHRONIC MIDLINE LOW BACK PAIN WITHOUT SCIATICA: ICD-10-CM

## 2024-09-17 DIAGNOSIS — M15.9 PRIMARY OSTEOARTHRITIS INVOLVING MULTIPLE JOINTS: ICD-10-CM

## 2024-09-17 DIAGNOSIS — M54.12 CERVICAL RADICULOPATHY: ICD-10-CM

## 2024-09-18 RX ORDER — TRAMADOL HYDROCHLORIDE 50 MG/1
100 TABLET ORAL EVERY 8 HOURS PRN
Qty: 180 TABLET | Refills: 0 | Status: SHIPPED | OUTPATIENT
Start: 2024-09-18 | End: 2024-10-18

## 2024-10-15 DIAGNOSIS — M79.2 RADICULAR PAIN IN LEFT ARM: ICD-10-CM

## 2024-10-15 DIAGNOSIS — M54.12 CERVICAL RADICULOPATHY: ICD-10-CM

## 2024-10-15 DIAGNOSIS — M15.0 PRIMARY OSTEOARTHRITIS INVOLVING MULTIPLE JOINTS: ICD-10-CM

## 2024-10-15 DIAGNOSIS — J44.9 CHRONIC OBSTRUCTIVE PULMONARY DISEASE, UNSPECIFIED COPD TYPE (HCC): ICD-10-CM

## 2024-10-15 DIAGNOSIS — R06.02 SHORTNESS OF BREATH: ICD-10-CM

## 2024-10-15 DIAGNOSIS — M54.50 CHRONIC MIDLINE LOW BACK PAIN WITHOUT SCIATICA: ICD-10-CM

## 2024-10-15 DIAGNOSIS — G89.29 CHRONIC MIDLINE LOW BACK PAIN WITHOUT SCIATICA: ICD-10-CM

## 2024-10-15 DIAGNOSIS — M51.379 DISC DISEASE, DEGENERATIVE, LUMBAR OR LUMBOSACRAL: ICD-10-CM

## 2024-10-15 RX ORDER — TRAMADOL HYDROCHLORIDE 50 MG/1
100 TABLET ORAL EVERY 8 HOURS PRN
Qty: 180 TABLET | Refills: 0 | Status: SHIPPED | OUTPATIENT
Start: 2024-10-15 | End: 2024-11-14

## 2024-10-15 RX ORDER — FLUTICASONE PROPIONATE AND SALMETEROL 100; 50 UG/1; UG/1
1 POWDER RESPIRATORY (INHALATION) EVERY 12 HOURS
Qty: 60 EACH | Refills: 5 | Status: SHIPPED | OUTPATIENT
Start: 2024-10-15

## 2024-10-15 RX ORDER — LORATADINE PSEUDOEPHEDRINE SULFATE 10; 240 MG/1; MG/1
1 TABLET, EXTENDED RELEASE ORAL DAILY
Qty: 90 TABLET | Refills: 3 | Status: SHIPPED | OUTPATIENT
Start: 2024-10-15

## 2024-10-15 RX ORDER — ALBUTEROL SULFATE 90 UG/1
INHALANT RESPIRATORY (INHALATION)
Qty: 8.5 G | Refills: 5 | Status: SHIPPED | OUTPATIENT
Start: 2024-10-15

## 2024-11-07 DIAGNOSIS — J44.9 CHRONIC OBSTRUCTIVE PULMONARY DISEASE, UNSPECIFIED COPD TYPE (HCC): ICD-10-CM

## 2024-11-07 DIAGNOSIS — R06.02 SHORTNESS OF BREATH: ICD-10-CM

## 2024-11-07 RX ORDER — ALBUTEROL SULFATE 90 UG/1
INHALANT RESPIRATORY (INHALATION)
Qty: 8.5 G | Refills: 5 | Status: SHIPPED | OUTPATIENT
Start: 2024-11-07

## 2024-11-14 DIAGNOSIS — M54.50 CHRONIC MIDLINE LOW BACK PAIN WITHOUT SCIATICA: ICD-10-CM

## 2024-11-14 DIAGNOSIS — R06.02 SHORTNESS OF BREATH: ICD-10-CM

## 2024-11-14 DIAGNOSIS — G89.29 CHRONIC MIDLINE LOW BACK PAIN WITHOUT SCIATICA: ICD-10-CM

## 2024-11-14 DIAGNOSIS — M51.379 DISC DISEASE, DEGENERATIVE, LUMBAR OR LUMBOSACRAL: ICD-10-CM

## 2024-11-14 DIAGNOSIS — J44.9 CHRONIC OBSTRUCTIVE PULMONARY DISEASE, UNSPECIFIED COPD TYPE (HCC): ICD-10-CM

## 2024-11-14 DIAGNOSIS — M15.0 PRIMARY OSTEOARTHRITIS INVOLVING MULTIPLE JOINTS: ICD-10-CM

## 2024-11-14 DIAGNOSIS — M79.2 RADICULAR PAIN IN LEFT ARM: ICD-10-CM

## 2024-11-14 DIAGNOSIS — M54.12 CERVICAL RADICULOPATHY: ICD-10-CM

## 2024-11-14 RX ORDER — ALBUTEROL SULFATE 90 UG/1
INHALANT RESPIRATORY (INHALATION)
Qty: 8.5 G | Refills: 5 | Status: SHIPPED | OUTPATIENT
Start: 2024-11-14

## 2024-11-14 RX ORDER — TRAMADOL HYDROCHLORIDE 50 MG/1
100 TABLET ORAL EVERY 8 HOURS PRN
Qty: 180 TABLET | Refills: 0 | Status: SHIPPED | OUTPATIENT
Start: 2024-11-14 | End: 2024-12-14

## 2024-11-26 ENCOUNTER — OFFICE VISIT (OUTPATIENT)
Dept: PRIMARY CARE CLINIC | Age: 59
End: 2024-11-26

## 2024-11-26 VITALS
WEIGHT: 203.6 LBS | DIASTOLIC BLOOD PRESSURE: 78 MMHG | SYSTOLIC BLOOD PRESSURE: 132 MMHG | HEART RATE: 72 BPM | BODY MASS INDEX: 26.86 KG/M2 | OXYGEN SATURATION: 96 %

## 2024-11-26 DIAGNOSIS — R20.2 NUMBNESS AND TINGLING OF RIGHT UPPER EXTREMITY: ICD-10-CM

## 2024-11-26 DIAGNOSIS — J41.0 SIMPLE CHRONIC BRONCHITIS (HCC): Chronic | ICD-10-CM

## 2024-11-26 DIAGNOSIS — R29.898 WEAKNESS OF LEFT ARM: ICD-10-CM

## 2024-11-26 DIAGNOSIS — M54.12 CERVICAL RADICULOPATHY: ICD-10-CM

## 2024-11-26 DIAGNOSIS — Z00.00 ANNUAL PHYSICAL EXAM: Primary | ICD-10-CM

## 2024-11-26 DIAGNOSIS — M79.2 RADICULAR PAIN IN LEFT ARM: ICD-10-CM

## 2024-11-26 DIAGNOSIS — R20.0 NUMBNESS AND TINGLING OF RIGHT UPPER EXTREMITY: ICD-10-CM

## 2024-11-26 DIAGNOSIS — M15.0 PRIMARY OSTEOARTHRITIS INVOLVING MULTIPLE JOINTS: ICD-10-CM

## 2024-11-26 DIAGNOSIS — Z12.5 SCREENING FOR PROSTATE CANCER: ICD-10-CM

## 2024-11-26 DIAGNOSIS — I10 ESSENTIAL HYPERTENSION: Chronic | ICD-10-CM

## 2024-11-26 ASSESSMENT — ENCOUNTER SYMPTOMS
CONSTIPATION: 0
SINUS PRESSURE: 0
VOMITING: 0
NAUSEA: 0
COUGH: 0
SORE THROAT: 0
BLOOD IN STOOL: 0
WHEEZING: 0
TROUBLE SWALLOWING: 0
DIARRHEA: 0
ABDOMINAL PAIN: 0
SHORTNESS OF BREATH: 0

## 2024-11-26 NOTE — PROGRESS NOTES
[facility]  [facility] PRIMARY CARE  [address] [place] [address] [address]  [address]  [facility] [address]  Dept: [phone]  Dept Fax: [phone]    Dez Fernández is a 59 y.o. male who presentstoday for his medical conditions/complaints as noted below.  Dez Fernández is c/o of  Chief Complaint   Patient presents with    Hypertension    Annual Exam    Other     Had chest pain while hunting-went to [place] [facility]-neg for MI  Printed NS referral for patient-has not followed up yet.       HPI:     Here today for annual exam and follow up  He reports about 3 weeks ago, he was out of town hunting  He went to remove his jacket and had sudden onset chest pain, went to ED and MI was ruled out  He was told likely musculoskeletal   The pain resolved and has not recurred.  He is wondering if it could possibly be related to his neck  He continues to have chronic neck pain and weakness in his left arm, numbness and tingling in the right.  The symptoms are persistent  He admits he did not return to pain management as advised and has also not scheduled with neurosurgeon yet.  He states he is fearful of surgery but is recognizing that he needs to \"do something\"    Otherwise remains physically active, admits diet could use some improvement    Hypertension  This is a chronic problem. The current episode started more than 1 year ago. The problem is controlled. Associated symptoms include neck pain. Pertinent negatives include no chest pain, headaches, palpitations or shortness of breath. Past treatments include ACE inhibitors. The current treatment provides significant improvement. There are no compliance problems.  There is no history of CAD/MI or CVA.       No results found for: \"LABA1C\"          ( goal A1C is < 7)   No components found for: \"LABMICR\"  No components found for: \"LDLCHOLESTEROL\", \"LDLCALC\"    (goal LDL is <100)   AST (U/L)   Date Value   06/26/2024 23     ALT (U/L)   Date Value   06/26/2024 22     BUN

## 2024-12-12 ENCOUNTER — PATIENT MESSAGE (OUTPATIENT)
Dept: PRIMARY CARE CLINIC | Age: 59
End: 2024-12-12

## 2024-12-12 DIAGNOSIS — M54.50 CHRONIC MIDLINE LOW BACK PAIN WITHOUT SCIATICA: ICD-10-CM

## 2024-12-12 DIAGNOSIS — M79.2 RADICULAR PAIN IN LEFT ARM: ICD-10-CM

## 2024-12-12 DIAGNOSIS — G89.29 CHRONIC MIDLINE LOW BACK PAIN WITHOUT SCIATICA: ICD-10-CM

## 2024-12-12 DIAGNOSIS — M15.0 PRIMARY OSTEOARTHRITIS INVOLVING MULTIPLE JOINTS: ICD-10-CM

## 2024-12-12 DIAGNOSIS — M51.379 DISC DISEASE, DEGENERATIVE, LUMBAR OR LUMBOSACRAL: ICD-10-CM

## 2024-12-12 DIAGNOSIS — M54.12 CERVICAL RADICULOPATHY: ICD-10-CM

## 2024-12-12 RX ORDER — TRAMADOL HYDROCHLORIDE 50 MG/1
100 TABLET ORAL EVERY 8 HOURS PRN
Qty: 180 TABLET | Refills: 0 | Status: SHIPPED | OUTPATIENT
Start: 2024-12-12 | End: 2025-01-11

## 2024-12-12 RX ORDER — AZITHROMYCIN 250 MG/1
TABLET, FILM COATED ORAL
Qty: 1 PACKET | Refills: 0 | Status: SHIPPED | OUTPATIENT
Start: 2024-12-12

## 2024-12-22 DIAGNOSIS — I10 ESSENTIAL HYPERTENSION: ICD-10-CM

## 2024-12-23 RX ORDER — LISINOPRIL 10 MG/1
TABLET ORAL
Qty: 90 TABLET | Refills: 3 | Status: SHIPPED | OUTPATIENT
Start: 2024-12-23

## 2024-12-23 NOTE — TELEPHONE ENCOUNTER
Last OV:  11/26/2024    Next OV: 2/27/2025    Pharmacy:     [facility] #[id number] - [place], [place] - [address] - P [phone] - F [phone]  [address]  [place] [address]  Phone: [phone] Fax: [phone]       Confirmed? Yes

## 2025-01-09 ENCOUNTER — PATIENT MESSAGE (OUTPATIENT)
Dept: PRIMARY CARE CLINIC | Age: 60
End: 2025-01-09

## 2025-01-09 DIAGNOSIS — J41.0 SIMPLE CHRONIC BRONCHITIS (HCC): Primary | Chronic | ICD-10-CM

## 2025-01-09 DIAGNOSIS — I10 ESSENTIAL HYPERTENSION: ICD-10-CM

## 2025-01-09 DIAGNOSIS — G89.29 CHRONIC MIDLINE LOW BACK PAIN WITHOUT SCIATICA: ICD-10-CM

## 2025-01-09 DIAGNOSIS — M15.0 PRIMARY OSTEOARTHRITIS INVOLVING MULTIPLE JOINTS: ICD-10-CM

## 2025-01-09 DIAGNOSIS — M54.12 CERVICAL RADICULOPATHY: ICD-10-CM

## 2025-01-09 DIAGNOSIS — M54.50 CHRONIC MIDLINE LOW BACK PAIN WITHOUT SCIATICA: ICD-10-CM

## 2025-01-09 DIAGNOSIS — M79.2 RADICULAR PAIN IN LEFT ARM: ICD-10-CM

## 2025-01-09 DIAGNOSIS — M51.379 DISC DISEASE, DEGENERATIVE, LUMBAR OR LUMBOSACRAL: ICD-10-CM

## 2025-01-10 RX ORDER — TRAMADOL HYDROCHLORIDE 50 MG/1
100 TABLET ORAL EVERY 8 HOURS PRN
Qty: 180 TABLET | Refills: 0 | Status: SHIPPED | OUTPATIENT
Start: 2025-01-10 | End: 2025-02-09

## 2025-01-10 RX ORDER — LISINOPRIL 10 MG/1
TABLET ORAL
Qty: 90 TABLET | Refills: 3 | Status: SHIPPED | OUTPATIENT
Start: 2025-01-10

## 2025-01-10 RX ORDER — ALBUTEROL SULFATE 0.83 MG/ML
2.5 SOLUTION RESPIRATORY (INHALATION) EVERY 6 HOURS PRN
Qty: 120 EACH | Refills: 3 | Status: SHIPPED | OUTPATIENT
Start: 2025-01-10

## 2025-01-10 RX ORDER — LORATADINE PSEUDOEPHEDRINE SULFATE 10; 240 MG/1; MG/1
1 TABLET, EXTENDED RELEASE ORAL DAILY
Qty: 90 TABLET | Refills: 3 | Status: SHIPPED | OUTPATIENT
Start: 2025-01-10

## 2025-01-10 NOTE — TELEPHONE ENCOUNTER
Last Visit Date: 11/26/2024   Next Visit Date: 2/27/2025     Writer could not locate an albuterol solution that was prescribed by PCP, last solution was from 2023 from a hospitalist

## 2025-01-13 ENCOUNTER — HOSPITAL ENCOUNTER (EMERGENCY)
Age: 60
Discharge: HOME | End: 2025-01-13
Attending: EMERGENCY MEDICINE
Payer: COMMERCIAL

## 2025-01-13 ENCOUNTER — APPOINTMENT (OUTPATIENT)
Dept: GENERAL RADIOLOGY | Age: 60
End: 2025-01-13
Payer: COMMERCIAL

## 2025-01-13 VITALS
WEIGHT: 205 LBS | HEART RATE: 68 BPM | OXYGEN SATURATION: 95 % | RESPIRATION RATE: 16 BRPM | TEMPERATURE: 98.3 F | DIASTOLIC BLOOD PRESSURE: 92 MMHG | BODY MASS INDEX: 27.17 KG/M2 | SYSTOLIC BLOOD PRESSURE: 132 MMHG | HEIGHT: 73 IN

## 2025-01-13 DIAGNOSIS — R07.9 CHEST PAIN, UNSPECIFIED TYPE: Primary | ICD-10-CM

## 2025-01-13 LAB
ANION GAP SERPL CALCULATED.3IONS-SCNC: 12 MMOL/L (ref 9–17)
BASOPHILS # BLD: 0.1 K/UL (ref 0–0.2)
BASOPHILS NFR BLD: 1 % (ref 0–2)
BUN SERPL-MCNC: 25 MG/DL (ref 8–23)
CALCIUM SERPL-MCNC: 9.3 MG/DL (ref 8.6–10.4)
CHLORIDE SERPL-SCNC: 99 MMOL/L (ref 98–107)
CO2 SERPL-SCNC: 27 MMOL/L (ref 20–31)
CREAT SERPL-MCNC: 1.1 MG/DL (ref 0.7–1.2)
D DIMER PPP FEU-MCNC: 0.32 UG/ML FEU
EOSINOPHIL # BLD: 0.4 K/UL (ref 0–0.4)
EOSINOPHILS RELATIVE PERCENT: 4 % (ref 1–4)
ERYTHROCYTE [DISTWIDTH] IN BLOOD BY AUTOMATED COUNT: 12.5 % (ref 12.5–15.4)
GFR, ESTIMATED: 76 ML/MIN/1.73M2
GLUCOSE SERPL-MCNC: 94 MG/DL (ref 70–99)
HCT VFR BLD AUTO: 48.3 % (ref 41–53)
HGB BLD-MCNC: 16.8 G/DL (ref 13.5–17.5)
LYMPHOCYTES NFR BLD: 1.6 K/UL (ref 1–4.8)
LYMPHOCYTES RELATIVE PERCENT: 17 % (ref 24–44)
MAGNESIUM SERPL-MCNC: 2.2 MG/DL (ref 1.6–2.6)
MCH RBC QN AUTO: 31.4 PG (ref 26–34)
MCHC RBC AUTO-ENTMCNC: 34.7 G/DL (ref 31–37)
MCV RBC AUTO: 90.6 FL (ref 80–100)
MONOCYTES NFR BLD: 0.8 K/UL (ref 0.1–1.2)
MONOCYTES NFR BLD: 8 % (ref 2–11)
NEUTROPHILS NFR BLD: 70 % (ref 36–66)
NEUTS SEG NFR BLD: 6.5 K/UL (ref 1.8–7.7)
PLATELET # BLD AUTO: 279 K/UL (ref 140–450)
PMV BLD AUTO: 7.8 FL (ref 6–12)
POTASSIUM SERPL-SCNC: 4.5 MMOL/L (ref 3.7–5.3)
RBC # BLD AUTO: 5.33 M/UL (ref 4.5–5.9)
SODIUM SERPL-SCNC: 138 MMOL/L (ref 135–144)
TROPONIN I SERPL HS-MCNC: 8 NG/L (ref 0–22)
TROPONIN I SERPL HS-MCNC: 9 NG/L (ref 0–22)
WBC OTHER # BLD: 9.3 K/UL (ref 3.5–11)

## 2025-01-13 PROCEDURE — 36415 COLL VENOUS BLD VENIPUNCTURE: CPT

## 2025-01-13 PROCEDURE — 71045 X-RAY EXAM CHEST 1 VIEW: CPT

## 2025-01-13 PROCEDURE — 99285 EMERGENCY DEPT VISIT HI MDM: CPT

## 2025-01-13 PROCEDURE — 93005 ELECTROCARDIOGRAM TRACING: CPT | Performed by: EMERGENCY MEDICINE

## 2025-01-13 PROCEDURE — 85025 COMPLETE CBC W/AUTO DIFF WBC: CPT

## 2025-01-13 PROCEDURE — 83735 ASSAY OF MAGNESIUM: CPT

## 2025-01-13 PROCEDURE — 84484 ASSAY OF TROPONIN QUANT: CPT

## 2025-01-13 PROCEDURE — 85379 FIBRIN DEGRADATION QUANT: CPT

## 2025-01-13 PROCEDURE — 80048 BASIC METABOLIC PNL TOTAL CA: CPT

## 2025-01-13 ASSESSMENT — PAIN - FUNCTIONAL ASSESSMENT: PAIN_FUNCTIONAL_ASSESSMENT: 0-10

## 2025-01-13 ASSESSMENT — PAIN SCALES - GENERAL: PAINLEVEL_OUTOF10: 0

## 2025-01-13 ASSESSMENT — HEART SCORE: ECG: NORMAL

## 2025-01-13 NOTE — ED PROVIDER NOTES
ago where he presented to the ED and workup was unremarkable.    On exam patient alert no acute distress. Heart/lung sounds normal. EKG normal. Patient's risk factors include history of hypertension, former smoker, strong family history of heart disease. Will order EKG, troponin, BMP, magnesium, chest x-ray to further evaluate. Additionally, will place patient on telemetry monitoring while he is here to further evaluate. Initial troponin 9, repeat 8.  Magnesium 2.2.  BUN slightly low at 25 but rest of BMP within normal limits. Chest x-ray showed mild chronic obstructive lung changes with mild segmental linear atelectasis or scarring along the right lung base extending laterally is consistent with patient's history of COPD. D-dimer 0.32. Patient's workup was grossly unremarkable. Patient remained stable during his visit. Patient was given a referral to follow-up with cardiologist Dr. Reyes for further evaluation. Additionally, patient was instructed to make an appointment with his PCP to follow-up in the next 2 to 3 days. Patient was also instructed to return to the ED if his symptoms worsen or he develops new concerning symptoms.    DIAGNOSTIC RESULTS     EKG: All EKG's are interpreted by the Emergency Department Physician who either signs or Co-signs this chart in the absence of a cardiologist.        RADIOLOGY:   Interpretation per the Radiologist below, if available at the time of this note:  XR CHEST PORTABLE   Final Result   Mild chronic obstructive lung changes with mild subsegmental linear   atelectasis or scarring along the right lung base extending laterally.             No results found.    LABS:  Results for orders placed or performed during the hospital encounter of 01/13/25   CBC with Auto Differential   Result Value Ref Range    WBC 9.3 3.5 - 11.0 k/uL    RBC 5.33 4.5 - 5.9 m/uL    Hemoglobin 16.8 13.5 - 17.5 g/dL    Hematocrit 48.3 41 - 53 %    MCV 90.6 80 - 100 fL    MCH 31.4 26 - 34 pg    MCHC 34.7

## 2025-01-14 LAB
EKG ATRIAL RATE: 67 BPM
EKG P AXIS: 74 DEGREES
EKG P-R INTERVAL: 136 MS
EKG Q-T INTERVAL: 384 MS
EKG QRS DURATION: 88 MS
EKG QTC CALCULATION (BAZETT): 405 MS
EKG R AXIS: 85 DEGREES
EKG T AXIS: 81 DEGREES
EKG VENTRICULAR RATE: 67 BPM

## 2025-01-14 NOTE — DISCHARGE INSTRUCTIONS
-I have given you referral to cardiology Dr. Reyes, please call to make an appointment and follow-up with him.  -Please make an appointment to follow-up with PCP in 2 to 3 days for close follow-up after your recent ED visit.  -If your symptoms worsen or you develop new concerning symptoms please return to the ED for further evaluation.

## 2025-02-10 ENCOUNTER — PATIENT MESSAGE (OUTPATIENT)
Dept: PRIMARY CARE CLINIC | Age: 60
End: 2025-02-10

## 2025-02-10 DIAGNOSIS — G89.29 CHRONIC MIDLINE LOW BACK PAIN WITHOUT SCIATICA: ICD-10-CM

## 2025-02-10 DIAGNOSIS — M79.2 RADICULAR PAIN IN LEFT ARM: ICD-10-CM

## 2025-02-10 DIAGNOSIS — M54.50 CHRONIC MIDLINE LOW BACK PAIN WITHOUT SCIATICA: ICD-10-CM

## 2025-02-10 DIAGNOSIS — M54.12 CERVICAL RADICULOPATHY: ICD-10-CM

## 2025-02-10 DIAGNOSIS — M15.0 PRIMARY OSTEOARTHRITIS INVOLVING MULTIPLE JOINTS: ICD-10-CM

## 2025-02-10 DIAGNOSIS — M51.379 DISC DISEASE, DEGENERATIVE, LUMBAR OR LUMBOSACRAL: ICD-10-CM

## 2025-02-10 RX ORDER — TRAMADOL HYDROCHLORIDE 50 MG/1
100 TABLET ORAL EVERY 8 HOURS PRN
Qty: 180 TABLET | Refills: 0 | Status: SHIPPED | OUTPATIENT
Start: 2025-02-10 | End: 2025-03-12

## 2025-02-10 RX ORDER — LORATADINE PSEUDOEPHEDRINE SULFATE 10; 240 MG/1; MG/1
1 TABLET, EXTENDED RELEASE ORAL DAILY
Qty: 90 TABLET | Refills: 3 | Status: SHIPPED | OUTPATIENT
Start: 2025-02-10

## 2025-02-12 ENCOUNTER — OFFICE VISIT (OUTPATIENT)
Age: 60
End: 2025-02-12
Payer: COMMERCIAL

## 2025-02-12 VITALS
WEIGHT: 205 LBS | HEART RATE: 67 BPM | BODY MASS INDEX: 27.05 KG/M2 | DIASTOLIC BLOOD PRESSURE: 85 MMHG | SYSTOLIC BLOOD PRESSURE: 142 MMHG | OXYGEN SATURATION: 95 %

## 2025-02-12 DIAGNOSIS — I10 ESSENTIAL HYPERTENSION: Chronic | ICD-10-CM

## 2025-02-12 DIAGNOSIS — G47.33 OBSTRUCTIVE SLEEP APNEA SYNDROME: Chronic | ICD-10-CM

## 2025-02-12 DIAGNOSIS — J41.0 SIMPLE CHRONIC BRONCHITIS (HCC): Chronic | ICD-10-CM

## 2025-02-12 DIAGNOSIS — R07.9 CHEST PAIN, UNSPECIFIED TYPE: Primary | ICD-10-CM

## 2025-02-12 DIAGNOSIS — Z87.891 FORMER SMOKER: Chronic | ICD-10-CM

## 2025-02-12 PROCEDURE — 3077F SYST BP >= 140 MM HG: CPT

## 2025-02-12 PROCEDURE — 3079F DIAST BP 80-89 MM HG: CPT

## 2025-02-12 PROCEDURE — 99214 OFFICE O/P EST MOD 30 MIN: CPT

## 2025-02-12 PROCEDURE — 1036F TOBACCO NON-USER: CPT

## 2025-02-12 PROCEDURE — G8419 CALC BMI OUT NRM PARAM NOF/U: HCPCS

## 2025-02-12 PROCEDURE — 3023F SPIROM DOC REV: CPT

## 2025-02-12 PROCEDURE — 3017F COLORECTAL CA SCREEN DOC REV: CPT

## 2025-02-12 PROCEDURE — G8428 CUR MEDS NOT DOCUMENT: HCPCS

## 2025-02-12 ASSESSMENT — ENCOUNTER SYMPTOMS
CHEST TIGHTNESS: 0
RESPIRATORY NEGATIVE: 1
SHORTNESS OF BREATH: 0

## 2025-02-12 NOTE — PROGRESS NOTES
Cardiology Office Consultation         Name: Dez Fernández   :  1965       Date: 25    CC: had concerns including Follow-Up from Hospital (DOS 25/Chest pressure, unspecified type with armpit pain).    HPI  Dez Fernández is here to establish cardiac care. No pertinent cardiac hx, does have a strong family hx of heart disease on his maternal and paternal side. He has been having intermittent episodes of chest pain since 2024, he has had multiple ER visits for this pain, no acute EKG changes or elevated troponin levels. Denies SOB, palpitations, lightheadedness, or syncope.       Past Medical:  Past Medical History:   Diagnosis Date    Anxiety 2020    Back pain     Bilateral tinnitus 2020    Cellulitis of right knee 2021    Chronic midline low back pain without sciatica 11/10/2016    Deviated septum 2020    Disc disease, degenerative, lumbar or lumbosacral     Ear drum perforation, right     Essential hypertension 11/10/2016    History of placement of ear tubes 2020    History of tobacco use 2020    Hyperlipidemia     Hypertension     Hypocalcemia 2021    Hyponatremia 2021    Lymph node enlargement     Normocytic normochromic anemia 2021    Obstructive sleep apnea syndrome 2017    Prepatellar bursitis of right knee 2021    Restless leg syndrome     Sensorineural hearing loss, bilateral 2020    Septic infrapatellar bursitis, left 2021    Somatic dysfunction of cervical region 2014       Past Surgical:  Past Surgical History:   Procedure Laterality Date    CYSTOSCOPY Right 2023    CYSTOSCOPY RIGHT URETERAL STENT INSERTION performed by Sly Garcia MD at [facility] OR    HERNIA REPAIR      KNEE SURGERY Right 2021    : KNEE INCISION AND DRAINAGE (Right )    KNEE SURGERY Right 2021    KNEE INCISION AND DRAINAGE performed by Gerson Hastings MD at [facility] OR    SINUS     10/25/24 0900   Team Meeting   Meeting Type Daily Rounds   Initial Conference Date 10/25/24   Team Members Present   Team Members Present Physician;Nurse;;Other (Discipline and Name);Occupational Therapist   Physician Team Member Isidro   Nursing Team Member Nikki Stoddard   Social Work Team Member Dariusz Hogue   OT Team Member Elan Sims   Other (Discipline and Name) Lobo Ferguson   Patient/Family Present   Patient Present No   Patient's Family Present No   Pt was in restraints yesterday due to attempting to elope off the unit. Pt received PO PRN last night due to increased behaviors. Pt's stimulants will be decreased. Pt is med/meal compliant and visible on the milieu. Pt participates in groups and engages with staff and peers. Pt denies all SI/SIB/AVH/HI at this time. Pt's projected discharge date is scheduled for 10/29/2024.

## 2025-02-24 ENCOUNTER — HOSPITAL ENCOUNTER (OUTPATIENT)
Dept: GENERAL RADIOLOGY | Age: 60
Discharge: HOME | End: 2025-02-26
Payer: COMMERCIAL

## 2025-02-24 ENCOUNTER — TELEPHONE (OUTPATIENT)
Dept: PRIMARY CARE CLINIC | Age: 60
End: 2025-02-24

## 2025-02-24 ENCOUNTER — OFFICE VISIT (OUTPATIENT)
Dept: PRIMARY CARE CLINIC | Age: 60
End: 2025-02-24
Payer: COMMERCIAL

## 2025-02-24 ENCOUNTER — HOSPITAL ENCOUNTER (OUTPATIENT)
Age: 60
Discharge: HOME | End: 2025-02-26
Payer: COMMERCIAL

## 2025-02-24 VITALS
BODY MASS INDEX: 27.6 KG/M2 | HEART RATE: 69 BPM | SYSTOLIC BLOOD PRESSURE: 136 MMHG | WEIGHT: 209.2 LBS | DIASTOLIC BLOOD PRESSURE: 84 MMHG | OXYGEN SATURATION: 92 % | TEMPERATURE: 98.3 F

## 2025-02-24 DIAGNOSIS — R05.3 PERSISTENT COUGH: ICD-10-CM

## 2025-02-24 DIAGNOSIS — J06.9 UPPER RESPIRATORY TRACT INFECTION, UNSPECIFIED TYPE: ICD-10-CM

## 2025-02-24 DIAGNOSIS — R07.9 CHEST PAIN, UNSPECIFIED TYPE: ICD-10-CM

## 2025-02-24 DIAGNOSIS — J44.1 COPD EXACERBATION (HCC): ICD-10-CM

## 2025-02-24 DIAGNOSIS — J44.1 COPD EXACERBATION (HCC): Primary | ICD-10-CM

## 2025-02-24 DIAGNOSIS — R06.02 SHORTNESS OF BREATH: ICD-10-CM

## 2025-02-24 PROCEDURE — 1036F TOBACCO NON-USER: CPT | Performed by: NURSE PRACTITIONER

## 2025-02-24 PROCEDURE — G8419 CALC BMI OUT NRM PARAM NOF/U: HCPCS | Performed by: NURSE PRACTITIONER

## 2025-02-24 PROCEDURE — 3075F SYST BP GE 130 - 139MM HG: CPT | Performed by: NURSE PRACTITIONER

## 2025-02-24 PROCEDURE — 3023F SPIROM DOC REV: CPT | Performed by: NURSE PRACTITIONER

## 2025-02-24 PROCEDURE — G8427 DOCREV CUR MEDS BY ELIG CLIN: HCPCS | Performed by: NURSE PRACTITIONER

## 2025-02-24 PROCEDURE — 3017F COLORECTAL CA SCREEN DOC REV: CPT | Performed by: NURSE PRACTITIONER

## 2025-02-24 PROCEDURE — 3079F DIAST BP 80-89 MM HG: CPT | Performed by: NURSE PRACTITIONER

## 2025-02-24 PROCEDURE — 71046 X-RAY EXAM CHEST 2 VIEWS: CPT

## 2025-02-24 PROCEDURE — 99214 OFFICE O/P EST MOD 30 MIN: CPT | Performed by: NURSE PRACTITIONER

## 2025-02-24 RX ORDER — PREDNISONE 20 MG/1
20 TABLET ORAL 2 TIMES DAILY
Qty: 14 TABLET | Refills: 0 | Status: SHIPPED | OUTPATIENT
Start: 2025-02-24 | End: 2025-03-03

## 2025-02-24 RX ORDER — LEVOFLOXACIN 500 MG/1
500 TABLET, FILM COATED ORAL DAILY
Qty: 10 TABLET | Refills: 0 | Status: SHIPPED | OUTPATIENT
Start: 2025-02-24 | End: 2025-03-06

## 2025-02-24 SDOH — ECONOMIC STABILITY: FOOD INSECURITY: WITHIN THE PAST 12 MONTHS, YOU WORRIED THAT YOUR FOOD WOULD RUN OUT BEFORE YOU GOT MONEY TO BUY MORE.: NEVER TRUE

## 2025-02-24 SDOH — ECONOMIC STABILITY: FOOD INSECURITY: WITHIN THE PAST 12 MONTHS, THE FOOD YOU BOUGHT JUST DIDN'T LAST AND YOU DIDN'T HAVE MONEY TO GET MORE.: NEVER TRUE

## 2025-02-24 ASSESSMENT — ENCOUNTER SYMPTOMS
ABDOMINAL PAIN: 0
TROUBLE SWALLOWING: 0
DIARRHEA: 0
SORE THROAT: 0
CONSTIPATION: 0
CHEST TIGHTNESS: 1
COUGH: 1
SHORTNESS OF BREATH: 1
VOMITING: 0
WHEEZING: 1
NAUSEA: 0
BLOOD IN STOOL: 0
SINUS PRESSURE: 0

## 2025-02-24 NOTE — TELEPHONE ENCOUNTER
Patient just left office and did not get work letter, left voice mail that he can get it off of my chart or can  from office.  Writer placed letter at .

## 2025-02-24 NOTE — PROGRESS NOTES
[facility]  [facility]  [address] [place] [address]  [address]  [facility] [address]  Dept: [phone]  Dept Fax: [phone]    Dez Fernández is a 59 y.o. male who presentstoday for his medical conditions/complaints as noted below.  Dez Fernández is c/o of  Chief Complaint   Patient presents with    Cough     Productive at time-clear x 3 wks  Coughing an hour straight yesterday    Headache         HPI:     History of Present Illness  The patient presents for evaluation of a persistent cough, feeling rundown and fatigued over the past 3 weeks.    He has been experiencing an intermittent, predominantly dry cough for the past 3 weeks, which occasionally becomes productive. The frequency of the cough has resulted in fatigue and soreness in his chest. He reports experiencing shortness of breath during physical activities, such as entering his vehicle and fastening his seatbelt, although it is not severe and resolves after a few breaths. He reports no sinus congestion but mentions a persistent crackling sound in his right ear.  He has been utilizing his inhaler and has undergone 3 breathing treatments. His current medication regimen includes Mucinex decongestant, having discontinued Claritin after the first week of symptoms. He has a history of pneumonia and COPD.  Despite his illness he has maintained his regular work schedule.    Reports he has sought medical attention for chest pain on several occasions, including a visit in 01/2025   He had a consultation with a cardiologist last week. Despite normal EKG and blood test results, he continues to experience unusual chest pains. A stress test has been recommended by his cardiologist for further evaluation.      No results found for: \"LABA1C\"          ( goal A1C is < 7)   No components found for: \"LABMICR\"  No components found for: \"LDLCHOLESTEROL\", \"LDLCALC\"    (goal LDL is <100)   AST (U/L)   Date Value   06/26/2024 23     ALT (U/L)

## 2025-03-10 DIAGNOSIS — M15.0 PRIMARY OSTEOARTHRITIS INVOLVING MULTIPLE JOINTS: ICD-10-CM

## 2025-03-10 DIAGNOSIS — M54.50 CHRONIC MIDLINE LOW BACK PAIN WITHOUT SCIATICA: ICD-10-CM

## 2025-03-10 DIAGNOSIS — M79.2 RADICULAR PAIN IN LEFT ARM: ICD-10-CM

## 2025-03-10 DIAGNOSIS — G89.29 CHRONIC MIDLINE LOW BACK PAIN WITHOUT SCIATICA: ICD-10-CM

## 2025-03-10 DIAGNOSIS — M54.12 CERVICAL RADICULOPATHY: ICD-10-CM

## 2025-03-10 DIAGNOSIS — M51.379 DISC DISEASE, DEGENERATIVE, LUMBAR OR LUMBOSACRAL: ICD-10-CM

## 2025-03-10 RX ORDER — TRAMADOL HYDROCHLORIDE 50 MG/1
100 TABLET ORAL EVERY 8 HOURS PRN
Qty: 180 TABLET | Refills: 0 | Status: SHIPPED | OUTPATIENT
Start: 2025-03-10 | End: 2025-04-09

## 2025-03-11 ENCOUNTER — TELEPHONE (OUTPATIENT)
Dept: PRIMARY CARE CLINIC | Age: 60
End: 2025-03-11

## 2025-03-11 ENCOUNTER — OFFICE VISIT (OUTPATIENT)
Dept: PRIMARY CARE CLINIC | Age: 60
End: 2025-03-11

## 2025-03-11 VITALS
DIASTOLIC BLOOD PRESSURE: 78 MMHG | WEIGHT: 204.8 LBS | HEART RATE: 79 BPM | OXYGEN SATURATION: 94 % | BODY MASS INDEX: 27.02 KG/M2 | SYSTOLIC BLOOD PRESSURE: 128 MMHG

## 2025-03-11 DIAGNOSIS — Z09 HOSPITAL DISCHARGE FOLLOW-UP: Primary | ICD-10-CM

## 2025-03-11 DIAGNOSIS — M51.370 DEGENERATION OF INTERVERTEBRAL DISC OF LUMBOSACRAL REGION WITH DISCOGENIC BACK PAIN: ICD-10-CM

## 2025-03-11 DIAGNOSIS — M54.50 CHRONIC MIDLINE LOW BACK PAIN WITHOUT SCIATICA: ICD-10-CM

## 2025-03-11 DIAGNOSIS — G89.29 CHRONIC MIDLINE LOW BACK PAIN WITHOUT SCIATICA: ICD-10-CM

## 2025-03-11 DIAGNOSIS — G44.52 NEW DAILY PERSISTENT HEADACHE: ICD-10-CM

## 2025-03-11 DIAGNOSIS — M15.0 PRIMARY OSTEOARTHRITIS INVOLVING MULTIPLE JOINTS: ICD-10-CM

## 2025-03-11 DIAGNOSIS — I21.4 NSTEMI (NON-ST ELEVATED MYOCARDIAL INFARCTION) (HCC): ICD-10-CM

## 2025-03-11 DIAGNOSIS — M54.12 CERVICAL RADICULOPATHY: ICD-10-CM

## 2025-03-11 DIAGNOSIS — I25.118 CORONARY ARTERY DISEASE OF NATIVE ARTERY OF NATIVE HEART WITH STABLE ANGINA PECTORIS: ICD-10-CM

## 2025-03-11 DIAGNOSIS — R45.89 ANXIETY ABOUT HEALTH: ICD-10-CM

## 2025-03-11 RX ORDER — NITROGLYCERIN 0.4 MG/1
0.4 TABLET SUBLINGUAL EVERY 5 MIN PRN
COMMUNITY

## 2025-03-11 RX ORDER — METOPROLOL TARTRATE 25 MG/1
25 TABLET, FILM COATED ORAL DAILY
COMMUNITY
End: 2025-03-11 | Stop reason: ALTCHOICE

## 2025-03-11 RX ORDER — ATORVASTATIN CALCIUM 40 MG/1
40 TABLET, FILM COATED ORAL DAILY
COMMUNITY

## 2025-03-11 RX ORDER — BUTALBITAL, ACETAMINOPHEN AND CAFFEINE 50; 325; 40 MG/1; MG/1; MG/1
1 TABLET ORAL EVERY 6 HOURS PRN
Qty: 120 TABLET | Refills: 3 | Status: SHIPPED | OUTPATIENT
Start: 2025-03-11

## 2025-03-11 RX ORDER — METOPROLOL SUCCINATE 25 MG/1
25 TABLET, EXTENDED RELEASE ORAL DAILY
COMMUNITY

## 2025-03-11 RX ORDER — ASPIRIN 81 MG/1
81 TABLET, CHEWABLE ORAL DAILY
COMMUNITY

## 2025-03-11 RX ORDER — ISOSORBIDE MONONITRATE 30 MG/1
30 TABLET, EXTENDED RELEASE ORAL DAILY
COMMUNITY

## 2025-03-11 ASSESSMENT — ENCOUNTER SYMPTOMS
CONSTIPATION: 0
VOMITING: 0
SORE THROAT: 0
DIARRHEA: 0
COUGH: 0
ABDOMINAL PAIN: 0
BLOOD IN STOOL: 0
TROUBLE SWALLOWING: 0
SHORTNESS OF BREATH: 0
NAUSEA: 0
BACK PAIN: 1
SINUS PRESSURE: 0
WHEEZING: 0

## 2025-03-11 NOTE — PROGRESS NOTES
[facility]  [facility]  [address] [place] [address]  [address]  [facility] [address]  Dept: [phone]  Dept Fax: [phone]    Dez Fernández is a 59 y.o. male who presentstoday for his medical conditions/complaints as noted below.  Dez Fernández is c/o of  Chief Complaint   Patient presents with    Follow-Up from Hospital     Nstemi     Headache     daily    Anxiety     Comes and goes but very concerned about have another MI           HPI:     History of Present Illness  The patient presents for follow-up for hospital stay from NSTEMI  He experienced a significant episode of chest pain radiating to his arm while at work a week ago on Friday, necessitating immediate hospitalization. Despite initial plans for a stress test, the procedure was expedited due to the severity of his symptoms. A cardiac catheterization revealed a 50 percent blockage in one of his arteries, which was deemed insufficient for stenting. During his recovery, he experienced severe pain and unresponsiveness, prompting an urgent MRI to rule out stroke. He remained hospitalized from Tuesday to Friday, undergoing various tests without any further episodes. However, on Wednesday, he reported a sensation of flushing and mild confusion, leading to an EEG and 24-hour monitoring, which yielded no significant findings. He was discharged with medications and advised to return to work with caution.  Returned to work yesterday   He has noticed an improvement in his breathing and activity tolerance since starting new medications and has not required his inhaler. He completed a 7-day course of antibiotics during his hospital stay, which resolved his congestion. He is currently on lisinopril, isosorbide mononitrate, metoprolol tartrate, and nitroglycerin as needed. He has a follow-up appointment with his cardiologist scheduled for next Tuesday.     He was experiencing severe headaches, which were initially managed with

## 2025-03-11 NOTE — TELEPHONE ENCOUNTER
Care Transitions Initial Follow Up Call    Outreach made within 2 business days of discharge: Yes    Patient: Dez Fernández Patient : 1965   MRN: [id number]  Reason for Admission:   Discharge Date: 3/10/2025      Spoke with: Dez Fernández    Discharge department/facility: [facility]    TCM Interactive Patient Contact:  Was patient able to fill all prescriptions: Yes  Was patient instructed to bring all medications to the follow-up visit: Yes  Is patient taking all medications as directed in the discharge summary? Yes  Does patient understand their discharge instructions: Yes  Does patient have questions or concerns that need addressed prior to 7-14 day follow up office visit: no    Additional needs identified to be addressed with provider  No needs identified    Scheduled for 3/11/2025 at 10:40am         Scheduled appointment with PCP within 7-14 days    Follow Up  Future Appointments   Date Time Provider Department Center   2025  3:00 PM Gaby Franklin APRN - CNP [facility]       Juan Ramon Love MA

## 2025-04-07 DIAGNOSIS — J44.9 CHRONIC OBSTRUCTIVE PULMONARY DISEASE, UNSPECIFIED COPD TYPE (HCC): ICD-10-CM

## 2025-04-07 RX ORDER — FLUTICASONE PROPIONATE AND SALMETEROL 100; 50 UG/1; UG/1
POWDER RESPIRATORY (INHALATION)
Qty: 60 EACH | Refills: 5 | Status: SHIPPED | OUTPATIENT
Start: 2025-04-07

## 2025-04-09 DIAGNOSIS — G89.29 CHRONIC MIDLINE LOW BACK PAIN WITHOUT SCIATICA: ICD-10-CM

## 2025-04-09 DIAGNOSIS — M54.12 CERVICAL RADICULOPATHY: ICD-10-CM

## 2025-04-09 DIAGNOSIS — M54.50 CHRONIC MIDLINE LOW BACK PAIN WITHOUT SCIATICA: ICD-10-CM

## 2025-04-09 DIAGNOSIS — M51.379 DISC DISEASE, DEGENERATIVE, LUMBAR OR LUMBOSACRAL: ICD-10-CM

## 2025-04-09 DIAGNOSIS — M79.2 RADICULAR PAIN IN LEFT ARM: ICD-10-CM

## 2025-04-09 DIAGNOSIS — M15.0 PRIMARY OSTEOARTHRITIS INVOLVING MULTIPLE JOINTS: ICD-10-CM

## 2025-04-09 RX ORDER — TRAMADOL HYDROCHLORIDE 50 MG/1
100 TABLET ORAL EVERY 8 HOURS PRN
Qty: 180 TABLET | Refills: 0 | Status: SHIPPED | OUTPATIENT
Start: 2025-04-09 | End: 2025-05-09

## 2025-04-11 ENCOUNTER — TELEPHONE (OUTPATIENT)
Dept: PRIMARY CARE CLINIC | Age: 60
End: 2025-04-11

## 2025-04-15 ENCOUNTER — OFFICE VISIT (OUTPATIENT)
Dept: PRIMARY CARE CLINIC | Age: 60
End: 2025-04-15

## 2025-04-15 VITALS
DIASTOLIC BLOOD PRESSURE: 76 MMHG | OXYGEN SATURATION: 93 % | HEART RATE: 82 BPM | BODY MASS INDEX: 27.23 KG/M2 | WEIGHT: 206.4 LBS | SYSTOLIC BLOOD PRESSURE: 128 MMHG

## 2025-04-15 DIAGNOSIS — F32.A ANXIETY AND DEPRESSION: ICD-10-CM

## 2025-04-15 DIAGNOSIS — R45.89 ANXIETY ABOUT HEALTH: ICD-10-CM

## 2025-04-15 DIAGNOSIS — F41.9 ANXIETY AND DEPRESSION: ICD-10-CM

## 2025-04-15 DIAGNOSIS — I50.9 CHRONIC HEART FAILURE, UNSPECIFIED HEART FAILURE TYPE (HCC): ICD-10-CM

## 2025-04-15 DIAGNOSIS — Z09 HOSPITAL DISCHARGE FOLLOW-UP: Primary | ICD-10-CM

## 2025-04-15 DIAGNOSIS — I25.10 ASCVD (ARTERIOSCLEROTIC CARDIOVASCULAR DISEASE): ICD-10-CM

## 2025-04-15 DIAGNOSIS — Z95.5 S/P PRIMARY ANGIOPLASTY WITH CORONARY STENT: ICD-10-CM

## 2025-04-15 PROBLEM — R07.9 CHEST PAIN: Status: ACTIVE | Noted: 2025-03-24

## 2025-04-15 RX ORDER — HYDROXYZINE HYDROCHLORIDE 25 MG/1
25-50 TABLET, FILM COATED ORAL EVERY 6 HOURS PRN
Qty: 120 TABLET | Refills: 5 | Status: SHIPPED | OUTPATIENT
Start: 2025-04-15

## 2025-04-15 RX ORDER — PRASUGREL 10 MG/1
10 TABLET, FILM COATED ORAL DAILY
COMMUNITY

## 2025-04-15 RX ORDER — RANOLAZINE 500 MG/1
500 TABLET, EXTENDED RELEASE ORAL 2 TIMES DAILY
COMMUNITY

## 2025-04-15 RX ORDER — ESCITALOPRAM OXALATE 10 MG/1
10 TABLET ORAL DAILY
Qty: 30 TABLET | Refills: 5 | Status: SHIPPED | OUTPATIENT
Start: 2025-04-15

## 2025-04-15 RX ORDER — AMLODIPINE BESYLATE 5 MG/1
5 TABLET ORAL DAILY
COMMUNITY

## 2025-04-15 ASSESSMENT — ENCOUNTER SYMPTOMS
CONSTIPATION: 0
ABDOMINAL PAIN: 0
COUGH: 0
TROUBLE SWALLOWING: 0
NAUSEA: 0
SINUS PRESSURE: 0
SORE THROAT: 0
WHEEZING: 0
SHORTNESS OF BREATH: 0
BLOOD IN STOOL: 0
DIARRHEA: 0
VOMITING: 0

## 2025-04-15 NOTE — PROGRESS NOTES
for Pain 30 tablet 0    acetaminophen (EQ ACETAMINOPHEN) 325 MG tablet Take 2 tablets by mouth every 6 hours as needed for Pain 120 tablet 0    Multiple Vitamins-Minerals (MULTI FOR HIM 50+ PO) Take by mouth          Medications patient taking as of now reconciled against medications ordered at time of hospital discharge: Yes    Objective:    /76   Pulse 82   Wt 93.6 kg (206 lb 6.4 oz)   SpO2 93%   BMI 27.23 kg/m²     An electronic signature was used to authenticate this note.  --Gaby Franklin, APRN - CNP

## 2025-05-08 DIAGNOSIS — M54.12 CERVICAL RADICULOPATHY: ICD-10-CM

## 2025-05-08 DIAGNOSIS — M79.2 RADICULAR PAIN IN LEFT ARM: ICD-10-CM

## 2025-05-08 DIAGNOSIS — M54.50 CHRONIC MIDLINE LOW BACK PAIN WITHOUT SCIATICA: ICD-10-CM

## 2025-05-08 DIAGNOSIS — M51.379 DISC DISEASE, DEGENERATIVE, LUMBAR OR LUMBOSACRAL: ICD-10-CM

## 2025-05-08 DIAGNOSIS — G89.29 CHRONIC MIDLINE LOW BACK PAIN WITHOUT SCIATICA: ICD-10-CM

## 2025-05-08 DIAGNOSIS — M15.0 PRIMARY OSTEOARTHRITIS INVOLVING MULTIPLE JOINTS: ICD-10-CM

## 2025-05-08 RX ORDER — TRAMADOL HYDROCHLORIDE 50 MG/1
100 TABLET ORAL EVERY 8 HOURS PRN
Qty: 180 TABLET | Refills: 0 | Status: SHIPPED | OUTPATIENT
Start: 2025-05-08 | End: 2025-06-07

## 2025-06-02 ENCOUNTER — PATIENT MESSAGE (OUTPATIENT)
Dept: PRIMARY CARE CLINIC | Age: 60
End: 2025-06-02

## 2025-06-02 RX ORDER — AZITHROMYCIN 250 MG/1
TABLET, FILM COATED ORAL
Qty: 1 PACKET | Refills: 0 | Status: SHIPPED | OUTPATIENT
Start: 2025-06-02

## 2025-06-07 DIAGNOSIS — G89.29 CHRONIC MIDLINE LOW BACK PAIN WITHOUT SCIATICA: ICD-10-CM

## 2025-06-07 DIAGNOSIS — M51.379 DISC DISEASE, DEGENERATIVE, LUMBAR OR LUMBOSACRAL: ICD-10-CM

## 2025-06-07 DIAGNOSIS — M54.50 CHRONIC MIDLINE LOW BACK PAIN WITHOUT SCIATICA: ICD-10-CM

## 2025-06-07 DIAGNOSIS — M54.12 CERVICAL RADICULOPATHY: ICD-10-CM

## 2025-06-07 DIAGNOSIS — M15.0 PRIMARY OSTEOARTHRITIS INVOLVING MULTIPLE JOINTS: ICD-10-CM

## 2025-06-07 DIAGNOSIS — M79.2 RADICULAR PAIN IN LEFT ARM: ICD-10-CM

## 2025-06-09 RX ORDER — LORATADINE PSEUDOEPHEDRINE SULFATE 10; 240 MG/1; MG/1
1 TABLET, EXTENDED RELEASE ORAL DAILY
Qty: 90 TABLET | Refills: 3 | Status: SHIPPED | OUTPATIENT
Start: 2025-06-09

## 2025-06-09 RX ORDER — TRAMADOL HYDROCHLORIDE 50 MG/1
100 TABLET ORAL EVERY 8 HOURS PRN
Qty: 180 TABLET | Refills: 0 | Status: SHIPPED | OUTPATIENT
Start: 2025-06-09 | End: 2025-07-09

## 2025-07-06 DIAGNOSIS — M54.12 CERVICAL RADICULOPATHY: ICD-10-CM

## 2025-07-06 DIAGNOSIS — M79.2 RADICULAR PAIN IN LEFT ARM: ICD-10-CM

## 2025-07-06 DIAGNOSIS — M54.50 CHRONIC MIDLINE LOW BACK PAIN WITHOUT SCIATICA: ICD-10-CM

## 2025-07-06 DIAGNOSIS — M51.379 DISC DISEASE, DEGENERATIVE, LUMBAR OR LUMBOSACRAL: ICD-10-CM

## 2025-07-06 DIAGNOSIS — G89.29 CHRONIC MIDLINE LOW BACK PAIN WITHOUT SCIATICA: ICD-10-CM

## 2025-07-06 DIAGNOSIS — M15.0 PRIMARY OSTEOARTHRITIS INVOLVING MULTIPLE JOINTS: ICD-10-CM

## 2025-07-07 RX ORDER — LORATADINE PSEUDOEPHEDRINE SULFATE 10; 240 MG/1; MG/1
1 TABLET, EXTENDED RELEASE ORAL DAILY
Qty: 90 TABLET | Refills: 3 | Status: SHIPPED | OUTPATIENT
Start: 2025-07-07 | End: 2025-08-04 | Stop reason: SDUPTHER

## 2025-07-07 RX ORDER — TRAMADOL HYDROCHLORIDE 50 MG/1
100 TABLET ORAL EVERY 8 HOURS PRN
Qty: 180 TABLET | Refills: 0 | Status: SHIPPED | OUTPATIENT
Start: 2025-07-07 | End: 2025-08-04 | Stop reason: SDUPTHER

## 2025-07-14 ENCOUNTER — PATIENT MESSAGE (OUTPATIENT)
Dept: PRIMARY CARE CLINIC | Age: 60
End: 2025-07-14

## 2025-07-14 RX ORDER — AMOXICILLIN 875 MG/1
875 TABLET, COATED ORAL 2 TIMES DAILY
Qty: 20 TABLET | Refills: 0 | Status: SHIPPED | OUTPATIENT
Start: 2025-07-14 | End: 2025-07-24

## 2025-08-01 ENCOUNTER — OFFICE VISIT (OUTPATIENT)
Dept: PRIMARY CARE CLINIC | Age: 60
End: 2025-08-01
Payer: COMMERCIAL

## 2025-08-01 VITALS
HEART RATE: 53 BPM | WEIGHT: 205.2 LBS | OXYGEN SATURATION: 98 % | BODY MASS INDEX: 27.07 KG/M2 | DIASTOLIC BLOOD PRESSURE: 80 MMHG | SYSTOLIC BLOOD PRESSURE: 126 MMHG

## 2025-08-01 DIAGNOSIS — R35.1 NOCTURIA: ICD-10-CM

## 2025-08-01 DIAGNOSIS — M51.370 DEGENERATION OF INTERVERTEBRAL DISC OF LUMBOSACRAL REGION WITH DISCOGENIC BACK PAIN: ICD-10-CM

## 2025-08-01 DIAGNOSIS — M15.0 PRIMARY OSTEOARTHRITIS INVOLVING MULTIPLE JOINTS: Primary | ICD-10-CM

## 2025-08-01 DIAGNOSIS — J41.0 SIMPLE CHRONIC BRONCHITIS (HCC): Chronic | ICD-10-CM

## 2025-08-01 DIAGNOSIS — R45.89 ANXIETY ABOUT HEALTH: ICD-10-CM

## 2025-08-01 DIAGNOSIS — G89.29 CHRONIC MIDLINE LOW BACK PAIN WITHOUT SCIATICA: ICD-10-CM

## 2025-08-01 DIAGNOSIS — Z95.5 S/P PRIMARY ANGIOPLASTY WITH CORONARY STENT: ICD-10-CM

## 2025-08-01 DIAGNOSIS — I25.10 ASCVD (ARTERIOSCLEROTIC CARDIOVASCULAR DISEASE): ICD-10-CM

## 2025-08-01 DIAGNOSIS — Z12.5 SCREENING FOR PROSTATE CANCER: ICD-10-CM

## 2025-08-01 DIAGNOSIS — R39.198 DECREASED URINE STREAM: ICD-10-CM

## 2025-08-01 DIAGNOSIS — I10 ESSENTIAL HYPERTENSION: Chronic | ICD-10-CM

## 2025-08-01 DIAGNOSIS — M79.2 RADICULAR PAIN IN LEFT ARM: ICD-10-CM

## 2025-08-01 DIAGNOSIS — Z12.11 SCREEN FOR COLON CANCER: ICD-10-CM

## 2025-08-01 DIAGNOSIS — M54.50 CHRONIC MIDLINE LOW BACK PAIN WITHOUT SCIATICA: ICD-10-CM

## 2025-08-01 DIAGNOSIS — M54.12 CERVICAL RADICULOPATHY: ICD-10-CM

## 2025-08-01 DIAGNOSIS — S99.912A ANKLE INJURIES, LEFT, INITIAL ENCOUNTER: ICD-10-CM

## 2025-08-01 PROBLEM — M54.2 PAIN IN NECK: Status: RESOLVED | Noted: 2022-08-23 | Resolved: 2025-08-01

## 2025-08-01 PROBLEM — H90.3 SENSORINEURAL HEARING LOSS, BILATERAL: Status: RESOLVED | Noted: 2020-05-26 | Resolved: 2025-08-01

## 2025-08-01 PROBLEM — N20.0 LEFT NEPHROLITHIASIS: Status: RESOLVED | Noted: 2023-12-20 | Resolved: 2025-08-01

## 2025-08-01 PROBLEM — I50.9 CHRONIC HEART FAILURE (HCC): Status: RESOLVED | Noted: 2025-03-18 | Resolved: 2025-08-01

## 2025-08-01 PROBLEM — D64.9 NORMOCYTIC NORMOCHROMIC ANEMIA: Status: RESOLVED | Noted: 2021-05-20 | Resolved: 2025-08-01

## 2025-08-01 PROBLEM — R07.9 CHEST PAIN: Status: RESOLVED | Noted: 2025-03-24 | Resolved: 2025-08-01

## 2025-08-01 PROCEDURE — 1036F TOBACCO NON-USER: CPT | Performed by: NURSE PRACTITIONER

## 2025-08-01 PROCEDURE — 3074F SYST BP LT 130 MM HG: CPT | Performed by: NURSE PRACTITIONER

## 2025-08-01 PROCEDURE — 3079F DIAST BP 80-89 MM HG: CPT | Performed by: NURSE PRACTITIONER

## 2025-08-01 PROCEDURE — 99214 OFFICE O/P EST MOD 30 MIN: CPT | Performed by: NURSE PRACTITIONER

## 2025-08-01 PROCEDURE — G8427 DOCREV CUR MEDS BY ELIG CLIN: HCPCS | Performed by: NURSE PRACTITIONER

## 2025-08-01 PROCEDURE — 3017F COLORECTAL CA SCREEN DOC REV: CPT | Performed by: NURSE PRACTITIONER

## 2025-08-01 PROCEDURE — 3023F SPIROM DOC REV: CPT | Performed by: NURSE PRACTITIONER

## 2025-08-01 PROCEDURE — G8419 CALC BMI OUT NRM PARAM NOF/U: HCPCS | Performed by: NURSE PRACTITIONER

## 2025-08-01 ASSESSMENT — ENCOUNTER SYMPTOMS
TROUBLE SWALLOWING: 0
BACK PAIN: 1
SINUS PRESSURE: 0
BLOOD IN STOOL: 0
ABDOMINAL PAIN: 0
CONSTIPATION: 0
DIARRHEA: 0
COUGH: 0
VOMITING: 0
SHORTNESS OF BREATH: 0
WHEEZING: 0
NAUSEA: 0
SORE THROAT: 0

## 2025-08-01 NOTE — PROGRESS NOTES
[facility]  [facility]  [address] [place] [address] [address]  [address]  [facility] [address]  Dept: [phone]  Dept Fax: [phone]    Dez Fernández is a 60 y.o. male who presentstoday for his medical conditions/complaints as noted below.  Dez Fernández is c/o of  Chief Complaint   Patient presents with    Anxiety    Hypertension    Other     Due for lipids  Had tooth extracted yesterday.    Foot Pain     Injured left foot at work-advised we do not see workers comp, patient stated he did file a incident report.         HPI:     History of Present Illness  The patient presents for follow-up    He has been prescribed nitroglycerin but has not used it since his stent placement. He is curious about the expiration date of the medication as he was informed that each package contains 4 bottles, and he has 2 packages. He plans to inform his cardiologist about his non-use of the medication during his next visit in 08/2025. He was advised to discontinue lisinopril as he is currently on amlodipine and metoprolol.  He reports feeling stable since his stent placement, with only occasional mild pain that he describes as a pinch rather than a cardiac spasm. He was informed that this could be due to the stent settling.     He experienced an accident at work where he kicked a skid steer, resulting in a bruise on his foot. The area of impact swelled to the size of a golf ball but reduced after applying ice. He reports soreness in the area, which he believes may be due to a tendon injury. The pain is most noticeable when he first stands up or after sitting for over an hour, causing him to limp. However, the pain subsides after walking around. The area is also sensitive to touch. He initiated a report to his employer but has not had medical evaluation yet     He has been using albuterol and Advair for his breathing issues.  Denies any recent exacerbation    He has noticed changes in his urination pattern

## 2025-08-04 DIAGNOSIS — M51.379 DISC DISEASE, DEGENERATIVE, LUMBAR OR LUMBOSACRAL: ICD-10-CM

## 2025-08-04 DIAGNOSIS — G89.29 CHRONIC MIDLINE LOW BACK PAIN WITHOUT SCIATICA: ICD-10-CM

## 2025-08-04 DIAGNOSIS — M54.12 CERVICAL RADICULOPATHY: ICD-10-CM

## 2025-08-04 DIAGNOSIS — M54.50 CHRONIC MIDLINE LOW BACK PAIN WITHOUT SCIATICA: ICD-10-CM

## 2025-08-04 DIAGNOSIS — M79.2 RADICULAR PAIN IN LEFT ARM: ICD-10-CM

## 2025-08-04 DIAGNOSIS — M15.0 PRIMARY OSTEOARTHRITIS INVOLVING MULTIPLE JOINTS: ICD-10-CM

## 2025-08-05 RX ORDER — TRAMADOL HYDROCHLORIDE 50 MG/1
100 TABLET ORAL EVERY 8 HOURS PRN
Qty: 180 TABLET | Refills: 0 | Status: SHIPPED | OUTPATIENT
Start: 2025-08-05 | End: 2025-09-04

## 2025-08-05 RX ORDER — LORATADINE PSEUDOEPHEDRINE SULFATE 10; 240 MG/1; MG/1
1 TABLET, EXTENDED RELEASE ORAL DAILY
Qty: 90 TABLET | Refills: 3 | Status: SHIPPED | OUTPATIENT
Start: 2025-08-05

## 2025-08-05 RX ORDER — OMEPRAZOLE 20 MG/1
CAPSULE, DELAYED RELEASE ORAL
Qty: 90 CAPSULE | Refills: 3 | Status: SHIPPED | OUTPATIENT
Start: 2025-08-05

## 2025-09-01 DIAGNOSIS — M54.12 CERVICAL RADICULOPATHY: ICD-10-CM

## 2025-09-01 DIAGNOSIS — M51.379 DISC DISEASE, DEGENERATIVE, LUMBAR OR LUMBOSACRAL: ICD-10-CM

## 2025-09-01 DIAGNOSIS — G89.29 CHRONIC MIDLINE LOW BACK PAIN WITHOUT SCIATICA: ICD-10-CM

## 2025-09-01 DIAGNOSIS — M54.50 CHRONIC MIDLINE LOW BACK PAIN WITHOUT SCIATICA: ICD-10-CM

## 2025-09-01 DIAGNOSIS — M15.0 PRIMARY OSTEOARTHRITIS INVOLVING MULTIPLE JOINTS: ICD-10-CM

## 2025-09-01 DIAGNOSIS — M79.2 RADICULAR PAIN IN LEFT ARM: ICD-10-CM

## 2025-09-02 RX ORDER — TRAMADOL HYDROCHLORIDE 50 MG/1
100 TABLET ORAL EVERY 8 HOURS PRN
Qty: 180 TABLET | Refills: 0 | Status: SHIPPED | OUTPATIENT
Start: 2025-09-02 | End: 2025-10-02

## (undated) DEVICE — BANDAGE,SELF ADHRNT,COFLEX,4"X5YD,STRL: Brand: COLABEL

## (undated) DEVICE — SINGLE PORT MANIFOLD: Brand: NEPTUNE 2

## (undated) DEVICE — SUTURE PROL SZ 2-0 L18IN NONABSORBABLE BLU FS L26MM 3/8 CIR 8685H

## (undated) DEVICE — SHEET, ORTHO, SPLIT, STERILE: Brand: MEDLINE

## (undated) DEVICE — SWAB CULT CLR BLU PLAS RAYON LIQ AMIES AERB ANAERB FRIC CAP

## (undated) DEVICE — 3M™ IOBAN™ 2 ANTIMICROBIAL INCISE DRAPE 6651EZ: Brand: IOBAN™ 2

## (undated) DEVICE — YANKAUER,OPEN TIP,W/O VENT,STERILE: Brand: MEDLINE INDUSTRIES, INC.

## (undated) DEVICE — DRESSING,GAUZE,XEROFORM,CURAD,1"X8",ST: Brand: CURAD

## (undated) DEVICE — CUFF TRNQT 30 X 4 1BLA 1PRT QUICK CONNECT

## (undated) DEVICE — COVER,TABLE,60X90,STERILE: Brand: MEDLINE

## (undated) DEVICE — GLOVE ORANGE PI 7   MSG9070

## (undated) DEVICE — ST CHARLES MINOR ABDOMINAL PK: Brand: MEDLINE INDUSTRIES, INC.

## (undated) DEVICE — GLOVE ORANGE PI 7 1/2   MSG9075

## (undated) DEVICE — 3M™ IOBAN™ 2 ANTIMICROBIAL INCISE DRAPE 6650EZ: Brand: IOBAN™ 2

## (undated) DEVICE — GOWN,SIRUS,NONRNF,SETINSLV,XL,20/CS: Brand: MEDLINE

## (undated) DEVICE — DRAIN,WOUND,15FR,3/16,FULL-FLUTED: Brand: MEDLINE

## (undated) DEVICE — SPONGE LAP W18XL18IN WHT COT 4 PLY FLD STRUNG RADPQ DISP ST

## (undated) DEVICE — RESERVOIR,SUCTION,100CC,SILICONE: Brand: MEDLINE

## (undated) DEVICE — DRAPE,U/ SHT,SPLIT,PLAS,STERIL: Brand: MEDLINE

## (undated) DEVICE — STRAP,POSITIONING,KNEE/BODY,FOAM,4X60": Brand: MEDLINE

## (undated) DEVICE — SOLUTION IV IRRIG POUR BRL 0.9% SODIUM CHL 2F7124

## (undated) DEVICE — STOCKINETTE,IMPERVIOUS,12X48,STERILE: Brand: MEDLINE

## (undated) DEVICE — COVER,MAYO STAND,XL,STERILE: Brand: MEDLINE

## (undated) DEVICE — SUTURE NONABSORBABLE MONOFILAMENT 2-0 FS 18 IN ETHILON 664H